# Patient Record
Sex: FEMALE | Race: WHITE | NOT HISPANIC OR LATINO | Employment: OTHER | URBAN - METROPOLITAN AREA
[De-identification: names, ages, dates, MRNs, and addresses within clinical notes are randomized per-mention and may not be internally consistent; named-entity substitution may affect disease eponyms.]

---

## 2019-10-22 ENCOUNTER — TELEPHONE (OUTPATIENT)
Dept: FAMILY MEDICINE | Facility: CLINIC | Age: 59
End: 2019-10-22

## 2019-10-22 DIAGNOSIS — Z79.899 ENCOUNTER FOR LONG-TERM (CURRENT) USE OF OTHER MEDICATIONS: ICD-10-CM

## 2019-10-22 DIAGNOSIS — E78.5 HYPERLIPEMIA: ICD-10-CM

## 2019-10-22 DIAGNOSIS — E03.9 HYPOTHYROID: Primary | ICD-10-CM

## 2019-10-22 NOTE — TELEPHONE ENCOUNTER
From eCW:  VERONIKA NEGRON 7/1/2019 12:53:34 PM > Slightly overtreating thyroid. Lets reduce levothyroxine to 88 mcg and rechedk TSH in 3 months. Cholesterol is slightly elevated as well but not worrisome. Encourage a healthy diet and exercise regimen if possible. Can recheck cholesterol in 3 months as well.**QUEST/EARLY OCT** Call with any questions. CHELE STAFFORD 7/1/2019 1:19:56 PM > lab call    10/22 no answer/ba

## 2019-10-22 NOTE — LETTER
[address] [facility] [address]  [place] [address]  Phone: [phone]   [phone]    MD Jamie Boogie MD Chequita Williams, MD Matthew Bassett, PA-C Linda Melerine, NP Jodi Powell, NP      Date: 10/30/2019        Patient: Antonieta Clay  YOB: 1960      Dear Ms Clay,         We have been unable to reach you by phone.  It is time to recheck your cholesterol and thyroid.  Your lab order has been sent to [facility].  Please fast for 8 hours before your blood draw but be sure to drink water during that time to prevent dehydration.       Call us if you have any questions.        Sincerely,     Frida Contreras

## 2019-11-04 ENCOUNTER — TELEPHONE (OUTPATIENT)
Dept: FAMILY MEDICINE | Facility: CLINIC | Age: 59
End: 2019-11-04

## 2019-11-04 NOTE — TELEPHONE ENCOUNTER
----- Message from Paul Renteria sent at 11/4/2019  8:48 AM CST -----  Contact: Tere Clay   Pt left a voice message, she would like to know can she come in today and get blood work done? Please give her a call back .   # [phone]

## 2019-11-05 LAB
CHOLEST SERPL-MCNC: 232 MG/DL
CHOLEST/HDLC SERPL: 4.5 (CALC)
HDLC SERPL-MCNC: 52 MG/DL
LDLC SERPL CALC-MCNC: 159 MG/DL (CALC)
NONHDLC SERPL-MCNC: 180 MG/DL (CALC)
TRIGL SERPL-MCNC: 100 MG/DL
TSH SERPL-ACNC: 1.04 MIU/L (ref 0.4–4.5)

## 2019-11-07 NOTE — TELEPHONE ENCOUNTER
Please call pt and let her know thyroid level within normal range- continue current medication. Her bad cholesterol (LDL) has risen since last labs- up to 159 from 135 though triglyceride and HDL within range- recommend low fat diet and regular exercise. Repeat TSH/reflex, CBC, CMP, lipids, UA/micro in 6 months

## 2019-11-08 ENCOUNTER — TELEPHONE (OUTPATIENT)
Dept: FAMILY MEDICINE | Facility: CLINIC | Age: 59
End: 2019-11-08

## 2019-11-08 NOTE — TELEPHONE ENCOUNTER
----- Message from Casandra Yu NP sent at 11/7/2019  3:39 PM CST -----  Please call pt and let her know thyroid level within normal range- continue current medication. Her bad cholesterol (LDL) has risen since last labs- up to 159 from 135 though triglyceride and HDL within range- recommend low fat diet and regular exercise. Repeat TSH/reflex, CBC, CMP, lipids, UA/micro in 6 months

## 2019-11-19 DIAGNOSIS — E03.9 HYPOTHYROIDISM, UNSPECIFIED TYPE: Primary | ICD-10-CM

## 2019-11-19 RX ORDER — LEVOTHYROXINE SODIUM 88 UG/1
1 TABLET ORAL DAILY
Refills: 0 | COMMUNITY
Start: 2019-10-25 | End: 2019-11-19 | Stop reason: SDUPTHER

## 2019-11-19 RX ORDER — LEVOTHYROXINE SODIUM 88 UG/1
88 TABLET ORAL DAILY
Qty: 90 TABLET | Refills: 1 | Status: SHIPPED | OUTPATIENT
Start: 2019-11-19 | End: 2020-01-20 | Stop reason: SDUPTHER

## 2019-11-19 NOTE — TELEPHONE ENCOUNTER
----- Message from Paul Renteria sent at 11/19/2019  9:10 AM CST -----  Contact: Antonieta Clay  Patient needs a refill on her on her Levothyroxine [facility] [place] [facility]  Pt# [phone]

## 2019-11-26 ENCOUNTER — OFFICE VISIT (OUTPATIENT)
Dept: FAMILY MEDICINE | Facility: CLINIC | Age: 59
End: 2019-11-26
Payer: COMMERCIAL

## 2019-11-26 ENCOUNTER — HOSPITAL ENCOUNTER (OUTPATIENT)
Dept: RADIOLOGY | Facility: HOSPITAL | Age: 59
Discharge: HOME | End: 2019-11-26
Attending: NURSE PRACTITIONER
Payer: COMMERCIAL

## 2019-11-26 ENCOUNTER — TELEPHONE (OUTPATIENT)
Dept: FAMILY MEDICINE | Facility: CLINIC | Age: 59
End: 2019-11-26

## 2019-11-26 VITALS
HEART RATE: 60 BPM | SYSTOLIC BLOOD PRESSURE: 130 MMHG | BODY MASS INDEX: 26.86 KG/M2 | WEIGHT: 136.81 LBS | HEIGHT: 60 IN | DIASTOLIC BLOOD PRESSURE: 82 MMHG

## 2019-11-26 DIAGNOSIS — M54.41 ACUTE BILATERAL LOW BACK PAIN WITH BILATERAL SCIATICA: ICD-10-CM

## 2019-11-26 DIAGNOSIS — M54.42 ACUTE BILATERAL LOW BACK PAIN WITH BILATERAL SCIATICA: Primary | ICD-10-CM

## 2019-11-26 DIAGNOSIS — M54.41 ACUTE BILATERAL LOW BACK PAIN WITH BILATERAL SCIATICA: Primary | ICD-10-CM

## 2019-11-26 DIAGNOSIS — M54.42 ACUTE BILATERAL LOW BACK PAIN WITH BILATERAL SCIATICA: ICD-10-CM

## 2019-11-26 PROCEDURE — 3008F BODY MASS INDEX DOCD: CPT | Mod: S$GLB,,, | Performed by: NURSE PRACTITIONER

## 2019-11-26 PROCEDURE — 3008F PR BODY MASS INDEX (BMI) DOCUMENTED: ICD-10-PCS | Mod: S$GLB,,, | Performed by: NURSE PRACTITIONER

## 2019-11-26 PROCEDURE — 99213 PR OFFICE/OUTPT VISIT, EST, LEVL III, 20-29 MIN: ICD-10-PCS | Mod: 25,S$GLB,, | Performed by: NURSE PRACTITIONER

## 2019-11-26 PROCEDURE — 99213 OFFICE O/P EST LOW 20 MIN: CPT | Mod: 25,S$GLB,, | Performed by: NURSE PRACTITIONER

## 2019-11-26 PROCEDURE — 96372 THER/PROPH/DIAG INJ SC/IM: CPT | Mod: S$GLB,,, | Performed by: NURSE PRACTITIONER

## 2019-11-26 PROCEDURE — 72110 X-RAY EXAM L-2 SPINE 4/>VWS: CPT | Mod: TC,PO

## 2019-11-26 PROCEDURE — 96372 PR INJECTION,THERAP/PROPH/DIAG2ST, IM OR SUBCUT: ICD-10-PCS | Mod: S$GLB,,, | Performed by: NURSE PRACTITIONER

## 2019-11-26 RX ORDER — IBUPROFEN 800 MG/1
800 TABLET ORAL
COMMUNITY
Start: 2019-11-26 | End: 2019-11-29

## 2019-11-26 RX ORDER — DEXAMETHASONE SODIUM PHOSPHATE 4 MG/ML
8 INJECTION, SOLUTION INTRA-ARTICULAR; INTRALESIONAL; INTRAMUSCULAR; INTRAVENOUS; SOFT TISSUE ONCE
Status: COMPLETED | OUTPATIENT
Start: 2019-11-26 | End: 2019-11-26

## 2019-11-26 RX ADMIN — DEXAMETHASONE SODIUM PHOSPHATE 8 MG: 4 INJECTION, SOLUTION INTRA-ARTICULAR; INTRALESIONAL; INTRAMUSCULAR; INTRAVENOUS; SOFT TISSUE at 09:11

## 2019-11-26 NOTE — PATIENT INSTRUCTIONS
Back Exercise to Keep Fit for Low Back Pain  To help in your recovery and to prevent further back problems, keep your back, abdominal muscles and legs strong. Walk daily as soon as you can. Gradually add other physical activities such as swimming and biking, which can help improve lower back strength. Begin as soon as you can do them comfortably. Do not do any exercises that make your pain a lot worse. The following are some back exercises that can help relieve low back pain.     Pelvic tilt   Repeat 5-10 times, twice per day.  Lie flat on your back (or stand with your back to a wall), knees bent, feet flat on the floor, body relaxed. Tighten your abdominal and buttock muscles, and tilt your pelvis. The curve of the small of your back should flatten towards the floor (or wall). Hold 10 seconds and then relax.       Knee raise     Repeat 5-10 times, twice per day.    Lie flat on your back, knees bent. Bring one knee slowly to your chest. Hug your knee gently. Then lower your leg toward the floor, keeping your knee bent. Do not straighten your legs. Repeat exercise with your other leg.              Partial press-up     Lie face down on a soft, firm surface. Do not turn your head to either side. Rest your arms bent at the elbows alongside your body. Relax for a few minutes. Then raise your upper body enough to lean on your elbows. Relax your lower back and legs as much as possible. Hold this position for 30 seconds at first. Gradually work up to five minutes. Or try slow press-ups. Hold each for five seconds, and repeat five to six times.              Copyright © 2012 by [employer]   Suman DUMONT, Duc ROONEY, Carmelina KHAN, Jennifer MARTI, Cristino R, Julia B, Hieu K, Kenneth C, Denver B, Gabino S, Gabby PARRA, Corby R. [employer]. Adult Acute and Subacute Low Back Pain. Updated November 2012.     Back Pain (Acute or Chronic)    Back pain is one of the most common  problems. The good news is that most people feel better in 1 to 2 weeks, and most of the rest in 1 to 2 months. Most people can remain active.  People experience and describe pain differently; not everyone is the same.  · The pain can be sharp, stabbing, shooting, aching, cramping or burning.  · Movement, standing, bending, lifting, sitting, or walking may worsen pain.  · It can be localized to one spot or area, or it can be more generalized.  · It can spread or radiate upwards, to the front, or go down your arms or legs (sciatica).  · It can cause muscle spasm.  Most of the time, mechanical problems with the muscles or spine cause the pain. Mechanical problems are usually caused by an injury to the muscles or ligaments. While illness can cause back pain, it is usually not caused by a serious illness. Mechanical problems include:   · Physical activity such as sports, exercise, work, or normal activity  · Overexertion, lifting, pushing, pulling incorrectly or too aggressively  · Sudden twisting, bending, or stretching from an accident, or accidental movement  · Poor posture  · Stretching or moving wrong, without noticing pain at the time  · Poor coordination, lack of regular exercise (check with your doctor about this)  · Spinal disc disease or arthritis  · Stress  Pain can also be related to pregnancy, or illness like appendicitis, bladder or kidney infections, pelvic infections, and many other things.  Acute back pain usually gets better in 1 to 2 weeks. Back pain related to disk disease, arthritis in the spinal joints or spinal stenosis (narrowing of the spinal canal) can become chronic and last for months or years.  Unless you had a physical injury (for example, a car accident or fall) X-rays are usually not needed for the initial evaluation of back pain. If pain continues and does not respond to medical treatment, X-rays and other tests may be needed.  Home care  Try these home care recommendations:  · When in  bed, try to find a position of comfort. A firm mattress is best. Try lying flat on your back with pillows under your knees. You can also try lying on your side with your knees bent up towards your chest and a pillow between your knees.  · At first, do not try to stretch out the sore spots. If there is a strain, it is not like the good soreness you get after exercising without an injury. In this case, stretching may make it worse.  · Avoid prolong sitting, long car rides, or travel. This puts more stress on the lower back than standing or walking.  · During the first 24 to 72 hours after an acute injury or flare up of chronic back pain, apply an ice pack to the painful area for 20 minutes and then remove it for 20 minutes. Do this over a period of 60 to 90 minutes or several times a day. This will reduce swelling and pain. Wrap the ice pack in a thin towel or plastic to protect your skin.  · You can start with ice, then switch to heat. Heat (hot shower, hot bath, or heating pad) reduces pain and works well for muscle spasms. Heat can be applied to the painful area for 20 minutes then remove it for 20 minutes. Do this over a period of 60 to 90 minutes or several times a day. Do not sleep on a heating pad. It can lead to skin burns or tissue damage.  · You can alternate ice and heat therapy. Talk with your doctor about the best treatment for your back pain.  · Therapeutic massage can help relax the back muscles without stretching them.  · Be aware of safe lifting methods and do not lift anything without stretching first.  Medicines  Talk to your doctor before using medicine, especially if you have other medical problems or are taking other medicines.  · You may use over-the-counter medicine as directed on the bottle to control pain, unless another pain medicine was prescribed. If you have chronic conditions like diabetes, liver or kidney disease, stomach ulcers, or gastrointestinal bleeding, or are taking blood  thinners, talk to your doctor before taking any medicine.  · Be careful if you are given a prescription medicines, narcotics, or medicine for muscle spasms. They can cause drowsiness, affect your coordination, reflexes, and judgement. Do not drive or operate heavy machinery.  Follow-up care  Follow up with your healthcare provider, or as advised.   A radiologist will review any X-rays that were taken. Your provide will notify you of any new findings that may affect your care.  Call 911  Call emergency services if any of the following occur:  · Trouble breathing  · Confusion  · Very drowsy or trouble awakening  · Fainting or loss of consciousness  · Rapid or very slow heart rate  · Loss of bowel or bladder control  When to seek medical advice  Call your healthcare provider right away if any of these occur:   · Pain becomes worse or spreads to your legs  · Weakness or numbness in one or both legs  · Numbness in the groin or genital area  Date Last Reviewed: 7/1/2016  © 2022-3196 [employer]. [address] [place] [address], [place] [address]. All rights reserved. This information is not intended as a substitute for professional medical care. Always follow your healthcare professional's instructions.

## 2019-11-26 NOTE — TELEPHONE ENCOUNTER
----- Message from Angie Holland sent at 11/26/2019  9:08 AM CST -----  vm- pt would like to be seen today for back pain  [phone]

## 2019-11-26 NOTE — PROGRESS NOTES
Patient ID: Antonieta Clay is a 59 y.o. female.    Chief Complaint: Back Pain (Lower back pain for past few days, using heating pad and taking ibuprofen which has not helped much//MM) and Medications (Pt did not bring medication bottles//MM)    Pt here for sick visit- reports started with pain across lower back approx 3 days ago. Pain across lower back, worse with movement- has been taking ibuprofen which only helps some. Used heating pad which helped some. Earlier today had shooting pain into bilat lateral thighs. Denies any strength change. Denies any recent trauma/falls.  Works at [employer] in the kitchen and, admits carries heavy trays for work          Past Medical History:   Diagnosis Date    Hypothyroidism      History reviewed. No pertinent surgical history.      Tobacco History:  reports that she has never smoked. She has never used smokeless tobacco.      Review of patient's allergies indicates:  No Known Allergies    Current Outpatient Medications:     levothyroxine (SYNTHROID) 88 MCG tablet, Take 1 tablet (88 mcg total) by mouth once daily., Disp: 90 tablet, Rfl: 1    multivit-iron-min-folic acid (ONE DAILY FOR WOMEN) 18-0.4 mg Tab, as directed, Disp: , Rfl:     ibuprofen (ADVIL,MOTRIN) 800 MG tablet, Take 1 tablet (800 mg total) by mouth every meal as needed for Pain., Disp: , Rfl:   No current facility-administered medications for this visit.     Review of Systems   Constitutional: Negative for activity change, chills and fever.   Respiratory: Negative for cough and shortness of breath.    Cardiovascular: Negative for chest pain.   Gastrointestinal: Negative for abdominal pain, diarrhea, nausea and vomiting.   Genitourinary: Negative for difficulty urinating and flank pain.   Musculoskeletal: Positive for back pain. Negative for gait problem.   Neurological: Negative for dizziness and weakness.          Objective:      Vitals:    11/26/19 0909   BP: 130/82   Pulse: 60    Weight: 62.1 kg (136 lb 12.8 oz)   Height: 5' (1.524 m)     Physical Exam   Constitutional: She is oriented to person, place, and time. She appears well-developed and well-nourished. No distress.   Cardiovascular: Normal rate and regular rhythm. Exam reveals no gallop and no friction rub.   No murmur heard.  Pulmonary/Chest: Breath sounds normal. She has no wheezes. She has no rales.   Musculoskeletal:        Lumbar back: She exhibits tenderness (mild tenderness bilat lumbar paraspinous muscles). She exhibits normal range of motion (normal forward flexion, able to touch toes), no bony tenderness, no pain and no spasm.   Neurological: She is alert and oriented to person, place, and time. She has normal strength and normal reflexes. No sensory deficit. Gait normal.   Skin: No rash noted.         Assessment:       1. Acute bilateral low back pain with bilateral sciatica           Plan:       Acute bilateral low back pain with bilateral sciatica  -     dexamethasone injection 8 mg  -     X-Ray Lumbar 4-5 View with Bending Views; Future; Expected date: 11/26/2019  -     ibuprofen (ADVIL,MOTRIN) 800 MG tablet; Take 1 tablet (800 mg total) by mouth every meal as needed for Pain.  -patient complaining of lower back pain and today had some pain radiating to legs.  No neuro deficits on exam and denies any trauma recently.  Will send for x-ray given the mild radicular symptom though discussed main treatment is aimed at stretching exercises and anti-inflammatories.  Cautioned if pain or leg symptoms persist or worsen then notify me.  Additional treatment options include physical therapy and or addition of muscle relaxer    Follow up if symptoms worsen or fail to improve.        11/26/2019 Casandra Yu NP

## 2019-11-26 NOTE — TELEPHONE ENCOUNTER
----- Message from Casandra Yu NP sent at 11/26/2019  1:14 PM CST -----  Please call pt and let her know lumbar xray shows mild disc space narrowing L2-L3 and L3-L4 and multilevel arthritis changes. Recommended treatment as we discussed however if pain continues/worsens then consider PT referral vs pain mgmt referral

## 2019-11-26 NOTE — LETTER
November 26, 2019      [facility]  [address]  [facility] [address]  Phone: [phone]  Fax: [phone]       Patient: Antonieta Clay   YOB: 1960  Date of Visit: 11/26/2019    To Whom It May Concern:    GOLDIE Clay  was at [facility] on 11/26/2019. She may return to work/school on 12/2/2019 with no restrictions. If you have any questions or concerns, or if I can be of further assistance, please do not hesitate to contact me.    Sincerely,    Casandra Yu, NP

## 2019-11-26 NOTE — PROGRESS NOTES
Please call pt and let her know lumbar xray shows mild disc space narrowing L2-L3 and L3-L4 and multilevel arthritis changes. Recommended treatment as we discussed however if pain continues/worsens then consider PT referral vs pain mgmt referral

## 2019-12-12 ENCOUNTER — HOSPITAL ENCOUNTER (OUTPATIENT)
Dept: RADIOLOGY | Facility: HOSPITAL | Age: 59
Discharge: HOME | End: 2019-12-12
Attending: OBSTETRICS & GYNECOLOGY
Payer: COMMERCIAL

## 2019-12-12 VITALS — BODY MASS INDEX: 26.87 KG/M2 | HEIGHT: 60 IN | WEIGHT: 136.88 LBS

## 2019-12-12 DIAGNOSIS — R92.8 ABNORMAL MAMMOGRAM: ICD-10-CM

## 2019-12-12 PROCEDURE — 77062 BREAST TOMOSYNTHESIS BI: CPT | Mod: TC,PO

## 2019-12-12 PROCEDURE — 76642 ULTRASOUND BREAST LIMITED: CPT | Mod: TC,PO,RT

## 2020-01-20 DIAGNOSIS — E03.9 HYPOTHYROIDISM, UNSPECIFIED TYPE: ICD-10-CM

## 2020-01-20 RX ORDER — LEVOTHYROXINE SODIUM 88 UG/1
88 TABLET ORAL DAILY
Qty: 90 TABLET | Refills: 1 | Status: SHIPPED | OUTPATIENT
Start: 2020-01-20 | End: 2020-07-14 | Stop reason: SDUPTHER

## 2020-01-20 NOTE — TELEPHONE ENCOUNTER
----- Message from Casandra Tracy sent at 1/20/2020  9:37 AM CST -----  Refill for thyroid pills. Pt want more than QTY 30. [facility] . Pt #[phone]

## 2020-05-11 ENCOUNTER — OFFICE VISIT (OUTPATIENT)
Dept: FAMILY MEDICINE | Facility: CLINIC | Age: 60
End: 2020-05-11
Payer: COMMERCIAL

## 2020-05-11 ENCOUNTER — TELEPHONE (OUTPATIENT)
Dept: FAMILY MEDICINE | Facility: CLINIC | Age: 60
End: 2020-05-11

## 2020-05-11 VITALS
HEIGHT: 60 IN | SYSTOLIC BLOOD PRESSURE: 132 MMHG | BODY MASS INDEX: 25.52 KG/M2 | HEART RATE: 62 BPM | WEIGHT: 130 LBS | DIASTOLIC BLOOD PRESSURE: 80 MMHG | TEMPERATURE: 98 F

## 2020-05-11 DIAGNOSIS — E03.9 ACQUIRED HYPOTHYROIDISM: ICD-10-CM

## 2020-05-11 DIAGNOSIS — H69.92 EUSTACHIAN TUBE DYSFUNCTION, LEFT: Primary | ICD-10-CM

## 2020-05-11 PROCEDURE — 3008F PR BODY MASS INDEX (BMI) DOCUMENTED: ICD-10-PCS | Mod: S$GLB,,, | Performed by: NURSE PRACTITIONER

## 2020-05-11 PROCEDURE — 99213 PR OFFICE/OUTPT VISIT, EST, LEVL III, 20-29 MIN: ICD-10-PCS | Mod: S$GLB,,, | Performed by: NURSE PRACTITIONER

## 2020-05-11 PROCEDURE — 3008F BODY MASS INDEX DOCD: CPT | Mod: S$GLB,,, | Performed by: NURSE PRACTITIONER

## 2020-05-11 PROCEDURE — 99213 OFFICE O/P EST LOW 20 MIN: CPT | Mod: S$GLB,,, | Performed by: NURSE PRACTITIONER

## 2020-05-11 RX ORDER — FLUTICASONE PROPIONATE 50 MCG
2 SPRAY, SUSPENSION (ML) NASAL DAILY
Qty: 18.2 ML | Refills: 1 | Status: SHIPPED | OUTPATIENT
Start: 2020-05-11 | End: 2020-06-09

## 2020-05-11 NOTE — PROGRESS NOTES
Patient ID: Antonieta Clay is a 59 y.o. female.    Chief Complaint: Otalgia (left ear//no bottles tb ) and Adenopathy (left side tb )    Pt here for sick visit- reports left ear and neck pain for approx 6 weeks. Describes pain as pressure to left ear and feels her glands may be slightly swollen left neck. Denies any fever/chills.  Reports went to urgent care a couple months ago and treated with abx but ear has continued to feel full. No drainage from ear or tinnitus          Past Medical History:   Diagnosis Date    Hypothyroidism      History reviewed. No pertinent surgical history.      Tobacco History:  reports that she has never smoked. She has never used smokeless tobacco.      Review of patient's allergies indicates:   Allergen Reactions    Pcn [penicillins] Swelling    Codeine Nausea And Vomiting       Current Outpatient Medications:     fluticasone propionate (FLONASE) 50 mcg/actuation nasal spray, 2 sprays (100 mcg total) by Each Nostril route once daily., Disp: 18.2 mL, Rfl: 1    levothyroxine (SYNTHROID) 88 MCG tablet, Take 1 tablet (88 mcg total) by mouth once daily., Disp: 90 tablet, Rfl: 1    multivit-iron-min-folic acid (ONE DAILY FOR WOMEN) 18-0.4 mg Tab, as directed, Disp: , Rfl:     Review of Systems   Constitutional: Negative for chills and fever.   HENT: Positive for ear pain. Negative for congestion, postnasal drip, rhinorrhea, sinus pain and sore throat.    Respiratory: Negative for cough, shortness of breath and wheezing.    Cardiovascular: Negative for chest pain.   Gastrointestinal: Negative for nausea and vomiting.   Skin: Negative for rash.   Neurological: Negative for dizziness and headaches.          Objective:      Vitals:    05/11/20 1512   BP: 132/80   Pulse: 62   Temp: 98 °F (36.7 °C)   Weight: 59 kg (130 lb)   Height: 5' (1.524 m)     Physical Exam   Constitutional: She is oriented to person, place, and time. She appears well-developed and well-nourished.   HENT:   Head:  Normocephalic and atraumatic.   Right Ear: Tympanic membrane and ear canal normal.   Left Ear: Ear canal normal. Tympanic membrane is not perforated and not erythematous. A middle ear effusion (TM dull/thickened) is present.   Nose: No mucosal edema or rhinorrhea. Right sinus exhibits no maxillary sinus tenderness and no frontal sinus tenderness. Left sinus exhibits no maxillary sinus tenderness and no frontal sinus tenderness.   Mouth/Throat: Mucous membranes are normal. No oropharyngeal exudate or posterior oropharyngeal erythema. No tonsillar exudate.   Eyes: Conjunctivae are normal.   Neck: Neck supple.   Shotty submandibular lymph nodes bilat, no erythema/pain   Cardiovascular: Normal rate and regular rhythm.   Pulmonary/Chest: Breath sounds normal. She has no wheezes. She has no rales.   Neurological: She is alert and oriented to person, place, and time.   Skin: No rash noted.   Nursing note and vitals reviewed.        Assessment:       1. Eustachian tube dysfunction, left    2. Acquired hypothyroidism           Plan:       Eustachian tube dysfunction, left  -     fluticasone propionate (FLONASE) 50 mcg/actuation nasal spray; 2 sprays (100 mcg total) by Each Nostril route once daily.  Dispense: 18.2 mL; Refill: 1  -pt advised no signs of infection, will treat for chronic effusion with nasal steroid spray and recommend OTC oral decongestant for 3-5 days    Acquired hypothyroidism  -     Comprehensive metabolic panel; Future; Expected date: 05/11/2020  -     Lipid Panel; Future; Expected date: 05/11/2020  -     TSH w/reflex to FT4; Future; Expected date: 05/11/2020  -labs before routine f/u in 2 months    Follow up in about 2 months (around 7/11/2020) for Labs before next visit.        5/11/2020 Casandra Yu NP

## 2020-05-11 NOTE — PATIENT INSTRUCTIONS

## 2020-06-09 ENCOUNTER — OFFICE VISIT (OUTPATIENT)
Dept: FAMILY MEDICINE | Facility: CLINIC | Age: 60
End: 2020-06-09
Payer: COMMERCIAL

## 2020-06-09 ENCOUNTER — TELEPHONE (OUTPATIENT)
Dept: FAMILY MEDICINE | Facility: CLINIC | Age: 60
End: 2020-06-09

## 2020-06-09 VITALS
SYSTOLIC BLOOD PRESSURE: 114 MMHG | BODY MASS INDEX: 25.32 KG/M2 | TEMPERATURE: 98 F | HEIGHT: 60 IN | HEART RATE: 64 BPM | DIASTOLIC BLOOD PRESSURE: 64 MMHG | WEIGHT: 129 LBS

## 2020-06-09 DIAGNOSIS — H69.92 EUSTACHIAN TUBE DYSFUNCTION, LEFT: Primary | ICD-10-CM

## 2020-06-09 PROCEDURE — 99213 OFFICE O/P EST LOW 20 MIN: CPT | Mod: S$GLB,,, | Performed by: NURSE PRACTITIONER

## 2020-06-09 PROCEDURE — 3008F PR BODY MASS INDEX (BMI) DOCUMENTED: ICD-10-PCS | Mod: S$GLB,,, | Performed by: NURSE PRACTITIONER

## 2020-06-09 PROCEDURE — 3008F BODY MASS INDEX DOCD: CPT | Mod: S$GLB,,, | Performed by: NURSE PRACTITIONER

## 2020-06-09 PROCEDURE — 99213 PR OFFICE/OUTPT VISIT, EST, LEVL III, 20-29 MIN: ICD-10-PCS | Mod: S$GLB,,, | Performed by: NURSE PRACTITIONER

## 2020-06-09 RX ORDER — AZITHROMYCIN 250 MG/1
TABLET, FILM COATED ORAL
Qty: 6 TABLET | Refills: 0 | Status: SHIPPED | OUTPATIENT
Start: 2020-06-09 | End: 2020-06-14

## 2020-06-09 RX ORDER — FLUTICASONE PROPIONATE 50 MCG
2 SPRAY, SUSPENSION (ML) NASAL DAILY
Qty: 18.2 ML | Refills: 1 | COMMUNITY
Start: 2020-06-09

## 2020-06-09 NOTE — PROGRESS NOTES
Patient ID: Antonieta Clay is a 59 y.o. female.    Chief Complaint: Otalgia (under left comes and goes, no bottles went over meds verbally, C-scope declined for now and declined FOBT// SW)    Pt here for sick visit.  Patient was seen last month with similar complaints of left ear pain.  Describes popping and pressure to left ear, denies any drainage.  No fever, chills, cough or sore throat.  Patient was prescribed Flonase at last visit however she reports she never started this.  She did have a nasal spray at home she used for about a week and then stopped though she is unclear of the medications name.  Over last couple weeks she has continued with similar symptoms to left ear and thought she may have a slightly enlarged lymph node to the left neck          Past Medical History:   Diagnosis Date    Hypothyroidism      History reviewed. No pertinent surgical history.      Tobacco History:  reports that she has never smoked. She has never used smokeless tobacco.      Review of patient's allergies indicates:   Allergen Reactions    Pcn [penicillins] Swelling    Codeine Nausea And Vomiting       Current Outpatient Medications:     fluticasone propionate (FLONASE) 50 mcg/actuation nasal spray, 2 sprays (100 mcg total) by Each Nostril route once daily., Disp: 18.2 mL, Rfl: 1    levothyroxine (SYNTHROID) 88 MCG tablet, Take 1 tablet (88 mcg total) by mouth once daily., Disp: 90 tablet, Rfl: 1    multivit-iron-min-folic acid (ONE DAILY FOR WOMEN) 18-0.4 mg Tab, as directed, Disp: , Rfl:     azithromycin (Z-AKIN) 250 MG tablet, Take 2 tablets by mouth on day 1; Take 1 tablet by mouth on days 2-5, Disp: 6 tablet, Rfl: 0    Review of Systems   Constitutional: Negative for chills and fever.   HENT: Positive for ear pain. Negative for congestion, postnasal drip, rhinorrhea, sinus pain and sore throat.    Respiratory: Negative for cough, shortness of breath and wheezing.    Cardiovascular: Negative for chest pain.    Gastrointestinal: Negative for nausea and vomiting.   Skin: Negative for rash.   Neurological: Negative for dizziness and headaches.          Objective:      Vitals:    06/09/20 1019   BP: 114/64   Pulse: 64   Temp: 98.3 °F (36.8 °C)   Weight: 58.5 kg (129 lb)   Height: 5' (1.524 m)     Physical Exam   Constitutional: She is oriented to person, place, and time. She appears well-developed and well-nourished.   HENT:   Head: Normocephalic and atraumatic.   Right Ear: Tympanic membrane and ear canal normal.   Left Ear: Ear canal normal. Tympanic membrane is not perforated and not erythematous. A middle ear effusion (mild TM dullness, no obvious effusion/AFL) is present.   Nose: No mucosal edema or rhinorrhea. Right sinus exhibits no maxillary sinus tenderness and no frontal sinus tenderness. Left sinus exhibits no maxillary sinus tenderness and no frontal sinus tenderness.   Mouth/Throat: Mucous membranes are normal. No oropharyngeal exudate or posterior oropharyngeal erythema. No tonsillar exudate.   Eyes: Conjunctivae are normal.   Neck: Neck supple.   Shotty submandibular lymph nodes bilat, no erythema/pain   Cardiovascular: Normal rate and regular rhythm.   Pulmonary/Chest: Breath sounds normal. She has no wheezes. She has no rales.   Musculoskeletal: She exhibits no edema.   Lymphadenopathy:        Head (right side): No tonsillar, no preauricular and no posterior auricular adenopathy present.        Head (left side): No tonsillar, no preauricular and no posterior auricular adenopathy present.     She has no cervical adenopathy.   Neurological: She is alert and oriented to person, place, and time.   Skin: No rash noted.   Nursing note and vitals reviewed.        Assessment:       1. Eustachian tube dysfunction, left           Plan:       Eustachian tube dysfunction, left  Comments:  pt advised still suspect most of her sxs are eustachian tube dysfxn- recommend daily nasal steroid spray though will treat with  zpak- if no improvement ENT ref  Orders:  -     azithromycin (Z-AKIN) 250 MG tablet; Take 2 tablets by mouth on day 1; Take 1 tablet by mouth on days 2-5  Dispense: 6 tablet; Refill: 0  -     fluticasone propionate (FLONASE) 50 mcg/actuation nasal spray; 2 sprays (100 mcg total) by Each Nostril route once daily.  Dispense: 18.2 mL; Refill: 1      Follow up if symptoms worsen or fail to improve, for as scheduled.        6/9/2020 Casandra Yu NP

## 2020-06-09 NOTE — TELEPHONE ENCOUNTER
----- Message from Lorraine Arango sent at 6/9/2020  8:38 AM CDT -----  Pt had fluid in the ear 3 weeks ago and otc meds are not working and pt is still having pain   Pt [phone]

## 2020-07-02 ENCOUNTER — TELEPHONE (OUTPATIENT)
Dept: FAMILY MEDICINE | Facility: CLINIC | Age: 60
End: 2020-07-02

## 2020-07-02 DIAGNOSIS — H69.92 EUSTACHIAN TUBE DYSFUNCTION, LEFT: Primary | ICD-10-CM

## 2020-07-02 NOTE — TELEPHONE ENCOUNTER
Spoke to pt to let her know she is due to have fasting labs before her next office visit. Pt verbalized understanding

## 2020-07-02 NOTE — TELEPHONE ENCOUNTER
After hours: pt wants the nurse to call her back.   Spoke with pt she has already seen Casandra, she just wants a referral to  for her ears. Please sign.

## 2020-07-08 ENCOUNTER — TELEPHONE (OUTPATIENT)
Dept: FAMILY MEDICINE | Facility: CLINIC | Age: 60
End: 2020-07-08

## 2020-07-08 NOTE — TELEPHONE ENCOUNTER
From [facility]ind [facility]-lab due. Spoke to patient that fasting lab is due prior to office visit. Said she will come this week. Has ov 7/15. Updated remind me.

## 2020-07-14 ENCOUNTER — TELEPHONE (OUTPATIENT)
Dept: FAMILY MEDICINE | Facility: CLINIC | Age: 60
End: 2020-07-14

## 2020-07-14 DIAGNOSIS — E03.9 HYPOTHYROIDISM, UNSPECIFIED TYPE: ICD-10-CM

## 2020-07-14 RX ORDER — LEVOTHYROXINE SODIUM 88 UG/1
88 TABLET ORAL DAILY
Qty: 30 TABLET | Refills: 0 | Status: SHIPPED | OUTPATIENT
Start: 2020-07-14 | End: 2020-12-14 | Stop reason: SDUPTHER

## 2020-07-14 NOTE — TELEPHONE ENCOUNTER
----- Message from Jeannette Romel, RT sent at 7/8/2020  8:49 AM CDT -----    ----- Message -----  From: Frida Pratt  Sent: 5/1/2020  To: Frida Pratt    Notes recorded by Casandra Yu NP on 11/7/2019 at 3:39 PM CST  Please call pt and let her know thyroid level within normal range- continue current medication. Her bad cholesterol (LDL) has risen since last labs- up to 159 from 135 though triglyceride and HDL within range- recommend low fat diet and regular exercise. Repeat TSH/reflex, CBC, CMP, lipids, UA/micro in 6 months

## 2020-07-14 NOTE — TELEPHONE ENCOUNTER
Spoke to patient that fasting lab is due. Said she is holding off on this for now because Dr Nguyen found a tumor on her salivary gland and he will want some labs. Has ov with him 7/23 and wants to wait to see if he orders lab.

## 2020-07-14 NOTE — TELEPHONE ENCOUNTER
----- Message from [facility], RT sent at 7/14/2020 12:57 PM CDT -----  Needs refill on Levothyroxine, doesn't know strength. She is aware she is due for lab, but is waiting to see if Dr Nguyen orders lab at 7/23 visit. Said he found tumor on her salivary gland and she told our office this already. Harpreet Alvarez. Pt # [phone]

## 2020-07-23 DIAGNOSIS — Z87.898 PERSONAL HISTORY OF SOLITARY PULMONARY NODULE: ICD-10-CM

## 2020-07-23 DIAGNOSIS — K11.8 MASS OF PAROTID GLAND: Primary | ICD-10-CM

## 2020-07-30 ENCOUNTER — HOSPITAL ENCOUNTER (OUTPATIENT)
Dept: RADIOLOGY | Facility: HOSPITAL | Age: 60
Discharge: HOME | End: 2020-07-30
Attending: OTOLARYNGOLOGY
Payer: COMMERCIAL

## 2020-07-30 DIAGNOSIS — Z87.898 PERSONAL HISTORY OF SOLITARY PULMONARY NODULE: ICD-10-CM

## 2020-07-30 PROCEDURE — 25500020 PHARM REV CODE 255: Mod: PO

## 2020-07-30 PROCEDURE — 71260 CT THORAX DX C+: CPT | Mod: TC,PO

## 2020-07-30 RX ADMIN — IOHEXOL 100 ML: 350 INJECTION, SOLUTION INTRAVENOUS at 03:07

## 2020-08-04 ENCOUNTER — TELEPHONE (OUTPATIENT)
Dept: FAMILY MEDICINE | Facility: CLINIC | Age: 60
End: 2020-08-04

## 2020-08-04 NOTE — TELEPHONE ENCOUNTER
LMOR on cell, no voicemail on home, that fasting lab is due and that last time we talked, she wanted to see if Dr Nguyen ordered labs and if so we could possibly combine orders. Told patient on voicemail if he didn't order lab to be sure she has these drawn soon. This is 3rd attempt to reach patient. Can I shar reminder complete?

## 2020-08-17 DIAGNOSIS — E03.9 HYPOTHYROIDISM, UNSPECIFIED TYPE: ICD-10-CM

## 2020-08-17 NOTE — TELEPHONE ENCOUNTER
Spoke to patient today. Said that she is getting pre-op labs done tomorrow. I informed her that they may not draw what we need so she is coming to  our lab orders to bring when she gets pre-op labs at [facility] tomorrow.

## 2020-08-17 NOTE — TELEPHONE ENCOUNTER
Patient called back today. States that she is getting lab tomorrow as pre-op. She is going to come  a copy of our lab orders so that she can get those drawn at the same time. Copy placed in accordion.

## 2020-08-17 NOTE — TELEPHONE ENCOUNTER
Lm to call - needs labs.  We have left several messages for her to get TSH done.    SILAS Machado.

## 2020-08-17 NOTE — TELEPHONE ENCOUNTER
----- Message from Lorraine Arango sent at 8/17/2020  8:45 AM CDT -----  Regarding: refills  Synthroid  Pharm [facility] [address]  Pt  [phone]

## 2020-08-31 RX ORDER — LEVOTHYROXINE SODIUM 88 UG/1
88 TABLET ORAL DAILY
Qty: 30 TABLET | Refills: 0 | OUTPATIENT
Start: 2020-08-31 | End: 2020-09-30

## 2020-08-31 NOTE — TELEPHONE ENCOUNTER
Called patient again. LMOR that fasting lab is way overdue and to call me if she has any questions. Casandra Hirsch gave me permission to sign off the reminder because we contacted her 3 times. SILAS.

## 2020-09-03 ENCOUNTER — TELEPHONE (OUTPATIENT)
Dept: FAMILY MEDICINE | Facility: CLINIC | Age: 60
End: 2020-09-03

## 2020-09-03 NOTE — TELEPHONE ENCOUNTER
----- Message from Lorraine Arango sent at 9/3/2020 11:25 AM CDT -----  Regarding: junior Gonzalez is under the care of her dr she just had surgery with and will come in for labs when she is released

## 2020-09-17 ENCOUNTER — OFFICE VISIT (OUTPATIENT)
Dept: RADIATION ONCOLOGY | Facility: CLINIC | Age: 60
End: 2020-09-17
Payer: COMMERCIAL

## 2020-09-17 ENCOUNTER — SOCIAL WORK (OUTPATIENT)
Dept: HEMATOLOGY/ONCOLOGY | Facility: CLINIC | Age: 60
End: 2020-09-17

## 2020-09-17 VITALS
DIASTOLIC BLOOD PRESSURE: 77 MMHG | RESPIRATION RATE: 16 BRPM | HEART RATE: 57 BPM | OXYGEN SATURATION: 97 % | TEMPERATURE: 98 F | SYSTOLIC BLOOD PRESSURE: 121 MMHG | BODY MASS INDEX: 24.8 KG/M2 | WEIGHT: 127 LBS

## 2020-09-17 DIAGNOSIS — C08.9 MALIGNANT NEOPLASM OF MAJOR SALIVARY GLAND: ICD-10-CM

## 2020-09-17 PROCEDURE — 3008F PR BODY MASS INDEX (BMI) DOCUMENTED: ICD-10-PCS | Mod: S$GLB,,, | Performed by: RADIOLOGY

## 2020-09-17 PROCEDURE — 99205 OFFICE O/P NEW HI 60 MIN: CPT | Mod: S$GLB,,, | Performed by: RADIOLOGY

## 2020-09-17 PROCEDURE — 3008F BODY MASS INDEX DOCD: CPT | Mod: S$GLB,,, | Performed by: RADIOLOGY

## 2020-09-17 PROCEDURE — 99205 PR OFFICE/OUTPT VISIT, NEW, LEVL V, 60-74 MIN: ICD-10-PCS | Mod: S$GLB,,, | Performed by: RADIOLOGY

## 2020-09-17 NOTE — PROGRESS NOTES
I met with patient and her  to complete new patient orientation and the NCCN Distress Screening: she indicated a rating of 0.  Patient's  is under the care of Dr. Sagastume for Melanoma.  Patient denied needing psychosocial support at this time.  I provided my contact information in the event she needs supportive services in the future.

## 2020-09-17 NOTE — PROGRESS NOTES
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 Clay is an unfortunate 59F who initially noted a fullness and discomfort of her left jaw and ear in summer 2020, and after eval and w/u by PCP and ENT was found on CT to have a large mass arising from her left parotid gland.  She was then referred to Dr. Darden at [facility] who performed a left parotidectomy w/ cervical neck dissection as well as additional cutaneous margin excisions due to dermal/cutaneous invasion and (+)SM of primary resection.  Ultimately clear SM's were achieved, however (+)PNI, (+)LVSI, and 6 of 32 dissected LN's were (+) for disease w/ some having (+)MARIJA.  Thus the patient has now been referred to [facility] for eval and discussion re: PORT.  She endorses the above hx and report an uncomplicated stable postop recovery thus far.  She endorses some left-sided facial droop and eyelid palsy since surgery as well as dysgeusia and xerostomia, however denies any vision/hearing/speech changes, epistaxis, otalgia, or dysphagia/odynophagia, nor any sz activity, focal deficits, parasthesias, or other acute changes/concerns.      CT chest (7/30/2020):        L parotidectomy w/ unilateral cervical LND (8/24/2020):        REVIEW OF SYSTEMS:  A complete ROS was performed and the patient denies any acute changes/concerns other than per HPI.    Past Medical History:   Diagnosis Date    Hypothyroidism      No past surgical history on file.  Social History     Socioeconomic History    Marital status:      Spouse name: Not on file    Number of children: Not on file    Years of education: Not on file    Highest education level: Not on file   Occupational History    Not on file   Social Needs    Financial resource strain: Not on  file    Food insecurity     Worry: Not on file     Inability: Not on file    Transportation needs     Medical: Not on file     Non-medical: Not on file   Tobacco Use    Smoking status: Never Smoker    Smokeless tobacco: Never Used   Substance and Sexual Activity    Alcohol use: Never     Frequency: Never    Drug use: Not on file    Sexual activity: Not on file   Lifestyle    Physical activity     Days per week: Not on file     Minutes per session: Not on file    Stress: Not on file   Relationships    Social connections     Talks on phone: Not on file     Gets together: Not on file     Attends Anabaptism service: Not on file     Active member of club or organization: Not on file     Attends meetings of clubs or organizations: Not on file     Relationship status: Not on file   Other Topics Concern    Not on file   Social History Narrative    Not on file     Family History   Problem Relation Age of Onset    Breast cancer Mother     Breast cancer Maternal Aunt        PRIOR HISTORY OF CHEMOTHERAPY OR RADIOTHERAPY: Please see HPI for patients prior oncologic history.    Medication List with Changes/Refills   Current Medications    FLUTICASONE PROPIONATE (FLONASE) 50 MCG/ACTUATION NASAL SPRAY    2 sprays (100 mcg total) by Each Nostril route once daily.    LEVOTHYROXINE (SYNTHROID) 88 MCG TABLET    Take 1 tablet (88 mcg total) by mouth once daily.    MULTIVIT-IRON-MIN-FOLIC ACID (ONE DAILY FOR WOMEN) 18-0.4 MG TAB    as directed     Review of patient's allergies indicates:   Allergen Reactions    Pcn [penicillins] Swelling    Codeine Nausea And Vomiting       QUALITY OF LIFE: 80%- Normal Activity with Effort: Some Symptoms of Disease    There were no vitals filed for this visit.  There is no height or weight on file to calculate BMI.    PHYSICAL EXAM:   GENERAL: alert; in no apparent distress.   HEAD: mild asymmetric left-sided facial droop is evident; otherwise normocephalic, atraumatic.  EYES: Left eyelid  palsy is noted w/ inability to voluntarily close eyelid; pupils are equal, round, reactive to light and accommodation. Sclera anicteric. Conjunctiva not injected.   NOSE/THROAT: no nasal erythema or rhinorrhea. Oropharynx pink, without erythema, ulcerations or thrush.   NECK: surgical incisions of left neck/parotid healing well w/o inflammation, seroma, or bleeding noted; no cervical motion rigidity; supple with no masses.  CHEST: clear to auscultation bilaterally; no wheezes, crackles or rubs. Patient is speaking comfortably on room air with normal work of breathing without using accessory muscles of respiration.  CARDIOVASCULAR: regular rate and rhythm; no murmurs, rubs or gallops.  ABDOMEN: soft, nontender, nondistended. Bowel sounds present.   MUSCULOSKELETAL: no tenderness to palpation along the spine or scapulae. Normal range of motion.  NEUROLOGIC: cranial nerves II-XII intact bilaterally. Strength 5/5 in bilateral upper and lower extremities. No sensory deficits appreciated. Reflexes globally intact. No cerebellar signs. Normal gait.  LYMPHATIC: no cervical, supraclavicular or axillary adenopathy appreciated bilaterally.   EXTREMITIES: no clubbing, cyanosis, edema.  SKIN: no erythema, rashes, ulcerations noted.     REVIEW OF IMAGING/PATHOLOGY/LABS: Please see HPI. All images reviewed personally by dictating physician.       ASSESSMENT: Antonieta Clay is a 59 y.o. 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    PLAN:  After complete review of the patient's chart/hx and eval/discussion w/ her and her  today, I explained that I agree w/ Dr. Darden's recommendation for PORT in this setting but also that I would like them to consult w/ a [facility] re: the option of adding concurrent radiosensitizing chemo to her RT due to an accumulation of high-risk features, such as dermal invasion, (+)PNI, (+)LVSI, and (+)MARIJA.  The addition  of systemic therapy to PORT is a category-2B NCCN recommendation w/ any of these features and typically reserved for gross disease and/or very advanced disease, however her constellation of high-risk features may warrant such aggressive therapy.      Her PORT will consist of 6600 cGy in 33 daily fxns of IMRT.  I have also ordered a maxillofacial MRI for eval of cranial/facial nerve enhancement for RT fusion/targeting.    I spent over one hour discussing this plan w/ the patient emphasizing the importance of nutrition for weight maintenance and hydration to prevent the need for treatment breaks and/or PEG tube placement.  We reviewed the rationales, risks, benefits, and toxicities of H&N RT, including but not limited to, fatigue, skin erythema/discomfort/desquamation, late skin thickening and fibrosis, worsening of dysphagia, esophagitis, oral mucositis, trismus, xerostomia, hearing changes or tinnitus, voice changes, vision changes/loss, increased aspiration risk and/or laryngeal edema requiring intubation or tracheostomy, spinal cord myelopathy, mandibular necrosis requiring surgery, and secondary malignancy.  I carefully explained the process of simulation and treatment delivery with weekly physician visits.  It was also explained to the patient that she must be evaluated by her dentist for possible extractions prior to head and neck radiation therapy due to expected healing complications of future dental procedures, which may be mitigated by prophylactic extractions.  She was provided written instructions to share with her dentist regarding this request.  I carefully explained the process of simulation and treatment delivery with weekly physician visits.     The patient verbalized understanding of the above plan, risks, benefits, and details, and informed consent was obtained.  We will proceed with RTP-CT imaging after dental clearance, maxillofacial MRI, and [facility] with plans to initiate RT w/in the  next 1-2 weeks.  Contact info was exchanged, and the patient was encouraged to contact our clinic with any questions, needs, or concerns.    DISPOSITION: RTC FOR CT SIM    I have personally seen and evaluated this patient. Greater than 50% of this time was spent discussing coordination of care and/or counseling.    PHYSICIAN: Ismael Monroy III, MD    Thank you for the opportunity to meet and consult with Antonieta Clay.   Please feel free to contact me to discuss the above recommendation further.

## 2020-09-18 NOTE — PATIENT INSTRUCTIONS
Patient given education and instructions on dental care. Radiation therapy to the Head and neck instruction sheet given along with skin care  Instructions.   SUAD booklet given.  Dental clearance form given.  Patient verbalized understanding.

## 2020-09-21 ENCOUNTER — OFFICE VISIT (OUTPATIENT)
Dept: HEMATOLOGY/ONCOLOGY | Facility: CLINIC | Age: 60
End: 2020-09-21
Payer: COMMERCIAL

## 2020-09-21 VITALS
HEART RATE: 57 BPM | BODY MASS INDEX: 24.78 KG/M2 | TEMPERATURE: 98 F | DIASTOLIC BLOOD PRESSURE: 75 MMHG | WEIGHT: 126.88 LBS | RESPIRATION RATE: 19 BRPM | SYSTOLIC BLOOD PRESSURE: 127 MMHG

## 2020-09-21 DIAGNOSIS — C07 MALIGNANT NEOPLASM OF PAROTID GLAND: Primary | ICD-10-CM

## 2020-09-21 DIAGNOSIS — C08.9 MALIGNANT NEOPLASM OF MAJOR SALIVARY GLAND: ICD-10-CM

## 2020-09-21 PROCEDURE — 3008F BODY MASS INDEX DOCD: CPT | Mod: S$GLB,,, | Performed by: INTERNAL MEDICINE

## 2020-09-21 PROCEDURE — 99204 PR OFFICE/OUTPT VISIT, NEW, LEVL IV, 45-59 MIN: ICD-10-PCS | Mod: S$GLB,,, | Performed by: INTERNAL MEDICINE

## 2020-09-21 PROCEDURE — 3008F PR BODY MASS INDEX (BMI) DOCUMENTED: ICD-10-PCS | Mod: S$GLB,,, | Performed by: INTERNAL MEDICINE

## 2020-09-21 PROCEDURE — 99204 OFFICE O/P NEW MOD 45 MIN: CPT | Mod: S$GLB,,, | Performed by: INTERNAL MEDICINE

## 2020-09-21 NOTE — PROGRESS NOTES
INITIAL [facility] HEM/ONC CONSULTATION      Subjective:       Patient ID: Antonieta Clay is a 59 y.o. female.    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 Margin positive  T4aN3b    Chief Complaint: No chief complaint on file.  left parotid cancer.     Patient is a 60yo female who noticed fullness in her left ear 4 to 5 months ago.  She was treated with abx with no improvement and referred to Dr. Nguyen who did a CT scan which found a mass in the left parotid.  She was then seen by Dr. Wolff who performed a left parotidectomy on 8/31/2020 with left neck dissection.  Path c/w T4aN3b adenoid cystic carcinoma.  Margins are positive.  She is to get radiation therapy and is here on recommendations from the medical oncology point of view.        Past Medical History:   Diagnosis Date    Hypothyroidism        History reviewed. No pertinent surgical history.    Social History     Socioeconomic History    Marital status:      Spouse name: Not on file    Number of children: Not on file    Years of education: Not on file    Highest education level: Not on file   Occupational History    Not on file   Social Needs    Financial resource strain: Not on file    Food insecurity     Worry: Not on file     Inability: Not on file    Transportation needs     Medical: Not on file     Non-medical: Not on file   Tobacco Use    Smoking status: Never Smoker    Smokeless tobacco: Never Used   Substance and Sexual Activity    Alcohol use: Never     Frequency: Never    Drug use: Not on file    Sexual activity: Not on file   Lifestyle    Physical activity     Days per week: Not on file     Minutes per session: Not on file    Stress: Not on file   Relationships    Social connections     Talks on phone: Not on file     Gets together: Not on file     Attends Episcopalian service: Not on file     Active member of club or organization: Not on file     Attends  meetings of clubs or organizations: Not on file     Relationship status: Not on file   Other Topics Concern    Not on file   Social History Narrative    Not on file       Family History   Problem Relation Age of Onset    Breast cancer Mother     Breast cancer Maternal Aunt        Review of patient's allergies indicates:   Allergen Reactions    Pcn [penicillins] Swelling    Codeine Nausea And Vomiting       Current Outpatient Medications:     fluticasone propionate (FLONASE) 50 mcg/actuation nasal spray, 2 sprays (100 mcg total) by Each Nostril route once daily., Disp: 18.2 mL, Rfl: 1    levothyroxine (SYNTHROID) 88 MCG tablet, Take 1 tablet (88 mcg total) by mouth once daily., Disp: 30 tablet, Rfl: 0    multivit-iron-min-folic acid (ONE DAILY FOR WOMEN) 18-0.4 mg Tab, as directed, Disp: , Rfl:     All medications and past history have been reviewed.    Review of Systems   Constitutional: Negative for activity change, appetite change, diaphoresis, fatigue, fever and unexpected weight change.   HENT: Negative for congestion and hearing loss.    Eyes: Negative for visual disturbance.   Respiratory: Negative for cough, chest tightness and shortness of breath.    Cardiovascular: Negative for chest pain and leg swelling.   Gastrointestinal: Negative for abdominal pain, blood in stool, diarrhea, nausea and vomiting.   Endocrine: Negative for cold intolerance and heat intolerance.   Genitourinary: Negative for difficulty urinating, dysuria and hematuria.   Neurological: Negative for dizziness and headaches.   Hematological: Negative for adenopathy. Does not bruise/bleed easily.   Psychiatric/Behavioral: Negative for behavioral problems.       Objective:        /75   Pulse (!) 57   Temp 97.9 °F (36.6 °C)   Resp 19   Wt 57.6 kg (126 lb 14.4 oz)   BMI 24.78 kg/m²     Physical Exam  Constitutional:       Appearance: She is well-developed.   HENT:      Head: Normocephalic and atraumatic.      Right Ear:  External ear normal.      Left Ear: External ear normal.   Eyes:      Conjunctiva/sclera: Conjunctivae normal.      Pupils: Pupils are equal, round, and reactive to light.   Neck:      Thyroid: No thyromegaly.      Trachea: No tracheal deviation.   Cardiovascular:      Rate and Rhythm: Normal rate and regular rhythm.      Heart sounds: Normal heart sounds.   Pulmonary:      Effort: Pulmonary effort is normal.      Breath sounds: Normal breath sounds.   Abdominal:      General: Bowel sounds are normal. There is no distension.      Palpations: Abdomen is soft. There is no mass.      Tenderness: There is no abdominal tenderness.   Skin:     Findings: No rash.   Neurological:      Comments: Neuro intact througout   Psychiatric:         Behavior: Behavior normal.         Thought Content: Thought content normal.         Judgment: Judgment normal.           Lab  No results found for this or any previous visit (from the past 336 hour(s)).  CMP  No results found for: NA, K, CL, CO2, GLU, BUN, CREATININE, CALCIUM, PROT, ALBUMIN, BILITOT, ALKPHOS, AST, ALT, ANIONGAP, ESTGFRAFRICA, EGFRNONAA      Specimen (12h ago, onward)    None                All lab results and imaging results have been reviewed and discussed with the patient.     Assessment:       1. Malignant neoplasm of major salivary gland      Problem List Items Addressed This Visit     Malignant neoplasm of major salivary gland     I had a long discussion with Mr. Clay about this relatively aggressive cancer and reviewed the literature here.  There is no clear benefit to the addition of chemotherapy to radiation and is some situations, appears possible to have a poorer outcome.  I would like to get a PET scan to evaluate for distant mets and discussed this today.  I will discuss this further with Dr. Stevenson as the chemo/rt is still under study via RTOG 1008.  Discussed this in detail today.           Relevant Orders    NM PET CT Routine Skull to Mid Thigh         Cancer Staging  Malignant neoplasm of major salivary gland  Staging form: Major Salivary Glands, AJCC 8th Edition  - Clinical stage from 9/17/2020: Stage IVB (cT4a, cN3b, cM0) - Signed by Ismael Monroy III, MD on 9/17/2020        Plan:         Follow up in about 4 weeks (around 10/19/2020).       The plan was discussed with the patient and all questions/concerns have been answered to the patient's satisfaction.

## 2020-09-21 NOTE — ASSESSMENT & PLAN NOTE
I had a long discussion with Mr. Clay about this relatively aggressive cancer and reviewed the literature here.  There is no clear benefit to the addition of chemotherapy to radiation and is some situations, appears possible to have a poorer outcome.  I would like to get a PET scan to evaluate for distant mets and discussed this today.  I will discuss this further with Dr. Stevenson as the chemo/rt is still under study via RTOG 1008.  Discussed this in detail today.

## 2020-09-23 ENCOUNTER — HOSPITAL ENCOUNTER (OUTPATIENT)
Dept: RADIOLOGY | Facility: HOSPITAL | Age: 60
Discharge: HOME | End: 2020-09-23
Attending: RADIOLOGY
Payer: COMMERCIAL

## 2020-09-23 DIAGNOSIS — C08.9 MALIGNANT NEOPLASM OF MAJOR SALIVARY GLAND: ICD-10-CM

## 2020-09-23 PROCEDURE — 70543 MRI ORBT/FAC/NCK W/O &W/DYE: CPT | Mod: TC,PO

## 2020-09-23 PROCEDURE — 25500020 PHARM REV CODE 255: Mod: PO | Performed by: RADIOLOGY

## 2020-09-23 PROCEDURE — A9585 GADOBUTROL INJECTION: HCPCS | Mod: PO | Performed by: RADIOLOGY

## 2020-09-23 RX ORDER — GADOBUTROL 604.72 MG/ML
5.5 INJECTION INTRAVENOUS
Status: COMPLETED | OUTPATIENT
Start: 2020-09-23 | End: 2020-09-23

## 2020-09-23 RX ADMIN — GADOBUTROL 5.5 ML: 604.72 INJECTION INTRAVENOUS at 01:09

## 2020-09-25 LAB
ALBUMIN SERPL-MCNC: 4.3 G/DL (ref 3.6–5.1)
ALBUMIN/GLOB SERPL: 1.6 (CALC) (ref 1–2.5)
ALP SERPL-CCNC: 62 U/L (ref 37–153)
ALT SERPL-CCNC: 14 U/L (ref 6–29)
AST SERPL-CCNC: 16 U/L (ref 10–35)
BILIRUB SERPL-MCNC: 0.4 MG/DL (ref 0.2–1.2)
BUN SERPL-MCNC: 12 MG/DL (ref 7–25)
BUN/CREAT SERPL: NORMAL (CALC) (ref 6–22)
CALCIUM SERPL-MCNC: 9.7 MG/DL (ref 8.6–10.4)
CHLORIDE SERPL-SCNC: 106 MMOL/L (ref 98–110)
CHOLEST SERPL-MCNC: 251 MG/DL
CHOLEST/HDLC SERPL: 4.6 (CALC)
CO2 SERPL-SCNC: 29 MMOL/L (ref 20–32)
CREAT SERPL-MCNC: 0.71 MG/DL (ref 0.5–1.05)
GFRSERPLBLD MDRD-ARVRAT: 93 ML/MIN/1.73M2
GLOBULIN SER CALC-MCNC: 2.7 G/DL (CALC) (ref 1.9–3.7)
GLUCOSE SERPL-MCNC: 87 MG/DL (ref 65–99)
HDLC SERPL-MCNC: 55 MG/DL
LDLC SERPL CALC-MCNC: 172 MG/DL (CALC)
NONHDLC SERPL-MCNC: 196 MG/DL (CALC)
POTASSIUM SERPL-SCNC: 4.2 MMOL/L (ref 3.5–5.3)
PROT SERPL-MCNC: 7 G/DL (ref 6.1–8.1)
SODIUM SERPL-SCNC: 142 MMOL/L (ref 135–146)
T4 FREE SERPL-MCNC: 1.3 NG/DL (ref 0.8–1.8)
TRIGL SERPL-MCNC: 116 MG/DL
TSH SERPL-ACNC: 0.16 MIU/L (ref 0.4–4.5)

## 2020-09-30 ENCOUNTER — TELEPHONE (OUTPATIENT)
Dept: FAMILY MEDICINE | Facility: CLINIC | Age: 60
End: 2020-09-30

## 2020-09-30 NOTE — PROGRESS NOTES
Spoke to patient with results verbatim per Casandra. Verbalized understanding on all including to follow low fat diet and daily exercise.

## 2020-09-30 NOTE — PROGRESS NOTES
Please call pt and let her know TSH is slightly out of range however free T4 is normal so recommend continue with current levothyroxine dose. LDL cholesterol remains elevated at 172, up from 159 though calculated 10 year CV risk is still very low at 3%. Would recommend low fat diet and regular exercise. Blood sugar, liver and kidney function all within normal range.

## 2020-09-30 NOTE — TELEPHONE ENCOUNTER
----- Message from Casandra Yu NP sent at 9/29/2020  8:52 PM CDT -----  Please call pt and let her know TSH is slightly out of range however free T4 is normal so recommend continue with current levothyroxine dose. LDL cholesterol remains elevated at 172, up from 159 though calculated 10 year CV risk is still very low at 3%. Would recommend low fat diet and regular exercise. Blood sugar, liver and kidney function all within normal range.

## 2020-10-16 ENCOUNTER — HOSPITAL ENCOUNTER (OUTPATIENT)
Dept: RADIOLOGY | Facility: HOSPITAL | Age: 60
Discharge: HOME | End: 2020-10-16
Attending: INTERNAL MEDICINE
Payer: COMMERCIAL

## 2020-10-16 VITALS — HEIGHT: 59 IN | BODY MASS INDEX: 25.8 KG/M2 | WEIGHT: 128 LBS

## 2020-10-16 DIAGNOSIS — C07 MALIGNANT NEOPLASM OF PAROTID GLAND: ICD-10-CM

## 2020-10-16 LAB — GLUCOSE SERPL-MCNC: 95 MG/DL (ref 70–110)

## 2020-10-16 PROCEDURE — 82962 GLUCOSE BLOOD TEST: CPT | Mod: PO

## 2020-10-16 PROCEDURE — 78815 PET IMAGE W/CT SKULL-THIGH: CPT | Mod: TC,PO

## 2020-10-19 ENCOUNTER — OFFICE VISIT (OUTPATIENT)
Dept: HEMATOLOGY/ONCOLOGY | Facility: CLINIC | Age: 60
End: 2020-10-19
Payer: COMMERCIAL

## 2020-10-19 DIAGNOSIS — C08.9 MALIGNANT NEOPLASM OF MAJOR SALIVARY GLAND: ICD-10-CM

## 2020-10-19 PROCEDURE — 99213 OFFICE O/P EST LOW 20 MIN: CPT | Mod: 95,,, | Performed by: INTERNAL MEDICINE

## 2020-10-19 PROCEDURE — 99213 PR OFFICE/OUTPT VISIT, EST, LEVL III, 20-29 MIN: ICD-10-PCS | Mod: 95,,, | Performed by: INTERNAL MEDICINE

## 2020-10-19 NOTE — PROGRESS NOTES
Subjective:       Patient ID: Antonieta Clay is a 59 y.o. female.    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 Postoperative changes in the left side of the neck with slightly  increased FDG activity from background.  2. No FDG avid lymphadenopathy or finding of additional distant sites  of FDG avid disease.    10/20/2020:  Radiation start    Chief Complaint: parotid cancer.      HPI:  Patient presents for follow up as above.    She will begin radiation therapy tomorrow.  She has no new complaints today.     All medications and past medical and surgical history have been reviewed.     The patient location is: Home  Visit type: Virtual visit with synchronous audio and video due to covid 19 pandemic crisis.    Review of patient's allergies indicates:   Allergen Reactions    Pcn [penicillins] Swelling    Codeine Nausea And Vomiting       ROS  GEN:   normal without any fever, night sweats or weight loss  HEENT:  normal with no HA's,  changes in vision  CV:   normal with no CP, SOB  PULM:  normal with no SOB, cough  GI:   normal with no abdominal pain, nausea, vomiting  :   normal with no hematuria, dysuria  SKIN:   normal with no rash      Previous FAMHX and SOCHX information reviewed and remains unchanged         Objective:        Physical Exam  There were no vitals taken for this visit.  GEN:   no apparent distress, comfortable; AAOx3  HEAD:  atraumatic and normocephalic  EYES:   no pallor, no icterus,  PSYCH:  normal mood, affect and behavior  PULM:   no apparent distress, breathing unlabored.  NEURO:  Grossly intact.   MUSC:   Visibly normal ROM throughout.           All lab results and imaging results have been reviewed and discussed with the patient  No results found for this or any previous visit (from the past 336 hour(s)).  CMP  Sodium   Date Value Ref Range Status   09/24/2020  142 135 - 146 mmol/L Final     Potassium   Date Value Ref Range Status   09/24/2020 4.2 3.5 - 5.3 mmol/L Final     Chloride   Date Value Ref Range Status   09/24/2020 106 98 - 110 mmol/L Final     CO2   Date Value Ref Range Status   09/24/2020 29 20 - 32 mmol/L Final     Glucose   Date Value Ref Range Status   09/24/2020 87 65 - 99 mg/dL Final     Comment:                   Fasting reference interval          BUN, Bld   Date Value Ref Range Status   09/24/2020 12 7 - 25 mg/dL Final     Creatinine   Date Value Ref Range Status   09/24/2020 0.71 0.50 - 1.05 mg/dL Final     Comment:     For patients >49 years of age, the reference limit  for Creatinine is approximately 13% higher for people  identified as -American.          Calcium   Date Value Ref Range Status   09/24/2020 9.7 8.6 - 10.4 mg/dL Final     Total Protein   Date Value Ref Range Status   09/24/2020 7.0 6.1 - 8.1 g/dL Final     Albumin   Date Value Ref Range Status   09/24/2020 4.3 3.6 - 5.1 g/dL Final     Total Bilirubin   Date Value Ref Range Status   09/24/2020 0.4 0.2 - 1.2 mg/dL Final     AST   Date Value Ref Range Status   09/24/2020 16 10 - 35 U/L Final     ALT   Date Value Ref Range Status   09/24/2020 14 6 - 29 U/L Final     eGFR if    Date Value Ref Range Status   09/24/2020 108 > OR = 60 mL/min/1.73m2 Final     eGFR if non    Date Value Ref Range Status   09/24/2020 93 > OR = 60 mL/min/1.73m2 Final       Total time spent with patient: 10 min  Assessment:      1. Malignant neoplasm of major salivary gland      Problem List Items Addressed This Visit     Malignant neoplasm of major salivary gland     I had a long discussion today about the path forward.  Her PET scan looks good with no distant mets and she will proceed with radiation therapy tomorrow.      I reviewed that we will need to continue to watch with scanning in the future but will let rad/onc order the first post-treatment scans on the schedule they  desire.      I will have her back with me in three months and discussed this today.                  Plan:   As Above in Assessment      The plan was discussed with the patient and all questions/concerns have been answered to the patient's satisfaction.          Thank you for allowing me to participate in this pleasant patient's care. Please call with any questions or concerns.

## 2020-10-19 NOTE — ASSESSMENT & PLAN NOTE
I had a long discussion today about the path forward.  Her PET scan looks good with no distant mets and she will proceed with radiation therapy tomorrow.      I reviewed that we will need to continue to watch with scanning in the future but will let rad/onc order the first post-treatment scans on the schedule they desire.      I will have her back with me in three months and discussed this today.

## 2020-10-20 ENCOUNTER — TREATMENT (OUTPATIENT)
Dept: RADIATION ONCOLOGY | Facility: CLINIC | Age: 60
End: 2020-10-20
Payer: COMMERCIAL

## 2020-10-20 PROCEDURE — 77427 PR CHG RADIATION,MANGEMENT,5 TX'S: ICD-10-PCS | Mod: S$GLB,,, | Performed by: RADIOLOGY

## 2020-10-20 PROCEDURE — 77014 PR  CT GUIDANCE PLACEMENT RAD THERAPY FIELDS: CPT | Mod: S$GLB,,, | Performed by: RADIOLOGY

## 2020-10-20 PROCEDURE — G6015 PR RADN TX DELIVERY,  INTENS MOD, 1+ FIELDS PER TX: ICD-10-PCS | Mod: S$GLB,,, | Performed by: RADIOLOGY

## 2020-10-20 PROCEDURE — 77014 PR  CT GUIDANCE PLACEMENT RAD THERAPY FIELDS: ICD-10-PCS | Mod: S$GLB,,, | Performed by: RADIOLOGY

## 2020-10-20 PROCEDURE — 77427 RADIATION TX MANAGEMENT X5: CPT | Mod: S$GLB,,, | Performed by: RADIOLOGY

## 2020-10-20 PROCEDURE — G6015 RADIATION TX DELIVERY IMRT: HCPCS | Mod: S$GLB,,, | Performed by: RADIOLOGY

## 2020-10-21 ENCOUNTER — CLINICAL SUPPORT (OUTPATIENT)
Dept: HEMATOLOGY/ONCOLOGY | Facility: CLINIC | Age: 60
End: 2020-10-21
Payer: COMMERCIAL

## 2020-10-21 ENCOUNTER — TELEPHONE (OUTPATIENT)
Dept: HEMATOLOGY/ONCOLOGY | Facility: CLINIC | Age: 60
End: 2020-10-21

## 2020-10-21 NOTE — PROGRESS NOTES
Medical Nutrition Therapy Oncology Progress Note      Patient's PCP:Casandra Yu NP  Referring Provider: No ref. provider found    Recommendations/Interventions     RD Notes  Mrs. Tere Clay is a 60 y.o. female with a dx of malignant neoplasm of major salivary gland. She has just started radiation therapy yesterday. Plan for total of 33 fractions. S/p left parotidectomy with cervical neck dissection. Today she endorses mild facial pain/soreness on the left side when chewing certain foods such as tough cuts of meat. She also endorses mild dysgeusia & xerostomia (using biotene rinse). However, she is generally able to tolerate a regular diet. She endorses wt loss of ~10# since her surgery, but is now maintaining at 131#. She denies n/v/c/d. She endorses regular physical activity- goes on daily walks.     Plan  1. Discussed importance of wt maintenance & aggressive fluid intake during treatment course.   2. Reviewed potential side effects r/t RT & tips to combat each.   3. Continue with biotene rinse for xerostomia. Recommend baking soda/salt rinses multiple times daily to prevent mouth sores.   4. Provided lists of high-kcal/protein foods/supplements to promote wt maintenance.   5. Provided RD contact info. Encouraged pt & spouse to call with any questions/concerns.     Subjective:        Patient ID: Antonieta Clay is a 60 y.o. female.    Chief Complaint: nutrition consult- cancer of salivary gland    Past Medical History:   Diagnosis Date    Hypothyroidism        Past Surgical History:   Procedure Laterality Date    left temporal bone resection  2020    MODIFIED RADICAL NECK DISSECTION Left 2020    parotidectomy Left 2020       Social History     Socioeconomic History    Marital status:      Spouse name: Not on file    Number of children: Not on file    Years of education: Not on file    Highest education level: Not on file   Occupational History    Not on  file   Social Needs    Financial resource strain: Not on file    Food insecurity     Worry: Not on file     Inability: Not on file    Transportation needs     Medical: Not on file     Non-medical: Not on file   Tobacco Use    Smoking status: Never Smoker    Smokeless tobacco: Never Used   Substance and Sexual Activity    Alcohol use: Never     Frequency: Never    Drug use: Not on file    Sexual activity: Not on file   Lifestyle    Physical activity     Days per week: Not on file     Minutes per session: Not on file    Stress: Not on file   Relationships    Social connections     Talks on phone: Not on file     Gets together: Not on file     Attends [employer] service: Not on file     Active member of club or organization: Not on file     Attends meetings of clubs or organizations: Not on file     Relationship status: Not on file   Other Topics Concern    Not on file   Social History Narrative    Not on file       Family History   Problem Relation Age of Onset    Breast cancer Mother     Breast cancer Maternal Aunt        Review of patient's allergies indicates:   Allergen Reactions    Pcn [penicillins] Swelling    Codeine Nausea And Vomiting       Current Outpatient Medications:     fluticasone propionate (FLONASE) 50 mcg/actuation nasal spray, 2 sprays (100 mcg total) by Each Nostril route once daily., Disp: 18.2 mL, Rfl: 1    levothyroxine (SYNTHROID) 88 MCG tablet, Take 1 tablet (88 mcg total) by mouth once daily., Disp: 30 tablet, Rfl: 0    multivit-iron-min-folic acid (ONE DAILY FOR WOMEN) 18-0.4 mg Tab, as directed, Disp: , Rfl:     All medications and past history have been reviewed.    [No treatment plan]    Objective:        Wt Readings from Last 1 Encounters:   10/16/20 1122 58.1 kg (128 lb)       Last Labs:  Glucose   Date Value Ref Range Status   09/24/2020 87 65 - 99 mg/dL Final     Comment:                   Fasting reference interval          BUN, Bld   Date Value Ref Range Status    09/24/2020 12 7 - 25 mg/dL Final     Creatinine   Date Value Ref Range Status   09/24/2020 0.71 0.50 - 1.05 mg/dL Final     Comment:     For patients >49 years of age, the reference limit  for Creatinine is approximately 13% higher for people  identified as -American.          Sodium   Date Value Ref Range Status   09/24/2020 142 135 - 146 mmol/L Final     Potassium   Date Value Ref Range Status   09/24/2020 4.2 3.5 - 5.3 mmol/L Final     No results found for: PHOS  Calcium   Date Value Ref Range Status   09/24/2020 9.7 8.6 - 10.4 mg/dL Final     No results found for: PREALBUMIN  Total Protein   Date Value Ref Range Status   09/24/2020 7.0 6.1 - 8.1 g/dL Final     Cholesterol   Date Value Ref Range Status   09/24/2020 251 (H) <200 mg/dL Final   11/04/2019 232 (H) <200 mg/dL Final     No results found for: HGBA1C  No results found for: HGB  No results found for: HCT  No results found for: IRON  No components found for: FROLATE  No results found for: [id number]  No results found for: WBC    Assessment:     Nutrition/Diet History     Patient Reported Diet/Restrictions/Preferences: regular diet   Food Allergies: NKFA  Factors Affecting Nutritional Intake: facial pain when chewing tough proteins    Estimated/Assessed Needs     Weight Used For Calorie Calculations: 59.5 kg (131 lb)  Energy Calorie Requirements (kcal): 9897-4117 kcal/day   Energy Need Method: 25-30 Kcal/kg  Protein Requirements: 71-95 g/day   Protein Need Method: 1.2-1.6 g/kg  Fluid Requirements: 1600 ml/day  Estimated Fluid Requirement Method: 1ml/kcal      Nutrition Support  N/A    Evaluation of Received Nutrient/Fluid Intake     Calorie Intake: meeting needs  Protein Intake: meeting needs  Fluid Intake: meeting needs  Tolerance: tolerating  % Intake of Estimated Energy Needs:  %      Nutrition Diagnosis Related to (Etiology) As Evidenced By (Signs/Symptoms)   Increased nutrient needs H&N cancer Initiation of XRT for H&N cancer       RD  Notes  Mrs. Tere Clay is a 60 y.o. female with a dx of malignant neoplasm of major salivary gland. She has just started radiation therapy yesterday. Plan for total of 33 fractions. S/p left parotidectomy with cervical neck dissection. Today she endorses mild facial pain/soreness on the left side when chewing certain foods such as tough cuts of meat. She also endorses mild dysgeusia & xerostomia (using biotene rinse). However, she is generally able to tolerate a regular diet. She endorses wt loss of ~10# since her surgery, but is now maintaining at 131#. She denies n/v/c/d. She endorses regular physical activity- goes on daily walks.     Nutrition Intervention:      Nutrition Intervention Energy-modified diet   Goals/Expected Outcomes High-kcal/protein diet to promote wt maintenance during treatment.    Progress Initial       Plan  1. Discussed importance of wt maintenance & aggressive fluid intake during treatment course.   2. Reviewed potential side effects r/t RT & tips to combat each.   3. Continue with biotene rinse for xerostomia. Recommend baking soda/salt rinses multiple times daily to prevent mouth sores.   4. Provided lists of high-kcal/protein foods/supplements to promote wt maintenance.   5. Provided RD contact info. Encouraged pt & spouse to call with any questions/concerns.     Monitoring/Evaluation:     Monitor: wt, p.o. intake    Next Visit: Will f/u in 1 week.       I have explained and the patient understands all of  the current recommendation(s). I have answered all of their questions to the best of my ability and to their complete satisfaction.   The patient is to continue with the current management plan.    Electronically signed by: Shruti Decker MS, RDN, LDN

## 2020-10-21 NOTE — TELEPHONE ENCOUNTER
----- Message from Milagros Dinh MA sent at 10/21/2020 10:51 AM CDT -----  Regarding: last appointment notes  Angie JOSHI case manager at [facility] called and asked for patients last notes from visit on 10-19-20. Please fax these over to her.     # [phone]   Ext. [phone]    Fax # [phone]    Thank you     Milagros    Sent to [facility] last progress note.

## 2020-11-03 DIAGNOSIS — C08.9 MALIGNANT NEOPLASM OF MAJOR SALIVARY GLAND: Primary | ICD-10-CM

## 2020-11-03 RX ORDER — HYDROCODONE BITARTRATE AND ACETAMINOPHEN 7.5; 325 MG/15ML; MG/15ML
15 SOLUTION ORAL
Qty: 750 ML | Refills: 0 | Status: SHIPPED | OUTPATIENT
Start: 2020-11-03 | End: 2022-01-01

## 2020-11-04 ENCOUNTER — DOCUMENTATION ONLY (OUTPATIENT)
Dept: HEMATOLOGY/ONCOLOGY | Facility: CLINIC | Age: 60
End: 2020-11-04

## 2020-11-04 NOTE — PROGRESS NOTES
Medical Nutrition Therapy Oncology Progress Note      Patient's PCP:Casandra Yu NP  Referring Provider: No ref. provider found    Recommendations/Interventions     RD Notes  Nutrition f/u with Mrs. Clay today following her radiation appt. She has now received 12/33 fractions of radiation therapy. She endorses onset of mild odynophagia & is interested in beginning to supplement her diet with an ONS. She is using baking soda/salt rinse multiple times daily. She has been prescribed magic mouthwash & liquid hycet per Dr. Monroy, which she plans to  from her pharmacy today. Despite sore throat, she is still maintaining her wt at 131#.     Plan  1. Continue gargling with baking soda/salt rinses prn. Start magic mouthwash before meals & liquid hycet as prescribed for pain.   2. Encouraged high-kcal/protein bland, soft, lukewarm foods as tolerated to promote wt maintenance.   3. Recommend beginning to supplement with either Premier protein or Ensure Max protein shakes 1-2x/day. Provided samples & coupons for ONS.   4. Pt has RD contact info. Encouraged her to call with any questions/concerns.     Subjective:        Patient ID: Antonieta Clay is a 60 y.o. female.    Chief Complaint: nutrition f/u    Past Medical History:   Diagnosis Date    Hypothyroidism        Past Surgical History:   Procedure Laterality Date    left temporal bone resection  2020    MODIFIED RADICAL NECK DISSECTION Left 2020    parotidectomy Left 2020       Social History     Socioeconomic History    Marital status:      Spouse name: Not on file    Number of children: Not on file    Years of education: Not on file    Highest education level: Not on file   Occupational History    Not on file   Social Needs    Financial resource strain: Not on file    Food insecurity     Worry: Not on file     Inability: Not on file    Transportation needs     Medical: Not on file     Non-medical: Not  on file   Tobacco Use    Smoking status: Never Smoker    Smokeless tobacco: Never Used   Substance and Sexual Activity    Alcohol use: Never     Frequency: Never    Drug use: Not on file    Sexual activity: Not on file   Lifestyle    Physical activity     Days per week: Not on file     Minutes per session: Not on file    Stress: Not on file   Relationships    Social connections     Talks on phone: Not on file     Gets together: Not on file     Attends Holiness service: Not on file     Active member of club or organization: Not on file     Attends meetings of clubs or organizations: Not on file     Relationship status: Not on file   Other Topics Concern    Not on file   Social History Narrative    Not on file       Family History   Problem Relation Age of Onset    Breast cancer Mother     Breast cancer Maternal Aunt        Review of patient's allergies indicates:   Allergen Reactions    Pcn [penicillins] Swelling    Codeine Nausea And Vomiting       Current Outpatient Medications:     duke's soln (benadryl 30 mL, mylanta 30 mL, lidocaine 30 mL, nystatin 30 mL) 120mL, Take 10 mLs by mouth 4 (four) times daily., Disp: 120 mL, Rfl: 0    fluticasone propionate (FLONASE) 50 mcg/actuation nasal spray, 2 sprays (100 mcg total) by Each Nostril route once daily., Disp: 18.2 mL, Rfl: 1    hydrocodone-acetaminophen (HYCET) solution 7.5-325 mg/15mL, Take 15 mLs by mouth every 4 to 6 hours as needed for Pain., Disp: 750 mL, Rfl: 0    levothyroxine (SYNTHROID) 88 MCG tablet, Take 1 tablet (88 mcg total) by mouth once daily., Disp: 30 tablet, Rfl: 0    multivit-iron-min-folic acid (ONE DAILY FOR WOMEN) 18-0.4 mg Tab, as directed, Disp: , Rfl:     All medications and past history have been reviewed.    [No treatment plan]    Objective:        Wt Readings from Last 1 Encounters:   10/16/20 1122 58.1 kg (128 lb)       Last Labs:  Glucose   Date Value Ref Range Status   09/24/2020 87 65 - 99 mg/dL Final     Comment:                    Fasting reference interval          BUN   Date Value Ref Range Status   09/24/2020 12 7 - 25 mg/dL Final     Creatinine   Date Value Ref Range Status   09/24/2020 0.71 0.50 - 1.05 mg/dL Final     Comment:     For patients >49 years of age, the reference limit  for Creatinine is approximately 13% higher for people  identified as -American.          Sodium   Date Value Ref Range Status   09/24/2020 142 135 - 146 mmol/L Final     Potassium   Date Value Ref Range Status   09/24/2020 4.2 3.5 - 5.3 mmol/L Final     No results found for: PHOS  Calcium   Date Value Ref Range Status   09/24/2020 9.7 8.6 - 10.4 mg/dL Final     No results found for: PREALBUMIN  Total Protein   Date Value Ref Range Status   09/24/2020 7.0 6.1 - 8.1 g/dL Final     Cholesterol   Date Value Ref Range Status   09/24/2020 251 (H) <200 mg/dL Final   11/04/2019 232 (H) <200 mg/dL Final     No results found for: HGBA1C  No results found for: HGB  No results found for: HCT  No results found for: IRON  No components found for: FROLATE  No results found for: [id number]  No results found for: WBC    Assessment:     Nutrition/Diet History     Patient Reported Diet/Restrictions/Preferences: regular diet   Food Allergies: NKFA  Factors Affecting Nutritional Intake: odynophagia    Estimated/Assessed Needs     Weight Used For Calorie Calculations: 59.5 kg (131 lb)  Energy Calorie Requirements (kcal): 1548-6500 kcal/day   Energy Need Method: 25-30 Kcal/kg  Protein Requirements: 71-95 g/day   Protein Need Method: 1.2-1.6 g/kg  Fluid Requirements: 1600 ml/day  Estimated Fluid Requirement Method: 1ml/kcal     Nutrition Support  N/A    Evaluation of Received Nutrient/Fluid Intake     Calorie Intake: meeting needs  Protein Intake: meeting needs  Fluid Intake: meeting needs  Tolerance: tolerating  % Intake of Estimated Energy Needs:  %      Nutrition Diagnosis Related to (Etiology) As Evidenced By (Signs/Symptoms)   Swallowing  difficulty RT to H&N region Need for transition to mechanical soft diet        RD Notes  Nutrition f/u with Mrs. Clay today following her radiation appt. She has now received 12/33 fractions of radiation therapy. She endorses onset of mild odynophagia & is interested in beginning to supplement her diet with an ONS. She is using baking soda/salt rinse multiple times daily. She has been prescribed magic mouthwash & liquid hycet per Dr. Monroy, which she plans to  from her pharmacy today. Despite sore throat, she is still maintaining her wt at 131#.     Nutrition Intervention:      Nutrition Intervention Texture-modified diet   Goals/Expected Outcomes Consume a mechanical soft diet to meet estimated energy needs.    Progress Initial       Plan  1. Continue gargling with baking soda/salt rinses prn. Start magic mouthwash before meals & liquid hycet as prescribed for pain.   2. Encouraged high-kcal/protein bland, soft, lukewarm foods as tolerated to promote wt maintenance.   3. Recommend beginning to supplement with either Premier protein or Ensure Max protein shakes 1-2x/day. Provided samples & coupons for ONS.   4. Pt has RD contact info. Encouraged her to call with any questions/concerns.     Monitoring/Evaluation:     Monitor: wt, p.o. intake    Next Visit: Will f/u in 1 week.       I have explained and the patient understands all of  the current recommendation(s). I have answered all of their questions to the best of my ability and to their complete satisfaction.   The patient is to continue with the current management plan.    Electronically signed by: Shruti Decker MS, RDN, LDN

## 2020-11-12 ENCOUNTER — DOCUMENTATION ONLY (OUTPATIENT)
Dept: HEMATOLOGY/ONCOLOGY | Facility: CLINIC | Age: 60
End: 2020-11-12

## 2020-11-12 NOTE — PROGRESS NOTES
Medical Nutrition Therapy Oncology Progress Note      Patient's PCP:Casandra Yu NP  Referring Provider: No ref. provider found    Recommendations/Interventions     RD Notes  Nutrition f/u with Mrs. Clay today prior to her radiation appt. She has now received 17/33 fractions of radiation therapy. She endorses mild odynophagia, but is still tolerating a mechanical soft diet well. She reports one incident in which she tried to eat pizza, but endorses it burned her mouth due to the acidity of the tomato sauce. She has since eliminated spicy/highly seasoned foods along with acidic fruits/veggies from her diet. Supplementing with Boost shakes 1-2x/day. She is using baking soda/salt rinse multiple times daily. She has been prescribed magic mouthwash, but has not yet begun to use it. She has maintained her wt @ 131#.     Plan  1. Continue high-kcal/protein mechanical soft diet as tolerated.   2. Supplement with ONS of choice BID.   3. Avoid highly seasoned/spicy foods along with acidic foods/beverages.   4. Baking soda/salt rinses 2-3x/day along with magic mouthwash before meals.   5. Pleased with pt's wt maintenance thus far.   6. Pt has RD contact info; encouraged her to call with any questions/concerns.     Subjective:        Patient ID: Antonieta Clay is a 60 y.o. female.    Chief Complaint: nutrition f/u    Past Medical History:   Diagnosis Date    Hypothyroidism        Past Surgical History:   Procedure Laterality Date    left temporal bone resection  2020    MODIFIED RADICAL NECK DISSECTION Left 2020    parotidectomy Left 2020       Social History     Socioeconomic History    Marital status:      Spouse name: Not on file    Number of children: Not on file    Years of education: Not on file    Highest education level: Not on file   Occupational History    Not on file   Social Needs    Financial resource strain: Not on file    Food insecurity     Worry: Not  on file     Inability: Not on file    Transportation needs     Medical: Not on file     Non-medical: Not on file   Tobacco Use    Smoking status: Never Smoker    Smokeless tobacco: Never Used   Substance and Sexual Activity    Alcohol use: Never     Frequency: Never    Drug use: Not on file    Sexual activity: Not on file   Lifestyle    Physical activity     Days per week: Not on file     Minutes per session: Not on file    Stress: Not on file   Relationships    Social connections     Talks on phone: Not on file     Gets together: Not on file     Attends [employer] service: Not on file     Active member of club or organization: Not on file     Attends meetings of clubs or organizations: Not on file     Relationship status: Not on file   Other Topics Concern    Not on file   Social History Narrative    Not on file       Family History   Problem Relation Age of Onset    Breast cancer Mother     Breast cancer Maternal Aunt        Review of patient's allergies indicates:   Allergen Reactions    Pcn [penicillins] Swelling    Codeine Nausea And Vomiting       Current Outpatient Medications:     duke's soln (benadryl 30 mL, mylanta 30 mL, lidocaine 30 mL, nystatin 30 mL) 120mL, Take 10 mLs by mouth 4 (four) times daily., Disp: 120 mL, Rfl: 0    fluticasone propionate (FLONASE) 50 mcg/actuation nasal spray, 2 sprays (100 mcg total) by Each Nostril route once daily., Disp: 18.2 mL, Rfl: 1    hydrocodone-acetaminophen (HYCET) solution 7.5-325 mg/15mL, Take 15 mLs by mouth every 4 to 6 hours as needed for Pain., Disp: 750 mL, Rfl: 0    levothyroxine (SYNTHROID) 88 MCG tablet, Take 1 tablet (88 mcg total) by mouth once daily., Disp: 30 tablet, Rfl: 0    multivit-iron-min-folic acid (ONE DAILY FOR WOMEN) 18-0.4 mg Tab, as directed, Disp: , Rfl:     All medications and past history have been reviewed.    [No treatment plan]    Objective:        Wt Readings from Last 1 Encounters:   10/16/20 1122 58.1 kg (128  lb)       Last Labs:  Glucose   Date Value Ref Range Status   09/24/2020 87 65 - 99 mg/dL Final     Comment:                   Fasting reference interval          BUN   Date Value Ref Range Status   09/24/2020 12 7 - 25 mg/dL Final     Creatinine   Date Value Ref Range Status   09/24/2020 0.71 0.50 - 1.05 mg/dL Final     Comment:     For patients >49 years of age, the reference limit  for Creatinine is approximately 13% higher for people  identified as -American.          Sodium   Date Value Ref Range Status   09/24/2020 142 135 - 146 mmol/L Final     Potassium   Date Value Ref Range Status   09/24/2020 4.2 3.5 - 5.3 mmol/L Final     No results found for: PHOS  Calcium   Date Value Ref Range Status   09/24/2020 9.7 8.6 - 10.4 mg/dL Final     No results found for: PREALBUMIN  Total Protein   Date Value Ref Range Status   09/24/2020 7.0 6.1 - 8.1 g/dL Final     Cholesterol   Date Value Ref Range Status   09/24/2020 251 (H) <200 mg/dL Final   11/04/2019 232 (H) <200 mg/dL Final     No results found for: HGBA1C  No results found for: HGB  No results found for: HCT  No results found for: IRON  No components found for: FROLATE  No results found for: [id number]  No results found for: WBC    Assessment:     Nutrition/Diet History     Patient Reported Diet/Restrictions/Preferences: mechanical soft diet   Food Allergies: NKFA  Factors Affecting Nutritional Intake: odynophagia     Estimated/Assessed Needs     Weight Used For Calorie Calculations: 59.5 kg (131 lb)  Energy Calorie Requirements (kcal): 3885-7831 kcal/day   Energy Need Method: 25-30 Kcal/kg  Protein Requirements: 71-95 g/day   Protein Need Method: 1.2-1.6 g/kg  Fluid Requirements: 1600 ml/day  Estimated Fluid Requirement Method: 1ml/kcal      Nutrition Support  N/A    Evaluation of Received Nutrient/Fluid Intake     Calorie Intake: meeting needs  Protein Intake: meeting needs  Fluid Intake: meeting needs  Tolerance: tolerating  % Intake of Estimated  Energy Needs:  %      Nutrition Diagnosis Related to (Etiology) As Evidenced By (Signs/Symptoms)   Swallowing difficulty RT to H&N region Need for transition to mechanical soft diet        RD Notes  Nutrition f/u with Mrs. Clay today prior to her radiation appt. She has now received 17/33 fractions of radiation therapy. She endorses mild odynophagia, but is still tolerating a mechanical soft diet well. She reports one incident in which she tried to eat pizza, but endorses it burned her mouth due to the acidity of the tomato sauce. She has since eliminated spicy/highly seasoned foods along with acidic fruits/veggies from her diet. Supplementing with Boost shakes 1-2x/day. She is using baking soda/salt rinse multiple times daily. She has been prescribed magic mouthwash, but has not yet begun to use it. She has maintained her wt @ 131#.     Nutrition Intervention:      Nutrition Intervention Texture-modified diet   Goals/Expected Outcomes Mechanical soft diet as tolerated to meet estimated energy needs.    Progress Progressing towards goal       Plan  1. Continue high-kcal/protein mechanical soft diet as tolerated.   2. Supplement with ONS of choice BID.   3. Avoid highly seasoned/spicy foods along with acidic foods/beverages.   4. Baking soda/salt rinses 2-3x/day along with magic mouthwash before meals.   5. Pleased with pt's wt maintenance thus far.   6. Pt has RD contact info; encouraged her to call with any questions/concerns.     Monitoring/Evaluation:     Monitor: wt, p.o. intake    Next Visit: Will f/u in 1 week.       I have explained and the patient understands all of  the current recommendation(s). I have answered all of their questions to the best of my ability and to their complete satisfaction.   The patient is to continue with the current management plan.    Electronically signed by: Shruti Decker MS, RDN, LDN

## 2020-11-19 ENCOUNTER — DOCUMENTATION ONLY (OUTPATIENT)
Dept: HEMATOLOGY/ONCOLOGY | Facility: CLINIC | Age: 60
End: 2020-11-19

## 2020-11-19 NOTE — PROGRESS NOTES
Medical Nutrition Therapy Oncology Progress Note      Patient's PCP:Casandra Yu NP  Referring Provider: No ref. provider found    Recommendations/Interventions     RD Notes  Mrs. Clay has now received 23/33 fractions of RT. She endorses mild odynophagia & dysgeusia, but is still fully p.o. tolerant to a mechanical soft diet. She is avoiding spicy/highly seasoned & acidic foods. Supplementing with Boost shakes prn. She endorses difficulty swallowing her vit C capsule for the past few days, but is able to swallow all prescribed meds ok. Using baking soda/salt rinses multiple times daily. She has still not begun to use magic mouthwash but is taking liquid hycet every night. Wt stable @ 130#. Normal BMs noted.     Plan  1. Continue high-kcal/protein mechanical soft diet as tolerated.   2. Boost shakes prn for supplemental nutrition.   3. Avoid highly seasoned/spicy foods & acidic foods/beverages.   4. Baking soda/ salt rinses at least 3x daily.   5. Encouraged use of magic mouthwash before meals. Continue liquid hycet before bed.   6. D/c vit C capsules for remainder of treatment if needed.   7. Pt has RD contact info; encouraged her to call with any questions/concerns.     Subjective:        Patient ID: Antonieta Clay is a 60 y.o. female.    Chief Complaint: nutrition f/u    Past Medical History:   Diagnosis Date    Hypothyroidism        Past Surgical History:   Procedure Laterality Date    left temporal bone resection  2020    MODIFIED RADICAL NECK DISSECTION Left 2020    parotidectomy Left 2020       Social History     Socioeconomic History    Marital status:      Spouse name: Not on file    Number of children: Not on file    Years of education: Not on file    Highest education level: Not on file   Occupational History    Not on file   Social Needs    Financial resource strain: Not on file    Food insecurity     Worry: Not on file     Inability: Not on  file    Transportation needs     Medical: Not on file     Non-medical: Not on file   Tobacco Use    Smoking status: Never Smoker    Smokeless tobacco: Never Used   Substance and Sexual Activity    Alcohol use: Never     Frequency: Never    Drug use: Not on file    Sexual activity: Not on file   Lifestyle    Physical activity     Days per week: Not on file     Minutes per session: Not on file    Stress: Not on file   Relationships    Social connections     Talks on phone: Not on file     Gets together: Not on file     Attends [employer] service: Not on file     Active member of club or organization: Not on file     Attends meetings of clubs or organizations: Not on file     Relationship status: Not on file   Other Topics Concern    Not on file   Social History Narrative    Not on file       Family History   Problem Relation Age of Onset    Breast cancer Mother     Breast cancer Maternal Aunt        Review of patient's allergies indicates:   Allergen Reactions    Pcn [penicillins] Swelling    Codeine Nausea And Vomiting       Current Outpatient Medications:     duke's soln (benadryl 30 mL, mylanta 30 mL, lidocaine 30 mL, nystatin 30 mL) 120mL, Take 10 mLs by mouth 4 (four) times daily., Disp: 120 mL, Rfl: 0    fluticasone propionate (FLONASE) 50 mcg/actuation nasal spray, 2 sprays (100 mcg total) by Each Nostril route once daily., Disp: 18.2 mL, Rfl: 1    hydrocodone-acetaminophen (HYCET) solution 7.5-325 mg/15mL, Take 15 mLs by mouth every 4 to 6 hours as needed for Pain., Disp: 750 mL, Rfl: 0    levothyroxine (SYNTHROID) 88 MCG tablet, Take 1 tablet (88 mcg total) by mouth once daily., Disp: 30 tablet, Rfl: 0    multivit-iron-min-folic acid (ONE DAILY FOR WOMEN) 18-0.4 mg Tab, as directed, Disp: , Rfl:     All medications and past history have been reviewed.    [No treatment plan]    Objective:        Wt Readings from Last 1 Encounters:   10/16/20 1122 58.1 kg (128 lb)       Last Labs:  Glucose    Date Value Ref Range Status   09/24/2020 87 65 - 99 mg/dL Final     Comment:                   Fasting reference interval          BUN   Date Value Ref Range Status   09/24/2020 12 7 - 25 mg/dL Final     Creatinine   Date Value Ref Range Status   09/24/2020 0.71 0.50 - 1.05 mg/dL Final     Comment:     For patients >49 years of age, the reference limit  for Creatinine is approximately 13% higher for people  identified as -American.          Sodium   Date Value Ref Range Status   09/24/2020 142 135 - 146 mmol/L Final     Potassium   Date Value Ref Range Status   09/24/2020 4.2 3.5 - 5.3 mmol/L Final     No results found for: PHOS  Calcium   Date Value Ref Range Status   09/24/2020 9.7 8.6 - 10.4 mg/dL Final     No results found for: PREALBUMIN  Total Protein   Date Value Ref Range Status   09/24/2020 7.0 6.1 - 8.1 g/dL Final     Cholesterol   Date Value Ref Range Status   09/24/2020 251 (H) <200 mg/dL Final   11/04/2019 232 (H) <200 mg/dL Final     No results found for: HGBA1C  No results found for: HGB  No results found for: HCT  No results found for: IRON  No components found for: FROLATE  No results found for: [id number]  No results found for: WBC    Assessment:     Nutrition/Diet History     Patient Reported Diet/Restrictions/Preferences: mechanical soft diet   Food Allergies: NKFA  Factors Affecting Nutritional Intake: odynophagia     Estimated/Assessed Needs     Weight Used For Calorie Calculations: 59.5 kg (131 lb)  Energy Calorie Requirements (kcal): 7407-4002 kcal/day   Energy Need Method: 25-30 Kcal/kg  Protein Requirements: 71-95 g/day   Protein Need Method: 1.2-1.6 g/kg  Fluid Requirements: 1600 ml/day  Estimated Fluid Requirement Method: 1ml/kcal      Nutrition Support  N/A    Evaluation of Received Nutrient/Fluid Intake     Calorie Intake: meeting needs  Protein Intake: meeting needs  Fluid Intake: meeting needs  Tolerance: tolerating  % Intake of Estimated Energy Needs:   %      Nutrition Diagnosis Related to (Etiology) As Evidenced By (Signs/Symptoms)   Swallowing difficulty RT to H&N region  Need for mechanical soft diet        RD Notes  Mrs. Clay has now received 23/33 fractions of RT. She endorses mild odynophagia & dysgeusia, but is still fully p.o. tolerant to a mechanical soft diet. She is avoiding spicy/highly seasoned & acidic foods. Supplementing with Boost shakes prn. She endorses difficulty swallowing her vit C capsule for the past few days, but is able to swallow all prescribed meds ok. Using baking soda/salt rinses multiple times daily. She has still not begun to use magic mouthwash but is taking liquid hycet every night. Wt stable @ 130#. Normal BMs noted.     Nutrition Intervention:      Nutrition Intervention Texture-modified diet   Goals/Expected Outcomes Mechanical soft diet as tolerated to meet estimated energy needs.    Progress Progressing towards goal     Plan  1. Continue high-kcal/protein mechanical soft diet as tolerated.   2. Boost shakes prn for supplemental nutrition.   3. Avoid highly seasoned/spicy foods & acidic foods/beverages.   4. Baking soda/ salt rinses at least 3x daily.   5. Encouraged use of magic mouthwash before meals. Continue liquid hycet before bed.   6. D/c vit C capsules for remainder of treatment if needed.   7. Pt has RD contact info; encouraged her to call with any questions/concerns.     Monitoring/Evaluation:     Monitor: wt, p.o. intake    Next Visit: Will f/u in 1 week.       I have explained and the patient understands all of  the current recommendation(s). I have answered all of their questions to the best of my ability and to their complete satisfaction.   The patient is to continue with the current management plan.    Electronically signed by: Shruti Decker MS, RDN, LDN

## 2020-11-24 DIAGNOSIS — C08.9 MALIGNANT NEOPLASM OF MAJOR SALIVARY GLAND: Primary | ICD-10-CM

## 2020-11-24 RX ORDER — SILVER SULFADIAZINE 10 G/1000G
CREAM TOPICAL 2 TIMES DAILY
Qty: 400 G | Refills: 2 | Status: SHIPPED | OUTPATIENT
Start: 2020-11-24 | End: 2022-01-01

## 2020-12-04 ENCOUNTER — TREATMENT (OUTPATIENT)
Dept: RADIATION ONCOLOGY | Facility: CLINIC | Age: 60
End: 2020-12-04
Payer: COMMERCIAL

## 2020-12-04 PROCEDURE — G6015 PR RADN TX DELIVERY,  INTENS MOD, 1+ FIELDS PER TX: ICD-10-PCS | Mod: S$GLB,,, | Performed by: RADIOLOGY

## 2020-12-04 PROCEDURE — 77014 PR  CT GUIDANCE PLACEMENT RAD THERAPY FIELDS: ICD-10-PCS | Mod: S$GLB,,, | Performed by: RADIOLOGY

## 2020-12-04 PROCEDURE — 77014 PR  CT GUIDANCE PLACEMENT RAD THERAPY FIELDS: CPT | Mod: S$GLB,,, | Performed by: RADIOLOGY

## 2020-12-04 PROCEDURE — G6015 RADIATION TX DELIVERY IMRT: HCPCS | Mod: S$GLB,,, | Performed by: RADIOLOGY

## 2020-12-04 PROCEDURE — 77336 PR  RADN PHYSICS CONSULT CONTINUING: ICD-10-PCS | Mod: S$GLB,,, | Performed by: RADIOLOGY

## 2020-12-04 PROCEDURE — 77336 RADIATION PHYSICS CONSULT: CPT | Mod: S$GLB,,, | Performed by: RADIOLOGY

## 2020-12-08 DIAGNOSIS — Z12.31 ENCOUNTER FOR SCREENING MAMMOGRAM FOR MALIGNANT NEOPLASM OF BREAST: Primary | ICD-10-CM

## 2020-12-14 DIAGNOSIS — E03.9 HYPOTHYROIDISM, UNSPECIFIED TYPE: ICD-10-CM

## 2020-12-14 RX ORDER — LEVOTHYROXINE SODIUM 88 UG/1
88 TABLET ORAL DAILY
Qty: 90 TABLET | Refills: 0 | Status: SHIPPED | OUTPATIENT
Start: 2020-12-14 | End: 2021-01-20 | Stop reason: SDUPTHER

## 2020-12-14 NOTE — TELEPHONE ENCOUNTER
----- Message from Lorraine Arango sent at 12/14/2020  9:08 AM CST -----  Regarding: refills  [facility]   Pharm [facility] [address]  Pt [phone]

## 2020-12-23 ENCOUNTER — OFFICE VISIT (OUTPATIENT)
Dept: RADIATION ONCOLOGY | Facility: CLINIC | Age: 60
End: 2020-12-23
Payer: COMMERCIAL

## 2020-12-23 DIAGNOSIS — C76.0 MALIGNANT NEOPLASM OF HEAD, FACE AND NECK: ICD-10-CM

## 2020-12-23 NOTE — PROGRESS NOTES
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    TREATMENT      Contacted patient today for 3 week checkup.  She reports favorable improvements in neck comfort, swallowing, xerostomia, energy, and overall status each day/week since completion of RT.  Her skin has healed well w/ minimal residual erythema/sensitivity.  She denies hearing or facial nerve changes, nor slurred speech or drooling/droop.    A/P  1.  CT neck/face w/ IV contrast in Feb 2021  2.  Follow-up with ENT and MedOnc scheduled in Jan 2021  3.  Return to clinic in 3 months, after Feb CT   4.  COVID-19 precautions discussed.

## 2020-12-29 ENCOUNTER — HOSPITAL ENCOUNTER (OUTPATIENT)
Dept: RADIOLOGY | Facility: HOSPITAL | Age: 60
Discharge: HOME | End: 2020-12-29
Attending: OBSTETRICS & GYNECOLOGY
Payer: COMMERCIAL

## 2020-12-29 DIAGNOSIS — Z12.31 ENCOUNTER FOR SCREENING MAMMOGRAM FOR MALIGNANT NEOPLASM OF BREAST: ICD-10-CM

## 2020-12-29 PROCEDURE — 77067 SCR MAMMO BI INCL CAD: CPT | Mod: TC,PO

## 2021-01-01 ENCOUNTER — HOSPITAL ENCOUNTER (OUTPATIENT)
Dept: RADIOLOGY | Facility: HOSPITAL | Age: 61
Discharge: HOME | End: 2021-12-03
Attending: RADIOLOGY
Payer: COMMERCIAL

## 2021-01-01 ENCOUNTER — OFFICE VISIT (OUTPATIENT)
Dept: RADIATION ONCOLOGY | Facility: CLINIC | Age: 61
End: 2021-01-01
Payer: COMMERCIAL

## 2021-01-01 DIAGNOSIS — R91.1 SOLITARY PULMONARY NODULE: Primary | ICD-10-CM

## 2021-01-01 DIAGNOSIS — C07 MALIGNANT NEOPLASM OF PAROTID GLAND: ICD-10-CM

## 2021-01-01 DIAGNOSIS — R91.1 SOLITARY PULMONARY NODULE: ICD-10-CM

## 2021-01-01 PROCEDURE — 99214 PR OFFICE/OUTPT VISIT, EST, LEVL IV, 30-39 MIN: ICD-10-PCS | Mod: S$GLB,,, | Performed by: RADIOLOGY

## 2021-01-01 PROCEDURE — 99214 OFFICE O/P EST MOD 30 MIN: CPT | Mod: S$GLB,,, | Performed by: RADIOLOGY

## 2021-01-01 PROCEDURE — 71250 CT THORAX DX C-: CPT | Mod: TC,PO

## 2021-01-15 ENCOUNTER — OFFICE VISIT (OUTPATIENT)
Dept: RADIATION ONCOLOGY | Facility: CLINIC | Age: 61
End: 2021-01-15
Payer: COMMERCIAL

## 2021-01-15 VITALS
OXYGEN SATURATION: 97 % | WEIGHT: 128 LBS | BODY MASS INDEX: 25.85 KG/M2 | HEART RATE: 78 BPM | SYSTOLIC BLOOD PRESSURE: 119 MMHG | DIASTOLIC BLOOD PRESSURE: 71 MMHG | TEMPERATURE: 98 F

## 2021-01-15 DIAGNOSIS — R29.898 DECREASED RANGE OF MOTION OF NECK: Primary | ICD-10-CM

## 2021-01-15 DIAGNOSIS — C08.9 MALIGNANT NEOPLASM OF MAJOR SALIVARY GLAND: ICD-10-CM

## 2021-01-19 ENCOUNTER — OFFICE VISIT (OUTPATIENT)
Dept: HEMATOLOGY/ONCOLOGY | Facility: CLINIC | Age: 61
End: 2021-01-19
Payer: COMMERCIAL

## 2021-01-19 VITALS
BODY MASS INDEX: 24.92 KG/M2 | HEART RATE: 75 BPM | TEMPERATURE: 98 F | SYSTOLIC BLOOD PRESSURE: 151 MMHG | WEIGHT: 123.38 LBS | DIASTOLIC BLOOD PRESSURE: 77 MMHG | RESPIRATION RATE: 18 BRPM

## 2021-01-19 DIAGNOSIS — C08.9 MALIGNANT NEOPLASM OF MAJOR SALIVARY GLAND: ICD-10-CM

## 2021-01-19 PROCEDURE — 99213 OFFICE O/P EST LOW 20 MIN: CPT | Mod: S$GLB,,, | Performed by: INTERNAL MEDICINE

## 2021-01-19 PROCEDURE — 99213 PR OFFICE/OUTPT VISIT, EST, LEVL III, 20-29 MIN: ICD-10-PCS | Mod: S$GLB,,, | Performed by: INTERNAL MEDICINE

## 2021-01-20 DIAGNOSIS — E03.9 HYPOTHYROIDISM, UNSPECIFIED TYPE: ICD-10-CM

## 2021-01-20 RX ORDER — LEVOTHYROXINE SODIUM 88 UG/1
88 TABLET ORAL DAILY
Qty: 90 TABLET | Refills: 1 | Status: SHIPPED | OUTPATIENT
Start: 2021-01-20 | End: 2021-01-28 | Stop reason: SDUPTHER

## 2021-01-25 ENCOUNTER — CLINICAL SUPPORT (OUTPATIENT)
Dept: REHABILITATION | Facility: HOSPITAL | Age: 61
End: 2021-01-25
Attending: RADIOLOGY
Payer: COMMERCIAL

## 2021-01-25 DIAGNOSIS — M53.82 NECK MUSCLE WEAKNESS: ICD-10-CM

## 2021-01-25 DIAGNOSIS — M53.82 IMPAIRED RANGE OF MOTION OF CERVICAL SPINE: ICD-10-CM

## 2021-01-25 DIAGNOSIS — R29.3 POSTURE IMBALANCE: ICD-10-CM

## 2021-01-25 PROCEDURE — 97110 THERAPEUTIC EXERCISES: CPT | Mod: PN

## 2021-01-25 PROCEDURE — 97161 PT EVAL LOW COMPLEX 20 MIN: CPT | Mod: PN

## 2021-01-28 ENCOUNTER — OFFICE VISIT (OUTPATIENT)
Dept: FAMILY MEDICINE | Facility: CLINIC | Age: 61
End: 2021-01-28
Payer: COMMERCIAL

## 2021-01-28 VITALS
SYSTOLIC BLOOD PRESSURE: 124 MMHG | HEART RATE: 68 BPM | HEIGHT: 60 IN | BODY MASS INDEX: 24.54 KG/M2 | WEIGHT: 125 LBS | DIASTOLIC BLOOD PRESSURE: 72 MMHG

## 2021-01-28 DIAGNOSIS — C08.9 MALIGNANT NEOPLASM OF MAJOR SALIVARY GLAND: ICD-10-CM

## 2021-01-28 DIAGNOSIS — R53.83 FATIGUE, UNSPECIFIED TYPE: ICD-10-CM

## 2021-01-28 DIAGNOSIS — E03.9 ACQUIRED HYPOTHYROIDISM: Primary | ICD-10-CM

## 2021-01-28 DIAGNOSIS — E78.00 ELEVATED LDL CHOLESTEROL LEVEL: ICD-10-CM

## 2021-01-28 PROCEDURE — 99214 OFFICE O/P EST MOD 30 MIN: CPT | Mod: S$GLB,,, | Performed by: NURSE PRACTITIONER

## 2021-01-28 PROCEDURE — 99214 PR OFFICE/OUTPT VISIT, EST, LEVL IV, 30-39 MIN: ICD-10-PCS | Mod: S$GLB,,, | Performed by: NURSE PRACTITIONER

## 2021-01-28 RX ORDER — LEVOTHYROXINE SODIUM 88 UG/1
88 TABLET ORAL DAILY
Qty: 90 TABLET | Refills: 1 | Status: SHIPPED | OUTPATIENT
Start: 2021-01-28 | End: 2021-05-28 | Stop reason: SDUPTHER

## 2021-01-28 RX ORDER — IBUPROFEN 100 MG/5ML
1000 SUSPENSION, ORAL (FINAL DOSE FORM) ORAL DAILY
Status: ON HOLD | COMMUNITY
End: 2022-01-01 | Stop reason: HOSPADM

## 2021-01-28 RX ORDER — AMOXICILLIN 500 MG
1 CAPSULE ORAL DAILY
Status: ON HOLD | COMMUNITY
End: 2022-01-01 | Stop reason: HOSPADM

## 2021-01-29 ENCOUNTER — OFFICE VISIT (OUTPATIENT)
Dept: HEMATOLOGY/ONCOLOGY | Facility: CLINIC | Age: 61
End: 2021-01-29
Payer: COMMERCIAL

## 2021-01-29 ENCOUNTER — INFUSION (OUTPATIENT)
Dept: INFUSION THERAPY | Facility: HOSPITAL | Age: 61
End: 2021-01-29
Attending: INTERNAL MEDICINE
Payer: COMMERCIAL

## 2021-01-29 VITALS
TEMPERATURE: 98 F | RESPIRATION RATE: 18 BRPM | DIASTOLIC BLOOD PRESSURE: 80 MMHG | SYSTOLIC BLOOD PRESSURE: 130 MMHG | HEART RATE: 84 BPM | WEIGHT: 123.38 LBS | BODY MASS INDEX: 24.1 KG/M2

## 2021-01-29 VITALS
RESPIRATION RATE: 17 BRPM | DIASTOLIC BLOOD PRESSURE: 77 MMHG | SYSTOLIC BLOOD PRESSURE: 134 MMHG | TEMPERATURE: 98 F | HEART RATE: 71 BPM

## 2021-01-29 DIAGNOSIS — E86.0 DEHYDRATION: ICD-10-CM

## 2021-01-29 DIAGNOSIS — C08.9 MALIGNANT NEOPLASM OF MAJOR SALIVARY GLAND: Primary | ICD-10-CM

## 2021-01-29 DIAGNOSIS — R51.9 NONINTRACTABLE HEADACHE, UNSPECIFIED CHRONICITY PATTERN, UNSPECIFIED HEADACHE TYPE: ICD-10-CM

## 2021-01-29 DIAGNOSIS — E86.0 DEHYDRATION: Primary | ICD-10-CM

## 2021-01-29 DIAGNOSIS — R11.0 NAUSEA: ICD-10-CM

## 2021-01-29 PROCEDURE — 96360 HYDRATION IV INFUSION INIT: CPT

## 2021-01-29 PROCEDURE — 99214 OFFICE O/P EST MOD 30 MIN: CPT | Mod: S$GLB,,, | Performed by: NURSE PRACTITIONER

## 2021-01-29 PROCEDURE — 96365 THER/PROPH/DIAG IV INF INIT: CPT

## 2021-01-29 PROCEDURE — 99214 PR OFFICE/OUTPT VISIT, EST, LEVL IV, 30-39 MIN: ICD-10-PCS | Mod: S$GLB,,, | Performed by: NURSE PRACTITIONER

## 2021-01-29 PROCEDURE — 25000003 PHARM REV CODE 250: Performed by: NURSE PRACTITIONER

## 2021-01-29 PROCEDURE — 96367 TX/PROPH/DG ADDL SEQ IV INF: CPT

## 2021-01-29 PROCEDURE — 96361 HYDRATE IV INFUSION ADD-ON: CPT

## 2021-01-29 PROCEDURE — 63600175 PHARM REV CODE 636 W HCPCS: Performed by: NURSE PRACTITIONER

## 2021-01-29 RX ORDER — HEPARIN 100 UNIT/ML
500 SYRINGE INTRAVENOUS
Status: CANCELLED | OUTPATIENT
Start: 2021-01-29

## 2021-01-29 RX ORDER — DEXAMETHASONE SODIUM PHOSPHATE 4 MG/ML
20 INJECTION, SOLUTION INTRA-ARTICULAR; INTRALESIONAL; INTRAMUSCULAR; INTRAVENOUS; SOFT TISSUE
Status: CANCELLED
Start: 2021-01-29

## 2021-01-29 RX ORDER — SODIUM CHLORIDE 0.9 % (FLUSH) 0.9 %
10 SYRINGE (ML) INJECTION
Status: DISCONTINUED | OUTPATIENT
Start: 2021-01-29 | End: 2021-01-29 | Stop reason: HOSPADM

## 2021-01-29 RX ORDER — SODIUM CHLORIDE 0.9 % (FLUSH) 0.9 %
10 SYRINGE (ML) INJECTION
Status: CANCELLED | OUTPATIENT
Start: 2021-01-29

## 2021-01-29 RX ORDER — BUTALBITAL, ACETAMINOPHEN AND CAFFEINE 50; 325; 40 MG/1; MG/1; MG/1
1 TABLET ORAL EVERY 4 HOURS PRN
Qty: 20 TABLET | Refills: 0 | Status: SHIPPED | OUTPATIENT
Start: 2021-01-29 | End: 2021-02-28

## 2021-01-29 RX ORDER — ONDANSETRON 2 MG/ML
8 INJECTION INTRAMUSCULAR; INTRAVENOUS ONCE
Status: CANCELLED
Start: 2021-01-29 | End: 2021-01-29

## 2021-01-29 RX ORDER — ONDANSETRON HCL IN 0.9 % NACL 8 MG/50 ML
8 INTRAVENOUS SOLUTION, PIGGYBACK (ML) INTRAVENOUS ONCE
Status: COMPLETED | OUTPATIENT
Start: 2021-01-29 | End: 2021-01-29

## 2021-01-29 RX ADMIN — SODIUM CHLORIDE 500 ML: 0.9 INJECTION, SOLUTION INTRAVENOUS at 02:01

## 2021-01-29 RX ADMIN — DEXAMETHASONE SODIUM PHOSPHATE: 4 INJECTION, SOLUTION INTRA-ARTICULAR; INTRALESIONAL; INTRAMUSCULAR; INTRAVENOUS; SOFT TISSUE at 02:01

## 2021-01-29 RX ADMIN — ONDANSETRON 8 MG: 2 INJECTION INTRAMUSCULAR; INTRAVENOUS at 02:01

## 2021-01-30 LAB
ALBUMIN SERPL-MCNC: 4.6 G/DL (ref 3.6–5.1)
ALBUMIN/CREAT UR: 6 MCG/MG CREAT
ALBUMIN/GLOB SERPL: 1.6 (CALC) (ref 1–2.5)
ALP SERPL-CCNC: 77 U/L (ref 37–153)
ALT SERPL-CCNC: 16 U/L (ref 6–29)
APPEARANCE UR: CLEAR
AST SERPL-CCNC: 19 U/L (ref 10–35)
BACTERIA #/AREA URNS HPF: ABNORMAL /HPF
BACTERIA UR CULT: NORMAL
BASOPHILS # BLD AUTO: 30 CELLS/UL (ref 0–200)
BASOPHILS NFR BLD AUTO: 1 %
BILIRUB SERPL-MCNC: 0.4 MG/DL (ref 0.2–1.2)
BILIRUB UR QL STRIP: NEGATIVE
BUN SERPL-MCNC: 17 MG/DL (ref 7–25)
BUN/CREAT SERPL: NORMAL (CALC) (ref 6–22)
CALCIUM SERPL-MCNC: 10.2 MG/DL (ref 8.6–10.4)
CHLORIDE SERPL-SCNC: 104 MMOL/L (ref 98–110)
CHOLEST SERPL-MCNC: 244 MG/DL
CHOLEST/HDLC SERPL: 5 (CALC)
CO2 SERPL-SCNC: 27 MMOL/L (ref 20–32)
COLOR UR: YELLOW
CREAT SERPL-MCNC: 0.69 MG/DL (ref 0.5–0.99)
CREAT UR-MCNC: 98 MG/DL (ref 20–275)
EOSINOPHIL # BLD AUTO: 129 CELLS/UL (ref 15–500)
EOSINOPHIL NFR BLD AUTO: 4.3 %
ERYTHROCYTE [DISTWIDTH] IN BLOOD BY AUTOMATED COUNT: 13 % (ref 11–15)
GFRSERPLBLD MDRD-ARVRAT: 95 ML/MIN/1.73M2
GLOBULIN SER CALC-MCNC: 2.8 G/DL (CALC) (ref 1.9–3.7)
GLUCOSE SERPL-MCNC: 90 MG/DL (ref 65–99)
GLUCOSE UR QL STRIP: NEGATIVE
HCT VFR BLD AUTO: 37.4 % (ref 35–45)
HDLC SERPL-MCNC: 49 MG/DL
HGB BLD-MCNC: 12 G/DL (ref 11.7–15.5)
HGB UR QL STRIP: ABNORMAL
HYALINE CASTS #/AREA URNS LPF: ABNORMAL /LPF
KETONES UR QL STRIP: NEGATIVE
LDLC SERPL CALC-MCNC: 174 MG/DL (CALC)
LEUKOCYTE ESTERASE UR QL STRIP: ABNORMAL
LYMPHOCYTES # BLD AUTO: 756 CELLS/UL (ref 850–3900)
LYMPHOCYTES NFR BLD AUTO: 25.2 %
MCH RBC QN AUTO: 28.8 PG (ref 27–33)
MCHC RBC AUTO-ENTMCNC: 32.1 G/DL (ref 32–36)
MCV RBC AUTO: 89.9 FL (ref 80–100)
MICROALBUMIN UR-MCNC: 0.6 MG/DL
MONOCYTES # BLD AUTO: 426 CELLS/UL (ref 200–950)
MONOCYTES NFR BLD AUTO: 14.2 %
NEUTROPHILS # BLD AUTO: 1659 CELLS/UL (ref 1500–7800)
NEUTROPHILS NFR BLD AUTO: 55.3 %
NITRITE UR QL STRIP: NEGATIVE
NONHDLC SERPL-MCNC: 195 MG/DL (CALC)
PH UR STRIP: ABNORMAL [PH] (ref 5–8)
PLATELET # BLD AUTO: 258 THOUSAND/UL (ref 140–400)
PMV BLD REES-ECKER: 11.2 FL (ref 7.5–12.5)
POTASSIUM SERPL-SCNC: 4.1 MMOL/L (ref 3.5–5.3)
PROT SERPL-MCNC: 7.4 G/DL (ref 6.1–8.1)
PROT UR QL STRIP: NEGATIVE
RBC # BLD AUTO: 4.16 MILLION/UL (ref 3.8–5.1)
RBC #/AREA URNS HPF: ABNORMAL /HPF
SODIUM SERPL-SCNC: 141 MMOL/L (ref 135–146)
SP GR UR STRIP: 1.02 (ref 1–1.03)
SQUAMOUS #/AREA URNS HPF: ABNORMAL /HPF
T4 FREE SERPL-MCNC: 1.2 NG/DL (ref 0.8–1.8)
TRIGL SERPL-MCNC: 95 MG/DL
TSH SERPL-ACNC: 0.11 MIU/L (ref 0.4–4.5)
VIT B12 SERPL-MCNC: 1027 PG/ML (ref 200–1100)
WBC # BLD AUTO: 3 THOUSAND/UL (ref 3.8–10.8)
WBC #/AREA URNS HPF: ABNORMAL /HPF

## 2021-02-01 ENCOUNTER — HOSPITAL ENCOUNTER (OUTPATIENT)
Dept: RADIOLOGY | Facility: HOSPITAL | Age: 61
Discharge: HOME | End: 2021-02-01
Attending: RADIOLOGY
Payer: COMMERCIAL

## 2021-02-01 DIAGNOSIS — C76.0 MALIGNANT NEOPLASM OF HEAD, FACE AND NECK: ICD-10-CM

## 2021-02-01 PROCEDURE — 25500020 PHARM REV CODE 255: Mod: PO | Performed by: RADIOLOGY

## 2021-02-01 PROCEDURE — 70487 CT MAXILLOFACIAL W/DYE: CPT | Mod: TC,PO

## 2021-02-01 RX ADMIN — IOHEXOL 100 ML: 350 INJECTION, SOLUTION INTRAVENOUS at 08:02

## 2021-02-03 ENCOUNTER — TELEPHONE (OUTPATIENT)
Dept: HEMATOLOGY/ONCOLOGY | Facility: CLINIC | Age: 61
End: 2021-02-03

## 2021-02-04 ENCOUNTER — CLINICAL SUPPORT (OUTPATIENT)
Dept: REHABILITATION | Facility: HOSPITAL | Age: 61
End: 2021-02-04
Attending: RADIOLOGY
Payer: COMMERCIAL

## 2021-02-04 ENCOUNTER — DOCUMENTATION ONLY (OUTPATIENT)
Dept: REHABILITATION | Facility: HOSPITAL | Age: 61
End: 2021-02-04

## 2021-02-04 DIAGNOSIS — M53.82 NECK MUSCLE WEAKNESS: ICD-10-CM

## 2021-02-04 DIAGNOSIS — R29.3 POSTURE IMBALANCE: ICD-10-CM

## 2021-02-04 DIAGNOSIS — M53.82 IMPAIRED RANGE OF MOTION OF CERVICAL SPINE: ICD-10-CM

## 2021-02-04 PROCEDURE — 97110 THERAPEUTIC EXERCISES: CPT | Mod: PN,CQ

## 2021-02-05 ENCOUNTER — TELEPHONE (OUTPATIENT)
Dept: RADIATION ONCOLOGY | Facility: CLINIC | Age: 61
End: 2021-02-05

## 2021-02-05 DIAGNOSIS — C07 MALIGNANT NEOPLASM OF PAROTID GLAND: ICD-10-CM

## 2021-02-05 DIAGNOSIS — C08.9 MALIGNANT NEOPLASM OF MAJOR SALIVARY GLAND: Primary | ICD-10-CM

## 2021-02-09 ENCOUNTER — HOSPITAL ENCOUNTER (OUTPATIENT)
Dept: RADIOLOGY | Facility: HOSPITAL | Age: 61
Discharge: HOME | End: 2021-02-09
Attending: RADIOLOGY
Payer: COMMERCIAL

## 2021-02-09 DIAGNOSIS — C76.0 MALIGNANT NEOPLASM OF HEAD, FACE AND NECK: ICD-10-CM

## 2021-02-09 PROCEDURE — 70491 CT SOFT TISSUE NECK W/DYE: CPT | Mod: TC,PO

## 2021-02-09 PROCEDURE — 25500020 PHARM REV CODE 255: Mod: PO | Performed by: RADIOLOGY

## 2021-02-09 RX ADMIN — IOHEXOL 100 ML: 350 INJECTION, SOLUTION INTRAVENOUS at 08:02

## 2021-02-12 ENCOUNTER — CLINICAL SUPPORT (OUTPATIENT)
Dept: REHABILITATION | Facility: HOSPITAL | Age: 61
End: 2021-02-12
Attending: RADIOLOGY
Payer: COMMERCIAL

## 2021-02-12 DIAGNOSIS — R29.3 POSTURE IMBALANCE: ICD-10-CM

## 2021-02-12 DIAGNOSIS — M53.82 NECK MUSCLE WEAKNESS: ICD-10-CM

## 2021-02-12 DIAGNOSIS — M53.82 IMPAIRED RANGE OF MOTION OF CERVICAL SPINE: ICD-10-CM

## 2021-02-12 PROCEDURE — 97112 NEUROMUSCULAR REEDUCATION: CPT | Mod: PN,CQ

## 2021-02-12 PROCEDURE — 97110 THERAPEUTIC EXERCISES: CPT | Mod: PN,CQ

## 2021-02-24 ENCOUNTER — CLINICAL SUPPORT (OUTPATIENT)
Dept: REHABILITATION | Facility: HOSPITAL | Age: 61
End: 2021-02-24
Attending: RADIOLOGY
Payer: COMMERCIAL

## 2021-02-24 DIAGNOSIS — M53.82 NECK MUSCLE WEAKNESS: ICD-10-CM

## 2021-02-24 DIAGNOSIS — R29.3 POSTURE IMBALANCE: ICD-10-CM

## 2021-02-24 DIAGNOSIS — M53.82 IMPAIRED RANGE OF MOTION OF CERVICAL SPINE: ICD-10-CM

## 2021-02-24 PROCEDURE — 97110 THERAPEUTIC EXERCISES: CPT | Mod: PN

## 2021-02-24 PROCEDURE — 97112 NEUROMUSCULAR REEDUCATION: CPT | Mod: PN

## 2021-02-24 PROCEDURE — 97124 MASSAGE THERAPY: CPT | Mod: PN

## 2021-02-26 DIAGNOSIS — R51.9 HEADACHE: ICD-10-CM

## 2021-02-26 DIAGNOSIS — R51.9 GENERALIZED HEADACHES: Primary | ICD-10-CM

## 2021-02-26 DIAGNOSIS — C80.1 ADENOCYSTIC CARCINOMA: ICD-10-CM

## 2021-03-09 ENCOUNTER — CLINICAL SUPPORT (OUTPATIENT)
Dept: REHABILITATION | Facility: HOSPITAL | Age: 61
End: 2021-03-09
Attending: RADIOLOGY
Payer: COMMERCIAL

## 2021-03-09 DIAGNOSIS — R29.3 POSTURE IMBALANCE: ICD-10-CM

## 2021-03-09 DIAGNOSIS — M53.82 IMPAIRED RANGE OF MOTION OF CERVICAL SPINE: ICD-10-CM

## 2021-03-09 DIAGNOSIS — M53.82 NECK MUSCLE WEAKNESS: ICD-10-CM

## 2021-03-09 PROCEDURE — 97112 NEUROMUSCULAR REEDUCATION: CPT | Mod: PN

## 2021-03-09 PROCEDURE — 97110 THERAPEUTIC EXERCISES: CPT | Mod: PN

## 2021-03-11 ENCOUNTER — HOSPITAL ENCOUNTER (OUTPATIENT)
Dept: RADIOLOGY | Facility: HOSPITAL | Age: 61
Discharge: HOME | End: 2021-03-11
Attending: OTOLARYNGOLOGY
Payer: COMMERCIAL

## 2021-03-11 ENCOUNTER — CLINICAL SUPPORT (OUTPATIENT)
Dept: REHABILITATION | Facility: HOSPITAL | Age: 61
End: 2021-03-11
Attending: RADIOLOGY
Payer: COMMERCIAL

## 2021-03-11 DIAGNOSIS — C80.1 ADENOCYSTIC CARCINOMA: ICD-10-CM

## 2021-03-11 DIAGNOSIS — M53.82 IMPAIRED RANGE OF MOTION OF CERVICAL SPINE: ICD-10-CM

## 2021-03-11 DIAGNOSIS — R51.9 HEADACHE: ICD-10-CM

## 2021-03-11 DIAGNOSIS — R51.9 GENERALIZED HEADACHES: ICD-10-CM

## 2021-03-11 DIAGNOSIS — M53.82 NECK MUSCLE WEAKNESS: ICD-10-CM

## 2021-03-11 DIAGNOSIS — R29.3 POSTURE IMBALANCE: ICD-10-CM

## 2021-03-11 LAB
CREAT SERPL-MCNC: 0.9 MG/DL (ref 0.5–1.4)
SAMPLE: NORMAL

## 2021-03-11 PROCEDURE — 97112 NEUROMUSCULAR REEDUCATION: CPT | Mod: PN

## 2021-03-11 PROCEDURE — A9585 GADOBUTROL INJECTION: HCPCS | Mod: PO

## 2021-03-11 PROCEDURE — 97110 THERAPEUTIC EXERCISES: CPT | Mod: PN

## 2021-03-11 PROCEDURE — 70543 MRI ORBT/FAC/NCK W/O &W/DYE: CPT | Mod: TC,PO

## 2021-03-11 PROCEDURE — 70553 MRI BRAIN STEM W/O & W/DYE: CPT | Mod: TC,PO

## 2021-03-11 PROCEDURE — 25500020 PHARM REV CODE 255: Mod: PO

## 2021-03-11 RX ORDER — GADOBUTROL 604.72 MG/ML
5.5 INJECTION INTRAVENOUS
Status: COMPLETED | OUTPATIENT
Start: 2021-03-11 | End: 2021-03-11

## 2021-03-11 RX ADMIN — GADOBUTROL 5.5 ML: 604.72 INJECTION INTRAVENOUS at 01:03

## 2021-03-18 ENCOUNTER — CLINICAL SUPPORT (OUTPATIENT)
Dept: REHABILITATION | Facility: HOSPITAL | Age: 61
End: 2021-03-18
Attending: RADIOLOGY
Payer: COMMERCIAL

## 2021-03-18 DIAGNOSIS — M53.82 IMPAIRED RANGE OF MOTION OF CERVICAL SPINE: ICD-10-CM

## 2021-03-18 DIAGNOSIS — M53.82 NECK MUSCLE WEAKNESS: ICD-10-CM

## 2021-03-18 DIAGNOSIS — R29.3 POSTURE IMBALANCE: ICD-10-CM

## 2021-03-18 PROCEDURE — 97110 THERAPEUTIC EXERCISES: CPT | Mod: PN,CQ

## 2021-03-18 PROCEDURE — 97112 NEUROMUSCULAR REEDUCATION: CPT | Mod: PN,CQ

## 2021-03-19 ENCOUNTER — DOCUMENTATION ONLY (OUTPATIENT)
Dept: REHABILITATION | Facility: HOSPITAL | Age: 61
End: 2021-03-19

## 2021-03-23 ENCOUNTER — CLINICAL SUPPORT (OUTPATIENT)
Dept: REHABILITATION | Facility: HOSPITAL | Age: 61
End: 2021-03-23
Attending: RADIOLOGY
Payer: COMMERCIAL

## 2021-03-23 DIAGNOSIS — M53.82 NECK MUSCLE WEAKNESS: ICD-10-CM

## 2021-03-23 DIAGNOSIS — M53.82 IMPAIRED RANGE OF MOTION OF CERVICAL SPINE: ICD-10-CM

## 2021-03-23 DIAGNOSIS — R29.3 POSTURE IMBALANCE: ICD-10-CM

## 2021-03-23 PROCEDURE — 97110 THERAPEUTIC EXERCISES: CPT | Mod: PN,CQ

## 2021-03-23 PROCEDURE — 97112 NEUROMUSCULAR REEDUCATION: CPT | Mod: PN,CQ

## 2021-03-25 ENCOUNTER — CLINICAL SUPPORT (OUTPATIENT)
Dept: REHABILITATION | Facility: HOSPITAL | Age: 61
End: 2021-03-25
Attending: RADIOLOGY
Payer: COMMERCIAL

## 2021-03-25 DIAGNOSIS — R29.3 POSTURE IMBALANCE: ICD-10-CM

## 2021-03-25 DIAGNOSIS — M53.82 NECK MUSCLE WEAKNESS: ICD-10-CM

## 2021-03-25 DIAGNOSIS — M53.82 IMPAIRED RANGE OF MOTION OF CERVICAL SPINE: ICD-10-CM

## 2021-03-25 PROCEDURE — 97110 THERAPEUTIC EXERCISES: CPT | Mod: PN,CQ

## 2021-03-25 PROCEDURE — 97112 NEUROMUSCULAR REEDUCATION: CPT | Mod: PN,CQ

## 2021-03-30 ENCOUNTER — CLINICAL SUPPORT (OUTPATIENT)
Dept: REHABILITATION | Facility: HOSPITAL | Age: 61
End: 2021-03-30
Attending: RADIOLOGY
Payer: COMMERCIAL

## 2021-03-30 DIAGNOSIS — R29.3 POSTURE IMBALANCE: ICD-10-CM

## 2021-03-30 DIAGNOSIS — M53.82 IMPAIRED RANGE OF MOTION OF CERVICAL SPINE: ICD-10-CM

## 2021-03-30 DIAGNOSIS — M53.82 NECK MUSCLE WEAKNESS: ICD-10-CM

## 2021-03-30 PROCEDURE — 97110 THERAPEUTIC EXERCISES: CPT | Mod: PN

## 2021-03-30 PROCEDURE — 97112 NEUROMUSCULAR REEDUCATION: CPT | Mod: PN

## 2021-04-01 ENCOUNTER — CLINICAL SUPPORT (OUTPATIENT)
Dept: REHABILITATION | Facility: HOSPITAL | Age: 61
End: 2021-04-01
Attending: RADIOLOGY
Payer: COMMERCIAL

## 2021-04-01 ENCOUNTER — DOCUMENTATION ONLY (OUTPATIENT)
Dept: REHABILITATION | Facility: HOSPITAL | Age: 61
End: 2021-04-01

## 2021-04-01 DIAGNOSIS — M53.82 IMPAIRED RANGE OF MOTION OF CERVICAL SPINE: ICD-10-CM

## 2021-04-01 DIAGNOSIS — R29.3 POSTURE IMBALANCE: ICD-10-CM

## 2021-04-01 DIAGNOSIS — M53.82 NECK MUSCLE WEAKNESS: ICD-10-CM

## 2021-04-01 PROCEDURE — 97112 NEUROMUSCULAR REEDUCATION: CPT | Mod: PN,CQ

## 2021-04-01 PROCEDURE — 97110 THERAPEUTIC EXERCISES: CPT | Mod: PN,CQ

## 2021-04-06 ENCOUNTER — CLINICAL SUPPORT (OUTPATIENT)
Dept: REHABILITATION | Facility: HOSPITAL | Age: 61
End: 2021-04-06
Attending: RADIOLOGY
Payer: COMMERCIAL

## 2021-04-06 DIAGNOSIS — M53.82 IMPAIRED RANGE OF MOTION OF CERVICAL SPINE: ICD-10-CM

## 2021-04-06 DIAGNOSIS — R29.3 POSTURE IMBALANCE: ICD-10-CM

## 2021-04-06 DIAGNOSIS — M53.82 NECK MUSCLE WEAKNESS: ICD-10-CM

## 2021-04-06 PROCEDURE — 97110 THERAPEUTIC EXERCISES: CPT | Mod: PN

## 2021-05-10 ENCOUNTER — PATIENT MESSAGE (OUTPATIENT)
Dept: RESEARCH | Facility: HOSPITAL | Age: 61
End: 2021-05-10

## 2021-05-28 ENCOUNTER — HOSPITAL ENCOUNTER (OUTPATIENT)
Dept: RADIOLOGY | Facility: HOSPITAL | Age: 61
Discharge: HOME | End: 2021-05-28
Attending: NURSE PRACTITIONER
Payer: COMMERCIAL

## 2021-05-28 ENCOUNTER — OFFICE VISIT (OUTPATIENT)
Dept: FAMILY MEDICINE | Facility: CLINIC | Age: 61
End: 2021-05-28
Payer: COMMERCIAL

## 2021-05-28 VITALS
DIASTOLIC BLOOD PRESSURE: 84 MMHG | WEIGHT: 124 LBS | HEART RATE: 60 BPM | SYSTOLIC BLOOD PRESSURE: 136 MMHG | BODY MASS INDEX: 24.35 KG/M2 | HEIGHT: 60 IN

## 2021-05-28 DIAGNOSIS — E03.9 ACQUIRED HYPOTHYROIDISM: ICD-10-CM

## 2021-05-28 DIAGNOSIS — M54.2 NECK PAIN: ICD-10-CM
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
## 2021-05-28 DIAGNOSIS — C07 PRIMARY CANCER OF PAROTID GLAND: ICD-10-CM

## 2021-05-28 PROCEDURE — 99214 OFFICE O/P EST MOD 30 MIN: CPT | Mod: S$GLB,,, | Performed by: NURSE PRACTITIONER

## 2021-05-28 PROCEDURE — 72070 X-RAY EXAM THORAC SPINE 2VWS: CPT | Mod: TC,PO

## 2021-05-28 PROCEDURE — 72050 X-RAY EXAM NECK SPINE 4/5VWS: CPT | Mod: TC,PO

## 2021-05-28 PROCEDURE — 99214 PR OFFICE/OUTPT VISIT, EST, LEVL IV, 30-39 MIN: ICD-10-PCS | Mod: S$GLB,,, | Performed by: NURSE PRACTITIONER

## 2021-05-28 RX ORDER — LEVOTHYROXINE SODIUM 88 UG/1
88 TABLET ORAL DAILY
Qty: 90 TABLET | Refills: 1 | Status: SHIPPED | OUTPATIENT
Start: 2021-05-28 | End: 2021-07-22 | Stop reason: SDUPTHER

## 2021-05-28 RX ORDER — METHOCARBAMOL 750 MG/1
750 TABLET, FILM COATED ORAL 3 TIMES DAILY PRN
Qty: 30 TABLET | Refills: 0 | Status: SHIPPED | OUTPATIENT
Start: 2021-05-28 | End: 2021-06-07

## 2021-05-28 RX ORDER — LEVOTHYROXINE SODIUM 88 UG/1
88 TABLET ORAL DAILY
Qty: 90 TABLET | Refills: 1 | Status: CANCELLED | OUTPATIENT
Start: 2021-05-28

## 2021-06-01 ENCOUNTER — TELEPHONE (OUTPATIENT)
Dept: FAMILY MEDICINE | Facility: CLINIC | Age: 61
End: 2021-06-01

## 2021-07-22 ENCOUNTER — OFFICE VISIT (OUTPATIENT)
Dept: FAMILY MEDICINE | Facility: CLINIC | Age: 61
End: 2021-07-22
Payer: COMMERCIAL

## 2021-07-22 VITALS
BODY MASS INDEX: 24.94 KG/M2 | OXYGEN SATURATION: 99 % | WEIGHT: 127 LBS | SYSTOLIC BLOOD PRESSURE: 124 MMHG | DIASTOLIC BLOOD PRESSURE: 70 MMHG | HEART RATE: 60 BPM | HEIGHT: 60 IN

## 2021-07-22 DIAGNOSIS — C07 PRIMARY CANCER OF PAROTID GLAND: ICD-10-CM

## 2021-07-22 DIAGNOSIS — Z12.11 COLON CANCER SCREENING: ICD-10-CM

## 2021-07-22 DIAGNOSIS — E78.00 ELEVATED LDL CHOLESTEROL LEVEL: ICD-10-CM

## 2021-07-22 DIAGNOSIS — E03.9 ACQUIRED HYPOTHYROIDISM: Primary | ICD-10-CM

## 2021-07-22 PROCEDURE — 99214 PR OFFICE/OUTPT VISIT, EST, LEVL IV, 30-39 MIN: ICD-10-PCS | Mod: S$GLB,,, | Performed by: NURSE PRACTITIONER

## 2021-07-22 PROCEDURE — 99214 OFFICE O/P EST MOD 30 MIN: CPT | Mod: S$GLB,,, | Performed by: NURSE PRACTITIONER

## 2021-07-22 RX ORDER — LEVOTHYROXINE SODIUM 88 UG/1
88 TABLET ORAL DAILY
Qty: 90 TABLET | Refills: 1 | Status: SHIPPED | OUTPATIENT
Start: 2021-07-22 | End: 2022-01-01 | Stop reason: SDUPTHER

## 2021-07-28 ENCOUNTER — OFFICE VISIT (OUTPATIENT)
Dept: HEMATOLOGY/ONCOLOGY | Facility: CLINIC | Age: 61
End: 2021-07-28
Payer: COMMERCIAL

## 2021-07-28 VITALS
BODY MASS INDEX: 25.6 KG/M2 | WEIGHT: 127 LBS | RESPIRATION RATE: 18 BRPM | HEIGHT: 59 IN | SYSTOLIC BLOOD PRESSURE: 159 MMHG | HEART RATE: 59 BPM | DIASTOLIC BLOOD PRESSURE: 73 MMHG | TEMPERATURE: 98 F

## 2021-07-28 DIAGNOSIS — C08.9 MALIGNANT NEOPLASM OF MAJOR SALIVARY GLAND: ICD-10-CM

## 2021-07-28 PROCEDURE — 99213 PR OFFICE/OUTPT VISIT, EST, LEVL III, 20-29 MIN: ICD-10-PCS | Mod: S$GLB,,, | Performed by: INTERNAL MEDICINE

## 2021-07-28 PROCEDURE — 99213 OFFICE O/P EST LOW 20 MIN: CPT | Mod: S$GLB,,, | Performed by: INTERNAL MEDICINE

## 2021-08-16 ENCOUNTER — CLINICAL SUPPORT (OUTPATIENT)
Dept: RADIATION ONCOLOGY | Facility: CLINIC | Age: 61
End: 2021-08-16
Payer: COMMERCIAL

## 2021-08-16 DIAGNOSIS — C76.0 MALIGNANT NEOPLASM OF HEAD, FACE AND NECK: ICD-10-CM

## 2021-08-16 PROCEDURE — 99442 PR PHYSICIAN TELEPHONE EVALUATION 11-20 MIN: ICD-10-PCS | Mod: 95,,, | Performed by: RADIOLOGY

## 2021-08-16 PROCEDURE — 99442 PR PHYSICIAN TELEPHONE EVALUATION 11-20 MIN: CPT | Mod: 95,,, | Performed by: RADIOLOGY

## 2021-08-26 ENCOUNTER — TELEPHONE (OUTPATIENT)
Dept: FAMILY MEDICINE | Facility: CLINIC | Age: 61
End: 2021-08-26

## 2021-09-03 ENCOUNTER — HOSPITAL ENCOUNTER (OUTPATIENT)
Dept: RADIOLOGY | Facility: HOSPITAL | Age: 61
Discharge: HOME | End: 2021-09-03
Attending: RADIOLOGY
Payer: COMMERCIAL

## 2021-09-03 DIAGNOSIS — R91.1 SOLITARY PULMONARY NODULE: ICD-10-CM

## 2021-09-03 DIAGNOSIS — C76.0 MALIGNANT NEOPLASM OF HEAD, FACE AND NECK: ICD-10-CM

## 2021-09-03 DIAGNOSIS — C07 MALIGNANT NEOPLASM OF PAROTID GLAND: Primary | ICD-10-CM

## 2021-09-03 LAB
CREAT SERPL-MCNC: 0.9 MG/DL (ref 0.5–1.4)
SAMPLE: NORMAL

## 2021-09-03 PROCEDURE — 70491 CT SOFT TISSUE NECK W/DYE: CPT | Mod: TC,PO

## 2021-09-03 PROCEDURE — 25500020 PHARM REV CODE 255: Mod: PO | Performed by: RADIOLOGY

## 2021-09-03 PROCEDURE — 82565 ASSAY OF CREATININE: CPT | Mod: PO

## 2021-09-03 RX ADMIN — IOHEXOL 100 ML: 350 INJECTION, SOLUTION INTRAVENOUS at 08:09

## 2021-09-21 ENCOUNTER — HOSPITAL ENCOUNTER (EMERGENCY)
Facility: HOSPITAL | Age: 61
Discharge: HOME | End: 2021-09-21
Attending: EMERGENCY MEDICINE
Payer: COMMERCIAL

## 2021-09-21 VITALS
RESPIRATION RATE: 18 BRPM | BODY MASS INDEX: 25.6 KG/M2 | TEMPERATURE: 99 F | DIASTOLIC BLOOD PRESSURE: 68 MMHG | HEIGHT: 59 IN | WEIGHT: 127 LBS | SYSTOLIC BLOOD PRESSURE: 146 MMHG | OXYGEN SATURATION: 99 % | HEART RATE: 56 BPM

## 2021-09-21 DIAGNOSIS — J98.4 PULMONARY LESION, RIGHT: Primary | ICD-10-CM

## 2021-09-21 DIAGNOSIS — U07.1 COVID-19: ICD-10-CM

## 2021-09-21 DIAGNOSIS — R07.9 CHEST PAIN: ICD-10-CM

## 2021-09-21 LAB
ALBUMIN SERPL BCP-MCNC: 4.4 G/DL (ref 3.5–5.2)
ALP SERPL-CCNC: 71 U/L (ref 55–135)
ALT SERPL W/O P-5'-P-CCNC: 21 U/L (ref 10–44)
ANION GAP SERPL CALC-SCNC: 13 MMOL/L (ref 8–16)
AST SERPL-CCNC: 22 U/L (ref 10–40)
BASOPHILS # BLD AUTO: 0.04 K/UL (ref 0–0.2)
BASOPHILS NFR BLD: 0.7 % (ref 0–1.9)
BILIRUB SERPL-MCNC: 0.9 MG/DL (ref 0.1–1)
BNP SERPL-MCNC: 64 PG/ML (ref 0–99)
BUN SERPL-MCNC: 19 MG/DL (ref 6–20)
CALCIUM SERPL-MCNC: 9.4 MG/DL (ref 8.7–10.5)
CHLORIDE SERPL-SCNC: 108 MMOL/L (ref 95–110)
CO2 SERPL-SCNC: 22 MMOL/L (ref 23–29)
CREAT SERPL-MCNC: 0.7 MG/DL (ref 0.5–1.4)
CRP SERPL-MCNC: 0.45 MG/DL
DIFFERENTIAL METHOD: ABNORMAL
EOSINOPHIL # BLD AUTO: 0.1 K/UL (ref 0–0.5)
EOSINOPHIL NFR BLD: 1.7 % (ref 0–8)
ERYTHROCYTE [DISTWIDTH] IN BLOOD BY AUTOMATED COUNT: 13.1 % (ref 11.5–14.5)
EST. GFR  (AFRICAN AMERICAN): >60 ML/MIN/1.73 M^2
EST. GFR  (NON AFRICAN AMERICAN): >60 ML/MIN/1.73 M^2
FERRITIN SERPL-MCNC: 250 NG/ML (ref 20–300)
GLUCOSE SERPL-MCNC: 89 MG/DL (ref 70–110)
HCT VFR BLD AUTO: 36.5 % (ref 37–48.5)
HGB BLD-MCNC: 11.9 G/DL (ref 12–16)
IMM GRANULOCYTES # BLD AUTO: 0.01 K/UL (ref 0–0.04)
IMM GRANULOCYTES NFR BLD AUTO: 0.2 % (ref 0–0.5)
LYMPHOCYTES # BLD AUTO: 1.6 K/UL (ref 1–4.8)
LYMPHOCYTES NFR BLD: 30.2 % (ref 18–48)
MCH RBC QN AUTO: 29.6 PG (ref 27–31)
MCHC RBC AUTO-ENTMCNC: 32.6 G/DL (ref 32–36)
MCV RBC AUTO: 91 FL (ref 82–98)
MONOCYTES # BLD AUTO: 0.7 K/UL (ref 0.3–1)
MONOCYTES NFR BLD: 12.1 % (ref 4–15)
NEUTROPHILS # BLD AUTO: 3 K/UL (ref 1.8–7.7)
NEUTROPHILS NFR BLD: 55.1 % (ref 38–73)
NRBC BLD-RTO: 0 /100 WBC
PLATELET # BLD AUTO: 243 K/UL (ref 150–450)
PMV BLD AUTO: 11.1 FL (ref 9.2–12.9)
POTASSIUM SERPL-SCNC: 3.5 MMOL/L (ref 3.5–5.1)
PROT SERPL-MCNC: 7.8 G/DL (ref 6–8.4)
RBC # BLD AUTO: 4.02 M/UL (ref 4–5.4)
SARS-COV-2 RDRP RESP QL NAA+PROBE: POSITIVE
SODIUM SERPL-SCNC: 143 MMOL/L (ref 136–145)
TROPONIN I SERPL DL<=0.01 NG/ML-MCNC: <0.03 NG/ML
TROPONIN I SERPL DL<=0.01 NG/ML-MCNC: <0.03 NG/ML
WBC # BLD AUTO: 5.39 K/UL (ref 3.9–12.7)

## 2021-09-21 PROCEDURE — 83880 ASSAY OF NATRIURETIC PEPTIDE: CPT | Performed by: EMERGENCY MEDICINE

## 2021-09-21 PROCEDURE — 25500020 PHARM REV CODE 255: Performed by: EMERGENCY MEDICINE

## 2021-09-21 PROCEDURE — 93010 EKG 12-LEAD: ICD-10-PCS | Mod: ,,, | Performed by: INTERNAL MEDICINE

## 2021-09-21 PROCEDURE — 85025 COMPLETE CBC W/AUTO DIFF WBC: CPT | Performed by: EMERGENCY MEDICINE

## 2021-09-21 PROCEDURE — 99285 EMERGENCY DEPT VISIT HI MDM: CPT

## 2021-09-21 PROCEDURE — 82728 ASSAY OF FERRITIN: CPT | Performed by: EMERGENCY MEDICINE

## 2021-09-21 PROCEDURE — 84484 ASSAY OF TROPONIN QUANT: CPT | Performed by: EMERGENCY MEDICINE

## 2021-09-21 PROCEDURE — 93010 ELECTROCARDIOGRAM REPORT: CPT | Mod: ,,, | Performed by: INTERNAL MEDICINE

## 2021-09-21 PROCEDURE — 36415 COLL VENOUS BLD VENIPUNCTURE: CPT | Performed by: EMERGENCY MEDICINE

## 2021-09-21 PROCEDURE — 80053 COMPREHEN METABOLIC PANEL: CPT | Performed by: EMERGENCY MEDICINE

## 2021-09-21 PROCEDURE — 86140 C-REACTIVE PROTEIN: CPT | Performed by: EMERGENCY MEDICINE

## 2021-09-21 PROCEDURE — 25000003 PHARM REV CODE 250: Performed by: EMERGENCY MEDICINE

## 2021-09-21 PROCEDURE — U0002 COVID-19 LAB TEST NON-CDC: HCPCS | Performed by: EMERGENCY MEDICINE

## 2021-09-21 PROCEDURE — 93005 ELECTROCARDIOGRAM TRACING: CPT | Performed by: INTERNAL MEDICINE

## 2021-09-21 RX ORDER — ASPIRIN 325 MG
325 TABLET ORAL
Status: COMPLETED | OUTPATIENT
Start: 2021-09-21 | End: 2021-09-21

## 2021-09-21 RX ORDER — ACETAMINOPHEN 500 MG
5000 TABLET ORAL DAILY
Status: ON HOLD | COMMUNITY
End: 2022-01-01 | Stop reason: HOSPADM

## 2021-09-21 RX ADMIN — ASPIRIN 325 MG ORAL TABLET 325 MG: 325 PILL ORAL at 05:09

## 2021-09-21 RX ADMIN — IOHEXOL 100 ML: 350 INJECTION, SOLUTION INTRAVENOUS at 07:09

## 2021-09-22 ENCOUNTER — INFUSION (OUTPATIENT)
Dept: INFECTIOUS DISEASES | Facility: HOSPITAL | Age: 61
End: 2021-09-22
Attending: EMERGENCY MEDICINE
Payer: COMMERCIAL

## 2021-09-22 VITALS
WEIGHT: 127 LBS | BODY MASS INDEX: 25.6 KG/M2 | RESPIRATION RATE: 17 BRPM | SYSTOLIC BLOOD PRESSURE: 150 MMHG | OXYGEN SATURATION: 98 % | HEART RATE: 59 BPM | HEIGHT: 59 IN | TEMPERATURE: 98 F | DIASTOLIC BLOOD PRESSURE: 69 MMHG

## 2021-09-22 DIAGNOSIS — U07.1 COVID-19: Primary | ICD-10-CM

## 2021-09-22 DIAGNOSIS — J98.4 PULMONARY LESION, RIGHT: ICD-10-CM

## 2021-09-22 PROCEDURE — 25000003 PHARM REV CODE 250: Performed by: EMERGENCY MEDICINE

## 2021-09-22 PROCEDURE — 63600175 PHARM REV CODE 636 W HCPCS: Performed by: EMERGENCY MEDICINE

## 2021-09-22 PROCEDURE — M0243 CASIRIVI AND IMDEVI INFUSION: HCPCS | Performed by: EMERGENCY MEDICINE

## 2021-09-22 RX ORDER — EPINEPHRINE 0.3 MG/.3ML
0.3 INJECTION SUBCUTANEOUS
Status: DISCONTINUED | OUTPATIENT
Start: 2021-09-22 | End: 2022-01-01

## 2021-09-22 RX ORDER — ACETAMINOPHEN 325 MG/1
650 TABLET ORAL ONCE AS NEEDED
Status: DISCONTINUED | OUTPATIENT
Start: 2021-09-22 | End: 2022-01-01

## 2021-09-22 RX ORDER — ONDANSETRON 4 MG/1
4 TABLET, ORALLY DISINTEGRATING ORAL ONCE AS NEEDED
Status: DISCONTINUED | OUTPATIENT
Start: 2021-09-22 | End: 2022-01-01

## 2021-09-22 RX ORDER — DIPHENHYDRAMINE HYDROCHLORIDE 50 MG/ML
25 INJECTION INTRAMUSCULAR; INTRAVENOUS ONCE AS NEEDED
Status: DISCONTINUED | OUTPATIENT
Start: 2021-09-22 | End: 2022-01-01

## 2021-09-22 RX ORDER — ALBUTEROL SULFATE 90 UG/1
2 AEROSOL, METERED RESPIRATORY (INHALATION)
Status: DISCONTINUED | OUTPATIENT
Start: 2021-09-22 | End: 2022-01-01

## 2021-09-22 RX ORDER — SODIUM CHLORIDE 0.9 % (FLUSH) 0.9 %
10 SYRINGE (ML) INJECTION
Status: DISCONTINUED | OUTPATIENT
Start: 2021-09-22 | End: 2022-01-01

## 2021-09-22 RX ADMIN — CASIRIVIMAB AND IMDEVIMAB 600 MG: 600; 600 INJECTION, SOLUTION, CONCENTRATE INTRAVENOUS at 08:09

## 2021-10-01 DIAGNOSIS — Z12.31 ENCOUNTER FOR SCREENING MAMMOGRAM FOR MALIGNANT NEOPLASM OF BREAST: Primary | ICD-10-CM

## 2021-10-04 ENCOUNTER — HOSPITAL ENCOUNTER (OUTPATIENT)
Dept: RADIOLOGY | Facility: HOSPITAL | Age: 61
Discharge: HOME | End: 2021-10-04
Attending: RADIOLOGY
Payer: COMMERCIAL

## 2021-10-04 DIAGNOSIS — R91.1 SOLITARY PULMONARY NODULE: ICD-10-CM

## 2021-10-04 DIAGNOSIS — C07 MALIGNANT NEOPLASM OF PAROTID GLAND: ICD-10-CM

## 2021-10-04 PROCEDURE — 71250 CT THORAX DX C-: CPT | Mod: TC,PO

## 2021-10-06 LAB
ALBUMIN SERPL-MCNC: 4.6 G/DL (ref 3.6–5.1)
ALBUMIN/GLOB SERPL: 1.7 (CALC) (ref 1–2.5)
ALP SERPL-CCNC: 82 U/L (ref 37–153)
ALT SERPL-CCNC: 22 U/L (ref 6–29)
AST SERPL-CCNC: 21 U/L (ref 10–35)
BASOPHILS # BLD AUTO: 29 CELLS/UL (ref 0–200)
BASOPHILS NFR BLD AUTO: 0.7 %
BILIRUB SERPL-MCNC: 0.4 MG/DL (ref 0.2–1.2)
BUN SERPL-MCNC: 17 MG/DL (ref 7–25)
BUN/CREAT SERPL: NORMAL (CALC) (ref 6–22)
CALCIUM SERPL-MCNC: 9.9 MG/DL (ref 8.6–10.4)
CHLORIDE SERPL-SCNC: 104 MMOL/L (ref 98–110)
CHOLEST SERPL-MCNC: 247 MG/DL
CHOLEST/HDLC SERPL: 4.5 (CALC)
CO2 SERPL-SCNC: 26 MMOL/L (ref 20–32)
CREAT SERPL-MCNC: 0.92 MG/DL (ref 0.5–0.99)
EOSINOPHIL # BLD AUTO: 90 CELLS/UL (ref 15–500)
EOSINOPHIL NFR BLD AUTO: 2.2 %
ERYTHROCYTE [DISTWIDTH] IN BLOOD BY AUTOMATED COUNT: 13 % (ref 11–15)
GLOBULIN SER CALC-MCNC: 2.7 G/DL (CALC) (ref 1.9–3.7)
GLUCOSE SERPL-MCNC: 83 MG/DL (ref 65–99)
HCT VFR BLD AUTO: 36.4 % (ref 35–45)
HDLC SERPL-MCNC: 55 MG/DL
HGB BLD-MCNC: 12 G/DL (ref 11.7–15.5)
LDLC SERPL CALC-MCNC: 170 MG/DL (CALC)
LYMPHOCYTES # BLD AUTO: 1230 CELLS/UL (ref 850–3900)
LYMPHOCYTES NFR BLD AUTO: 30 %
MCH RBC QN AUTO: 29.6 PG (ref 27–33)
MCHC RBC AUTO-ENTMCNC: 33 G/DL (ref 32–36)
MCV RBC AUTO: 89.9 FL (ref 80–100)
MONOCYTES # BLD AUTO: 492 CELLS/UL (ref 200–950)
MONOCYTES NFR BLD AUTO: 12 %
NEUTROPHILS # BLD AUTO: 2259 CELLS/UL (ref 1500–7800)
NEUTROPHILS NFR BLD AUTO: 55.1 %
NONHDLC SERPL-MCNC: 192 MG/DL (CALC)
PLATELET # BLD AUTO: 285 THOUSAND/UL (ref 140–400)
PMV BLD REES-ECKER: 10.6 FL (ref 7.5–12.5)
POTASSIUM SERPL-SCNC: 4.3 MMOL/L (ref 3.5–5.3)
PROT SERPL-MCNC: 7.3 G/DL (ref 6.1–8.1)
RBC # BLD AUTO: 4.05 MILLION/UL (ref 3.8–5.1)
SODIUM SERPL-SCNC: 141 MMOL/L (ref 135–146)
T4 FREE SERPL-MCNC: 1.2 NG/DL (ref 0.8–1.8)
TRIGL SERPL-MCNC: 101 MG/DL
TSH SERPL-ACNC: 0.48 MIU/L (ref 0.4–4.5)
WBC # BLD AUTO: 4.1 THOUSAND/UL (ref 3.8–10.8)

## 2021-10-12 DIAGNOSIS — C07 MALIGNANT NEOPLASM OF PAROTID GLAND: ICD-10-CM

## 2021-10-12 DIAGNOSIS — R91.1 SOLITARY PULMONARY NODULE: Primary | ICD-10-CM

## 2021-10-14 ENCOUNTER — TELEPHONE (OUTPATIENT)
Dept: FAMILY MEDICINE | Facility: CLINIC | Age: 61
End: 2021-10-14

## 2022-01-01 ENCOUNTER — ANESTHESIA EVENT (OUTPATIENT)
Dept: SURGERY | Facility: HOSPITAL | Age: 62
End: 2022-01-01
Payer: COMMERCIAL

## 2022-01-01 ENCOUNTER — OFFICE VISIT (OUTPATIENT)
Dept: SURGERY | Facility: CLINIC | Age: 62
End: 2022-01-01
Payer: COMMERCIAL

## 2022-01-01 ENCOUNTER — OFFICE VISIT (OUTPATIENT)
Dept: HEMATOLOGY/ONCOLOGY | Facility: CLINIC | Age: 62
End: 2022-01-01
Payer: COMMERCIAL

## 2022-01-01 ENCOUNTER — PATIENT MESSAGE (OUTPATIENT)
Dept: HEMATOLOGY/ONCOLOGY | Facility: CLINIC | Age: 62
End: 2022-01-01

## 2022-01-01 ENCOUNTER — OFFICE VISIT (OUTPATIENT)
Dept: FAMILY MEDICINE | Facility: CLINIC | Age: 62
End: 2022-01-01
Payer: COMMERCIAL

## 2022-01-01 ENCOUNTER — SOCIAL WORK (OUTPATIENT)
Dept: HEMATOLOGY/ONCOLOGY | Facility: CLINIC | Age: 62
End: 2022-01-01

## 2022-01-01 ENCOUNTER — HOSPITAL ENCOUNTER (INPATIENT)
Facility: HOSPITAL | Age: 62
LOS: 1 days | Discharge: HOME | DRG: 897 | End: 2022-09-23
Attending: EMERGENCY MEDICINE | Admitting: INTERNAL MEDICINE
Payer: COMMERCIAL

## 2022-01-01 ENCOUNTER — TELEPHONE (OUTPATIENT)
Dept: HEMATOLOGY/ONCOLOGY | Facility: CLINIC | Age: 62
End: 2022-01-01

## 2022-01-01 ENCOUNTER — PATIENT OUTREACH (OUTPATIENT)
Dept: FAMILY MEDICINE | Facility: CLINIC | Age: 62
End: 2022-01-01

## 2022-01-01 ENCOUNTER — HOSPITAL ENCOUNTER (OUTPATIENT)
Dept: RADIOLOGY | Facility: HOSPITAL | Age: 62
Discharge: HOME | End: 2022-02-25
Attending: OTOLARYNGOLOGY
Payer: COMMERCIAL

## 2022-01-01 ENCOUNTER — LAB VISIT (OUTPATIENT)
Dept: LAB | Facility: HOSPITAL | Age: 62
End: 2022-01-01
Attending: INTERNAL MEDICINE
Payer: COMMERCIAL

## 2022-01-01 ENCOUNTER — HOSPITAL ENCOUNTER (OUTPATIENT)
Dept: RADIOLOGY | Facility: HOSPITAL | Age: 62
Discharge: HOME | End: 2022-05-31
Attending: NURSE PRACTITIONER
Payer: COMMERCIAL

## 2022-01-01 ENCOUNTER — TELEPHONE (OUTPATIENT)
Dept: HEMATOLOGY/ONCOLOGY | Facility: CLINIC | Age: 62
End: 2022-01-01
Payer: COMMERCIAL

## 2022-01-01 ENCOUNTER — ANESTHESIA EVENT (OUTPATIENT)
Dept: SURGERY | Facility: HOSPITAL | Age: 62
DRG: 866 | End: 2022-01-01
Payer: COMMERCIAL

## 2022-01-01 ENCOUNTER — ANESTHESIA (OUTPATIENT)
Dept: SURGERY | Facility: HOSPITAL | Age: 62
End: 2022-01-01
Payer: COMMERCIAL

## 2022-01-01 ENCOUNTER — DOCUMENTATION ONLY (OUTPATIENT)
Dept: HEMATOLOGY/ONCOLOGY | Facility: CLINIC | Age: 62
End: 2022-01-01

## 2022-01-01 ENCOUNTER — DOCUMENT SCAN (OUTPATIENT)
Dept: HOME HEALTH SERVICES | Facility: HOSPITAL | Age: 62
End: 2022-01-01
Payer: COMMERCIAL

## 2022-01-01 ENCOUNTER — INFUSION (OUTPATIENT)
Dept: INFUSION THERAPY | Facility: HOSPITAL | Age: 62
End: 2022-01-01
Attending: INTERNAL MEDICINE
Payer: COMMERCIAL

## 2022-01-01 ENCOUNTER — HOSPITAL ENCOUNTER (OUTPATIENT)
Dept: RADIOLOGY | Facility: HOSPITAL | Age: 62
Discharge: HOME | End: 2022-01-04
Attending: OBSTETRICS & GYNECOLOGY
Payer: COMMERCIAL

## 2022-01-01 ENCOUNTER — HOSPITAL ENCOUNTER (OUTPATIENT)
Facility: HOSPITAL | Age: 62
Discharge: HOME | End: 2022-04-04
Attending: SURGERY | Admitting: SURGERY
Payer: COMMERCIAL

## 2022-01-01 ENCOUNTER — EXTERNAL HOME HEALTH (OUTPATIENT)
Dept: HOME HEALTH SERVICES | Facility: HOSPITAL | Age: 62
End: 2022-01-01
Payer: COMMERCIAL

## 2022-01-01 ENCOUNTER — OFFICE VISIT (OUTPATIENT)
Dept: RADIATION ONCOLOGY | Facility: CLINIC | Age: 62
End: 2022-01-01
Payer: COMMERCIAL

## 2022-01-01 ENCOUNTER — CLINICAL SUPPORT (OUTPATIENT)
Dept: CARDIOLOGY | Facility: HOSPITAL | Age: 62
End: 2022-01-01
Attending: NURSE PRACTITIONER
Payer: COMMERCIAL

## 2022-01-01 ENCOUNTER — SPECIALTY PHARMACY (OUTPATIENT)
Dept: PHARMACY | Facility: CLINIC | Age: 62
End: 2022-01-01
Payer: COMMERCIAL

## 2022-01-01 ENCOUNTER — TELEPHONE (OUTPATIENT)
Dept: FAMILY MEDICINE | Facility: CLINIC | Age: 62
End: 2022-01-01

## 2022-01-01 ENCOUNTER — CLINICAL SUPPORT (OUTPATIENT)
Dept: HEMATOLOGY/ONCOLOGY | Facility: CLINIC | Age: 62
End: 2022-01-01
Payer: COMMERCIAL

## 2022-01-01 ENCOUNTER — HOSPITAL ENCOUNTER (EMERGENCY)
Facility: HOSPITAL | Age: 62
Discharge: HOME | End: 2022-09-17
Attending: EMERGENCY MEDICINE
Payer: COMMERCIAL

## 2022-01-01 ENCOUNTER — HOSPITAL ENCOUNTER (OUTPATIENT)
Dept: RADIOLOGY | Facility: HOSPITAL | Age: 62
Discharge: HOME | End: 2022-03-18
Attending: RADIOLOGY
Payer: COMMERCIAL

## 2022-01-01 ENCOUNTER — HOSPITAL ENCOUNTER (OUTPATIENT)
Dept: RADIOLOGY | Facility: HOSPITAL | Age: 62
Discharge: HOME | End: 2022-04-19
Attending: INTERNAL MEDICINE
Payer: COMMERCIAL

## 2022-01-01 ENCOUNTER — ANESTHESIA (OUTPATIENT)
Dept: SURGERY | Facility: HOSPITAL | Age: 62
DRG: 866 | End: 2022-01-01
Payer: COMMERCIAL

## 2022-01-01 ENCOUNTER — HOSPITAL ENCOUNTER (INPATIENT)
Facility: HOSPITAL | Age: 62
LOS: 9 days | Discharge: ANOTHER HOSPITAL | DRG: 866 | End: 2022-11-13
Attending: EMERGENCY MEDICINE | Admitting: INTERNAL MEDICINE
Payer: COMMERCIAL

## 2022-01-01 ENCOUNTER — HOSPITAL ENCOUNTER (OUTPATIENT)
Dept: PREADMISSION TESTING | Facility: HOSPITAL | Age: 62
Discharge: HOME | End: 2022-04-01
Attending: SURGERY
Payer: COMMERCIAL

## 2022-01-01 VITALS
HEART RATE: 66 BPM | TEMPERATURE: 97 F | BODY MASS INDEX: 24.3 KG/M2 | HEIGHT: 59 IN | HEART RATE: 60 BPM | SYSTOLIC BLOOD PRESSURE: 127 MMHG | SYSTOLIC BLOOD PRESSURE: 146 MMHG | RESPIRATION RATE: 18 BRPM | TEMPERATURE: 97 F | DIASTOLIC BLOOD PRESSURE: 76 MMHG | DIASTOLIC BLOOD PRESSURE: 79 MMHG

## 2022-01-01 VITALS
DIASTOLIC BLOOD PRESSURE: 64 MMHG | DIASTOLIC BLOOD PRESSURE: 72 MMHG | OXYGEN SATURATION: 99 % | WEIGHT: 119.38 LBS | WEIGHT: 122.63 LBS | BODY MASS INDEX: 24.72 KG/M2 | TEMPERATURE: 97 F | RESPIRATION RATE: 18 BRPM | HEART RATE: 69 BPM | SYSTOLIC BLOOD PRESSURE: 128 MMHG | RESPIRATION RATE: 18 BRPM | HEIGHT: 59 IN | TEMPERATURE: 98 F | BODY MASS INDEX: 24.07 KG/M2 | SYSTOLIC BLOOD PRESSURE: 112 MMHG | HEART RATE: 58 BPM | HEIGHT: 59 IN

## 2022-01-01 VITALS
TEMPERATURE: 97 F | SYSTOLIC BLOOD PRESSURE: 147 MMHG | HEART RATE: 77 BPM | SYSTOLIC BLOOD PRESSURE: 103 MMHG | HEART RATE: 83 BPM | DIASTOLIC BLOOD PRESSURE: 55 MMHG | WEIGHT: 114.38 LBS | WEIGHT: 120.19 LBS | DIASTOLIC BLOOD PRESSURE: 75 MMHG | TEMPERATURE: 98 F | BODY MASS INDEX: 24.28 KG/M2 | BODY MASS INDEX: 23.11 KG/M2

## 2022-01-01 VITALS
TEMPERATURE: 98 F | HEIGHT: 59 IN | DIASTOLIC BLOOD PRESSURE: 71 MMHG | OXYGEN SATURATION: 96 % | WEIGHT: 116.88 LBS | BODY MASS INDEX: 23.56 KG/M2 | SYSTOLIC BLOOD PRESSURE: 127 MMHG | RESPIRATION RATE: 18 BRPM | HEART RATE: 64 BPM

## 2022-01-01 VITALS
HEART RATE: 56 BPM | DIASTOLIC BLOOD PRESSURE: 56 MMHG | RESPIRATION RATE: 12 BRPM | OXYGEN SATURATION: 100 % | SYSTOLIC BLOOD PRESSURE: 85 MMHG

## 2022-01-01 VITALS
DIASTOLIC BLOOD PRESSURE: 76 MMHG | HEART RATE: 58 BPM | TEMPERATURE: 98 F | HEIGHT: 59 IN | RESPIRATION RATE: 18 BRPM | SYSTOLIC BLOOD PRESSURE: 143 MMHG | BODY MASS INDEX: 24.3 KG/M2

## 2022-01-01 VITALS
HEIGHT: 59 IN | RESPIRATION RATE: 18 BRPM | HEART RATE: 57 BPM | DIASTOLIC BLOOD PRESSURE: 75 MMHG | TEMPERATURE: 98 F | OXYGEN SATURATION: 98 % | BODY MASS INDEX: 24.25 KG/M2 | SYSTOLIC BLOOD PRESSURE: 134 MMHG | WEIGHT: 120.31 LBS

## 2022-01-01 VITALS
RESPIRATION RATE: 18 BRPM | WEIGHT: 125.31 LBS | SYSTOLIC BLOOD PRESSURE: 122 MMHG | HEIGHT: 59 IN | BODY MASS INDEX: 25.26 KG/M2 | DIASTOLIC BLOOD PRESSURE: 58 MMHG | TEMPERATURE: 98 F | HEART RATE: 60 BPM

## 2022-01-01 VITALS
WEIGHT: 123.81 LBS | BODY MASS INDEX: 24.96 KG/M2 | HEART RATE: 82 BPM | SYSTOLIC BLOOD PRESSURE: 112 MMHG | HEIGHT: 59 IN | DIASTOLIC BLOOD PRESSURE: 70 MMHG

## 2022-01-01 VITALS
WEIGHT: 120 LBS | HEART RATE: 66 BPM | BODY MASS INDEX: 24.19 KG/M2 | HEIGHT: 59 IN | RESPIRATION RATE: 14 BRPM | DIASTOLIC BLOOD PRESSURE: 83 MMHG | SYSTOLIC BLOOD PRESSURE: 152 MMHG | OXYGEN SATURATION: 96 % | TEMPERATURE: 98 F

## 2022-01-01 VITALS
DIASTOLIC BLOOD PRESSURE: 70 MMHG | WEIGHT: 125 LBS | SYSTOLIC BLOOD PRESSURE: 128 MMHG | HEIGHT: 59 IN | HEART RATE: 60 BPM | BODY MASS INDEX: 25.2 KG/M2

## 2022-01-01 VITALS
DIASTOLIC BLOOD PRESSURE: 98 MMHG | TEMPERATURE: 97 F | BODY MASS INDEX: 21.63 KG/M2 | WEIGHT: 107.13 LBS | HEART RATE: 85 BPM | SYSTOLIC BLOOD PRESSURE: 152 MMHG

## 2022-01-01 VITALS
RESPIRATION RATE: 18 BRPM | DIASTOLIC BLOOD PRESSURE: 64 MMHG | HEIGHT: 59 IN | SYSTOLIC BLOOD PRESSURE: 101 MMHG | TEMPERATURE: 97 F | BODY MASS INDEX: 24.12 KG/M2 | SYSTOLIC BLOOD PRESSURE: 96 MMHG | WEIGHT: 119.63 LBS | HEART RATE: 98 BPM | RESPIRATION RATE: 18 BRPM | WEIGHT: 116.31 LBS | TEMPERATURE: 96 F | DIASTOLIC BLOOD PRESSURE: 66 MMHG | HEIGHT: 59 IN | HEART RATE: 85 BPM | BODY MASS INDEX: 23.45 KG/M2

## 2022-01-01 VITALS — HEIGHT: 59 IN | BODY MASS INDEX: 24.19 KG/M2 | WEIGHT: 120 LBS

## 2022-01-01 VITALS
HEART RATE: 61 BPM | RESPIRATION RATE: 18 BRPM | WEIGHT: 120.13 LBS | TEMPERATURE: 98 F | OXYGEN SATURATION: 94 % | HEIGHT: 59 IN | SYSTOLIC BLOOD PRESSURE: 185 MMHG | DIASTOLIC BLOOD PRESSURE: 84 MMHG | BODY MASS INDEX: 24.22 KG/M2

## 2022-01-01 VITALS
WEIGHT: 121.31 LBS | HEART RATE: 70 BPM | BODY MASS INDEX: 24.46 KG/M2 | HEIGHT: 59 IN | OXYGEN SATURATION: 95 % | DIASTOLIC BLOOD PRESSURE: 72 MMHG | TEMPERATURE: 97 F | SYSTOLIC BLOOD PRESSURE: 117 MMHG | RESPIRATION RATE: 17 BRPM

## 2022-01-01 VITALS
WEIGHT: 123 LBS | SYSTOLIC BLOOD PRESSURE: 159 MMHG | HEART RATE: 61 BPM | TEMPERATURE: 97 F | BODY MASS INDEX: 24.84 KG/M2 | DIASTOLIC BLOOD PRESSURE: 81 MMHG

## 2022-01-01 VITALS
HEIGHT: 59 IN | OXYGEN SATURATION: 98 % | WEIGHT: 124.88 LBS | DIASTOLIC BLOOD PRESSURE: 77 MMHG | HEART RATE: 72 BPM | WEIGHT: 125 LBS | HEIGHT: 59 IN | DIASTOLIC BLOOD PRESSURE: 63 MMHG | HEART RATE: 69 BPM | RESPIRATION RATE: 18 BRPM | BODY MASS INDEX: 25.17 KG/M2 | BODY MASS INDEX: 25.2 KG/M2 | TEMPERATURE: 98 F | TEMPERATURE: 97 F | SYSTOLIC BLOOD PRESSURE: 132 MMHG | SYSTOLIC BLOOD PRESSURE: 114 MMHG | RESPIRATION RATE: 18 BRPM

## 2022-01-01 VITALS
HEART RATE: 64 BPM | WEIGHT: 124.31 LBS | SYSTOLIC BLOOD PRESSURE: 124 MMHG | BODY MASS INDEX: 25.11 KG/M2 | TEMPERATURE: 98 F | DIASTOLIC BLOOD PRESSURE: 74 MMHG | RESPIRATION RATE: 18 BRPM

## 2022-01-01 VITALS
TEMPERATURE: 98 F | HEIGHT: 59 IN | SYSTOLIC BLOOD PRESSURE: 123 MMHG | HEART RATE: 78 BPM | DIASTOLIC BLOOD PRESSURE: 72 MMHG | RESPIRATION RATE: 18 BRPM | OXYGEN SATURATION: 98 % | BODY MASS INDEX: 24.82 KG/M2 | WEIGHT: 123.13 LBS

## 2022-01-01 VITALS
RESPIRATION RATE: 18 BRPM | BODY MASS INDEX: 24.8 KG/M2 | SYSTOLIC BLOOD PRESSURE: 112 MMHG | HEART RATE: 68 BPM | TEMPERATURE: 97 F | DIASTOLIC BLOOD PRESSURE: 69 MMHG | WEIGHT: 123 LBS | HEIGHT: 59 IN

## 2022-01-01 VITALS
TEMPERATURE: 98 F | WEIGHT: 123.63 LBS | HEART RATE: 65 BPM | BODY MASS INDEX: 24.96 KG/M2 | SYSTOLIC BLOOD PRESSURE: 142 MMHG | DIASTOLIC BLOOD PRESSURE: 85 MMHG

## 2022-01-01 VITALS
HEART RATE: 92 BPM | SYSTOLIC BLOOD PRESSURE: 105 MMHG | BODY MASS INDEX: 24.86 KG/M2 | HEIGHT: 59 IN | TEMPERATURE: 98 F | DIASTOLIC BLOOD PRESSURE: 73 MMHG | RESPIRATION RATE: 20 BRPM

## 2022-01-01 VITALS
HEART RATE: 61 BPM | SYSTOLIC BLOOD PRESSURE: 134 MMHG | BODY MASS INDEX: 24.39 KG/M2 | HEIGHT: 59 IN | RESPIRATION RATE: 18 BRPM | DIASTOLIC BLOOD PRESSURE: 73 MMHG | WEIGHT: 121 LBS

## 2022-01-01 VITALS
RESPIRATION RATE: 18 BRPM | DIASTOLIC BLOOD PRESSURE: 82 MMHG | BODY MASS INDEX: 22.58 KG/M2 | HEIGHT: 59 IN | TEMPERATURE: 98 F | HEART RATE: 61 BPM | SYSTOLIC BLOOD PRESSURE: 168 MMHG | WEIGHT: 112 LBS

## 2022-01-01 VITALS
BODY MASS INDEX: 23.18 KG/M2 | HEART RATE: 77 BPM | WEIGHT: 115 LBS | SYSTOLIC BLOOD PRESSURE: 134 MMHG | HEIGHT: 59 IN | DIASTOLIC BLOOD PRESSURE: 72 MMHG | OXYGEN SATURATION: 98 %

## 2022-01-01 VITALS
WEIGHT: 122 LBS | HEIGHT: 59 IN | HEART RATE: 58 BPM | RESPIRATION RATE: 12 BRPM | DIASTOLIC BLOOD PRESSURE: 61 MMHG | TEMPERATURE: 98 F | SYSTOLIC BLOOD PRESSURE: 124 MMHG | BODY MASS INDEX: 24.6 KG/M2 | OXYGEN SATURATION: 97 %

## 2022-01-01 VITALS
WEIGHT: 107 LBS | HEIGHT: 64 IN | TEMPERATURE: 98 F | OXYGEN SATURATION: 96 % | SYSTOLIC BLOOD PRESSURE: 112 MMHG | DIASTOLIC BLOOD PRESSURE: 76 MMHG | HEART RATE: 105 BPM | RESPIRATION RATE: 16 BRPM | BODY MASS INDEX: 18.27 KG/M2

## 2022-01-01 VITALS
DIASTOLIC BLOOD PRESSURE: 67 MMHG | HEART RATE: 75 BPM | OXYGEN SATURATION: 99 % | SYSTOLIC BLOOD PRESSURE: 116 MMHG | RESPIRATION RATE: 18 BRPM | TEMPERATURE: 98 F

## 2022-01-01 VITALS
HEIGHT: 59 IN | BODY MASS INDEX: 24.6 KG/M2 | WEIGHT: 122 LBS | HEART RATE: 64 BPM | OXYGEN SATURATION: 97 % | DIASTOLIC BLOOD PRESSURE: 68 MMHG | TEMPERATURE: 98 F | RESPIRATION RATE: 16 BRPM | SYSTOLIC BLOOD PRESSURE: 104 MMHG

## 2022-01-01 VITALS
OXYGEN SATURATION: 96 % | SYSTOLIC BLOOD PRESSURE: 127 MMHG | BODY MASS INDEX: 24.52 KG/M2 | WEIGHT: 121.63 LBS | HEIGHT: 59 IN | HEART RATE: 76 BPM | TEMPERATURE: 97 F | RESPIRATION RATE: 18 BRPM | DIASTOLIC BLOOD PRESSURE: 74 MMHG

## 2022-01-01 VITALS — BODY MASS INDEX: 24.19 KG/M2 | WEIGHT: 120 LBS | HEIGHT: 59 IN

## 2022-01-01 DIAGNOSIS — C08.9 MALIGNANT NEOPLASM OF MAJOR SALIVARY GLAND: ICD-10-CM

## 2022-01-01 DIAGNOSIS — I10 HYPERTENSION, UNSPECIFIED TYPE: ICD-10-CM

## 2022-01-01 DIAGNOSIS — E87.1 DEHYDRATION WITH HYPONATREMIA: ICD-10-CM

## 2022-01-01 DIAGNOSIS — F41.9 ANXIETY: ICD-10-CM

## 2022-01-01 DIAGNOSIS — E86.0 DEHYDRATION: ICD-10-CM

## 2022-01-01 DIAGNOSIS — E03.9 ACQUIRED HYPOTHYROIDISM: ICD-10-CM

## 2022-01-01 DIAGNOSIS — Z85.818 HISTORY OF SALIVARY GLAND CANCER: ICD-10-CM

## 2022-01-01 DIAGNOSIS — D63.0 ANEMIA IN NEOPLASTIC DISEASE: ICD-10-CM

## 2022-01-01 DIAGNOSIS — C07 MALIGNANT NEOPLASM OF PAROTID GLAND: ICD-10-CM

## 2022-01-01 DIAGNOSIS — R29.898 WEAKNESS OF LOWER EXTREMITY, UNSPECIFIED LATERALITY: ICD-10-CM

## 2022-01-01 DIAGNOSIS — R44.3 HALLUCINATIONS: ICD-10-CM

## 2022-01-01 DIAGNOSIS — T45.1X5A CHEMOTHERAPY INDUCED NEUTROPENIA: ICD-10-CM

## 2022-01-01 DIAGNOSIS — R11.0 NAUSEA: ICD-10-CM

## 2022-01-01 DIAGNOSIS — C08.9 MALIGNANT NEOPLASM OF MAJOR SALIVARY GLAND: Primary | ICD-10-CM

## 2022-01-01 DIAGNOSIS — C80.1 ADENOID CYSTIC CARCINOMA: ICD-10-CM

## 2022-01-01 DIAGNOSIS — R63.0 ANOREXIA: Primary | ICD-10-CM

## 2022-01-01 DIAGNOSIS — R13.10 DYSPHAGIA, UNSPECIFIED TYPE: ICD-10-CM

## 2022-01-01 DIAGNOSIS — R07.9 CHEST PAIN: ICD-10-CM

## 2022-01-01 DIAGNOSIS — R51.9 NONINTRACTABLE HEADACHE, UNSPECIFIED CHRONICITY PATTERN, UNSPECIFIED HEADACHE TYPE: ICD-10-CM

## 2022-01-01 DIAGNOSIS — Z12.31 ENCOUNTER FOR SCREENING MAMMOGRAM FOR MALIGNANT NEOPLASM OF BREAST: ICD-10-CM

## 2022-01-01 DIAGNOSIS — I10 ESSENTIAL HYPERTENSION, MALIGNANT: ICD-10-CM

## 2022-01-01 DIAGNOSIS — B37.0 THRUSH, ORAL: ICD-10-CM

## 2022-01-01 DIAGNOSIS — E86.0 DEHYDRATION: Primary | ICD-10-CM

## 2022-01-01 DIAGNOSIS — C07 MALIGNANT NEOPLASM OF PAROTID GLAND: Primary | ICD-10-CM

## 2022-01-01 DIAGNOSIS — R41.82 ALTERED MENTAL STATUS, UNSPECIFIED ALTERED MENTAL STATUS TYPE: Primary | ICD-10-CM

## 2022-01-01 DIAGNOSIS — H71.90 CHOLESTEATOMA, UNSPECIFIED LATERALITY: ICD-10-CM

## 2022-01-01 DIAGNOSIS — L58.9 POST-RADIATION DERMATITIS: ICD-10-CM

## 2022-01-01 DIAGNOSIS — C80.1 ADENOID CYSTIC CARCINOMA: Primary | ICD-10-CM

## 2022-01-01 DIAGNOSIS — R42 DIZZINESS: ICD-10-CM

## 2022-01-01 DIAGNOSIS — Z01.818 PRE-OP TESTING: ICD-10-CM

## 2022-01-01 DIAGNOSIS — R44.3 HALLUCINATION: ICD-10-CM

## 2022-01-01 DIAGNOSIS — G89.3 CANCER ASSOCIATED PAIN: ICD-10-CM

## 2022-01-01 DIAGNOSIS — E80.6 HYPERBILIRUBINEMIA: Primary | ICD-10-CM

## 2022-01-01 DIAGNOSIS — R41.82 ALTERED MENTAL STATE: ICD-10-CM

## 2022-01-01 DIAGNOSIS — R05.9 COUGH: Primary | ICD-10-CM

## 2022-01-01 DIAGNOSIS — R26.81 UNSTEADY GAIT: Primary | ICD-10-CM

## 2022-01-01 DIAGNOSIS — E03.9 ACQUIRED HYPOTHYROIDISM: Primary | ICD-10-CM

## 2022-01-01 DIAGNOSIS — R41.82 AMS (ALTERED MENTAL STATUS): Primary | ICD-10-CM

## 2022-01-01 DIAGNOSIS — R91.1 PULMONARY NODULE: ICD-10-CM

## 2022-01-01 DIAGNOSIS — B00.89 HERPES SIMPLEX ESOPHAGITIS: ICD-10-CM

## 2022-01-01 DIAGNOSIS — R51.9 NONINTRACTABLE HEADACHE, UNSPECIFIED CHRONICITY PATTERN, UNSPECIFIED HEADACHE TYPE: Primary | ICD-10-CM

## 2022-01-01 DIAGNOSIS — K20.80 HERPES SIMPLEX ESOPHAGITIS: ICD-10-CM

## 2022-01-01 DIAGNOSIS — Z51.5 END OF LIFE CARE: ICD-10-CM

## 2022-01-01 DIAGNOSIS — C79.31 SECONDARY MALIGNANT NEOPLASM OF BRAIN: ICD-10-CM

## 2022-01-01 DIAGNOSIS — R30.0 DYSURIA: ICD-10-CM

## 2022-01-01 DIAGNOSIS — K13.79 MOUTH SORES: Primary | ICD-10-CM

## 2022-01-01 DIAGNOSIS — R44.3 HALLUCINATIONS: Primary | ICD-10-CM

## 2022-01-01 DIAGNOSIS — R62.7 ADULT FAILURE TO THRIVE: ICD-10-CM

## 2022-01-01 DIAGNOSIS — Z12.11 COLON CANCER SCREENING: ICD-10-CM

## 2022-01-01 DIAGNOSIS — R53.83 FATIGUE, UNSPECIFIED TYPE: ICD-10-CM

## 2022-01-01 DIAGNOSIS — R53.81 PHYSICAL DEBILITY: ICD-10-CM

## 2022-01-01 DIAGNOSIS — C50.919 ADENOID CYSTIC CARCINOMA OF BREAST: Primary | ICD-10-CM

## 2022-01-01 DIAGNOSIS — D70.1 CHEMOTHERAPY INDUCED NEUTROPENIA: ICD-10-CM

## 2022-01-01 DIAGNOSIS — C79.9 METASTATIC MALIGNANT NEOPLASM, UNSPECIFIED SITE: ICD-10-CM

## 2022-01-01 DIAGNOSIS — Z51.89 AFTERCARE: Primary | ICD-10-CM

## 2022-01-01 DIAGNOSIS — K12.1 STOMATITIS: ICD-10-CM

## 2022-01-01 DIAGNOSIS — D50.9 IRON DEFICIENCY ANEMIA, UNSPECIFIED: Primary | ICD-10-CM

## 2022-01-01 DIAGNOSIS — Z01.818 PREOP TESTING: Primary | ICD-10-CM

## 2022-01-01 DIAGNOSIS — E86.0 DEHYDRATION WITH HYPONATREMIA: ICD-10-CM

## 2022-01-01 DIAGNOSIS — R41.82 ALTERED MENTAL STATUS, UNSPECIFIED ALTERED MENTAL STATUS TYPE: ICD-10-CM

## 2022-01-01 DIAGNOSIS — K21.9 GASTROESOPHAGEAL REFLUX DISEASE, UNSPECIFIED WHETHER ESOPHAGITIS PRESENT: Primary | ICD-10-CM

## 2022-01-01 LAB
ALBUMIN SERPL BCP-MCNC: 1.8 G/DL (ref 3.5–5.2)
ALBUMIN SERPL BCP-MCNC: 1.8 G/DL (ref 3.5–5.2)
ALBUMIN SERPL BCP-MCNC: 2 G/DL (ref 3.5–5.2)
ALBUMIN SERPL BCP-MCNC: 2.1 G/DL (ref 3.5–5.2)
ALBUMIN SERPL BCP-MCNC: 2.1 G/DL (ref 3.5–5.2)
ALBUMIN SERPL BCP-MCNC: 2.2 G/DL (ref 3.5–5.2)
ALBUMIN SERPL BCP-MCNC: 2.3 G/DL (ref 3.5–5.2)
ALBUMIN SERPL BCP-MCNC: 2.6 G/DL (ref 3.5–5.2)
ALBUMIN SERPL BCP-MCNC: 2.6 G/DL (ref 3.5–5.2)
ALBUMIN SERPL BCP-MCNC: 3.1 G/DL (ref 3.5–5.2)
ALBUMIN SERPL BCP-MCNC: 3.3 G/DL (ref 3.5–5.2)
ALBUMIN SERPL BCP-MCNC: 3.4 G/DL (ref 3.5–5.2)
ALBUMIN SERPL BCP-MCNC: 3.5 G/DL (ref 3.5–5.2)
ALBUMIN SERPL BCP-MCNC: 3.6 G/DL (ref 3.5–5.2)
ALBUMIN SERPL BCP-MCNC: 3.7 G/DL (ref 3.5–5.2)
ALBUMIN SERPL BCP-MCNC: 3.9 G/DL (ref 3.5–5.2)
ALBUMIN SERPL BCP-MCNC: 4 G/DL (ref 3.5–5.2)
ALBUMIN SERPL BCP-MCNC: 4.1 G/DL (ref 3.5–5.2)
ALBUMIN SERPL BCP-MCNC: 4.2 G/DL (ref 3.5–5.2)
ALBUMIN SERPL BCP-MCNC: 4.2 G/DL (ref 3.5–5.2)
ALBUMIN SERPL BCP-MCNC: 4.3 G/DL (ref 3.5–5.2)
ALBUMIN SERPL BCP-MCNC: 4.3 G/DL (ref 3.5–5.2)
ALP SERPL-CCNC: 102 U/L (ref 55–135)
ALP SERPL-CCNC: 104 U/L (ref 55–135)
ALP SERPL-CCNC: 129 U/L (ref 55–135)
ALP SERPL-CCNC: 49 U/L (ref 55–135)
ALP SERPL-CCNC: 51 U/L (ref 55–135)
ALP SERPL-CCNC: 52 U/L (ref 55–135)
ALP SERPL-CCNC: 54 U/L (ref 55–135)
ALP SERPL-CCNC: 57 U/L (ref 55–135)
ALP SERPL-CCNC: 57 U/L (ref 55–135)
ALP SERPL-CCNC: 59 U/L (ref 55–135)
ALP SERPL-CCNC: 61 U/L (ref 55–135)
ALP SERPL-CCNC: 62 U/L (ref 55–135)
ALP SERPL-CCNC: 63 U/L (ref 55–135)
ALP SERPL-CCNC: 63 U/L (ref 55–135)
ALP SERPL-CCNC: 65 U/L (ref 55–135)
ALP SERPL-CCNC: 66 U/L (ref 55–135)
ALP SERPL-CCNC: 67 U/L (ref 55–135)
ALP SERPL-CCNC: 68 U/L (ref 55–135)
ALP SERPL-CCNC: 70 U/L (ref 55–135)
ALP SERPL-CCNC: 71 U/L (ref 55–135)
ALP SERPL-CCNC: 73 U/L (ref 55–135)
ALP SERPL-CCNC: 74 U/L (ref 55–135)
ALP SERPL-CCNC: 76 U/L (ref 55–135)
ALP SERPL-CCNC: 76 U/L (ref 55–135)
ALP SERPL-CCNC: 79 U/L (ref 55–135)
ALP SERPL-CCNC: 83 U/L (ref 55–135)
ALP SERPL-CCNC: 84 U/L (ref 55–135)
ALP SERPL-CCNC: 85 U/L (ref 55–135)
ALP SERPL-CCNC: 95 U/L (ref 55–135)
ALT SERPL W/O P-5'-P-CCNC: 112 U/L (ref 10–44)
ALT SERPL W/O P-5'-P-CCNC: 113 U/L (ref 10–44)
ALT SERPL W/O P-5'-P-CCNC: 121 U/L (ref 10–44)
ALT SERPL W/O P-5'-P-CCNC: 132 U/L (ref 10–44)
ALT SERPL W/O P-5'-P-CCNC: 145 U/L (ref 10–44)
ALT SERPL W/O P-5'-P-CCNC: 15 U/L (ref 10–44)
ALT SERPL W/O P-5'-P-CCNC: 157 U/L (ref 10–44)
ALT SERPL W/O P-5'-P-CCNC: 159 U/L (ref 10–44)
ALT SERPL W/O P-5'-P-CCNC: 16 U/L (ref 10–44)
ALT SERPL W/O P-5'-P-CCNC: 17 U/L (ref 10–44)
ALT SERPL W/O P-5'-P-CCNC: 19 U/L (ref 10–44)
ALT SERPL W/O P-5'-P-CCNC: 191 U/L (ref 10–44)
ALT SERPL W/O P-5'-P-CCNC: 20 U/L (ref 10–44)
ALT SERPL W/O P-5'-P-CCNC: 20 U/L (ref 10–44)
ALT SERPL W/O P-5'-P-CCNC: 223 U/L (ref 10–44)
ALT SERPL W/O P-5'-P-CCNC: 23 U/L (ref 10–44)
ALT SERPL W/O P-5'-P-CCNC: 23 U/L (ref 10–44)
ALT SERPL W/O P-5'-P-CCNC: 28 U/L (ref 10–44)
ALT SERPL W/O P-5'-P-CCNC: 30 U/L (ref 10–44)
ALT SERPL W/O P-5'-P-CCNC: 34 U/L (ref 10–44)
ALT SERPL W/O P-5'-P-CCNC: 37 U/L (ref 10–44)
ALT SERPL W/O P-5'-P-CCNC: 42 U/L (ref 10–44)
ALT SERPL W/O P-5'-P-CCNC: 42 U/L (ref 10–44)
ALT SERPL W/O P-5'-P-CCNC: 43 U/L (ref 10–44)
ALT SERPL W/O P-5'-P-CCNC: 45 U/L (ref 10–44)
ALT SERPL W/O P-5'-P-CCNC: 47 U/L (ref 10–44)
ALT SERPL W/O P-5'-P-CCNC: 48 U/L (ref 10–44)
ALT SERPL W/O P-5'-P-CCNC: 58 U/L (ref 10–44)
ALT SERPL W/O P-5'-P-CCNC: 72 U/L (ref 10–44)
ALT SERPL W/O P-5'-P-CCNC: 75 U/L (ref 10–44)
ALT SERPL W/O P-5'-P-CCNC: 80 U/L (ref 10–44)
ALT SERPL W/O P-5'-P-CCNC: 89 U/L (ref 10–44)
ALT SERPL W/O P-5'-P-CCNC: 94 U/L (ref 10–44)
ALT SERPL W/O P-5'-P-CCNC: 96 U/L (ref 10–44)
AMMONIA PLAS-SCNC: 10 UMOL/L (ref 10–50)
AMPHET+METHAMPHET UR QL: NEGATIVE
ANION GAP SERPL CALC-SCNC: 10 MMOL/L (ref 8–16)
ANION GAP SERPL CALC-SCNC: 11 MMOL/L (ref 8–16)
ANION GAP SERPL CALC-SCNC: 12 MMOL/L (ref 8–16)
ANION GAP SERPL CALC-SCNC: 12 MMOL/L (ref 8–16)
ANION GAP SERPL CALC-SCNC: 13 MMOL/L (ref 8–16)
ANION GAP SERPL CALC-SCNC: 14 MMOL/L (ref 8–16)
ANION GAP SERPL CALC-SCNC: 5 MMOL/L (ref 8–16)
ANION GAP SERPL CALC-SCNC: 6 MMOL/L (ref 8–16)
ANION GAP SERPL CALC-SCNC: 7 MMOL/L (ref 8–16)
ANION GAP SERPL CALC-SCNC: 8 MMOL/L (ref 8–16)
ANION GAP SERPL CALC-SCNC: 9 MMOL/L (ref 8–16)
ANISOCYTOSIS BLD QL SMEAR: ABNORMAL
ANISOCYTOSIS BLD QL SMEAR: SLIGHT
AORTIC VALVE CUSP SEPERATION: 1.77 CM
APPEARANCE UR: CLEAR
AST SERPL-CCNC: 16 U/L (ref 10–40)
AST SERPL-CCNC: 18 U/L (ref 10–40)
AST SERPL-CCNC: 18 U/L (ref 10–40)
AST SERPL-CCNC: 19 U/L (ref 10–40)
AST SERPL-CCNC: 19 U/L (ref 10–40)
AST SERPL-CCNC: 20 U/L (ref 10–40)
AST SERPL-CCNC: 20 U/L (ref 10–40)
AST SERPL-CCNC: 21 U/L (ref 10–40)
AST SERPL-CCNC: 21 U/L (ref 10–40)
AST SERPL-CCNC: 22 U/L (ref 10–40)
AST SERPL-CCNC: 22 U/L (ref 10–40)
AST SERPL-CCNC: 23 U/L (ref 10–40)
AST SERPL-CCNC: 25 U/L (ref 10–40)
AST SERPL-CCNC: 30 U/L (ref 10–40)
AST SERPL-CCNC: 31 U/L (ref 10–40)
AST SERPL-CCNC: 32 U/L (ref 10–40)
AST SERPL-CCNC: 34 U/L (ref 10–40)
AST SERPL-CCNC: 34 U/L (ref 10–40)
AST SERPL-CCNC: 35 U/L (ref 10–40)
AST SERPL-CCNC: 35 U/L (ref 10–40)
AST SERPL-CCNC: 36 U/L (ref 10–40)
AST SERPL-CCNC: 36 U/L (ref 10–40)
AST SERPL-CCNC: 38 U/L (ref 10–40)
AST SERPL-CCNC: 38 U/L (ref 10–40)
AST SERPL-CCNC: 47 U/L (ref 10–40)
AST SERPL-CCNC: 47 U/L (ref 10–40)
AST SERPL-CCNC: 50 U/L (ref 10–40)
AST SERPL-CCNC: 51 U/L (ref 10–40)
AST SERPL-CCNC: 52 U/L (ref 10–40)
AST SERPL-CCNC: 56 U/L (ref 10–40)
AST SERPL-CCNC: 77 U/L (ref 10–40)
AST SERPL-CCNC: 78 U/L (ref 10–40)
AST SERPL-CCNC: 81 U/L (ref 10–40)
AST SERPL-CCNC: 86 U/L (ref 10–40)
AV INDEX (PROSTH): 0.89
AV MEAN GRADIENT: 3 MMHG
AV PEAK GRADIENT: 5 MMHG
AV VALVE AREA: 2.59 CM2
AV VELOCITY RATIO: 89.77
BACTERIA #/AREA URNS HPF: ABNORMAL /HPF
BACTERIA #/AREA URNS HPF: NEGATIVE /HPF
BACTERIA BLD CULT: ABNORMAL
BACTERIA BLD CULT: NORMAL
BACTERIA CSF CULT: NO GROWTH
BACTERIA UR CULT: ABNORMAL
BACTERIA UR CULT: ABNORMAL
BARBITURATES UR QL SCN>200 NG/ML: NEGATIVE
BASOPHILS # BLD AUTO: 0 K/UL (ref 0–0.2)
BASOPHILS # BLD AUTO: 0 K/UL (ref 0–0.2)
BASOPHILS # BLD AUTO: 0.01 K/UL (ref 0–0.2)
BASOPHILS # BLD AUTO: 0.02 K/UL (ref 0–0.2)
BASOPHILS # BLD AUTO: 0.03 K/UL (ref 0–0.2)
BASOPHILS # BLD AUTO: 0.06 K/UL (ref 0–0.2)
BASOPHILS # BLD AUTO: ABNORMAL K/UL (ref 0–0.2)
BASOPHILS # BLD AUTO: ABNORMAL K/UL (ref 0–0.2)
BASOPHILS NFR BLD: 0 % (ref 0–1.9)
BASOPHILS NFR BLD: 0.1 % (ref 0–1.9)
BASOPHILS NFR BLD: 0.2 % (ref 0–1.9)
BASOPHILS NFR BLD: 0.3 % (ref 0–1.9)
BASOPHILS NFR BLD: 0.3 % (ref 0–1.9)
BASOPHILS NFR BLD: 0.4 % (ref 0–1.9)
BASOPHILS NFR BLD: 0.5 % (ref 0–1.9)
BASOPHILS NFR BLD: 0.7 % (ref 0–1.9)
BASOPHILS NFR BLD: 1 % (ref 0–1.9)
BASOPHILS NFR BLD: 1.2 % (ref 0–1.9)
BENZODIAZ UR QL SCN>200 NG/ML: NEGATIVE
BILIRUB SERPL-MCNC: 0.2 MG/DL (ref 0.1–1)
BILIRUB SERPL-MCNC: 0.2 MG/DL (ref 0.1–1)
BILIRUB SERPL-MCNC: 0.3 MG/DL (ref 0.1–1)
BILIRUB SERPL-MCNC: 0.4 MG/DL (ref 0.1–1)
BILIRUB SERPL-MCNC: 0.5 MG/DL (ref 0.1–1)
BILIRUB SERPL-MCNC: 0.6 MG/DL (ref 0.1–1)
BILIRUB SERPL-MCNC: 0.7 MG/DL (ref 0.1–1)
BILIRUB SERPL-MCNC: 0.8 MG/DL (ref 0.1–1)
BILIRUB SERPL-MCNC: 0.9 MG/DL (ref 0.1–1)
BILIRUB SERPL-MCNC: 1 MG/DL (ref 0.1–1)
BILIRUB SERPL-MCNC: 1.1 MG/DL (ref 0.1–1)
BILIRUB SERPL-MCNC: 1.2 MG/DL (ref 0.1–1)
BILIRUB UR QL STRIP: NEGATIVE
BNP SERPL-MCNC: 33 PG/ML (ref 0–99)
BNP SERPL-MCNC: 36 PG/ML (ref 0–99)
BSA FOR ECHO PROCEDURE: 1.51 M2
BUN SERPL-MCNC: 10 MG/DL (ref 8–23)
BUN SERPL-MCNC: 10 MG/DL (ref 8–23)
BUN SERPL-MCNC: 12 MG/DL (ref 8–23)
BUN SERPL-MCNC: 12 MG/DL (ref 8–23)
BUN SERPL-MCNC: 13 MG/DL (ref 8–23)
BUN SERPL-MCNC: 15 MG/DL (ref 8–23)
BUN SERPL-MCNC: 15 MG/DL (ref 8–23)
BUN SERPL-MCNC: 16 MG/DL (ref 8–23)
BUN SERPL-MCNC: 17 MG/DL (ref 8–23)
BUN SERPL-MCNC: 18 MG/DL (ref 8–23)
BUN SERPL-MCNC: 19 MG/DL (ref 8–23)
BUN SERPL-MCNC: 20 MG/DL (ref 8–23)
BUN SERPL-MCNC: 21 MG/DL (ref 8–23)
BUN SERPL-MCNC: 24 MG/DL (ref 8–23)
BUN SERPL-MCNC: 24 MG/DL (ref 8–23)
BUN SERPL-MCNC: 25 MG/DL (ref 8–23)
BUN SERPL-MCNC: 25 MG/DL (ref 8–23)
BUN SERPL-MCNC: 26 MG/DL (ref 8–23)
BUN SERPL-MCNC: 26 MG/DL (ref 8–23)
BUN SERPL-MCNC: 27 MG/DL (ref 8–23)
BUN SERPL-MCNC: 27 MG/DL (ref 8–23)
BUN SERPL-MCNC: 28 MG/DL (ref 8–23)
BUN SERPL-MCNC: 29 MG/DL (ref 8–23)
BUN SERPL-MCNC: 31 MG/DL (ref 8–23)
BUN SERPL-MCNC: 9 MG/DL (ref 8–23)
BZE UR QL SCN: NEGATIVE
CALCIUM SERPL-MCNC: 10 MG/DL (ref 8.7–10.5)
CALCIUM SERPL-MCNC: 7.8 MG/DL (ref 8.7–10.5)
CALCIUM SERPL-MCNC: 7.8 MG/DL (ref 8.7–10.5)
CALCIUM SERPL-MCNC: 7.9 MG/DL (ref 8.7–10.5)
CALCIUM SERPL-MCNC: 8 MG/DL (ref 8.7–10.5)
CALCIUM SERPL-MCNC: 8.1 MG/DL (ref 8.7–10.5)
CALCIUM SERPL-MCNC: 8.2 MG/DL (ref 8.7–10.5)
CALCIUM SERPL-MCNC: 8.2 MG/DL (ref 8.7–10.5)
CALCIUM SERPL-MCNC: 8.3 MG/DL (ref 8.7–10.5)
CALCIUM SERPL-MCNC: 8.4 MG/DL (ref 8.7–10.5)
CALCIUM SERPL-MCNC: 8.4 MG/DL (ref 8.7–10.5)
CALCIUM SERPL-MCNC: 8.5 MG/DL (ref 8.7–10.5)
CALCIUM SERPL-MCNC: 8.7 MG/DL (ref 8.7–10.5)
CALCIUM SERPL-MCNC: 8.9 MG/DL (ref 8.7–10.5)
CALCIUM SERPL-MCNC: 9 MG/DL (ref 8.7–10.5)
CALCIUM SERPL-MCNC: 9.1 MG/DL (ref 8.7–10.5)
CALCIUM SERPL-MCNC: 9.1 MG/DL (ref 8.7–10.5)
CALCIUM SERPL-MCNC: 9.3 MG/DL (ref 8.7–10.5)
CALCIUM SERPL-MCNC: 9.4 MG/DL (ref 8.7–10.5)
CALCIUM SERPL-MCNC: 9.5 MG/DL (ref 8.7–10.5)
CALCIUM SERPL-MCNC: 9.6 MG/DL (ref 8.7–10.5)
CALCIUM SERPL-MCNC: 9.7 MG/DL (ref 8.7–10.5)
CALCIUM SERPL-MCNC: 9.7 MG/DL (ref 8.7–10.5)
CALCIUM SERPL-MCNC: 9.8 MG/DL (ref 8.7–10.5)
CANNABINOIDS UR QL SCN: ABNORMAL
CHLORIDE SERPL-SCNC: 100 MMOL/L (ref 95–110)
CHLORIDE SERPL-SCNC: 101 MMOL/L (ref 95–110)
CHLORIDE SERPL-SCNC: 102 MMOL/L (ref 95–110)
CHLORIDE SERPL-SCNC: 102 MMOL/L (ref 95–110)
CHLORIDE SERPL-SCNC: 103 MMOL/L (ref 95–110)
CHLORIDE SERPL-SCNC: 104 MMOL/L (ref 95–110)
CHLORIDE SERPL-SCNC: 105 MMOL/L (ref 95–110)
CHLORIDE SERPL-SCNC: 105 MMOL/L (ref 95–110)
CHLORIDE SERPL-SCNC: 106 MMOL/L (ref 95–110)
CHLORIDE SERPL-SCNC: 93 MMOL/L (ref 95–110)
CHLORIDE SERPL-SCNC: 93 MMOL/L (ref 95–110)
CHLORIDE SERPL-SCNC: 98 MMOL/L (ref 95–110)
CHLORIDE SERPL-SCNC: 99 MMOL/L (ref 95–110)
CHOLEST SERPL-MCNC: 265 MG/DL
CHOLEST/HDLC SERPL: 4.5 (CALC)
CLARITY CSF: CLEAR
CLARITY CSF: CLEAR
CLARITY UR: CLEAR
CO2 SERPL-SCNC: 20 MMOL/L (ref 23–29)
CO2 SERPL-SCNC: 20 MMOL/L (ref 23–29)
CO2 SERPL-SCNC: 22 MMOL/L (ref 23–29)
CO2 SERPL-SCNC: 22 MMOL/L (ref 23–29)
CO2 SERPL-SCNC: 24 MMOL/L (ref 23–29)
CO2 SERPL-SCNC: 25 MMOL/L (ref 23–29)
CO2 SERPL-SCNC: 26 MMOL/L (ref 23–29)
CO2 SERPL-SCNC: 27 MMOL/L (ref 23–29)
CO2 SERPL-SCNC: 28 MMOL/L (ref 23–29)
CO2 SERPL-SCNC: 29 MMOL/L (ref 23–29)
CO2 SERPL-SCNC: 30 MMOL/L (ref 23–29)
CO2 SERPL-SCNC: 31 MMOL/L (ref 23–29)
COLOR CSF: COLORLESS
COLOR CSF: COLORLESS
COLOR UR: YELLOW
CREAT SERPL-MCNC: 0.3 MG/DL (ref 0.5–1.4)
CREAT SERPL-MCNC: 0.4 MG/DL (ref 0.5–1.4)
CREAT SERPL-MCNC: 0.5 MG/DL (ref 0.5–1.4)
CREAT SERPL-MCNC: 0.6 MG/DL (ref 0.5–1.4)
CREAT SERPL-MCNC: 0.6 MG/DL (ref 0.5–1.4)
CREAT SERPL-MCNC: 0.7 MG/DL (ref 0.5–1.4)
CREAT SERPL-MCNC: 0.8 MG/DL (ref 0.5–1.4)
CREAT SERPL-MCNC: 0.9 MG/DL (ref 0.5–1.4)
CREAT UR-MCNC: 84 MG/DL (ref 15–325)
CRP SERPL-MCNC: 9.84 MG/DL
CV ECHO LV RWT: 0.45 CM
DACRYOCYTES BLD QL SMEAR: ABNORMAL
DIFFERENTIAL METHOD: ABNORMAL
DOP CALC AO PEAK VEL: 1.16 M/S
DOP CALC AO VTI: 25.35 CM
DOP CALC LVOT AREA: 2.9 CM2
DOP CALC LVOT DIAMETER: 1.92 CM
DOP CALC LVOT PEAK VEL: 104.13 M/S
DOP CALC LVOT STROKE VOLUME: 65.55 CM3
DOP CALCLVOT PEAK VEL VTI: 22.65 CM
E WAVE DECELERATION TIME: 230.22 MSEC
E/A RATIO: 1.02
E/E' RATIO: 7.88 M/S
ECHO LV POSTERIOR WALL: 0.82 CM (ref 0.6–1.1)
EJECTION FRACTION: 75 %
EOSINOPHIL # BLD AUTO: 0 K/UL (ref 0–0.5)
EOSINOPHIL # BLD AUTO: 0.1 K/UL (ref 0–0.5)
EOSINOPHIL # BLD AUTO: 0.2 K/UL (ref 0–0.5)
EOSINOPHIL # BLD AUTO: 0.5 K/UL (ref 0–0.5)
EOSINOPHIL # BLD AUTO: ABNORMAL K/UL (ref 0–0.5)
EOSINOPHIL # BLD AUTO: ABNORMAL K/UL (ref 0–0.5)
EOSINOPHIL NFR BLD: 0 % (ref 0–8)
EOSINOPHIL NFR BLD: 0.2 % (ref 0–8)
EOSINOPHIL NFR BLD: 1.4 % (ref 0–8)
EOSINOPHIL NFR BLD: 2.5 % (ref 0–8)
EOSINOPHIL NFR BLD: 4 % (ref 0–8)
EOSINOPHIL NFR BLD: 4.6 % (ref 0–8)
EOSINOPHIL NFR BLD: 5.1 % (ref 0–8)
EOSINOPHIL NFR BLD: 9.3 % (ref 0–8)
ERYTHROCYTE [DISTWIDTH] IN BLOOD BY AUTOMATED COUNT: 11.6 % (ref 11.5–14.5)
ERYTHROCYTE [DISTWIDTH] IN BLOOD BY AUTOMATED COUNT: 11.7 % (ref 11.5–14.5)
ERYTHROCYTE [DISTWIDTH] IN BLOOD BY AUTOMATED COUNT: 12.3 % (ref 11.5–14.5)
ERYTHROCYTE [DISTWIDTH] IN BLOOD BY AUTOMATED COUNT: 12.8 % (ref 11.5–14.5)
ERYTHROCYTE [DISTWIDTH] IN BLOOD BY AUTOMATED COUNT: 13 % (ref 11.5–14.5)
ERYTHROCYTE [DISTWIDTH] IN BLOOD BY AUTOMATED COUNT: 13.2 % (ref 11.5–14.5)
ERYTHROCYTE [DISTWIDTH] IN BLOOD BY AUTOMATED COUNT: 13.4 % (ref 11.5–14.5)
ERYTHROCYTE [DISTWIDTH] IN BLOOD BY AUTOMATED COUNT: 13.4 % (ref 11.5–14.5)
ERYTHROCYTE [DISTWIDTH] IN BLOOD BY AUTOMATED COUNT: 13.5 % (ref 11.5–14.5)
ERYTHROCYTE [DISTWIDTH] IN BLOOD BY AUTOMATED COUNT: 13.6 % (ref 11.5–14.5)
ERYTHROCYTE [DISTWIDTH] IN BLOOD BY AUTOMATED COUNT: 13.9 % (ref 11.5–14.5)
ERYTHROCYTE [DISTWIDTH] IN BLOOD BY AUTOMATED COUNT: 14.2 % (ref 11.5–14.5)
ERYTHROCYTE [DISTWIDTH] IN BLOOD BY AUTOMATED COUNT: 14.2 % (ref 11.5–14.5)
ERYTHROCYTE [DISTWIDTH] IN BLOOD BY AUTOMATED COUNT: 14.3 % (ref 11.5–14.5)
ERYTHROCYTE [DISTWIDTH] IN BLOOD BY AUTOMATED COUNT: 14.4 % (ref 11.5–14.5)
ERYTHROCYTE [DISTWIDTH] IN BLOOD BY AUTOMATED COUNT: 14.6 % (ref 11.5–14.5)
ERYTHROCYTE [DISTWIDTH] IN BLOOD BY AUTOMATED COUNT: 14.9 % (ref 11.5–14.5)
ERYTHROCYTE [DISTWIDTH] IN BLOOD BY AUTOMATED COUNT: 15 % (ref 11.5–14.5)
ERYTHROCYTE [DISTWIDTH] IN BLOOD BY AUTOMATED COUNT: 15.4 % (ref 11.5–14.5)
ERYTHROCYTE [DISTWIDTH] IN BLOOD BY AUTOMATED COUNT: 15.5 % (ref 11.5–14.5)
ERYTHROCYTE [DISTWIDTH] IN BLOOD BY AUTOMATED COUNT: 15.6 % (ref 11.5–14.5)
ERYTHROCYTE [DISTWIDTH] IN BLOOD BY AUTOMATED COUNT: 15.7 % (ref 11.5–14.5)
ERYTHROCYTE [DISTWIDTH] IN BLOOD BY AUTOMATED COUNT: 15.7 % (ref 11.5–14.5)
ERYTHROCYTE [DISTWIDTH] IN BLOOD BY AUTOMATED COUNT: 15.8 % (ref 11.5–14.5)
ERYTHROCYTE [DISTWIDTH] IN BLOOD BY AUTOMATED COUNT: 16.5 % (ref 11.5–14.5)
ERYTHROCYTE [DISTWIDTH] IN BLOOD BY AUTOMATED COUNT: 16.7 % (ref 11.5–14.5)
ERYTHROCYTE [DISTWIDTH] IN BLOOD BY AUTOMATED COUNT: 16.7 % (ref 11.5–14.5)
ERYTHROCYTE [DISTWIDTH] IN BLOOD BY AUTOMATED COUNT: 16.8 % (ref 11.5–14.5)
ERYTHROCYTE [DISTWIDTH] IN BLOOD BY AUTOMATED COUNT: 17 % (ref 11.5–14.5)
ERYTHROCYTE [DISTWIDTH] IN BLOOD BY AUTOMATED COUNT: 17.1 % (ref 11.5–14.5)
ERYTHROCYTE [DISTWIDTH] IN BLOOD BY AUTOMATED COUNT: 17.2 % (ref 11.5–14.5)
ERYTHROCYTE [DISTWIDTH] IN BLOOD BY AUTOMATED COUNT: 18.3 % (ref 11.5–14.5)
EST. GFR  (AFRICAN AMERICAN): >60 ML/MIN/1.73 M^2
EST. GFR  (NO RACE VARIABLE): >60 ML/MIN/1.73 M^2
EST. GFR  (NON AFRICAN AMERICAN): >60 ML/MIN/1.73 M^2
FRACTIONAL SHORTENING: 45 % (ref 28–44)
GLUCOSE CSF-MCNC: 86 MG/DL (ref 40–70)
GLUCOSE SERPL-MCNC: 100 MG/DL (ref 70–110)
GLUCOSE SERPL-MCNC: 101 MG/DL (ref 70–110)
GLUCOSE SERPL-MCNC: 102 MG/DL (ref 70–110)
GLUCOSE SERPL-MCNC: 102 MG/DL (ref 70–110)
GLUCOSE SERPL-MCNC: 103 MG/DL (ref 70–110)
GLUCOSE SERPL-MCNC: 108 MG/DL (ref 70–110)
GLUCOSE SERPL-MCNC: 108 MG/DL (ref 70–110)
GLUCOSE SERPL-MCNC: 109 MG/DL (ref 70–110)
GLUCOSE SERPL-MCNC: 112 MG/DL (ref 70–110)
GLUCOSE SERPL-MCNC: 122 MG/DL (ref 70–110)
GLUCOSE SERPL-MCNC: 122 MG/DL (ref 70–110)
GLUCOSE SERPL-MCNC: 124 MG/DL (ref 70–110)
GLUCOSE SERPL-MCNC: 125 MG/DL (ref 70–110)
GLUCOSE SERPL-MCNC: 125 MG/DL (ref 70–110)
GLUCOSE SERPL-MCNC: 127 MG/DL (ref 70–110)
GLUCOSE SERPL-MCNC: 131 MG/DL (ref 70–110)
GLUCOSE SERPL-MCNC: 132 MG/DL (ref 70–110)
GLUCOSE SERPL-MCNC: 133 MG/DL (ref 70–110)
GLUCOSE SERPL-MCNC: 137 MG/DL (ref 70–110)
GLUCOSE SERPL-MCNC: 139 MG/DL (ref 70–110)
GLUCOSE SERPL-MCNC: 140 MG/DL (ref 70–110)
GLUCOSE SERPL-MCNC: 141 MG/DL (ref 70–110)
GLUCOSE SERPL-MCNC: 144 MG/DL (ref 70–110)
GLUCOSE SERPL-MCNC: 147 MG/DL (ref 70–110)
GLUCOSE SERPL-MCNC: 148 MG/DL (ref 70–110)
GLUCOSE SERPL-MCNC: 149 MG/DL (ref 70–110)
GLUCOSE SERPL-MCNC: 149 MG/DL (ref 70–110)
GLUCOSE SERPL-MCNC: 158 MG/DL (ref 70–110)
GLUCOSE SERPL-MCNC: 162 MG/DL (ref 70–110)
GLUCOSE SERPL-MCNC: 165 MG/DL (ref 70–110)
GLUCOSE SERPL-MCNC: 167 MG/DL (ref 70–110)
GLUCOSE SERPL-MCNC: 169 MG/DL (ref 70–110)
GLUCOSE SERPL-MCNC: 175 MG/DL (ref 70–110)
GLUCOSE SERPL-MCNC: 177 MG/DL (ref 70–110)
GLUCOSE SERPL-MCNC: 179 MG/DL (ref 70–110)
GLUCOSE SERPL-MCNC: 180 MG/DL (ref 70–110)
GLUCOSE SERPL-MCNC: 184 MG/DL (ref 70–110)
GLUCOSE SERPL-MCNC: 185 MG/DL (ref 70–110)
GLUCOSE SERPL-MCNC: 187 MG/DL (ref 70–110)
GLUCOSE SERPL-MCNC: 188 MG/DL (ref 70–110)
GLUCOSE SERPL-MCNC: 189 MG/DL (ref 70–110)
GLUCOSE SERPL-MCNC: 196 MG/DL (ref 70–110)
GLUCOSE SERPL-MCNC: 197 MG/DL (ref 70–110)
GLUCOSE SERPL-MCNC: 199 MG/DL (ref 70–110)
GLUCOSE SERPL-MCNC: 202 MG/DL (ref 70–110)
GLUCOSE SERPL-MCNC: 203 MG/DL (ref 70–110)
GLUCOSE SERPL-MCNC: 207 MG/DL (ref 70–110)
GLUCOSE SERPL-MCNC: 209 MG/DL (ref 70–110)
GLUCOSE SERPL-MCNC: 210 MG/DL (ref 70–110)
GLUCOSE SERPL-MCNC: 214 MG/DL (ref 70–110)
GLUCOSE SERPL-MCNC: 215 MG/DL (ref 70–110)
GLUCOSE SERPL-MCNC: 232 MG/DL (ref 70–110)
GLUCOSE SERPL-MCNC: 239 MG/DL (ref 70–110)
GLUCOSE SERPL-MCNC: 256 MG/DL (ref 70–110)
GLUCOSE SERPL-MCNC: 267 MG/DL (ref 70–110)
GLUCOSE SERPL-MCNC: 286 MG/DL (ref 70–110)
GLUCOSE SERPL-MCNC: 294 MG/DL (ref 70–110)
GLUCOSE SERPL-MCNC: 83 MG/DL (ref 70–110)
GLUCOSE SERPL-MCNC: 87 MG/DL (ref 70–110)
GLUCOSE SERPL-MCNC: 89 MG/DL (ref 70–110)
GLUCOSE SERPL-MCNC: 91 MG/DL (ref 70–110)
GLUCOSE SERPL-MCNC: 92 MG/DL (ref 70–110)
GLUCOSE SERPL-MCNC: 98 MG/DL (ref 70–110)
GLUCOSE SERPL-MCNC: 98 MG/DL (ref 70–110)
GLUCOSE SERPL-MCNC: 99 MG/DL (ref 70–110)
GLUCOSE UR QL STRIP: ABNORMAL
GLUCOSE UR QL STRIP: NEGATIVE
GRAM STN SPEC: NORMAL
GRAM STN SPEC: NORMAL
HCT VFR BLD AUTO: 25.1 % (ref 37–48.5)
HCT VFR BLD AUTO: 26 % (ref 37–48.5)
HCT VFR BLD AUTO: 26.4 % (ref 37–48.5)
HCT VFR BLD AUTO: 27.1 % (ref 37–48.5)
HCT VFR BLD AUTO: 27.6 % (ref 37–48.5)
HCT VFR BLD AUTO: 27.9 % (ref 37–48.5)
HCT VFR BLD AUTO: 28.6 % (ref 37–48.5)
HCT VFR BLD AUTO: 28.7 % (ref 37–48.5)
HCT VFR BLD AUTO: 28.8 % (ref 37–48.5)
HCT VFR BLD AUTO: 29 % (ref 37–48.5)
HCT VFR BLD AUTO: 29.9 % (ref 37–48.5)
HCT VFR BLD AUTO: 30 % (ref 37–48.5)
HCT VFR BLD AUTO: 30.2 % (ref 37–48.5)
HCT VFR BLD AUTO: 31.8 % (ref 37–48.5)
HCT VFR BLD AUTO: 31.9 % (ref 37–48.5)
HCT VFR BLD AUTO: 32.2 % (ref 37–48.5)
HCT VFR BLD AUTO: 32.3 % (ref 37–48.5)
HCT VFR BLD AUTO: 32.9 % (ref 37–48.5)
HCT VFR BLD AUTO: 33 % (ref 37–48.5)
HCT VFR BLD AUTO: 33.2 % (ref 37–48.5)
HCT VFR BLD AUTO: 33.9 % (ref 37–48.5)
HCT VFR BLD AUTO: 34 % (ref 37–48.5)
HCT VFR BLD AUTO: 34.2 % (ref 37–48.5)
HCT VFR BLD AUTO: 34.9 % (ref 37–48.5)
HCT VFR BLD AUTO: 35.4 % (ref 37–48.5)
HCT VFR BLD AUTO: 35.9 % (ref 37–48.5)
HCT VFR BLD AUTO: 37.1 % (ref 37–48.5)
HCT VFR BLD AUTO: 37.3 % (ref 37–48.5)
HCT VFR BLD AUTO: 37.5 % (ref 37–48.5)
HCT VFR BLD AUTO: 37.6 % (ref 37–48.5)
HCT VFR BLD AUTO: 37.9 % (ref 37–48.5)
HCT VFR BLD AUTO: 40.9 % (ref 37–48.5)
HCT VFR BLD AUTO: 41.4 % (ref 37–48.5)
HCT VFR BLD AUTO: 42 % (ref 37–48.5)
HDLC SERPL-MCNC: 59 MG/DL
HGB BLD-MCNC: 10 G/DL (ref 12–16)
HGB BLD-MCNC: 10.2 G/DL (ref 12–16)
HGB BLD-MCNC: 10.6 G/DL (ref 12–16)
HGB BLD-MCNC: 10.9 G/DL (ref 12–16)
HGB BLD-MCNC: 10.9 G/DL (ref 12–16)
HGB BLD-MCNC: 11 G/DL (ref 12–16)
HGB BLD-MCNC: 11.1 G/DL (ref 12–16)
HGB BLD-MCNC: 11.2 G/DL (ref 12–16)
HGB BLD-MCNC: 11.2 G/DL (ref 12–16)
HGB BLD-MCNC: 11.3 G/DL (ref 12–16)
HGB BLD-MCNC: 11.8 G/DL (ref 12–16)
HGB BLD-MCNC: 11.9 G/DL (ref 12–16)
HGB BLD-MCNC: 12.2 G/DL (ref 12–16)
HGB BLD-MCNC: 12.3 G/DL (ref 12–16)
HGB BLD-MCNC: 12.4 G/DL (ref 12–16)
HGB BLD-MCNC: 12.7 G/DL (ref 12–16)
HGB BLD-MCNC: 13 G/DL (ref 12–16)
HGB BLD-MCNC: 13.3 G/DL (ref 12–16)
HGB BLD-MCNC: 14 G/DL (ref 12–16)
HGB BLD-MCNC: 14.6 G/DL (ref 12–16)
HGB BLD-MCNC: 14.6 G/DL (ref 12–16)
HGB BLD-MCNC: 8.4 G/DL (ref 12–16)
HGB BLD-MCNC: 8.5 G/DL (ref 12–16)
HGB BLD-MCNC: 9 G/DL (ref 12–16)
HGB BLD-MCNC: 9 G/DL (ref 12–16)
HGB BLD-MCNC: 9.1 G/DL (ref 12–16)
HGB BLD-MCNC: 9.5 G/DL (ref 12–16)
HGB BLD-MCNC: 9.6 G/DL (ref 12–16)
HGB BLD-MCNC: 9.7 G/DL (ref 12–16)
HGB BLD-MCNC: 9.8 G/DL (ref 12–16)
HGB BLD-MCNC: 9.9 G/DL (ref 12–16)
HGB BLD-MCNC: 9.9 G/DL (ref 12–16)
HGB UR QL STRIP: ABNORMAL
HGB UR QL STRIP: NEGATIVE
HYALINE CASTS #/AREA URNS LPF: 1 /LPF
HYALINE CASTS #/AREA URNS LPF: 16 /LPF
HYALINE CASTS #/AREA URNS LPF: 23 /LPF
HYALINE CASTS #/AREA URNS LPF: 6 /LPF
HYALINE CASTS #/AREA URNS LPF: 6 /LPF
HYALINE CASTS #/AREA URNS LPF: ABNORMAL /LPF
IMM GRANULOCYTES # BLD AUTO: 0.01 K/UL (ref 0–0.04)
IMM GRANULOCYTES # BLD AUTO: 0.02 K/UL (ref 0–0.04)
IMM GRANULOCYTES # BLD AUTO: 0.03 K/UL (ref 0–0.04)
IMM GRANULOCYTES # BLD AUTO: 0.03 K/UL (ref 0–0.04)
IMM GRANULOCYTES # BLD AUTO: 0.04 K/UL (ref 0–0.04)
IMM GRANULOCYTES # BLD AUTO: 0.05 K/UL (ref 0–0.04)
IMM GRANULOCYTES # BLD AUTO: 0.06 K/UL (ref 0–0.04)
IMM GRANULOCYTES # BLD AUTO: 0.07 K/UL (ref 0–0.04)
IMM GRANULOCYTES # BLD AUTO: 0.09 K/UL (ref 0–0.04)
IMM GRANULOCYTES # BLD AUTO: 0.1 K/UL (ref 0–0.04)
IMM GRANULOCYTES # BLD AUTO: 0.11 K/UL (ref 0–0.04)
IMM GRANULOCYTES # BLD AUTO: 0.11 K/UL (ref 0–0.04)
IMM GRANULOCYTES # BLD AUTO: 0.13 K/UL (ref 0–0.04)
IMM GRANULOCYTES # BLD AUTO: 0.17 K/UL (ref 0–0.04)
IMM GRANULOCYTES # BLD AUTO: 0.21 K/UL (ref 0–0.04)
IMM GRANULOCYTES # BLD AUTO: 0.21 K/UL (ref 0–0.04)
IMM GRANULOCYTES # BLD AUTO: ABNORMAL K/UL (ref 0–0.04)
IMM GRANULOCYTES NFR BLD AUTO: 0.2 % (ref 0–0.5)
IMM GRANULOCYTES NFR BLD AUTO: 0.3 % (ref 0–0.5)
IMM GRANULOCYTES NFR BLD AUTO: 0.5 % (ref 0–0.5)
IMM GRANULOCYTES NFR BLD AUTO: 0.6 % (ref 0–0.5)
IMM GRANULOCYTES NFR BLD AUTO: 0.9 % (ref 0–0.5)
IMM GRANULOCYTES NFR BLD AUTO: 1 % (ref 0–0.5)
IMM GRANULOCYTES NFR BLD AUTO: 1.1 % (ref 0–0.5)
IMM GRANULOCYTES NFR BLD AUTO: 1.1 % (ref 0–0.5)
IMM GRANULOCYTES NFR BLD AUTO: 1.2 % (ref 0–0.5)
IMM GRANULOCYTES NFR BLD AUTO: 1.2 % (ref 0–0.5)
IMM GRANULOCYTES NFR BLD AUTO: 1.3 % (ref 0–0.5)
IMM GRANULOCYTES NFR BLD AUTO: 1.4 % (ref 0–0.5)
IMM GRANULOCYTES NFR BLD AUTO: 1.6 % (ref 0–0.5)
IMM GRANULOCYTES NFR BLD AUTO: 1.7 % (ref 0–0.5)
IMM GRANULOCYTES NFR BLD AUTO: 1.8 % (ref 0–0.5)
IMM GRANULOCYTES NFR BLD AUTO: 1.8 % (ref 0–0.5)
IMM GRANULOCYTES NFR BLD AUTO: 2.6 % (ref 0–0.5)
IMM GRANULOCYTES NFR BLD AUTO: 2.8 % (ref 0–0.5)
IMM GRANULOCYTES NFR BLD AUTO: 3.5 % (ref 0–0.5)
IMM GRANULOCYTES NFR BLD AUTO: 4 % (ref 0–0.5)
IMM GRANULOCYTES NFR BLD AUTO: 4.4 % (ref 0–0.5)
IMM GRANULOCYTES NFR BLD AUTO: ABNORMAL % (ref 0–0.5)
INFLUENZA A, MOLECULAR: NEGATIVE
INFLUENZA B, MOLECULAR: NEGATIVE
INTERVENTRICULAR SEPTUM: 0.83 CM (ref 0.6–1.1)
KETONES UR QL STRIP: ABNORMAL
KETONES UR QL STRIP: NEGATIVE
LABCORP MISC TEST CODE: NORMAL
LABCORP MISC TEST NAME: NORMAL
LABCORP MISCELLANEOUS TEST: NORMAL
LACTATE SERPL-SCNC: 1.3 MMOL/L (ref 0.5–1.9)
LDLC SERPL CALC-MCNC: 179 MG/DL (CALC)
LEFT ATRIUM SIZE: 3.03 CM
LEFT INTERNAL DIMENSION IN SYSTOLE: 2.03 CM (ref 2.1–4)
LEFT VENTRICLE MASS INDEX: 58 G/M2
LEFT VENTRICULAR INTERNAL DIMENSION IN DIASTOLE: 3.68 CM (ref 3.5–6)
LEFT VENTRICULAR MASS: 85.12 G
LEUKOCYTE ESTERASE UR QL STRIP: ABNORMAL
LEUKOCYTE ESTERASE UR QL STRIP: NEGATIVE
LV LATERAL E/E' RATIO: 7 M/S
LV SEPTAL E/E' RATIO: 9 M/S
LYMPHOCYTES # BLD AUTO: 0.5 K/UL (ref 1–4.8)
LYMPHOCYTES # BLD AUTO: 0.6 K/UL (ref 1–4.8)
LYMPHOCYTES # BLD AUTO: 0.7 K/UL (ref 1–4.8)
LYMPHOCYTES # BLD AUTO: 0.8 K/UL (ref 1–4.8)
LYMPHOCYTES # BLD AUTO: 0.9 K/UL (ref 1–4.8)
LYMPHOCYTES # BLD AUTO: 1.1 K/UL (ref 1–4.8)
LYMPHOCYTES # BLD AUTO: 1.2 K/UL (ref 1–4.8)
LYMPHOCYTES # BLD AUTO: 1.4 K/UL (ref 1–4.8)
LYMPHOCYTES # BLD AUTO: ABNORMAL K/UL (ref 1–4.8)
LYMPHOCYTES # BLD AUTO: ABNORMAL K/UL (ref 1–4.8)
LYMPHOCYTES NFR BLD: 10.4 % (ref 18–48)
LYMPHOCYTES NFR BLD: 10.8 % (ref 18–48)
LYMPHOCYTES NFR BLD: 11.2 % (ref 18–48)
LYMPHOCYTES NFR BLD: 11.2 % (ref 18–48)
LYMPHOCYTES NFR BLD: 11.6 % (ref 18–48)
LYMPHOCYTES NFR BLD: 11.6 % (ref 18–48)
LYMPHOCYTES NFR BLD: 11.8 % (ref 18–48)
LYMPHOCYTES NFR BLD: 12.7 % (ref 18–48)
LYMPHOCYTES NFR BLD: 13.1 % (ref 18–48)
LYMPHOCYTES NFR BLD: 13.2 % (ref 18–48)
LYMPHOCYTES NFR BLD: 14.5 % (ref 18–48)
LYMPHOCYTES NFR BLD: 15.9 % (ref 18–48)
LYMPHOCYTES NFR BLD: 19.1 % (ref 18–48)
LYMPHOCYTES NFR BLD: 19.2 % (ref 18–48)
LYMPHOCYTES NFR BLD: 19.8 % (ref 18–48)
LYMPHOCYTES NFR BLD: 20.9 % (ref 18–48)
LYMPHOCYTES NFR BLD: 22 % (ref 18–48)
LYMPHOCYTES NFR BLD: 25.3 % (ref 18–48)
LYMPHOCYTES NFR BLD: 25.5 % (ref 18–48)
LYMPHOCYTES NFR BLD: 25.8 % (ref 18–48)
LYMPHOCYTES NFR BLD: 26.8 % (ref 18–48)
LYMPHOCYTES NFR BLD: 27.9 % (ref 18–48)
LYMPHOCYTES NFR BLD: 28.2 % (ref 18–48)
LYMPHOCYTES NFR BLD: 28.3 % (ref 18–48)
LYMPHOCYTES NFR BLD: 48 % (ref 18–48)
LYMPHOCYTES NFR BLD: 5 % (ref 18–48)
LYMPHOCYTES NFR BLD: 5.7 % (ref 18–48)
LYMPHOCYTES NFR BLD: 52 % (ref 18–48)
LYMPHOCYTES NFR BLD: 56 % (ref 18–48)
LYMPHOCYTES NFR BLD: 6.9 % (ref 18–48)
LYMPHOCYTES NFR BLD: 7 % (ref 18–48)
LYMPHOCYTES NFR BLD: 7 % (ref 18–48)
LYMPHOCYTES NFR BLD: 8 % (ref 18–48)
LYMPHOCYTES NFR BLD: 8.9 % (ref 18–48)
LYMPHOCYTES NFR CSF MANUAL: 50 % (ref 40–80)
LYMPHOCYTES NFR CSF MANUAL: 52 % (ref 40–80)
MAGNESIUM SERPL-MCNC: 2 MG/DL (ref 1.6–2.6)
MAGNESIUM SERPL-MCNC: 2 MG/DL (ref 1.6–2.6)
MAGNESIUM SERPL-MCNC: 2.1 MG/DL (ref 1.6–2.6)
MAGNESIUM SERPL-MCNC: 2.1 MG/DL (ref 1.6–2.6)
MAGNESIUM SERPL-MCNC: 2.2 MG/DL (ref 1.6–2.6)
MAGNESIUM SERPL-MCNC: 2.3 MG/DL (ref 1.6–2.6)
MAGNESIUM SERPL-MCNC: 2.3 MG/DL (ref 1.6–2.6)
MAGNESIUM SERPL-MCNC: 2.4 MG/DL (ref 1.6–2.6)
MAGNESIUM SERPL-MCNC: 2.6 MG/DL (ref 1.6–2.6)
MAGNESIUM SERPL-MCNC: 2.6 MG/DL (ref 1.6–2.6)
MCH RBC QN AUTO: 28.4 PG (ref 27–31)
MCH RBC QN AUTO: 29.2 PG (ref 27–31)
MCH RBC QN AUTO: 29.2 PG (ref 27–31)
MCH RBC QN AUTO: 29.3 PG (ref 27–31)
MCH RBC QN AUTO: 29.3 PG (ref 27–31)
MCH RBC QN AUTO: 29.4 PG (ref 27–31)
MCH RBC QN AUTO: 29.6 PG (ref 27–31)
MCH RBC QN AUTO: 30 PG (ref 27–31)
MCH RBC QN AUTO: 30 PG (ref 27–31)
MCH RBC QN AUTO: 30.1 PG (ref 27–31)
MCH RBC QN AUTO: 30.1 PG (ref 27–31)
MCH RBC QN AUTO: 30.2 PG (ref 27–31)
MCH RBC QN AUTO: 30.2 PG (ref 27–31)
MCH RBC QN AUTO: 30.3 PG (ref 27–31)
MCH RBC QN AUTO: 30.4 PG (ref 27–31)
MCH RBC QN AUTO: 30.5 PG (ref 27–31)
MCH RBC QN AUTO: 30.6 PG (ref 27–31)
MCH RBC QN AUTO: 30.7 PG (ref 27–31)
MCH RBC QN AUTO: 31 PG (ref 27–31)
MCH RBC QN AUTO: 31 PG (ref 27–31)
MCH RBC QN AUTO: 31.1 PG (ref 27–31)
MCH RBC QN AUTO: 31.3 PG (ref 27–31)
MCHC RBC AUTO-ENTMCNC: 32.1 G/DL (ref 32–36)
MCHC RBC AUTO-ENTMCNC: 32.3 G/DL (ref 32–36)
MCHC RBC AUTO-ENTMCNC: 32.7 G/DL (ref 32–36)
MCHC RBC AUTO-ENTMCNC: 32.8 G/DL (ref 32–36)
MCHC RBC AUTO-ENTMCNC: 33.1 G/DL (ref 32–36)
MCHC RBC AUTO-ENTMCNC: 33.2 G/DL (ref 32–36)
MCHC RBC AUTO-ENTMCNC: 33.3 G/DL (ref 32–36)
MCHC RBC AUTO-ENTMCNC: 33.5 G/DL (ref 32–36)
MCHC RBC AUTO-ENTMCNC: 33.5 G/DL (ref 32–36)
MCHC RBC AUTO-ENTMCNC: 33.7 G/DL (ref 32–36)
MCHC RBC AUTO-ENTMCNC: 33.8 G/DL (ref 32–36)
MCHC RBC AUTO-ENTMCNC: 33.9 G/DL (ref 32–36)
MCHC RBC AUTO-ENTMCNC: 34 G/DL (ref 32–36)
MCHC RBC AUTO-ENTMCNC: 34.1 G/DL (ref 32–36)
MCHC RBC AUTO-ENTMCNC: 34.2 G/DL (ref 32–36)
MCHC RBC AUTO-ENTMCNC: 34.2 G/DL (ref 32–36)
MCHC RBC AUTO-ENTMCNC: 34.3 G/DL (ref 32–36)
MCHC RBC AUTO-ENTMCNC: 34.4 G/DL (ref 32–36)
MCHC RBC AUTO-ENTMCNC: 34.5 G/DL (ref 32–36)
MCHC RBC AUTO-ENTMCNC: 34.5 G/DL (ref 32–36)
MCHC RBC AUTO-ENTMCNC: 34.6 G/DL (ref 32–36)
MCHC RBC AUTO-ENTMCNC: 34.8 G/DL (ref 32–36)
MCHC RBC AUTO-ENTMCNC: 34.9 G/DL (ref 32–36)
MCHC RBC AUTO-ENTMCNC: 35 G/DL (ref 32–36)
MCHC RBC AUTO-ENTMCNC: 35 G/DL (ref 32–36)
MCHC RBC AUTO-ENTMCNC: 35.3 G/DL (ref 32–36)
MCHC RBC AUTO-ENTMCNC: 35.5 G/DL (ref 32–36)
MCHC RBC AUTO-ENTMCNC: 35.5 G/DL (ref 32–36)
MCHC RBC AUTO-ENTMCNC: 36 G/DL (ref 32–36)
MCV RBC AUTO: 86 FL (ref 82–98)
MCV RBC AUTO: 87 FL (ref 82–98)
MCV RBC AUTO: 88 FL (ref 82–98)
MCV RBC AUTO: 89 FL (ref 82–98)
MCV RBC AUTO: 90 FL (ref 82–98)
MCV RBC AUTO: 91 FL (ref 82–98)
MCV RBC AUTO: 91 FL (ref 82–98)
MCV RBC AUTO: 92 FL (ref 82–98)
MCV RBC AUTO: 93 FL (ref 82–98)
MCV RBC AUTO: 94 FL (ref 82–98)
MICROSCOPIC COMMENT: ABNORMAL
MONOCYTES # BLD AUTO: 0 K/UL (ref 0.3–1)
MONOCYTES # BLD AUTO: 0 K/UL (ref 0.3–1)
MONOCYTES # BLD AUTO: 0.2 K/UL (ref 0.3–1)
MONOCYTES # BLD AUTO: 0.3 K/UL (ref 0.3–1)
MONOCYTES # BLD AUTO: 0.4 K/UL (ref 0.3–1)
MONOCYTES # BLD AUTO: 0.5 K/UL (ref 0.3–1)
MONOCYTES # BLD AUTO: 0.6 K/UL (ref 0.3–1)
MONOCYTES # BLD AUTO: 0.7 K/UL (ref 0.3–1)
MONOCYTES # BLD AUTO: 0.8 K/UL (ref 0.3–1)
MONOCYTES # BLD AUTO: 0.9 K/UL (ref 0.3–1)
MONOCYTES # BLD AUTO: 1 K/UL (ref 0.3–1)
MONOCYTES # BLD AUTO: 1 K/UL (ref 0.3–1)
MONOCYTES # BLD AUTO: ABNORMAL K/UL (ref 0.3–1)
MONOCYTES # BLD AUTO: ABNORMAL K/UL (ref 0.3–1)
MONOCYTES NFR BLD: 0 % (ref 4–15)
MONOCYTES NFR BLD: 0.7 % (ref 4–15)
MONOCYTES NFR BLD: 1 % (ref 4–15)
MONOCYTES NFR BLD: 1.4 % (ref 4–15)
MONOCYTES NFR BLD: 10 % (ref 4–15)
MONOCYTES NFR BLD: 10.6 % (ref 4–15)
MONOCYTES NFR BLD: 14.2 % (ref 4–15)
MONOCYTES NFR BLD: 14.3 % (ref 4–15)
MONOCYTES NFR BLD: 14.6 % (ref 4–15)
MONOCYTES NFR BLD: 15.7 % (ref 4–15)
MONOCYTES NFR BLD: 17.1 % (ref 4–15)
MONOCYTES NFR BLD: 17.3 % (ref 4–15)
MONOCYTES NFR BLD: 2 % (ref 4–15)
MONOCYTES NFR BLD: 23 % (ref 4–15)
MONOCYTES NFR BLD: 26.1 % (ref 4–15)
MONOCYTES NFR BLD: 3 % (ref 4–15)
MONOCYTES NFR BLD: 3.8 % (ref 4–15)
MONOCYTES NFR BLD: 3.9 % (ref 4–15)
MONOCYTES NFR BLD: 32 % (ref 4–15)
MONOCYTES NFR BLD: 4.6 % (ref 4–15)
MONOCYTES NFR BLD: 4.6 % (ref 4–15)
MONOCYTES NFR BLD: 4.7 % (ref 4–15)
MONOCYTES NFR BLD: 4.8 % (ref 4–15)
MONOCYTES NFR BLD: 40 % (ref 4–15)
MONOCYTES NFR BLD: 44 % (ref 4–15)
MONOCYTES NFR BLD: 5.5 % (ref 4–15)
MONOCYTES NFR BLD: 5.5 % (ref 4–15)
MONOCYTES NFR BLD: 5.6 % (ref 4–15)
MONOCYTES NFR BLD: 5.8 % (ref 4–15)
MONOCYTES NFR BLD: 6.3 % (ref 4–15)
MONOCYTES NFR BLD: 6.6 % (ref 4–15)
MONOCYTES NFR BLD: 6.8 % (ref 4–15)
MONOCYTES NFR BLD: 6.9 % (ref 4–15)
MONOCYTES NFR BLD: 7.6 % (ref 4–15)
MONOS+MACROS NFR CSF MANUAL: 48 % (ref 15–45)
MONOS+MACROS NFR CSF MANUAL: 50 % (ref 15–45)
MV PEAK A VEL: 0.62 M/S
MV PEAK E VEL: 0.63 M/S
NEUTROPHILS # BLD AUTO: 1.5 K/UL (ref 1.8–7.7)
NEUTROPHILS # BLD AUTO: 1.5 K/UL (ref 1.8–7.7)
NEUTROPHILS # BLD AUTO: 1.6 K/UL (ref 1.8–7.7)
NEUTROPHILS # BLD AUTO: 1.9 K/UL (ref 1.8–7.7)
NEUTROPHILS # BLD AUTO: 11.6 K/UL (ref 1.8–7.7)
NEUTROPHILS # BLD AUTO: 2.1 K/UL (ref 1.8–7.7)
NEUTROPHILS # BLD AUTO: 2.2 K/UL (ref 1.8–7.7)
NEUTROPHILS # BLD AUTO: 2.5 K/UL (ref 1.8–7.7)
NEUTROPHILS # BLD AUTO: 2.5 K/UL (ref 1.8–7.7)
NEUTROPHILS # BLD AUTO: 2.6 K/UL (ref 1.8–7.7)
NEUTROPHILS # BLD AUTO: 2.6 K/UL (ref 1.8–7.7)
NEUTROPHILS # BLD AUTO: 2.7 K/UL (ref 1.8–7.7)
NEUTROPHILS # BLD AUTO: 3.2 K/UL (ref 1.8–7.7)
NEUTROPHILS # BLD AUTO: 3.8 K/UL (ref 1.8–7.7)
NEUTROPHILS # BLD AUTO: 3.9 K/UL (ref 1.8–7.7)
NEUTROPHILS # BLD AUTO: 3.9 K/UL (ref 1.8–7.7)
NEUTROPHILS # BLD AUTO: 4 K/UL (ref 1.8–7.7)
NEUTROPHILS # BLD AUTO: 4.1 K/UL (ref 1.8–7.7)
NEUTROPHILS # BLD AUTO: 4.1 K/UL (ref 1.8–7.7)
NEUTROPHILS # BLD AUTO: 4.3 K/UL (ref 1.8–7.7)
NEUTROPHILS # BLD AUTO: 4.6 K/UL (ref 1.8–7.7)
NEUTROPHILS # BLD AUTO: 4.6 K/UL (ref 1.8–7.7)
NEUTROPHILS # BLD AUTO: 4.7 K/UL (ref 1.8–7.7)
NEUTROPHILS # BLD AUTO: 5.5 K/UL (ref 1.8–7.7)
NEUTROPHILS # BLD AUTO: 7 K/UL (ref 1.8–7.7)
NEUTROPHILS # BLD AUTO: 7.4 K/UL (ref 1.8–7.7)
NEUTROPHILS # BLD AUTO: 7.5 K/UL (ref 1.8–7.7)
NEUTROPHILS # BLD AUTO: ABNORMAL K/UL (ref 1.8–7.7)
NEUTROPHILS # BLD AUTO: ABNORMAL K/UL (ref 1.8–7.7)
NEUTROPHILS NFR BLD: 12 % (ref 38–73)
NEUTROPHILS NFR BLD: 44 % (ref 38–73)
NEUTROPHILS NFR BLD: 45.1 % (ref 38–73)
NEUTROPHILS NFR BLD: 52 % (ref 38–73)
NEUTROPHILS NFR BLD: 55.6 % (ref 38–73)
NEUTROPHILS NFR BLD: 56.5 % (ref 38–73)
NEUTROPHILS NFR BLD: 57.1 % (ref 38–73)
NEUTROPHILS NFR BLD: 57.2 % (ref 38–73)
NEUTROPHILS NFR BLD: 59.2 % (ref 38–73)
NEUTROPHILS NFR BLD: 62.1 % (ref 38–73)
NEUTROPHILS NFR BLD: 70.4 % (ref 38–73)
NEUTROPHILS NFR BLD: 72.2 % (ref 38–73)
NEUTROPHILS NFR BLD: 74.8 % (ref 38–73)
NEUTROPHILS NFR BLD: 76.2 % (ref 38–73)
NEUTROPHILS NFR BLD: 77.9 % (ref 38–73)
NEUTROPHILS NFR BLD: 77.9 % (ref 38–73)
NEUTROPHILS NFR BLD: 78.9 % (ref 38–73)
NEUTROPHILS NFR BLD: 79.1 % (ref 38–73)
NEUTROPHILS NFR BLD: 79.4 % (ref 38–73)
NEUTROPHILS NFR BLD: 79.6 % (ref 38–73)
NEUTROPHILS NFR BLD: 79.9 % (ref 38–73)
NEUTROPHILS NFR BLD: 8 % (ref 38–73)
NEUTROPHILS NFR BLD: 8 % (ref 38–73)
NEUTROPHILS NFR BLD: 80.6 % (ref 38–73)
NEUTROPHILS NFR BLD: 80.6 % (ref 38–73)
NEUTROPHILS NFR BLD: 82 % (ref 38–73)
NEUTROPHILS NFR BLD: 82.9 % (ref 38–73)
NEUTROPHILS NFR BLD: 83 % (ref 38–73)
NEUTROPHILS NFR BLD: 85.2 % (ref 38–73)
NEUTROPHILS NFR BLD: 85.6 % (ref 38–73)
NEUTROPHILS NFR BLD: 86.4 % (ref 38–73)
NEUTROPHILS NFR BLD: 88 % (ref 38–73)
NEUTROPHILS NFR BLD: 89 % (ref 38–73)
NEUTROPHILS NFR BLD: 91 % (ref 38–73)
NEUTS BAND NFR BLD MANUAL: 1 %
NEUTS BAND NFR BLD MANUAL: 3 %
NEUTS BAND NFR BLD MANUAL: 5 %
NEUTS BAND NFR BLD MANUAL: 7 %
NITRITE UR QL STRIP: NEGATIVE
NONHDLC SERPL-MCNC: 206 MG/DL (CALC)
NRBC BLD-RTO: 0 /100 WBC
NRBC BLD-RTO: 1 /100 WBC
NRBC BLD-RTO: 2 /100 WBC
OPIATES UR QL SCN: NEGATIVE
OVALOCYTES BLD QL SMEAR: ABNORMAL
OVALOCYTES BLD QL SMEAR: ABNORMAL
PCP UR QL SCN>25 NG/ML: NEGATIVE
PH UR STRIP: 5.5 [PH] (ref 5–8)
PH UR STRIP: 6 [PH] (ref 5–8)
PH UR STRIP: 7 [PH] (ref 5–8)
PHOSPHATE SERPL-MCNC: 1.9 MG/DL (ref 2.7–4.5)
PHOSPHATE SERPL-MCNC: 2 MG/DL (ref 2.7–4.5)
PHOSPHATE SERPL-MCNC: 2.7 MG/DL (ref 2.7–4.5)
PHOSPHATE SERPL-MCNC: 2.9 MG/DL (ref 2.7–4.5)
PHOSPHATE SERPL-MCNC: 3.2 MG/DL (ref 2.7–4.5)
PHOSPHATE SERPL-MCNC: 3.4 MG/DL (ref 2.7–4.5)
PHOSPHATE SERPL-MCNC: 3.8 MG/DL (ref 2.7–4.5)
PHOSPHATE SERPL-MCNC: 4.1 MG/DL (ref 2.7–4.5)
PHOSPHATE SERPL-MCNC: 4.1 MG/DL (ref 2.7–4.5)
PISA TR MAX VEL: 2.26 M/S
PLATELET # BLD AUTO: 107 K/UL (ref 150–450)
PLATELET # BLD AUTO: 133 K/UL (ref 150–450)
PLATELET # BLD AUTO: 134 K/UL (ref 150–450)
PLATELET # BLD AUTO: 142 K/UL (ref 150–450)
PLATELET # BLD AUTO: 171 K/UL (ref 150–450)
PLATELET # BLD AUTO: 192 K/UL (ref 150–450)
PLATELET # BLD AUTO: 229 K/UL (ref 150–450)
PLATELET # BLD AUTO: 230 K/UL (ref 150–450)
PLATELET # BLD AUTO: 239 K/UL (ref 150–450)
PLATELET # BLD AUTO: 247 K/UL (ref 150–450)
PLATELET # BLD AUTO: 255 K/UL (ref 150–450)
PLATELET # BLD AUTO: 263 K/UL (ref 150–450)
PLATELET # BLD AUTO: 263 K/UL (ref 150–450)
PLATELET # BLD AUTO: 278 K/UL (ref 150–450)
PLATELET # BLD AUTO: 279 K/UL (ref 150–450)
PLATELET # BLD AUTO: 281 K/UL (ref 150–450)
PLATELET # BLD AUTO: 296 K/UL (ref 150–450)
PLATELET # BLD AUTO: 319 K/UL (ref 150–450)
PLATELET # BLD AUTO: 379 K/UL (ref 150–450)
PLATELET # BLD AUTO: 38 K/UL (ref 150–450)
PLATELET # BLD AUTO: 38 K/UL (ref 150–450)
PLATELET # BLD AUTO: 385 K/UL (ref 150–450)
PLATELET # BLD AUTO: 44 K/UL (ref 150–450)
PLATELET # BLD AUTO: 45 K/UL (ref 150–450)
PLATELET # BLD AUTO: 46 K/UL (ref 150–450)
PLATELET # BLD AUTO: 50 K/UL (ref 150–450)
PLATELET # BLD AUTO: 50 K/UL (ref 150–450)
PLATELET # BLD AUTO: 51 K/UL (ref 150–450)
PLATELET # BLD AUTO: 52 K/UL (ref 150–450)
PLATELET # BLD AUTO: 537 K/UL (ref 150–450)
PLATELET # BLD AUTO: 56 K/UL (ref 150–450)
PLATELET # BLD AUTO: 60 K/UL (ref 150–450)
PLATELET # BLD AUTO: 71 K/UL (ref 150–450)
PLATELET # BLD AUTO: 94 K/UL (ref 150–450)
PLATELET BLD QL SMEAR: ABNORMAL
PMV BLD AUTO: 10 FL (ref 9.2–12.9)
PMV BLD AUTO: 10.2 FL (ref 9.2–12.9)
PMV BLD AUTO: 10.6 FL (ref 9.2–12.9)
PMV BLD AUTO: 11 FL (ref 9.2–12.9)
PMV BLD AUTO: 12.1 FL (ref 9.2–12.9)
PMV BLD AUTO: 12.2 FL (ref 9.2–12.9)
PMV BLD AUTO: 12.4 FL (ref 9.2–12.9)
PMV BLD AUTO: 12.6 FL (ref 9.2–12.9)
PMV BLD AUTO: 8.4 FL (ref 9.2–12.9)
PMV BLD AUTO: 8.7 FL (ref 9.2–12.9)
PMV BLD AUTO: 8.7 FL (ref 9.2–12.9)
PMV BLD AUTO: 8.9 FL (ref 9.2–12.9)
PMV BLD AUTO: 9.2 FL (ref 9.2–12.9)
PMV BLD AUTO: 9.2 FL (ref 9.2–12.9)
PMV BLD AUTO: 9.3 FL (ref 9.2–12.9)
PMV BLD AUTO: 9.5 FL (ref 9.2–12.9)
PMV BLD AUTO: 9.5 FL (ref 9.2–12.9)
PMV BLD AUTO: 9.6 FL (ref 9.2–12.9)
PMV BLD AUTO: 9.6 FL (ref 9.2–12.9)
PMV BLD AUTO: 9.7 FL (ref 9.2–12.9)
PMV BLD AUTO: 9.7 FL (ref 9.2–12.9)
PMV BLD AUTO: 9.8 FL (ref 9.2–12.9)
PMV BLD AUTO: 9.8 FL (ref 9.2–12.9)
PMV BLD AUTO: 9.9 FL (ref 9.2–12.9)
PMV BLD AUTO: 9.9 FL (ref 9.2–12.9)
PMV BLD AUTO: ABNORMAL FL (ref 9.2–12.9)
POIKILOCYTOSIS BLD QL SMEAR: SLIGHT
POLYCHROMASIA BLD QL SMEAR: ABNORMAL
POTASSIUM SERPL-SCNC: 3.2 MMOL/L (ref 3.5–5.1)
POTASSIUM SERPL-SCNC: 3.4 MMOL/L (ref 3.5–5.1)
POTASSIUM SERPL-SCNC: 3.4 MMOL/L (ref 3.5–5.1)
POTASSIUM SERPL-SCNC: 3.6 MMOL/L (ref 3.5–5.1)
POTASSIUM SERPL-SCNC: 3.7 MMOL/L (ref 3.5–5.1)
POTASSIUM SERPL-SCNC: 3.8 MMOL/L (ref 3.5–5.1)
POTASSIUM SERPL-SCNC: 3.9 MMOL/L (ref 3.5–5.1)
POTASSIUM SERPL-SCNC: 4 MMOL/L (ref 3.5–5.1)
POTASSIUM SERPL-SCNC: 4.1 MMOL/L (ref 3.5–5.1)
POTASSIUM SERPL-SCNC: 4.2 MMOL/L (ref 3.5–5.1)
POTASSIUM SERPL-SCNC: 5 MMOL/L (ref 3.5–5.1)
PREALB SERPL-MCNC: 14 MG/DL (ref 20–43)
PREALB SERPL-MCNC: 16 MG/DL (ref 20–43)
PROCALCITONIN SERPL IA-MCNC: 0.38 NG/ML (ref 0–0.5)
PROT CSF-MCNC: 42 MG/DL (ref 15–40)
PROT SERPL-MCNC: 4.7 G/DL (ref 6–8.4)
PROT SERPL-MCNC: 4.9 G/DL (ref 6–8.4)
PROT SERPL-MCNC: 5.1 G/DL (ref 6–8.4)
PROT SERPL-MCNC: 5.3 G/DL (ref 6–8.4)
PROT SERPL-MCNC: 5.4 G/DL (ref 6–8.4)
PROT SERPL-MCNC: 5.4 G/DL (ref 6–8.4)
PROT SERPL-MCNC: 5.5 G/DL (ref 6–8.4)
PROT SERPL-MCNC: 5.6 G/DL (ref 6–8.4)
PROT SERPL-MCNC: 5.7 G/DL (ref 6–8.4)
PROT SERPL-MCNC: 6.4 G/DL (ref 6–8.4)
PROT SERPL-MCNC: 6.7 G/DL (ref 6–8.4)
PROT SERPL-MCNC: 6.7 G/DL (ref 6–8.4)
PROT SERPL-MCNC: 7 G/DL (ref 6–8.4)
PROT SERPL-MCNC: 7.1 G/DL (ref 6–8.4)
PROT SERPL-MCNC: 7.3 G/DL (ref 6–8.4)
PROT SERPL-MCNC: 7.4 G/DL (ref 6–8.4)
PROT SERPL-MCNC: 7.5 G/DL (ref 6–8.4)
PROT SERPL-MCNC: 7.6 G/DL (ref 6–8.4)
PROT SERPL-MCNC: 7.7 G/DL (ref 6–8.4)
PROT SERPL-MCNC: 7.8 G/DL (ref 6–8.4)
PROT SERPL-MCNC: 7.9 G/DL (ref 6–8.4)
PROT SERPL-MCNC: 7.9 G/DL (ref 6–8.4)
PROT SERPL-MCNC: 8 G/DL (ref 6–8.4)
PROT SERPL-MCNC: 8 G/DL (ref 6–8.4)
PROT SERPL-MCNC: 8.1 G/DL (ref 6–8.4)
PROT SERPL-MCNC: 8.2 G/DL (ref 6–8.4)
PROT SERPL-MCNC: 8.5 G/DL (ref 6–8.4)
PROT UR QL STRIP: ABNORMAL
PROT UR QL STRIP: NEGATIVE
PV PEAK VELOCITY: 92.89 CM/S
RA PRESSURE: 3 MMHG
RBC # BLD AUTO: 2.8 M/UL (ref 4–5.4)
RBC # BLD AUTO: 2.83 M/UL (ref 4–5.4)
RBC # BLD AUTO: 2.88 M/UL (ref 4–5.4)
RBC # BLD AUTO: 2.93 M/UL (ref 4–5.4)
RBC # BLD AUTO: 3.07 M/UL (ref 4–5.4)
RBC # BLD AUTO: 3.09 M/UL (ref 4–5.4)
RBC # BLD AUTO: 3.22 M/UL (ref 4–5.4)
RBC # BLD AUTO: 3.22 M/UL (ref 4–5.4)
RBC # BLD AUTO: 3.23 M/UL (ref 4–5.4)
RBC # BLD AUTO: 3.24 M/UL (ref 4–5.4)
RBC # BLD AUTO: 3.38 M/UL (ref 4–5.4)
RBC # BLD AUTO: 3.39 M/UL (ref 4–5.4)
RBC # BLD AUTO: 3.41 M/UL (ref 4–5.4)
RBC # BLD AUTO: 3.42 M/UL (ref 4–5.4)
RBC # BLD AUTO: 3.57 M/UL (ref 4–5.4)
RBC # BLD AUTO: 3.65 M/UL (ref 4–5.4)
RBC # BLD AUTO: 3.66 M/UL (ref 4–5.4)
RBC # BLD AUTO: 3.68 M/UL (ref 4–5.4)
RBC # BLD AUTO: 3.69 M/UL (ref 4–5.4)
RBC # BLD AUTO: 3.72 M/UL (ref 4–5.4)
RBC # BLD AUTO: 3.84 M/UL (ref 4–5.4)
RBC # BLD AUTO: 3.84 M/UL (ref 4–5.4)
RBC # BLD AUTO: 3.93 M/UL (ref 4–5.4)
RBC # BLD AUTO: 4.03 M/UL (ref 4–5.4)
RBC # BLD AUTO: 4.04 M/UL (ref 4–5.4)
RBC # BLD AUTO: 4.04 M/UL (ref 4–5.4)
RBC # BLD AUTO: 4.22 M/UL (ref 4–5.4)
RBC # BLD AUTO: 4.23 M/UL (ref 4–5.4)
RBC # BLD AUTO: 4.24 M/UL (ref 4–5.4)
RBC # BLD AUTO: 4.24 M/UL (ref 4–5.4)
RBC # BLD AUTO: 4.37 M/UL (ref 4–5.4)
RBC # BLD AUTO: 4.6 M/UL (ref 4–5.4)
RBC # BLD AUTO: 4.78 M/UL (ref 4–5.4)
RBC # BLD AUTO: 4.78 M/UL (ref 4–5.4)
RBC # CSF: 0 /CU MM
RBC # CSF: 1 /CU MM
RBC #/AREA URNS HPF: 13 /HPF (ref 0–4)
RBC #/AREA URNS HPF: 2 /HPF (ref 0–4)
RBC #/AREA URNS HPF: 6 /HPF (ref 0–4)
RBC #/AREA URNS HPF: 6 /HPF (ref 0–4)
RBC #/AREA URNS HPF: 7 /HPF (ref 0–4)
RBC #/AREA URNS HPF: ABNORMAL /HPF
SAMPLE: NORMAL
SARS-COV-2 RDRP RESP QL NAA+PROBE: NEGATIVE
SARS-COV-2 RDRP RESP QL NAA+PROBE: NEGATIVE
SERVICE CMNT-IMP: ABNORMAL
SODIUM SERPL-SCNC: 129 MMOL/L (ref 136–145)
SODIUM SERPL-SCNC: 131 MMOL/L (ref 136–145)
SODIUM SERPL-SCNC: 131 MMOL/L (ref 136–145)
SODIUM SERPL-SCNC: 132 MMOL/L (ref 136–145)
SODIUM SERPL-SCNC: 133 MMOL/L (ref 136–145)
SODIUM SERPL-SCNC: 133 MMOL/L (ref 136–145)
SODIUM SERPL-SCNC: 134 MMOL/L (ref 136–145)
SODIUM SERPL-SCNC: 135 MMOL/L (ref 136–145)
SODIUM SERPL-SCNC: 136 MMOL/L (ref 136–145)
SODIUM SERPL-SCNC: 137 MMOL/L (ref 136–145)
SODIUM SERPL-SCNC: 138 MMOL/L (ref 136–145)
SODIUM SERPL-SCNC: 139 MMOL/L (ref 136–145)
SODIUM SERPL-SCNC: 140 MMOL/L (ref 136–145)
SODIUM SERPL-SCNC: 141 MMOL/L (ref 136–145)
SODIUM SERPL-SCNC: 141 MMOL/L (ref 136–145)
SODIUM SERPL-SCNC: 142 MMOL/L (ref 136–145)
SP GR UR STRIP: 1.01 (ref 1–1.03)
SP GR UR STRIP: 1.02 (ref 1–1.03)
SP GR UR STRIP: >1.03 (ref 1–1.03)
SPECIMEN SOURCE: NORMAL
SPECIMEN VOL CSF: 1.5 ML
SPECIMEN VOL CSF: 2 ML
SQUAMOUS #/AREA URNS HPF: 1 /HPF
SQUAMOUS #/AREA URNS HPF: 13 /HPF
SQUAMOUS #/AREA URNS HPF: 3 /HPF
SQUAMOUS #/AREA URNS HPF: 4 /HPF
SQUAMOUS #/AREA URNS HPF: 6 /HPF
SQUAMOUS #/AREA URNS HPF: ABNORMAL /HPF
T4 FREE SERPL-MCNC: 1.2 NG/DL (ref 0.71–1.51)
T4 FREE SERPL-MCNC: 1.3 NG/DL (ref 0.8–1.8)
TB INDURATION 48 - 72 HR READ: 0 MM
TDI LATERAL: 0.09 M/S
TDI SEPTAL: 0.07 M/S
TDI: 0.08 M/S
TOXICOLOGY INFORMATION: ABNORMAL
TR MAX PG: 20 MMHG
TRIGL SERPL-MCNC: 133 MG/DL
TRIGL SERPL-MCNC: 213 MG/DL (ref 30–150)
TRIGL SERPL-MCNC: 266 MG/DL (ref 30–150)
TROPONIN I SERPL DL<=0.01 NG/ML-MCNC: 0.04 NG/ML
TROPONIN I SERPL DL<=0.01 NG/ML-MCNC: <0.03 NG/ML
TSH SERPL DL<=0.005 MIU/L-ACNC: 0.28 UIU/ML (ref 0.34–5.6)
TSH SERPL-ACNC: 0.15 MIU/L (ref 0.4–4.5)
TV REST PULMONARY ARTERY PRESSURE: 23 MMHG
URN SPEC COLLECT METH UR: ABNORMAL
UROBILINOGEN UR STRIP-ACNC: >=8 EU/DL
UROBILINOGEN UR STRIP-ACNC: NEGATIVE EU/DL
WBC # BLD AUTO: 1.1 K/UL (ref 3.9–12.7)
WBC # BLD AUTO: 1.44 K/UL (ref 3.9–12.7)
WBC # BLD AUTO: 1.67 K/UL (ref 3.9–12.7)
WBC # BLD AUTO: 13.46 K/UL (ref 3.9–12.7)
WBC # BLD AUTO: 2.09 K/UL (ref 3.9–12.7)
WBC # BLD AUTO: 2.8 K/UL (ref 3.9–12.7)
WBC # BLD AUTO: 2.82 K/UL (ref 3.9–12.7)
WBC # BLD AUTO: 2.82 K/UL (ref 3.9–12.7)
WBC # BLD AUTO: 3.26 K/UL (ref 3.9–12.7)
WBC # BLD AUTO: 3.71 K/UL (ref 3.9–12.7)
WBC # BLD AUTO: 3.98 K/UL (ref 3.9–12.7)
WBC # BLD AUTO: 4.22 K/UL (ref 3.9–12.7)
WBC # BLD AUTO: 4.24 K/UL (ref 3.9–12.7)
WBC # BLD AUTO: 4.46 K/UL (ref 3.9–12.7)
WBC # BLD AUTO: 4.53 K/UL (ref 3.9–12.7)
WBC # BLD AUTO: 4.69 K/UL (ref 3.9–12.7)
WBC # BLD AUTO: 4.79 K/UL (ref 3.9–12.7)
WBC # BLD AUTO: 4.9 K/UL (ref 3.9–12.7)
WBC # BLD AUTO: 4.94 K/UL (ref 3.9–12.7)
WBC # BLD AUTO: 4.96 K/UL (ref 3.9–12.7)
WBC # BLD AUTO: 4.97 K/UL (ref 3.9–12.7)
WBC # BLD AUTO: 4.98 K/UL (ref 3.9–12.7)
WBC # BLD AUTO: 5.25 K/UL (ref 3.9–12.7)
WBC # BLD AUTO: 5.59 K/UL (ref 3.9–12.7)
WBC # BLD AUTO: 5.67 K/UL (ref 3.9–12.7)
WBC # BLD AUTO: 5.77 K/UL (ref 3.9–12.7)
WBC # BLD AUTO: 5.78 K/UL (ref 3.9–12.7)
WBC # BLD AUTO: 6.08 K/UL (ref 3.9–12.7)
WBC # BLD AUTO: 6.38 K/UL (ref 3.9–12.7)
WBC # BLD AUTO: 6.4 K/UL (ref 3.9–12.7)
WBC # BLD AUTO: 6.68 K/UL (ref 3.9–12.7)
WBC # BLD AUTO: 8.15 K/UL (ref 3.9–12.7)
WBC # BLD AUTO: 8.77 K/UL (ref 3.9–12.7)
WBC # BLD AUTO: 9.46 K/UL (ref 3.9–12.7)
WBC # CSF: 6 /CU MM (ref 0–5)
WBC # CSF: 6 /CU MM (ref 0–5)
WBC #/AREA URNS HPF: 17 /HPF (ref 0–5)
WBC #/AREA URNS HPF: 2 /HPF (ref 0–5)
WBC #/AREA URNS HPF: 4 /HPF (ref 0–5)
WBC #/AREA URNS HPF: 6 /HPF (ref 0–5)
WBC #/AREA URNS HPF: 7 /HPF (ref 0–5)
WBC #/AREA URNS HPF: ABNORMAL /HPF

## 2022-01-01 PROCEDURE — 80053 COMPREHEN METABOLIC PANEL: CPT | Performed by: NURSE PRACTITIONER

## 2022-01-01 PROCEDURE — 80053 COMPREHEN METABOLIC PANEL: CPT | Performed by: INTERNAL MEDICINE

## 2022-01-01 PROCEDURE — 25500020 PHARM REV CODE 255: Mod: PO

## 2022-01-01 PROCEDURE — 3078F PR MOST RECENT DIASTOLIC BLOOD PRESSURE < 80 MM HG: ICD-10-PCS | Mod: CPTII,S$GLB,, | Performed by: NURSE PRACTITIONER

## 2022-01-01 PROCEDURE — 63600175 PHARM REV CODE 636 W HCPCS: Performed by: INTERNAL MEDICINE

## 2022-01-01 PROCEDURE — 96360 HYDRATION IV INFUSION INIT: CPT

## 2022-01-01 PROCEDURE — 1159F PR MEDICATION LIST DOCUMENTED IN MEDICAL RECORD: ICD-10-PCS | Mod: CPTII,S$GLB,, | Performed by: INTERNAL MEDICINE

## 2022-01-01 PROCEDURE — 1159F PR MEDICATION LIST DOCUMENTED IN MEDICAL RECORD: ICD-10-PCS | Mod: CPTII,S$GLB,, | Performed by: NURSE PRACTITIONER

## 2022-01-01 PROCEDURE — 3077F PR MOST RECENT SYSTOLIC BLOOD PRESSURE >= 140 MM HG: ICD-10-PCS | Mod: CPTII,S$GLB,, | Performed by: NURSE PRACTITIONER

## 2022-01-01 PROCEDURE — 99214 PR OFFICE/OUTPT VISIT, EST, LEVL IV, 30-39 MIN: ICD-10-PCS | Mod: S$GLB,,, | Performed by: RADIOLOGY

## 2022-01-01 PROCEDURE — 85007 BL SMEAR W/DIFF WBC COUNT: CPT | Performed by: INTERNAL MEDICINE

## 2022-01-01 PROCEDURE — 3078F DIAST BP <80 MM HG: CPT | Mod: CPTII,S$GLB,, | Performed by: NURSE PRACTITIONER

## 2022-01-01 PROCEDURE — 99214 OFFICE O/P EST MOD 30 MIN: CPT | Mod: S$GLB,,, | Performed by: NURSE PRACTITIONER

## 2022-01-01 PROCEDURE — 94761 N-INVAS EAR/PLS OXIMETRY MLT: CPT

## 2022-01-01 PROCEDURE — 83880 ASSAY OF NATRIURETIC PEPTIDE: CPT | Performed by: EMERGENCY MEDICINE

## 2022-01-01 PROCEDURE — 3008F PR BODY MASS INDEX (BMI) DOCUMENTED: ICD-10-PCS | Mod: S$GLB,,, | Performed by: INTERNAL MEDICINE

## 2022-01-01 PROCEDURE — 1159F MED LIST DOCD IN RCRD: CPT | Mod: CPTII,S$GLB,, | Performed by: NURSE PRACTITIONER

## 2022-01-01 PROCEDURE — 96413 CHEMO IV INFUSION 1 HR: CPT

## 2022-01-01 PROCEDURE — 3080F DIAST BP >= 90 MM HG: CPT | Mod: CPTII,S$GLB,, | Performed by: NURSE PRACTITIONER

## 2022-01-01 PROCEDURE — 85025 COMPLETE CBC W/AUTO DIFF WBC: CPT | Performed by: INTERNAL MEDICINE

## 2022-01-01 PROCEDURE — 96366 THER/PROPH/DIAG IV INF ADDON: CPT

## 2022-01-01 PROCEDURE — 25000242 PHARM REV CODE 250 ALT 637 W/ HCPCS: Performed by: INTERNAL MEDICINE

## 2022-01-01 PROCEDURE — 84478 ASSAY OF TRIGLYCERIDES: CPT | Performed by: INTERNAL MEDICINE

## 2022-01-01 PROCEDURE — 1111F PR DISCHARGE MEDS RECONCILED W/ CURRENT OUTPATIENT MED LIST: ICD-10-PCS | Mod: CPTII,S$GLB,, | Performed by: NURSE PRACTITIONER

## 2022-01-01 PROCEDURE — 27000221 HC OXYGEN, UP TO 24 HOURS

## 2022-01-01 PROCEDURE — 25000003 PHARM REV CODE 250: Performed by: STUDENT IN AN ORGANIZED HEALTH CARE EDUCATION/TRAINING PROGRAM

## 2022-01-01 PROCEDURE — 87798 DETECT AGENT NOS DNA AMP: CPT | Performed by: INTERNAL MEDICINE

## 2022-01-01 PROCEDURE — G0378 HOSPITAL OBSERVATION PER HR: HCPCS

## 2022-01-01 PROCEDURE — 1160F RVW MEDS BY RX/DR IN RCRD: CPT | Mod: CPTII,S$GLB,, | Performed by: INTERNAL MEDICINE

## 2022-01-01 PROCEDURE — 3079F DIAST BP 80-89 MM HG: CPT | Mod: CPTII,S$GLB,, | Performed by: INTERNAL MEDICINE

## 2022-01-01 PROCEDURE — 25000003 PHARM REV CODE 250: Performed by: INTERNAL MEDICINE

## 2022-01-01 PROCEDURE — U0002 COVID-19 LAB TEST NON-CDC: HCPCS | Performed by: EMERGENCY MEDICINE

## 2022-01-01 PROCEDURE — 84100 ASSAY OF PHOSPHORUS: CPT | Performed by: INTERNAL MEDICINE

## 2022-01-01 PROCEDURE — 99214 OFFICE O/P EST MOD 30 MIN: CPT | Mod: S$GLB,,, | Performed by: INTERNAL MEDICINE

## 2022-01-01 PROCEDURE — 99214 PR OFFICE/OUTPT VISIT, EST, LEVL IV, 30-39 MIN: ICD-10-PCS | Mod: S$GLB,,, | Performed by: NURSE PRACTITIONER

## 2022-01-01 PROCEDURE — 3074F PR MOST RECENT SYSTOLIC BLOOD PRESSURE < 130 MM HG: ICD-10-PCS | Mod: CPTII,S$GLB,, | Performed by: INTERNAL MEDICINE

## 2022-01-01 PROCEDURE — 1160F PR REVIEW ALL MEDS BY PRESCRIBER/CLIN PHARMACIST DOCUMENTED: ICD-10-PCS | Mod: S$GLB,,, | Performed by: NURSE PRACTITIONER

## 2022-01-01 PROCEDURE — 87070 CULTURE OTHR SPECIMN AEROBIC: CPT | Performed by: INTERNAL MEDICINE

## 2022-01-01 PROCEDURE — 96367 TX/PROPH/DG ADDL SEQ IV INF: CPT

## 2022-01-01 PROCEDURE — 12000002 HC ACUTE/MED SURGE SEMI-PRIVATE ROOM

## 2022-01-01 PROCEDURE — 99214 PR OFFICE/OUTPT VISIT, EST, LEVL IV, 30-39 MIN: ICD-10-PCS | Mod: S$GLB,,, | Performed by: INTERNAL MEDICINE

## 2022-01-01 PROCEDURE — G0425 INPT/ED TELECONSULT30: HCPCS | Mod: 95,,, | Performed by: PSYCHIATRY & NEUROLOGY

## 2022-01-01 PROCEDURE — 3008F BODY MASS INDEX DOCD: CPT | Mod: CPTII,S$GLB,, | Performed by: NURSE PRACTITIONER

## 2022-01-01 PROCEDURE — 89051 BODY FLUID CELL COUNT: CPT | Performed by: INTERNAL MEDICINE

## 2022-01-01 PROCEDURE — 80307 DRUG TEST PRSMV CHEM ANLYZR: CPT | Performed by: EMERGENCY MEDICINE

## 2022-01-01 PROCEDURE — 27201423 OPTIME MED/SURG SUP & DEVICES STERILE SUPPLY: Performed by: SURGERY

## 2022-01-01 PROCEDURE — 36415 COLL VENOUS BLD VENIPUNCTURE: CPT | Performed by: INTERNAL MEDICINE

## 2022-01-01 PROCEDURE — 96375 TX/PRO/DX INJ NEW DRUG ADDON: CPT

## 2022-01-01 PROCEDURE — 99232 SBSQ HOSP IP/OBS MODERATE 35: CPT | Mod: ,,, | Performed by: INTERNAL MEDICINE

## 2022-01-01 PROCEDURE — 3075F SYST BP GE 130 - 139MM HG: CPT | Mod: CPTII,S$GLB,, | Performed by: NURSE PRACTITIONER

## 2022-01-01 PROCEDURE — 3078F PR MOST RECENT DIASTOLIC BLOOD PRESSURE < 80 MM HG: ICD-10-PCS | Mod: CPTII,S$GLB,, | Performed by: INTERNAL MEDICINE

## 2022-01-01 PROCEDURE — 25500020 PHARM REV CODE 255: Performed by: EMERGENCY MEDICINE

## 2022-01-01 PROCEDURE — 25000003 PHARM REV CODE 250: Performed by: NURSE PRACTITIONER

## 2022-01-01 PROCEDURE — 82945 GLUCOSE OTHER FLUID: CPT | Performed by: INTERNAL MEDICINE

## 2022-01-01 PROCEDURE — 1159F MED LIST DOCD IN RCRD: CPT | Mod: CPTII,S$GLB,, | Performed by: INTERNAL MEDICINE

## 2022-01-01 PROCEDURE — 85007 BL SMEAR W/DIFF WBC COUNT: CPT | Performed by: NURSE PRACTITIONER

## 2022-01-01 PROCEDURE — 85027 COMPLETE CBC AUTOMATED: CPT | Performed by: NURSE PRACTITIONER

## 2022-01-01 PROCEDURE — 96361 HYDRATE IV INFUSION ADD-ON: CPT

## 2022-01-01 PROCEDURE — 81001 URINALYSIS AUTO W/SCOPE: CPT | Performed by: INTERNAL MEDICINE

## 2022-01-01 PROCEDURE — 80053 COMPREHEN METABOLIC PANEL: CPT | Performed by: EMERGENCY MEDICINE

## 2022-01-01 PROCEDURE — 85027 COMPLETE CBC AUTOMATED: CPT | Performed by: INTERNAL MEDICINE

## 2022-01-01 PROCEDURE — 77001 CHG FLUOROGUIDE CNTRL VEN ACCESS,PLACE,REPLACE,REMOVE: ICD-10-PCS | Mod: 26,,, | Performed by: SURGERY

## 2022-01-01 PROCEDURE — 30000890 LABCORP MISCELLANEOUS TEST: Performed by: INTERNAL MEDICINE

## 2022-01-01 PROCEDURE — 83735 ASSAY OF MAGNESIUM: CPT | Performed by: NURSE PRACTITIONER

## 2022-01-01 PROCEDURE — 96374 THER/PROPH/DIAG INJ IV PUSH: CPT

## 2022-01-01 PROCEDURE — 37000009 HC ANESTHESIA EA ADD 15 MINS: Performed by: INTERNAL MEDICINE

## 2022-01-01 PROCEDURE — 3008F BODY MASS INDEX DOCD: CPT | Mod: S$GLB,,, | Performed by: RADIOLOGY

## 2022-01-01 PROCEDURE — 85025 COMPLETE CBC W/AUTO DIFF WBC: CPT | Performed by: NURSE PRACTITIONER

## 2022-01-01 PROCEDURE — 3078F DIAST BP <80 MM HG: CPT | Mod: S$GLB,,, | Performed by: INTERNAL MEDICINE

## 2022-01-01 PROCEDURE — 99215 PR OFFICE/OUTPT VISIT, EST, LEVL V, 40-54 MIN: ICD-10-PCS | Mod: S$GLB,,, | Performed by: INTERNAL MEDICINE

## 2022-01-01 PROCEDURE — 3008F PR BODY MASS INDEX (BMI) DOCUMENTED: ICD-10-PCS | Mod: CPTII,S$GLB,, | Performed by: INTERNAL MEDICINE

## 2022-01-01 PROCEDURE — 96411 CHEMO IV PUSH ADDL DRUG: CPT

## 2022-01-01 PROCEDURE — 1160F PR REVIEW ALL MEDS BY PRESCRIBER/CLIN PHARMACIST DOCUMENTED: ICD-10-PCS | Mod: CPTII,S$GLB,, | Performed by: NURSE PRACTITIONER

## 2022-01-01 PROCEDURE — 3077F PR MOST RECENT SYSTOLIC BLOOD PRESSURE >= 140 MM HG: ICD-10-PCS | Mod: S$GLB,,, | Performed by: INTERNAL MEDICINE

## 2022-01-01 PROCEDURE — A4216 STERILE WATER/SALINE, 10 ML: HCPCS | Performed by: NURSE PRACTITIONER

## 2022-01-01 PROCEDURE — 1160F PR REVIEW ALL MEDS BY PRESCRIBER/CLIN PHARMACIST DOCUMENTED: ICD-10-PCS | Mod: CPTII,S$GLB,, | Performed by: INTERNAL MEDICINE

## 2022-01-01 PROCEDURE — 1111F DSCHRG MED/CURRENT MED MERGE: CPT | Mod: CPTII,S$GLB,, | Performed by: NURSE PRACTITIONER

## 2022-01-01 PROCEDURE — 3008F BODY MASS INDEX DOCD: CPT | Mod: S$GLB,,, | Performed by: NURSE PRACTITIONER

## 2022-01-01 PROCEDURE — 97116 GAIT TRAINING THERAPY: CPT

## 2022-01-01 PROCEDURE — 27000284 HC CANNULA NASAL: Performed by: ANESTHESIOLOGY

## 2022-01-01 PROCEDURE — 63600175 PHARM REV CODE 636 W HCPCS: Performed by: NURSE PRACTITIONER

## 2022-01-01 PROCEDURE — 96376 TX/PRO/DX INJ SAME DRUG ADON: CPT

## 2022-01-01 PROCEDURE — B4185 PARENTERAL SOL 10 GM LIPIDS: HCPCS | Performed by: INTERNAL MEDICINE

## 2022-01-01 PROCEDURE — 85025 COMPLETE CBC W/AUTO DIFF WBC: CPT | Performed by: EMERGENCY MEDICINE

## 2022-01-01 PROCEDURE — 3077F SYST BP >= 140 MM HG: CPT | Mod: CPTII,S$GLB,, | Performed by: INTERNAL MEDICINE

## 2022-01-01 PROCEDURE — 99215 PR OFFICE/OUTPT VISIT, EST, LEVL V, 40-54 MIN: ICD-10-PCS | Mod: S$GLB,,, | Performed by: NURSE PRACTITIONER

## 2022-01-01 PROCEDURE — 3008F BODY MASS INDEX DOCD: CPT | Mod: CPTII,S$GLB,, | Performed by: INTERNAL MEDICINE

## 2022-01-01 PROCEDURE — 3075F PR MOST RECENT SYSTOLIC BLOOD PRESS GE 130-139MM HG: ICD-10-PCS | Mod: CPTII,S$GLB,, | Performed by: NURSE PRACTITIONER

## 2022-01-01 PROCEDURE — 3075F PR MOST RECENT SYSTOLIC BLOOD PRESS GE 130-139MM HG: ICD-10-PCS | Mod: S$GLB,,, | Performed by: INTERNAL MEDICINE

## 2022-01-01 PROCEDURE — C9113 INJ PANTOPRAZOLE SODIUM, VIA: HCPCS | Performed by: NURSE PRACTITIONER

## 2022-01-01 PROCEDURE — 3078F DIAST BP <80 MM HG: CPT | Mod: S$GLB,,, | Performed by: NURSE PRACTITIONER

## 2022-01-01 PROCEDURE — 3077F PR MOST RECENT SYSTOLIC BLOOD PRESSURE >= 140 MM HG: ICD-10-PCS | Mod: CPTII,S$GLB,, | Performed by: INTERNAL MEDICINE

## 2022-01-01 PROCEDURE — 27000673 HC TUBING BLOOD Y: Performed by: ANESTHESIOLOGY

## 2022-01-01 PROCEDURE — 3079F PR MOST RECENT DIASTOLIC BLOOD PRESSURE 80-89 MM HG: ICD-10-PCS | Mod: CPTII,S$GLB,, | Performed by: INTERNAL MEDICINE

## 2022-01-01 PROCEDURE — 93306 TTE W/DOPPLER COMPLETE: CPT | Mod: 26,,, | Performed by: INTERNAL MEDICINE

## 2022-01-01 PROCEDURE — 3077F SYST BP >= 140 MM HG: CPT | Mod: S$GLB,,, | Performed by: NURSE PRACTITIONER

## 2022-01-01 PROCEDURE — 84439 ASSAY OF FREE THYROXINE: CPT | Performed by: EMERGENCY MEDICINE

## 2022-01-01 PROCEDURE — 3008F PR BODY MASS INDEX (BMI) DOCUMENTED: ICD-10-PCS | Mod: S$GLB,,, | Performed by: NURSE PRACTITIONER

## 2022-01-01 PROCEDURE — 96415 CHEMO IV INFUSION ADDL HR: CPT

## 2022-01-01 PROCEDURE — 3008F PR BODY MASS INDEX (BMI) DOCUMENTED: ICD-10-PCS | Mod: CPTII,S$GLB,, | Performed by: NURSE PRACTITIONER

## 2022-01-01 PROCEDURE — 99213 OFFICE O/P EST LOW 20 MIN: CPT | Mod: S$GLB,,, | Performed by: INTERNAL MEDICINE

## 2022-01-01 PROCEDURE — 84484 ASSAY OF TROPONIN QUANT: CPT | Performed by: EMERGENCY MEDICINE

## 2022-01-01 PROCEDURE — 1160F RVW MEDS BY RX/DR IN RCRD: CPT | Mod: CPTII,S$GLB,, | Performed by: NURSE PRACTITIONER

## 2022-01-01 PROCEDURE — 99213 PR OFFICE/OUTPT VISIT, EST, LEVL III, 20-29 MIN: ICD-10-PCS | Mod: S$GLB,,, | Performed by: NURSE PRACTITIONER

## 2022-01-01 PROCEDURE — 3008F BODY MASS INDEX DOCD: CPT | Mod: S$GLB,,, | Performed by: INTERNAL MEDICINE

## 2022-01-01 PROCEDURE — 99285 EMERGENCY DEPT VISIT HI MDM: CPT | Mod: 25

## 2022-01-01 PROCEDURE — 85007 BL SMEAR W/DIFF WBC COUNT: CPT | Mod: 91 | Performed by: INTERNAL MEDICINE

## 2022-01-01 PROCEDURE — 99900035 HC TECH TIME PER 15 MIN (STAT)

## 2022-01-01 PROCEDURE — 3075F SYST BP GE 130 - 139MM HG: CPT | Mod: S$GLB,,, | Performed by: INTERNAL MEDICINE

## 2022-01-01 PROCEDURE — 3074F SYST BP LT 130 MM HG: CPT | Mod: CPTII,S$GLB,, | Performed by: INTERNAL MEDICINE

## 2022-01-01 PROCEDURE — 25500020 PHARM REV CODE 255: Performed by: INTERNAL MEDICINE

## 2022-01-01 PROCEDURE — 99232 PR SUBSEQUENT HOSPITAL CARE,LEVL II: ICD-10-PCS | Mod: ,,, | Performed by: INTERNAL MEDICINE

## 2022-01-01 PROCEDURE — 3078F DIAST BP <80 MM HG: CPT | Mod: S$GLB,,, | Performed by: RADIOLOGY

## 2022-01-01 PROCEDURE — 3075F PR MOST RECENT SYSTOLIC BLOOD PRESS GE 130-139MM HG: ICD-10-PCS | Mod: S$GLB,,, | Performed by: RADIOLOGY

## 2022-01-01 PROCEDURE — 99900031 HC PATIENT EDUCATION (STAT)

## 2022-01-01 PROCEDURE — 99233 SBSQ HOSP IP/OBS HIGH 50: CPT | Mod: ,,, | Performed by: INTERNAL MEDICINE

## 2022-01-01 PROCEDURE — 99215 OFFICE O/P EST HI 40 MIN: CPT | Mod: S$GLB,,, | Performed by: NURSE PRACTITIONER

## 2022-01-01 PROCEDURE — 63600175 PHARM REV CODE 636 W HCPCS: Performed by: HOSPITALIST

## 2022-01-01 PROCEDURE — 25000003 PHARM REV CODE 250: Performed by: NURSE ANESTHETIST, CERTIFIED REGISTERED

## 2022-01-01 PROCEDURE — 3078F DIAST BP <80 MM HG: CPT | Mod: CPTII,S$GLB,, | Performed by: PHYSICIAN ASSISTANT

## 2022-01-01 PROCEDURE — 94760 N-INVAS EAR/PLS OXIMETRY 1: CPT

## 2022-01-01 PROCEDURE — 84478 ASSAY OF TRIGLYCERIDES: CPT | Performed by: STUDENT IN AN ORGANIZED HEALTH CARE EDUCATION/TRAINING PROGRAM

## 2022-01-01 PROCEDURE — 87040 BLOOD CULTURE FOR BACTERIA: CPT | Performed by: INTERNAL MEDICINE

## 2022-01-01 PROCEDURE — 80053 COMPREHEN METABOLIC PANEL: CPT | Mod: 91 | Performed by: INTERNAL MEDICINE

## 2022-01-01 PROCEDURE — 70492 CT SFT TSUE NCK W/O & W/DYE: CPT | Mod: TC,PO

## 2022-01-01 PROCEDURE — 3074F PR MOST RECENT SYSTOLIC BLOOD PRESSURE < 130 MM HG: ICD-10-PCS | Mod: S$GLB,,, | Performed by: NURSE PRACTITIONER

## 2022-01-01 PROCEDURE — 37000008 HC ANESTHESIA 1ST 15 MINUTES: Performed by: SURGERY

## 2022-01-01 PROCEDURE — 3078F PR MOST RECENT DIASTOLIC BLOOD PRESSURE < 80 MM HG: ICD-10-PCS | Mod: S$GLB,,, | Performed by: INTERNAL MEDICINE

## 2022-01-01 PROCEDURE — 3077F SYST BP >= 140 MM HG: CPT | Mod: S$GLB,,, | Performed by: INTERNAL MEDICINE

## 2022-01-01 PROCEDURE — 63600175 PHARM REV CODE 636 W HCPCS: Mod: JG | Performed by: NURSE PRACTITIONER

## 2022-01-01 PROCEDURE — 99496 TRANSJ CARE MGMT HIGH F2F 7D: CPT | Mod: S$GLB,,, | Performed by: NURSE PRACTITIONER

## 2022-01-01 PROCEDURE — 96417 CHEMO IV INFUS EACH ADDL SEQ: CPT

## 2022-01-01 PROCEDURE — 99233 PR SUBSEQUENT HOSPITAL CARE,LEVL III: ICD-10-PCS | Mod: ,,, | Performed by: INTERNAL MEDICINE

## 2022-01-01 PROCEDURE — G0425 PR INPT TELEHEALTH CONSULT 30M: ICD-10-PCS | Mod: 95,,, | Performed by: PSYCHIATRY & NEUROLOGY

## 2022-01-01 PROCEDURE — 25000003 PHARM REV CODE 250: Performed by: HOSPITALIST

## 2022-01-01 PROCEDURE — 3074F PR MOST RECENT SYSTOLIC BLOOD PRESSURE < 130 MM HG: ICD-10-PCS | Mod: CPTII,S$GLB,, | Performed by: NURSE PRACTITIONER

## 2022-01-01 PROCEDURE — 1111F DSCHRG MED/CURRENT MED MERGE: CPT | Mod: CPTII,S$GLB,, | Performed by: INTERNAL MEDICINE

## 2022-01-01 PROCEDURE — 1160F RVW MEDS BY RX/DR IN RCRD: CPT | Mod: S$GLB,,, | Performed by: INTERNAL MEDICINE

## 2022-01-01 PROCEDURE — 63600175 PHARM REV CODE 636 W HCPCS: Performed by: STUDENT IN AN ORGANIZED HEALTH CARE EDUCATION/TRAINING PROGRAM

## 2022-01-01 PROCEDURE — 97116 GAIT TRAINING THERAPY: CPT | Mod: CQ

## 2022-01-01 PROCEDURE — 3079F DIAST BP 80-89 MM HG: CPT | Mod: CPTII,S$GLB,, | Performed by: NURSE PRACTITIONER

## 2022-01-01 PROCEDURE — C1788 PORT, INDWELLING, IMP: HCPCS | Performed by: SURGERY

## 2022-01-01 PROCEDURE — 63600175 PHARM REV CODE 636 W HCPCS: Performed by: EMERGENCY MEDICINE

## 2022-01-01 PROCEDURE — 99213 PR OFFICE/OUTPT VISIT, EST, LEVL III, 20-29 MIN: ICD-10-PCS | Mod: S$GLB,,, | Performed by: INTERNAL MEDICINE

## 2022-01-01 PROCEDURE — 97535 SELF CARE MNGMENT TRAINING: CPT

## 2022-01-01 PROCEDURE — 36415 COLL VENOUS BLD VENIPUNCTURE: CPT | Performed by: NURSE PRACTITIONER

## 2022-01-01 PROCEDURE — 99222 PR INITIAL HOSPITAL CARE,LEVL II: ICD-10-PCS | Mod: ,,, | Performed by: INTERNAL MEDICINE

## 2022-01-01 PROCEDURE — 25000003 PHARM REV CODE 250: Performed by: PHYSICIAN ASSISTANT

## 2022-01-01 PROCEDURE — 84100 ASSAY OF PHOSPHORUS: CPT | Performed by: NURSE PRACTITIONER

## 2022-01-01 PROCEDURE — 1160F RVW MEDS BY RX/DR IN RCRD: CPT | Mod: S$GLB,,, | Performed by: NURSE PRACTITIONER

## 2022-01-01 PROCEDURE — 86580 TB INTRADERMAL TEST: CPT | Performed by: INTERNAL MEDICINE

## 2022-01-01 PROCEDURE — 87147 CULTURE TYPE IMMUNOLOGIC: CPT | Performed by: INTERNAL MEDICINE

## 2022-01-01 PROCEDURE — 97165 OT EVAL LOW COMPLEX 30 MIN: CPT

## 2022-01-01 PROCEDURE — 3078F DIAST BP <80 MM HG: CPT | Mod: CPTII,S$GLB,, | Performed by: INTERNAL MEDICINE

## 2022-01-01 PROCEDURE — 25500020 PHARM REV CODE 255: Mod: PO | Performed by: RADIOLOGY

## 2022-01-01 PROCEDURE — 37000008 HC ANESTHESIA 1ST 15 MINUTES: Performed by: INTERNAL MEDICINE

## 2022-01-01 PROCEDURE — 99214 OFFICE O/P EST MOD 30 MIN: CPT | Mod: S$GLB,,, | Performed by: RADIOLOGY

## 2022-01-01 PROCEDURE — 36415 COLL VENOUS BLD VENIPUNCTURE: CPT | Performed by: EMERGENCY MEDICINE

## 2022-01-01 PROCEDURE — 93306 ECHO (CUPID ONLY): ICD-10-PCS | Mod: 26,,, | Performed by: INTERNAL MEDICINE

## 2022-01-01 PROCEDURE — 37000009 HC ANESTHESIA EA ADD 15 MINS: Performed by: SURGERY

## 2022-01-01 PROCEDURE — 25500020 PHARM REV CODE 255: Mod: PO | Performed by: NURSE PRACTITIONER

## 2022-01-01 PROCEDURE — 3079F PR MOST RECENT DIASTOLIC BLOOD PRESSURE 80-89 MM HG: ICD-10-PCS | Mod: CPTII,S$GLB,, | Performed by: NURSE PRACTITIONER

## 2022-01-01 PROCEDURE — 1160F PR REVIEW ALL MEDS BY PRESCRIBER/CLIN PHARMACIST DOCUMENTED: ICD-10-PCS | Mod: S$GLB,,, | Performed by: INTERNAL MEDICINE

## 2022-01-01 PROCEDURE — 99231 SBSQ HOSP IP/OBS SF/LOW 25: CPT | Mod: ,,, | Performed by: INTERNAL MEDICINE

## 2022-01-01 PROCEDURE — 84134 ASSAY OF PREALBUMIN: CPT | Performed by: INTERNAL MEDICINE

## 2022-01-01 PROCEDURE — 63600175 PHARM REV CODE 636 W HCPCS: Performed by: NURSE ANESTHETIST, CERTIFIED REGISTERED

## 2022-01-01 PROCEDURE — 99223 1ST HOSP IP/OBS HIGH 75: CPT | Mod: ,,, | Performed by: INTERNAL MEDICINE

## 2022-01-01 PROCEDURE — A9585 GADOBUTROL INJECTION: HCPCS | Performed by: INTERNAL MEDICINE

## 2022-01-01 PROCEDURE — 3074F SYST BP LT 130 MM HG: CPT | Mod: CPTII,S$GLB,, | Performed by: NURSE PRACTITIONER

## 2022-01-01 PROCEDURE — A4217 STERILE WATER/SALINE, 500 ML: HCPCS | Performed by: INTERNAL MEDICINE

## 2022-01-01 PROCEDURE — 30000890 HC MISC. SEND OUT TEST

## 2022-01-01 PROCEDURE — 3078F PR MOST RECENT DIASTOLIC BLOOD PRESSURE < 80 MM HG: ICD-10-PCS | Mod: S$GLB,,, | Performed by: NURSE PRACTITIONER

## 2022-01-01 PROCEDURE — 96523 IRRIG DRUG DELIVERY DEVICE: CPT

## 2022-01-01 PROCEDURE — 77001 FLUOROGUIDE FOR VEIN DEVICE: CPT | Mod: 26,,, | Performed by: SURGERY

## 2022-01-01 PROCEDURE — 93010 ELECTROCARDIOGRAM REPORT: CPT | Mod: ,,, | Performed by: SPECIALIST

## 2022-01-01 PROCEDURE — 97530 THERAPEUTIC ACTIVITIES: CPT

## 2022-01-01 PROCEDURE — 84134 ASSAY OF PREALBUMIN: CPT | Performed by: STUDENT IN AN ORGANIZED HEALTH CARE EDUCATION/TRAINING PROGRAM

## 2022-01-01 PROCEDURE — 87205 SMEAR GRAM STAIN: CPT | Performed by: INTERNAL MEDICINE

## 2022-01-01 PROCEDURE — 93005 ELECTROCARDIOGRAM TRACING: CPT | Performed by: GENERAL PRACTICE

## 2022-01-01 PROCEDURE — 81001 URINALYSIS AUTO W/SCOPE: CPT | Performed by: EMERGENCY MEDICINE

## 2022-01-01 PROCEDURE — 3074F SYST BP LT 130 MM HG: CPT | Mod: CPTII,S$GLB,, | Performed by: PHYSICIAN ASSISTANT

## 2022-01-01 PROCEDURE — 70480 CT ORBIT/EAR/FOSSA W/O DYE: CPT | Mod: TC,PO

## 2022-01-01 PROCEDURE — 99496 TRANSITIONAL CARE MANAGE SERVICE 7 DAY DISCHARGE: ICD-10-PCS | Mod: S$GLB,,, | Performed by: NURSE PRACTITIONER

## 2022-01-01 PROCEDURE — 99203 OFFICE O/P NEW LOW 30 MIN: CPT | Mod: S$GLB,,, | Performed by: PHYSICIAN ASSISTANT

## 2022-01-01 PROCEDURE — 99231 PR SUBSEQUENT HOSPITAL CARE,LEVL I: ICD-10-PCS | Mod: ,,, | Performed by: INTERNAL MEDICINE

## 2022-01-01 PROCEDURE — 27000675 HC TUBING MICRODRIP: Performed by: ANESTHESIOLOGY

## 2022-01-01 PROCEDURE — A4216 STERILE WATER/SALINE, 10 ML: HCPCS | Performed by: INTERNAL MEDICINE

## 2022-01-01 PROCEDURE — 36000707: Performed by: SURGERY

## 2022-01-01 PROCEDURE — 82565 ASSAY OF CREATININE: CPT | Mod: PO

## 2022-01-01 PROCEDURE — 3074F PR MOST RECENT SYSTOLIC BLOOD PRESSURE < 130 MM HG: ICD-10-PCS | Mod: CPTII,S$GLB,, | Performed by: PHYSICIAN ASSISTANT

## 2022-01-01 PROCEDURE — 94799 UNLISTED PULMONARY SVC/PX: CPT

## 2022-01-01 PROCEDURE — 77063 BREAST TOMOSYNTHESIS BI: CPT | Mod: TC,PO

## 2022-01-01 PROCEDURE — 83605 ASSAY OF LACTIC ACID: CPT | Performed by: EMERGENCY MEDICINE

## 2022-01-01 PROCEDURE — 86140 C-REACTIVE PROTEIN: CPT | Performed by: INTERNAL MEDICINE

## 2022-01-01 PROCEDURE — 99213 OFFICE O/P EST LOW 20 MIN: CPT | Mod: S$GLB,,, | Performed by: NURSE PRACTITIONER

## 2022-01-01 PROCEDURE — 70491 CT SOFT TISSUE NECK W/DYE: CPT | Mod: TC,PO

## 2022-01-01 PROCEDURE — 3074F SYST BP LT 130 MM HG: CPT | Mod: S$GLB,,, | Performed by: NURSE PRACTITIONER

## 2022-01-01 PROCEDURE — 51798 US URINE CAPACITY MEASURE: CPT

## 2022-01-01 PROCEDURE — 3008F PR BODY MASS INDEX (BMI) DOCUMENTED: ICD-10-PCS | Mod: S$GLB,,, | Performed by: RADIOLOGY

## 2022-01-01 PROCEDURE — 93005 ELECTROCARDIOGRAM TRACING: CPT | Performed by: SPECIALIST

## 2022-01-01 PROCEDURE — 99222 1ST HOSP IP/OBS MODERATE 55: CPT | Mod: ,,, | Performed by: INTERNAL MEDICINE

## 2022-01-01 PROCEDURE — 3077F SYST BP >= 140 MM HG: CPT | Mod: CPTII,S$GLB,, | Performed by: NURSE PRACTITIONER

## 2022-01-01 PROCEDURE — 3077F PR MOST RECENT SYSTOLIC BLOOD PRESSURE >= 140 MM HG: ICD-10-PCS | Mod: S$GLB,,, | Performed by: NURSE PRACTITIONER

## 2022-01-01 PROCEDURE — 27200043 HC FORCEPS, BIOPSY: Performed by: INTERNAL MEDICINE

## 2022-01-01 PROCEDURE — 99223 PR INITIAL HOSPITAL CARE,LEVL III: ICD-10-PCS | Mod: ,,, | Performed by: INTERNAL MEDICINE

## 2022-01-01 PROCEDURE — 83735 ASSAY OF MAGNESIUM: CPT | Performed by: EMERGENCY MEDICINE

## 2022-01-01 PROCEDURE — 36591 DRAW BLOOD OFF VENOUS DEVICE: CPT

## 2022-01-01 PROCEDURE — 82140 ASSAY OF AMMONIA: CPT | Performed by: EMERGENCY MEDICINE

## 2022-01-01 PROCEDURE — 43239 EGD BIOPSY SINGLE/MULTIPLE: CPT | Performed by: INTERNAL MEDICINE

## 2022-01-01 PROCEDURE — 93306 TTE W/DOPPLER COMPLETE: CPT

## 2022-01-01 PROCEDURE — 71260 CT THORAX DX C+: CPT | Mod: TC,PO

## 2022-01-01 PROCEDURE — 36000706: Performed by: SURGERY

## 2022-01-01 PROCEDURE — 3080F PR MOST RECENT DIASTOLIC BLOOD PRESSURE >= 90 MM HG: ICD-10-PCS | Mod: CPTII,S$GLB,, | Performed by: NURSE PRACTITIONER

## 2022-01-01 PROCEDURE — 1111F PR DISCHARGE MEDS RECONCILED W/ CURRENT OUTPATIENT MED LIST: ICD-10-PCS | Mod: CPTII,S$GLB,, | Performed by: INTERNAL MEDICINE

## 2022-01-01 PROCEDURE — 84145 PROCALCITONIN (PCT): CPT | Performed by: INTERNAL MEDICINE

## 2022-01-01 PROCEDURE — A9585 GADOBUTROL INJECTION: HCPCS | Mod: PO | Performed by: RADIOLOGY

## 2022-01-01 PROCEDURE — 25000003 PHARM REV CODE 250: Performed by: EMERGENCY MEDICINE

## 2022-01-01 PROCEDURE — 63600175 PHARM REV CODE 636 W HCPCS: Performed by: SURGERY

## 2022-01-01 PROCEDURE — 92526 ORAL FUNCTION THERAPY: CPT

## 2022-01-01 PROCEDURE — 3075F SYST BP GE 130 - 139MM HG: CPT | Mod: S$GLB,,, | Performed by: RADIOLOGY

## 2022-01-01 PROCEDURE — 78815 PET IMAGE W/CT SKULL-THIGH: CPT | Mod: TC,PO

## 2022-01-01 PROCEDURE — 82962 GLUCOSE BLOOD TEST: CPT

## 2022-01-01 PROCEDURE — 27000671 HC TUBING MICROBORE EXT: Performed by: ANESTHESIOLOGY

## 2022-01-01 PROCEDURE — 87255 GENET VIRUS ISOLATE HSV: CPT | Performed by: INTERNAL MEDICINE

## 2022-01-01 PROCEDURE — 71000015 HC POSTOP RECOV 1ST HR: Performed by: SURGERY

## 2022-01-01 PROCEDURE — 99215 OFFICE O/P EST HI 40 MIN: CPT | Mod: S$GLB,,, | Performed by: INTERNAL MEDICINE

## 2022-01-01 PROCEDURE — 87502 INFLUENZA DNA AMP PROBE: CPT | Performed by: EMERGENCY MEDICINE

## 2022-01-01 PROCEDURE — 93010 EKG 12-LEAD: ICD-10-PCS | Mod: ,,, | Performed by: SPECIALIST

## 2022-01-01 PROCEDURE — 36561 PR INSERT TUNNELED CV CATH WITH PORT: ICD-10-PCS | Mod: RT,,, | Performed by: SURGERY

## 2022-01-01 PROCEDURE — 3078F PR MOST RECENT DIASTOLIC BLOOD PRESSURE < 80 MM HG: ICD-10-PCS | Mod: CPTII,S$GLB,, | Performed by: PHYSICIAN ASSISTANT

## 2022-01-01 PROCEDURE — 85027 COMPLETE CBC AUTOMATED: CPT | Mod: 91 | Performed by: INTERNAL MEDICINE

## 2022-01-01 PROCEDURE — 25000003 PHARM REV CODE 250: Performed by: SURGERY

## 2022-01-01 PROCEDURE — 36561 INSERT TUNNELED CV CATH: CPT | Mod: RT,,, | Performed by: SURGERY

## 2022-01-01 PROCEDURE — 84157 ASSAY OF PROTEIN OTHER: CPT | Performed by: INTERNAL MEDICINE

## 2022-01-01 PROCEDURE — 93010 EKG 12-LEAD: ICD-10-PCS | Mod: ,,, | Performed by: GENERAL PRACTICE

## 2022-01-01 PROCEDURE — 3078F PR MOST RECENT DIASTOLIC BLOOD PRESSURE < 80 MM HG: ICD-10-PCS | Mod: S$GLB,,, | Performed by: RADIOLOGY

## 2022-01-01 PROCEDURE — 70553 MRI BRAIN STEM W/O & W/DYE: CPT | Mod: TC,PO

## 2022-01-01 PROCEDURE — 92610 EVALUATE SWALLOWING FUNCTION: CPT

## 2022-01-01 PROCEDURE — 81001 URINALYSIS AUTO W/SCOPE: CPT | Performed by: NURSE PRACTITIONER

## 2022-01-01 PROCEDURE — 97162 PT EVAL MOD COMPLEX 30 MIN: CPT

## 2022-01-01 PROCEDURE — 87040 BLOOD CULTURE FOR BACTERIA: CPT | Performed by: EMERGENCY MEDICINE

## 2022-01-01 PROCEDURE — 81003 URINALYSIS AUTO W/O SCOPE: CPT | Performed by: NURSE PRACTITIONER

## 2022-01-01 PROCEDURE — 71000033 HC RECOVERY, INTIAL HOUR: Performed by: SURGERY

## 2022-01-01 PROCEDURE — 84443 ASSAY THYROID STIM HORMONE: CPT | Performed by: EMERGENCY MEDICINE

## 2022-01-01 PROCEDURE — 99203 PR OFFICE/OUTPT VISIT, NEW, LEVL III, 30-44 MIN: ICD-10-PCS | Mod: S$GLB,,, | Performed by: PHYSICIAN ASSISTANT

## 2022-01-01 PROCEDURE — 93010 ELECTROCARDIOGRAM REPORT: CPT | Mod: ,,, | Performed by: GENERAL PRACTICE

## 2022-01-01 PROCEDURE — 30200315 PPD INTRADERMAL TEST REV CODE 302: Performed by: INTERNAL MEDICINE

## 2022-01-01 RX ORDER — SODIUM CHLORIDE 0.9 % (FLUSH) 0.9 %
10 SYRINGE (ML) INJECTION
Status: CANCELLED | OUTPATIENT
Start: 2022-01-01

## 2022-01-01 RX ORDER — SODIUM CHLORIDE 0.9 % (FLUSH) 0.9 %
10 SYRINGE (ML) INJECTION EVERY 12 HOURS PRN
Status: DISCONTINUED | OUTPATIENT
Start: 2022-01-01 | End: 2022-01-01 | Stop reason: HOSPADM

## 2022-01-01 RX ORDER — HEPARIN 100 UNIT/ML
500 SYRINGE INTRAVENOUS
Status: CANCELLED | OUTPATIENT
Start: 2022-01-01

## 2022-01-01 RX ORDER — SODIUM CHLORIDE 0.9 % (FLUSH) 0.9 %
10 SYRINGE (ML) INJECTION
Status: DISCONTINUED | OUTPATIENT
Start: 2022-01-01 | End: 2022-01-01 | Stop reason: HOSPADM

## 2022-01-01 RX ORDER — NALOXONE HCL 0.4 MG/ML
0.02 VIAL (ML) INJECTION
Status: DISCONTINUED | OUTPATIENT
Start: 2022-01-01 | End: 2022-01-01 | Stop reason: HOSPADM

## 2022-01-01 RX ORDER — HYDROCODONE BITARTRATE AND ACETAMINOPHEN 10; 325 MG/1; MG/1
1 TABLET ORAL EVERY 6 HOURS PRN
COMMUNITY
End: 2022-01-01

## 2022-01-01 RX ORDER — DOXORUBICIN HYDROCHLORIDE 2 MG/ML
50 INJECTION, SOLUTION INTRAVENOUS
Status: CANCELLED | OUTPATIENT
Start: 2022-01-01

## 2022-01-01 RX ORDER — SODIUM,POTASSIUM PHOSPHATES 280-250MG
2 POWDER IN PACKET (EA) ORAL
Status: DISCONTINUED | OUTPATIENT
Start: 2022-01-01 | End: 2022-01-01 | Stop reason: HOSPADM

## 2022-01-01 RX ORDER — LEVOTHYROXINE SODIUM 88 UG/1
88 TABLET ORAL DAILY
Qty: 90 TABLET | Refills: 1 | Status: SHIPPED | OUTPATIENT
Start: 2022-01-01

## 2022-01-01 RX ORDER — SODIUM CHLORIDE 9 MG/ML
INJECTION, SOLUTION INTRAVENOUS ONCE
Status: DISCONTINUED | OUTPATIENT
Start: 2022-01-01 | End: 2022-01-01 | Stop reason: HOSPADM

## 2022-01-01 RX ORDER — ACETAMINOPHEN 10 MG/ML
INJECTION, SOLUTION INTRAVENOUS
Status: DISCONTINUED | OUTPATIENT
Start: 2022-01-01 | End: 2022-01-01

## 2022-01-01 RX ORDER — ALPRAZOLAM 0.25 MG/1
0.25 TABLET ORAL NIGHTLY PRN
Status: DISCONTINUED | OUTPATIENT
Start: 2022-01-01 | End: 2022-01-01 | Stop reason: HOSPADM

## 2022-01-01 RX ORDER — HEPARIN 100 UNIT/ML
500 SYRINGE INTRAVENOUS
Status: DISCONTINUED | OUTPATIENT
Start: 2022-01-01 | End: 2022-01-01 | Stop reason: HOSPADM

## 2022-01-01 RX ORDER — SODIUM CHLORIDE 9 MG/ML
1000 INJECTION, SOLUTION INTRAVENOUS
Status: COMPLETED | OUTPATIENT
Start: 2022-01-01 | End: 2022-01-01

## 2022-01-01 RX ORDER — ALPRAZOLAM 0.5 MG/1
0.5 TABLET ORAL NIGHTLY PRN
Status: DISCONTINUED | OUTPATIENT
Start: 2022-01-01 | End: 2022-01-01 | Stop reason: HOSPADM

## 2022-01-01 RX ORDER — BISACODYL 10 MG
10 SUPPOSITORY, RECTAL RECTAL DAILY PRN
Status: DISCONTINUED | OUTPATIENT
Start: 2022-01-01 | End: 2022-01-01 | Stop reason: HOSPADM

## 2022-01-01 RX ORDER — ONDANSETRON 2 MG/ML
8 INJECTION INTRAMUSCULAR; INTRAVENOUS
Status: COMPLETED | OUTPATIENT
Start: 2022-01-01 | End: 2022-01-01

## 2022-01-01 RX ORDER — LEVOTHYROXINE SODIUM 25 UG/1
75 TABLET ORAL DAILY
Status: DISCONTINUED | OUTPATIENT
Start: 2022-01-01 | End: 2022-01-01 | Stop reason: HOSPADM

## 2022-01-01 RX ORDER — GADOBUTROL 604.72 MG/ML
5 INJECTION INTRAVENOUS
Status: COMPLETED | OUTPATIENT
Start: 2022-01-01 | End: 2022-01-01

## 2022-01-01 RX ORDER — LIDOCAINE HYDROCHLORIDE 20 MG/ML
5 SOLUTION OROPHARYNGEAL
Status: DISCONTINUED | OUTPATIENT
Start: 2022-01-01 | End: 2022-01-01 | Stop reason: HOSPADM

## 2022-01-01 RX ORDER — IBUPROFEN 200 MG
24 TABLET ORAL
Status: DISCONTINUED | OUTPATIENT
Start: 2022-01-01 | End: 2022-01-01 | Stop reason: HOSPADM

## 2022-01-01 RX ORDER — ONDANSETRON 2 MG/ML
4 INJECTION INTRAMUSCULAR; INTRAVENOUS EVERY 8 HOURS PRN
Status: DISCONTINUED | OUTPATIENT
Start: 2022-01-01 | End: 2022-01-01 | Stop reason: HOSPADM

## 2022-01-01 RX ORDER — ENOXAPARIN SODIUM 100 MG/ML
40 INJECTION SUBCUTANEOUS EVERY 24 HOURS
Status: DISCONTINUED | OUTPATIENT
Start: 2022-01-01 | End: 2022-01-01

## 2022-01-01 RX ORDER — LIDOCAINE HYDROCHLORIDE 20 MG/ML
INJECTION, SOLUTION EPIDURAL; INFILTRATION; INTRACAUDAL; PERINEURAL
Status: DISCONTINUED | OUTPATIENT
Start: 2022-01-01 | End: 2022-01-01

## 2022-01-01 RX ORDER — ALPRAZOLAM 0.25 MG/1
0.25 TABLET ORAL 3 TIMES DAILY PRN
Status: DISCONTINUED | OUTPATIENT
Start: 2022-01-01 | End: 2022-01-01 | Stop reason: HOSPADM

## 2022-01-01 RX ORDER — NYSTATIN 100000 [USP'U]/ML
4 SUSPENSION ORAL 4 TIMES DAILY
Status: DISCONTINUED | OUTPATIENT
Start: 2022-01-01 | End: 2022-01-01 | Stop reason: HOSPADM

## 2022-01-01 RX ORDER — MIRTAZAPINE 7.5 MG/1
7.5 TABLET, FILM COATED ORAL NIGHTLY
Status: DISCONTINUED | OUTPATIENT
Start: 2022-01-01 | End: 2022-01-01

## 2022-01-01 RX ORDER — BUTALBITAL, ACETAMINOPHEN AND CAFFEINE 50; 325; 40 MG/1; MG/1; MG/1
1 TABLET ORAL EVERY 4 HOURS PRN
Qty: 30 TABLET | Refills: 2 | Status: SHIPPED | OUTPATIENT
Start: 2022-01-01 | End: 2022-01-01

## 2022-01-01 RX ORDER — POTASSIUM CHLORIDE 7.45 MG/ML
60 INJECTION INTRAVENOUS
Status: DISCONTINUED | OUTPATIENT
Start: 2022-01-01 | End: 2022-01-01 | Stop reason: HOSPADM

## 2022-01-01 RX ORDER — OXYCODONE AND ACETAMINOPHEN 10; 325 MG/1; MG/1
1 TABLET ORAL EVERY 6 HOURS PRN
Qty: 28 TABLET | Refills: 0 | Status: SHIPPED | OUTPATIENT
Start: 2022-01-01 | End: 2023-08-09

## 2022-01-01 RX ORDER — LANOLIN ALCOHOL/MO/W.PET/CERES
800 CREAM (GRAM) TOPICAL
Status: DISCONTINUED | OUTPATIENT
Start: 2022-01-01 | End: 2022-01-01 | Stop reason: HOSPADM

## 2022-01-01 RX ORDER — AMLODIPINE BESYLATE 5 MG/1
5 TABLET ORAL DAILY
Status: DISCONTINUED | OUTPATIENT
Start: 2022-01-01 | End: 2022-01-01 | Stop reason: HOSPADM

## 2022-01-01 RX ORDER — PROMETHAZINE HYDROCHLORIDE 25 MG/1
25 TABLET ORAL
Qty: 30 TABLET | Refills: 5 | Status: SHIPPED | OUTPATIENT
Start: 2022-01-01 | End: 2022-01-01

## 2022-01-01 RX ORDER — SODIUM CHLORIDE 9 MG/ML
INJECTION, SOLUTION INTRAVENOUS CONTINUOUS
Status: CANCELLED | OUTPATIENT
Start: 2022-01-01

## 2022-01-01 RX ORDER — POTASSIUM CHLORIDE 7.45 MG/ML
40 INJECTION INTRAVENOUS
Status: DISCONTINUED | OUTPATIENT
Start: 2022-01-01 | End: 2022-01-01 | Stop reason: HOSPADM

## 2022-01-01 RX ORDER — ASCORBIC ACID 500 MG
1000 TABLET ORAL DAILY
Status: DISCONTINUED | OUTPATIENT
Start: 2022-01-01 | End: 2022-01-01

## 2022-01-01 RX ORDER — BUPIVACAINE HYDROCHLORIDE AND EPINEPHRINE 2.5; 5 MG/ML; UG/ML
INJECTION, SOLUTION EPIDURAL; INFILTRATION; INTRACAUDAL; PERINEURAL
Status: DISCONTINUED | OUTPATIENT
Start: 2022-01-01 | End: 2022-01-01 | Stop reason: HOSPADM

## 2022-01-01 RX ORDER — DEXAMETHASONE 4 MG/1
4 TABLET ORAL EVERY 12 HOURS
Qty: 60 TABLET | Refills: 2 | Status: ON HOLD | OUTPATIENT
Start: 2022-01-01 | End: 2022-01-01 | Stop reason: SDUPTHER

## 2022-01-01 RX ORDER — FAMOTIDINE 40 MG/1
40 TABLET, FILM COATED ORAL NIGHTLY
Qty: 30 TABLET | Refills: 1 | Status: SHIPPED | OUTPATIENT
Start: 2022-01-01 | End: 2022-01-01

## 2022-01-01 RX ORDER — ALPRAZOLAM 0.5 MG/1
TABLET ORAL
Qty: 3 TABLET | Refills: 0 | Status: SHIPPED | OUTPATIENT
Start: 2022-01-01 | End: 2022-01-01

## 2022-01-01 RX ORDER — MAGNESIUM SULFATE HEPTAHYDRATE 40 MG/ML
2 INJECTION, SOLUTION INTRAVENOUS
Status: DISCONTINUED | OUTPATIENT
Start: 2022-01-01 | End: 2022-01-01 | Stop reason: HOSPADM

## 2022-01-01 RX ORDER — BUTALBITAL, ACETAMINOPHEN AND CAFFEINE 50; 325; 40 MG/1; MG/1; MG/1
1 TABLET ORAL EVERY 4 HOURS PRN
Status: DISCONTINUED | OUTPATIENT
Start: 2022-01-01 | End: 2022-01-01 | Stop reason: HOSPADM

## 2022-01-01 RX ORDER — GADOBUTROL 604.72 MG/ML
4 INJECTION INTRAVENOUS
Status: COMPLETED | OUTPATIENT
Start: 2022-01-01 | End: 2022-01-01

## 2022-01-01 RX ORDER — MEGESTROL ACETATE 40 MG/ML
400 SUSPENSION ORAL 2 TIMES DAILY
Qty: 600 ML | Refills: 11 | Status: ON HOLD | OUTPATIENT
Start: 2022-01-01 | End: 2022-01-01 | Stop reason: HOSPADM

## 2022-01-01 RX ORDER — LEVOTHYROXINE SODIUM 88 UG/1
88 TABLET ORAL DAILY
Qty: 90 TABLET | Refills: 1 | Status: SHIPPED | OUTPATIENT
Start: 2022-01-01 | End: 2022-01-01 | Stop reason: SDUPTHER

## 2022-01-01 RX ORDER — HYDRALAZINE HYDROCHLORIDE 20 MG/ML
5 INJECTION INTRAMUSCULAR; INTRAVENOUS ONCE
Status: COMPLETED | OUTPATIENT
Start: 2022-01-01 | End: 2022-01-01

## 2022-01-01 RX ORDER — SODIUM CHLORIDE 0.9 % (FLUSH) 0.9 %
10 SYRINGE (ML) INJECTION
Status: CANCELLED
Start: 2022-01-01

## 2022-01-01 RX ORDER — PHENYLEPHRINE HYDROCHLORIDE 10 MG/ML
INJECTION INTRAVENOUS
Status: DISCONTINUED | OUTPATIENT
Start: 2022-01-01 | End: 2022-01-01

## 2022-01-01 RX ORDER — POLYETHYLENE GLYCOL 3350 17 G/17G
17 POWDER, FOR SOLUTION ORAL 2 TIMES DAILY PRN
Status: DISCONTINUED | OUTPATIENT
Start: 2022-01-01 | End: 2022-01-01 | Stop reason: HOSPADM

## 2022-01-01 RX ORDER — LORAZEPAM 2 MG/ML
1 INJECTION INTRAMUSCULAR ONCE AS NEEDED
Status: COMPLETED | OUTPATIENT
Start: 2022-01-01 | End: 2022-01-01

## 2022-01-01 RX ORDER — CHLORHEXIDINE GLUCONATE ORAL RINSE 1.2 MG/ML
15 SOLUTION DENTAL 2 TIMES DAILY
Status: DISCONTINUED | OUTPATIENT
Start: 2022-01-01 | End: 2022-01-01

## 2022-01-01 RX ORDER — NYSTATIN 100000 [USP'U]/ML
4 SUSPENSION ORAL 4 TIMES DAILY
Qty: 160 ML | Refills: 0 | Status: SHIPPED | OUTPATIENT
Start: 2022-01-01 | End: 2022-11-22

## 2022-01-01 RX ORDER — INSULIN ASPART 100 [IU]/ML
0-5 INJECTION, SOLUTION INTRAVENOUS; SUBCUTANEOUS
Status: DISCONTINUED | OUTPATIENT
Start: 2022-01-01 | End: 2022-01-01 | Stop reason: HOSPADM

## 2022-01-01 RX ORDER — LORAZEPAM 2 MG/ML
0.5 INJECTION INTRAMUSCULAR
Status: DISCONTINUED | OUTPATIENT
Start: 2022-01-01 | End: 2022-01-01 | Stop reason: HOSPADM

## 2022-01-01 RX ORDER — PROPOFOL 10 MG/ML
VIAL (ML) INTRAVENOUS
Status: DISCONTINUED | OUTPATIENT
Start: 2022-01-01 | End: 2022-01-01

## 2022-01-01 RX ORDER — BENZONATATE 100 MG/1
100 CAPSULE ORAL 3 TIMES DAILY PRN
Qty: 30 CAPSULE | Refills: 1 | Status: SHIPPED | OUTPATIENT
Start: 2022-01-01 | End: 2022-01-01

## 2022-01-01 RX ORDER — DOXORUBICIN HYDROCHLORIDE 2 MG/ML
50 INJECTION, SOLUTION INTRAVENOUS
Status: COMPLETED | OUTPATIENT
Start: 2022-01-01 | End: 2022-01-01

## 2022-01-01 RX ORDER — LORAZEPAM 2 MG/ML
1 INJECTION INTRAMUSCULAR
Status: DISCONTINUED | OUTPATIENT
Start: 2022-01-01 | End: 2022-01-01 | Stop reason: HOSPADM

## 2022-01-01 RX ORDER — CALCIUM GLUCONATE 20 MG/ML
1 INJECTION, SOLUTION INTRAVENOUS
Status: DISCONTINUED | OUTPATIENT
Start: 2022-01-01 | End: 2022-01-01 | Stop reason: HOSPADM

## 2022-01-01 RX ORDER — ONDANSETRON 2 MG/ML
4 INJECTION INTRAMUSCULAR; INTRAVENOUS DAILY PRN
Status: DISCONTINUED | OUTPATIENT
Start: 2022-01-01 | End: 2022-01-01 | Stop reason: HOSPADM

## 2022-01-01 RX ORDER — DRONABINOL 2.5 MG/1
2.5 CAPSULE ORAL 2 TIMES DAILY
Status: DISCONTINUED | OUTPATIENT
Start: 2022-01-01 | End: 2022-01-01

## 2022-01-01 RX ORDER — POTASSIUM CHLORIDE 7.45 MG/ML
80 INJECTION INTRAVENOUS
Status: DISCONTINUED | OUTPATIENT
Start: 2022-01-01 | End: 2022-01-01 | Stop reason: HOSPADM

## 2022-01-01 RX ORDER — AMLODIPINE BESYLATE 5 MG/1
5 TABLET ORAL DAILY
Qty: 30 TABLET | Refills: 3 | Status: ON HOLD | OUTPATIENT
Start: 2022-01-01 | End: 2022-01-01 | Stop reason: HOSPADM

## 2022-01-01 RX ORDER — LEVOTHYROXINE SODIUM 88 UG/1
88 TABLET ORAL DAILY
Status: DISCONTINUED | OUTPATIENT
Start: 2022-01-01 | End: 2022-01-01 | Stop reason: HOSPADM

## 2022-01-01 RX ORDER — DEXAMETHASONE 2 MG/1
2 TABLET ORAL EVERY 8 HOURS
Status: DISCONTINUED | OUTPATIENT
Start: 2022-01-01 | End: 2022-01-01 | Stop reason: HOSPADM

## 2022-01-01 RX ORDER — DEXAMETHASONE SODIUM PHOSPHATE 4 MG/ML
4 INJECTION, SOLUTION INTRA-ARTICULAR; INTRALESIONAL; INTRAMUSCULAR; INTRAVENOUS; SOFT TISSUE EVERY 6 HOURS
Status: DISCONTINUED | OUTPATIENT
Start: 2022-01-01 | End: 2022-01-01

## 2022-01-01 RX ORDER — AMLODIPINE BESYLATE 5 MG/1
5 TABLET ORAL DAILY
Status: DISCONTINUED | OUTPATIENT
Start: 2022-01-01 | End: 2022-01-01

## 2022-01-01 RX ORDER — TALC
6 POWDER (GRAM) TOPICAL NIGHTLY PRN
Status: DISCONTINUED | OUTPATIENT
Start: 2022-01-01 | End: 2022-01-01

## 2022-01-01 RX ORDER — ONDANSETRON 2 MG/ML
4 INJECTION INTRAMUSCULAR; INTRAVENOUS
Status: COMPLETED | OUTPATIENT
Start: 2022-01-01 | End: 2022-01-01

## 2022-01-01 RX ORDER — FENTANYL CITRATE 50 UG/ML
INJECTION, SOLUTION INTRAMUSCULAR; INTRAVENOUS
Status: DISCONTINUED | OUTPATIENT
Start: 2022-01-01 | End: 2022-01-01

## 2022-01-01 RX ORDER — LEVOFLOXACIN 250 MG/1
250 TABLET ORAL DAILY
COMMUNITY
Start: 2022-01-01 | End: 2022-01-01

## 2022-01-01 RX ORDER — CLINDAMYCIN PHOSPHATE 900 MG/50ML
900 INJECTION, SOLUTION INTRAVENOUS
Status: COMPLETED | OUTPATIENT
Start: 2022-01-01 | End: 2022-01-01

## 2022-01-01 RX ORDER — ACETAMINOPHEN 325 MG/1
650 TABLET ORAL EVERY 4 HOURS PRN
Status: DISCONTINUED | OUTPATIENT
Start: 2022-01-01 | End: 2022-01-01 | Stop reason: HOSPADM

## 2022-01-01 RX ORDER — IBUPROFEN 200 MG
16 TABLET ORAL
Status: DISCONTINUED | OUTPATIENT
Start: 2022-01-01 | End: 2022-01-01 | Stop reason: HOSPADM

## 2022-01-01 RX ORDER — SODIUM CHLORIDE, SODIUM LACTATE, POTASSIUM CHLORIDE, CALCIUM CHLORIDE 600; 310; 30; 20 MG/100ML; MG/100ML; MG/100ML; MG/100ML
INJECTION, SOLUTION INTRAVENOUS CONTINUOUS
Status: ACTIVE | OUTPATIENT
Start: 2022-01-01 | End: 2022-01-01

## 2022-01-01 RX ORDER — DEXAMETHASONE SODIUM PHOSPHATE 4 MG/ML
4 INJECTION, SOLUTION INTRA-ARTICULAR; INTRALESIONAL; INTRAMUSCULAR; INTRAVENOUS; SOFT TISSUE EVERY 6 HOURS
Status: DISCONTINUED | OUTPATIENT
Start: 2022-01-01 | End: 2022-01-01 | Stop reason: HOSPADM

## 2022-01-01 RX ORDER — METHYLPREDNISOLONE SOD SUCC 125 MG
125 VIAL (EA) INJECTION
Status: COMPLETED | OUTPATIENT
Start: 2022-01-01 | End: 2022-01-01

## 2022-01-01 RX ORDER — DIPHENHYDRAMINE HYDROCHLORIDE 50 MG/ML
12.5 INJECTION INTRAMUSCULAR; INTRAVENOUS
Status: DISCONTINUED | OUTPATIENT
Start: 2022-01-01 | End: 2022-01-01 | Stop reason: HOSPADM

## 2022-01-01 RX ORDER — HEPARIN 100 UNIT/ML
500 SYRINGE INTRAVENOUS
Status: CANCELLED
Start: 2022-01-01

## 2022-01-01 RX ORDER — OXYCODONE HYDROCHLORIDE 5 MG/1
5 TABLET ORAL
Status: DISCONTINUED | OUTPATIENT
Start: 2022-01-01 | End: 2022-01-01 | Stop reason: HOSPADM

## 2022-01-01 RX ORDER — CALCIUM GLUCONATE 20 MG/ML
2 INJECTION, SOLUTION INTRAVENOUS
Status: DISCONTINUED | OUTPATIENT
Start: 2022-01-01 | End: 2022-01-01 | Stop reason: HOSPADM

## 2022-01-01 RX ORDER — HEPARIN 100 UNIT/ML
500 SYRINGE INTRAVENOUS
Status: COMPLETED | OUTPATIENT
Start: 2022-01-01 | End: 2022-01-01

## 2022-01-01 RX ORDER — DEXAMETHASONE 4 MG/1
4 TABLET ORAL EVERY 8 HOURS
Qty: 90 TABLET | Refills: 0 | Status: SHIPPED | OUTPATIENT
Start: 2022-01-01 | End: 2022-01-01

## 2022-01-01 RX ORDER — GLUCAGON 1 MG
1 KIT INJECTION
Status: DISCONTINUED | OUTPATIENT
Start: 2022-01-01 | End: 2022-01-01 | Stop reason: HOSPADM

## 2022-01-01 RX ORDER — ALPRAZOLAM 0.25 MG/1
0.25 TABLET ORAL ONCE
Status: COMPLETED | OUTPATIENT
Start: 2022-01-01 | End: 2022-01-01

## 2022-01-01 RX ORDER — OXYCODONE AND ACETAMINOPHEN 10; 325 MG/1; MG/1
1 TABLET ORAL EVERY 12 HOURS PRN
Status: DISCONTINUED | OUTPATIENT
Start: 2022-01-01 | End: 2022-01-01 | Stop reason: HOSPADM

## 2022-01-01 RX ORDER — GADOBUTROL 604.72 MG/ML
5.5 INJECTION INTRAVENOUS
Status: DISCONTINUED | OUTPATIENT
Start: 2022-01-01 | End: 2022-01-01

## 2022-01-01 RX ORDER — ONDANSETRON HYDROCHLORIDE 8 MG/1
8 TABLET, FILM COATED ORAL EVERY 8 HOURS PRN
Qty: 30 TABLET | Refills: 5 | Status: SHIPPED | OUTPATIENT
Start: 2022-01-01 | End: 2022-01-01 | Stop reason: SDUPTHER

## 2022-01-01 RX ORDER — MAGNESIUM SULFATE HEPTAHYDRATE 40 MG/ML
4 INJECTION, SOLUTION INTRAVENOUS
Status: DISCONTINUED | OUTPATIENT
Start: 2022-01-01 | End: 2022-01-01 | Stop reason: HOSPADM

## 2022-01-01 RX ORDER — MIDAZOLAM HYDROCHLORIDE 1 MG/ML
INJECTION INTRAMUSCULAR; INTRAVENOUS
Status: DISCONTINUED | OUTPATIENT
Start: 2022-01-01 | End: 2022-01-01

## 2022-01-01 RX ORDER — ACETAMINOPHEN 500 MG
5000 TABLET ORAL DAILY
Status: DISCONTINUED | OUTPATIENT
Start: 2022-01-01 | End: 2022-01-01 | Stop reason: HOSPADM

## 2022-01-01 RX ORDER — DEXAMETHASONE 2 MG/1
4 TABLET ORAL EVERY 8 HOURS
Status: DISCONTINUED | OUTPATIENT
Start: 2022-01-01 | End: 2022-01-01

## 2022-01-01 RX ORDER — ONDANSETRON HYDROCHLORIDE 8 MG/1
8 TABLET, FILM COATED ORAL EVERY 8 HOURS PRN
Qty: 30 TABLET | Refills: 5 | Status: SHIPPED | OUTPATIENT
Start: 2022-01-01 | End: 2022-01-01

## 2022-01-01 RX ORDER — SODIUM CHLORIDE 9 MG/ML
INJECTION, SOLUTION INTRAVENOUS CONTINUOUS
Status: DISCONTINUED | OUTPATIENT
Start: 2022-01-01 | End: 2022-01-01 | Stop reason: HOSPADM

## 2022-01-01 RX ORDER — NYSTATIN 100000 [USP'U]/ML
4 SUSPENSION ORAL 4 TIMES DAILY
Qty: 160 ML | Refills: 0 | Status: ON HOLD | OUTPATIENT
Start: 2022-01-01 | End: 2022-01-01

## 2022-01-01 RX ORDER — GADOBUTROL 604.72 MG/ML
5.5 INJECTION INTRAVENOUS
Status: COMPLETED | OUTPATIENT
Start: 2022-01-01 | End: 2022-01-01

## 2022-01-01 RX ORDER — FLUCONAZOLE 2 MG/ML
200 INJECTION, SOLUTION INTRAVENOUS
Status: DISCONTINUED | OUTPATIENT
Start: 2022-01-01 | End: 2022-01-01

## 2022-01-01 RX ORDER — HYDROMORPHONE HYDROCHLORIDE 1 MG/ML
0.2 INJECTION, SOLUTION INTRAMUSCULAR; INTRAVENOUS; SUBCUTANEOUS EVERY 5 MIN PRN
Status: DISCONTINUED | OUTPATIENT
Start: 2022-01-01 | End: 2022-01-01 | Stop reason: HOSPADM

## 2022-01-01 RX ORDER — PROCHLORPERAZINE EDISYLATE 5 MG/ML
10 INJECTION INTRAMUSCULAR; INTRAVENOUS
Status: COMPLETED | OUTPATIENT
Start: 2022-01-01 | End: 2022-01-01

## 2022-01-01 RX ORDER — PANTOPRAZOLE SODIUM 40 MG/10ML
40 INJECTION, POWDER, LYOPHILIZED, FOR SOLUTION INTRAVENOUS
Status: DISCONTINUED | OUTPATIENT
Start: 2022-01-01 | End: 2022-01-01 | Stop reason: HOSPADM

## 2022-01-01 RX ORDER — MEPERIDINE HYDROCHLORIDE 50 MG/ML
12.5 INJECTION INTRAMUSCULAR; INTRAVENOUS; SUBCUTANEOUS EVERY 10 MIN PRN
Status: DISCONTINUED | OUTPATIENT
Start: 2022-01-01 | End: 2022-01-01 | Stop reason: HOSPADM

## 2022-01-01 RX ORDER — HEPARIN SODIUM 1000 [USP'U]/ML
INJECTION, SOLUTION INTRAVENOUS; SUBCUTANEOUS
Status: DISCONTINUED | OUTPATIENT
Start: 2022-01-01 | End: 2022-01-01 | Stop reason: HOSPADM

## 2022-01-01 RX ORDER — CALCIUM GLUCONATE 20 MG/ML
3 INJECTION, SOLUTION INTRAVENOUS
Status: DISCONTINUED | OUTPATIENT
Start: 2022-01-01 | End: 2022-01-01 | Stop reason: HOSPADM

## 2022-01-01 RX ORDER — SODIUM CHLORIDE, SODIUM LACTATE, POTASSIUM CHLORIDE, CALCIUM CHLORIDE 600; 310; 30; 20 MG/100ML; MG/100ML; MG/100ML; MG/100ML
INJECTION, SOLUTION INTRAVENOUS CONTINUOUS
Status: DISCONTINUED | OUTPATIENT
Start: 2022-01-01 | End: 2022-01-01

## 2022-01-01 RX ADMIN — NYSTATIN 400000 UNITS: 500000 SUSPENSION ORAL at 01:11

## 2022-01-01 RX ADMIN — SODIUM CHLORIDE, PRESERVATIVE FREE 10 ML: 5 INJECTION INTRAVENOUS at 09:09

## 2022-01-01 RX ADMIN — SODIUM CHLORIDE 1000 ML: 0.9 INJECTION, SOLUTION INTRAVENOUS at 10:05

## 2022-01-01 RX ADMIN — DEXAMETHASONE 2 MG: 2 TABLET ORAL at 01:11

## 2022-01-01 RX ADMIN — ONDANSETRON 4 MG: 2 INJECTION INTRAMUSCULAR; INTRAVENOUS at 04:11

## 2022-01-01 RX ADMIN — Medication 500 UNITS: at 02:04

## 2022-01-01 RX ADMIN — FENTANYL CITRATE 50 MCG: 50 INJECTION INTRAMUSCULAR; INTRAVENOUS at 09:04

## 2022-01-01 RX ADMIN — CHLORHEXIDINE GLUCONATE 15 ML: 1.2 RINSE ORAL at 09:11

## 2022-01-01 RX ADMIN — ACYCLOVIR SODIUM 240 MG: 50 INJECTION, SOLUTION INTRAVENOUS at 01:11

## 2022-01-01 RX ADMIN — Medication 500 UNITS: at 12:05

## 2022-01-01 RX ADMIN — PHENYLEPHRINE HYDROCHLORIDE 100 MCG: 10 INJECTION INTRAVENOUS at 12:11

## 2022-01-01 RX ADMIN — ACYCLOVIR SODIUM 240 MG: 50 INJECTION, SOLUTION INTRAVENOUS at 04:11

## 2022-01-01 RX ADMIN — NYSTATIN 400000 UNITS: 500000 SUSPENSION ORAL at 09:11

## 2022-01-01 RX ADMIN — SODIUM CHLORIDE 1000 ML: 900 INJECTION, SOLUTION INTRAVENOUS at 10:05

## 2022-01-01 RX ADMIN — ACYCLOVIR SODIUM 240 MG: 50 INJECTION, SOLUTION INTRAVENOUS at 11:11

## 2022-01-01 RX ADMIN — POTASSIUM CHLORIDE 500 ML/HR: 2 INJECTION, SOLUTION, CONCENTRATE INTRAVENOUS at 07:04

## 2022-01-01 RX ADMIN — SODIUM CHLORIDE 1000 ML: 900 INJECTION, SOLUTION INTRAVENOUS at 12:04

## 2022-01-01 RX ADMIN — AMLODIPINE BESYLATE 5 MG: 5 TABLET ORAL at 08:09

## 2022-01-01 RX ADMIN — CYCLOPHOSPHAMIDE 760 MG: 200 INJECTION, SOLUTION INTRAVENOUS at 11:04

## 2022-01-01 RX ADMIN — LORAZEPAM 0.5 MG: 2 INJECTION INTRAMUSCULAR; INTRAVENOUS at 05:11

## 2022-01-01 RX ADMIN — NYSTATIN 400000 UNITS: 500000 SUSPENSION ORAL at 10:11

## 2022-01-01 RX ADMIN — AMLODIPINE BESYLATE 5 MG: 5 TABLET ORAL at 10:11

## 2022-01-01 RX ADMIN — PROPOFOL 30 MG: 10 INJECTION, EMULSION INTRAVENOUS at 12:11

## 2022-01-01 RX ADMIN — Medication 5000 UNITS: at 09:11

## 2022-01-01 RX ADMIN — IOHEXOL 100 ML: 350 INJECTION, SOLUTION INTRAVENOUS at 01:02

## 2022-01-01 RX ADMIN — LEVOTHYROXINE SODIUM 75 MCG: 0.03 TABLET ORAL at 05:09

## 2022-01-01 RX ADMIN — MULTIPLE VITAMINS W/ MINERALS TAB 1 TABLET: TAB at 09:11

## 2022-01-01 RX ADMIN — OXYCODONE HYDROCHLORIDE AND ACETAMINOPHEN 1000 MG: 500 TABLET ORAL at 09:11

## 2022-01-01 RX ADMIN — DEXAMETHASONE 4 MG: 2 TABLET ORAL at 03:11

## 2022-01-01 RX ADMIN — DEXAMETHASONE 2 MG: 2 TABLET ORAL at 10:11

## 2022-01-01 RX ADMIN — SODIUM CHLORIDE, SODIUM LACTATE, POTASSIUM CHLORIDE, AND CALCIUM CHLORIDE: .6; .31; .03; .02 INJECTION, SOLUTION INTRAVENOUS at 09:11

## 2022-01-01 RX ADMIN — HYDRALAZINE HYDROCHLORIDE 5 MG: 20 INJECTION INTRAMUSCULAR; INTRAVENOUS at 08:09

## 2022-01-01 RX ADMIN — ONDANSETRON 8 MG: 2 INJECTION INTRAMUSCULAR; INTRAVENOUS at 10:09

## 2022-01-01 RX ADMIN — PANTOPRAZOLE SODIUM 40 MG: 40 INJECTION, POWDER, FOR SOLUTION INTRAVENOUS at 08:11

## 2022-01-01 RX ADMIN — CISPLATIN 76 MG: 1 INJECTION INTRAVENOUS at 10:04

## 2022-01-01 RX ADMIN — PROPOFOL 30 MG: 10 INJECTION, EMULSION INTRAVENOUS at 09:04

## 2022-01-01 RX ADMIN — HEPARIN 500 UNITS: 100 SYRINGE at 09:08

## 2022-01-01 RX ADMIN — SODIUM CHLORIDE, PRESERVATIVE FREE 10 ML: 5 INJECTION INTRAVENOUS at 10:07

## 2022-01-01 RX ADMIN — POTASSIUM CHLORIDE 506 ML/HR: 2 INJECTION, SOLUTION, CONCENTRATE INTRAVENOUS at 07:04

## 2022-01-01 RX ADMIN — OXYCODONE HYDROCHLORIDE AND ACETAMINOPHEN 1 TABLET: 10; 325 TABLET ORAL at 09:09

## 2022-01-01 RX ADMIN — SMOFLIPID: 6; 6; 5; 3 INJECTION, EMULSION INTRAVENOUS at 05:11

## 2022-01-01 RX ADMIN — IOHEXOL 50 ML: 350 INJECTION, SOLUTION INTRAVENOUS at 11:09

## 2022-01-01 RX ADMIN — CISPLATIN 76 MG: 1 INJECTION INTRAVENOUS at 10:05

## 2022-01-01 RX ADMIN — PANTOPRAZOLE SODIUM 40 MG: 40 INJECTION, POWDER, FOR SOLUTION INTRAVENOUS at 09:11

## 2022-01-01 RX ADMIN — ALPRAZOLAM 0.25 MG: 0.25 TABLET ORAL at 02:11

## 2022-01-01 RX ADMIN — AMLODIPINE BESYLATE 5 MG: 5 TABLET ORAL at 11:11

## 2022-01-01 RX ADMIN — DEXAMETHASONE SODIUM PHOSPHATE 0.25 MG: 4 INJECTION, SOLUTION INTRA-ARTICULAR; INTRALESIONAL; INTRAMUSCULAR; INTRAVENOUS; SOFT TISSUE at 10:04

## 2022-01-01 RX ADMIN — DEXAMETHASONE 4 MG: 2 TABLET ORAL at 05:11

## 2022-01-01 RX ADMIN — NYSTATIN 400000 UNITS: 500000 SUSPENSION ORAL at 08:11

## 2022-01-01 RX ADMIN — LEUCINE, PHENYLALANINE, LYSINE, METHIONINE, ISOLEUCINE, VALINE, HISTIDINE, THREONINE, TRYPTOPHAN, ALANINE, GLYCINE, ARGININE, PROLINE, SERINE, TYROSINE, SODIUM ACETATE, DIBASIC POTASSIUM PHOSPHATE, MAGNESIUM CHLORIDE, SODIUM CHLORIDE, CALCIUM CHLORIDE, DEXTROSE
311; 238; 247; 170; 255; 247; 204; 179; 77; 880; 438; 489; 289; 213; 17; 297; 261; 51; 77; 33; 5 INJECTION INTRAVENOUS at 01:11

## 2022-01-01 RX ADMIN — IOHEXOL 100 ML: 350 INJECTION, SOLUTION INTRAVENOUS at 09:05

## 2022-01-01 RX ADMIN — PROPOFOL 20 MG: 10 INJECTION, EMULSION INTRAVENOUS at 09:04

## 2022-01-01 RX ADMIN — NYSTATIN 400000 UNITS: 500000 SUSPENSION ORAL at 05:11

## 2022-01-01 RX ADMIN — BISACODYL 10 MG: 10 SUPPOSITORY RECTAL at 05:11

## 2022-01-01 RX ADMIN — DEXAMETHASONE 2 MG: 2 TABLET ORAL at 08:11

## 2022-01-01 RX ADMIN — DRONABINOL 2.5 MG: 2.5 CAPSULE ORAL at 08:11

## 2022-01-01 RX ADMIN — SODIUM CHLORIDE, SODIUM LACTATE, POTASSIUM CHLORIDE, AND CALCIUM CHLORIDE: .6; .31; .03; .02 INJECTION, SOLUTION INTRAVENOUS at 10:11

## 2022-01-01 RX ADMIN — PROCHLORPERAZINE EDISYLATE 10 MG: 5 INJECTION INTRAMUSCULAR; INTRAVENOUS at 08:09

## 2022-01-01 RX ADMIN — ALPRAZOLAM 0.25 MG: 0.25 TABLET ORAL at 09:11

## 2022-01-01 RX ADMIN — ALPRAZOLAM 0.5 MG: 0.5 TABLET ORAL at 09:09

## 2022-01-01 RX ADMIN — PHENYLEPHRINE HYDROCHLORIDE 100 MCG: 10 INJECTION INTRAVENOUS at 09:04

## 2022-01-01 RX ADMIN — NYSTATIN 400000 UNITS: 500000 SUSPENSION ORAL at 04:11

## 2022-01-01 RX ADMIN — POTASSIUM CHLORIDE: 2 INJECTION, SOLUTION, CONCENTRATE INTRAVENOUS at 05:11

## 2022-01-01 RX ADMIN — DEXAMETHASONE SODIUM PHOSPHATE 0.25 MG: 4 INJECTION, SOLUTION INTRA-ARTICULAR; INTRALESIONAL; INTRAMUSCULAR; INTRAVENOUS; SOFT TISSUE at 10:05

## 2022-01-01 RX ADMIN — DEXAMETHASONE SODIUM PHOSPHATE 4 MG: 4 INJECTION, SOLUTION INTRA-ARTICULAR; INTRALESIONAL; INTRAMUSCULAR; INTRAVENOUS; SOFT TISSUE at 12:09

## 2022-01-01 RX ADMIN — CHLORHEXIDINE GLUCONATE 15 ML: 1.2 RINSE ORAL at 08:11

## 2022-01-01 RX ADMIN — AMLODIPINE BESYLATE 5 MG: 5 TABLET ORAL at 09:11

## 2022-01-01 RX ADMIN — HYDRALAZINE HYDROCHLORIDE 5 MG: 20 INJECTION INTRAMUSCULAR; INTRAVENOUS at 11:09

## 2022-01-01 RX ADMIN — SODIUM CHLORIDE 1000 ML: 0.9 INJECTION, SOLUTION INTRAVENOUS at 07:09

## 2022-01-01 RX ADMIN — FLUCONAZOLE IN SODIUM CHLORIDE 200 MG: 2 INJECTION, SOLUTION INTRAVENOUS at 10:11

## 2022-01-01 RX ADMIN — DEXAMETHASONE 2 MG: 2 TABLET ORAL at 06:11

## 2022-01-01 RX ADMIN — ACYCLOVIR SODIUM 240 MG: 50 INJECTION, SOLUTION INTRAVENOUS at 07:11

## 2022-01-01 RX ADMIN — DEXAMETHASONE SODIUM PHOSPHATE 4 MG: 4 INJECTION, SOLUTION INTRA-ARTICULAR; INTRALESIONAL; INTRAMUSCULAR; INTRAVENOUS; SOFT TISSUE at 09:09

## 2022-01-01 RX ADMIN — ACYCLOVIR SODIUM 240 MG: 50 INJECTION, SOLUTION INTRAVENOUS at 03:11

## 2022-01-01 RX ADMIN — Medication 500 UNITS: at 02:05

## 2022-01-01 RX ADMIN — ALPRAZOLAM 0.25 MG: 0.25 TABLET ORAL at 11:11

## 2022-01-01 RX ADMIN — Medication 5000 UNITS: at 08:11

## 2022-01-01 RX ADMIN — SODIUM CHLORIDE, SODIUM LACTATE, POTASSIUM CHLORIDE, AND CALCIUM CHLORIDE: .6; .31; .03; .02 INJECTION, SOLUTION INTRAVENOUS at 11:11

## 2022-01-01 RX ADMIN — HEPARIN 500 UNITS: 100 SYRINGE at 09:09

## 2022-01-01 RX ADMIN — SODIUM CHLORIDE 1000 ML: 0.9 INJECTION, SOLUTION INTRAVENOUS at 12:04

## 2022-01-01 RX ADMIN — INSULIN ASPART 2 UNITS: 100 INJECTION, SOLUTION INTRAVENOUS; SUBCUTANEOUS at 12:11

## 2022-01-01 RX ADMIN — LEVOTHYROXINE SODIUM 88 MCG: 88 TABLET ORAL at 05:11

## 2022-01-01 RX ADMIN — SODIUM CHLORIDE, SODIUM LACTATE, POTASSIUM CHLORIDE, AND CALCIUM CHLORIDE: .6; .31; .03; .02 INJECTION, SOLUTION INTRAVENOUS at 08:04

## 2022-01-01 RX ADMIN — DEXAMETHASONE SODIUM PHOSPHATE 4 MG: 4 INJECTION, SOLUTION INTRA-ARTICULAR; INTRALESIONAL; INTRAMUSCULAR; INTRAVENOUS; SOFT TISSUE at 05:09

## 2022-01-01 RX ADMIN — GADOBUTROL 5.5 ML: 604.72 INJECTION INTRAVENOUS at 01:03

## 2022-01-01 RX ADMIN — ACETAMINOPHEN 650 MG: 325 TABLET ORAL at 09:11

## 2022-01-01 RX ADMIN — Medication 5000 UNITS: at 10:11

## 2022-01-01 RX ADMIN — GADOBUTROL 5 ML: 604.72 INJECTION INTRAVENOUS at 08:09

## 2022-01-01 RX ADMIN — DOXORUBICIN HYDROCHLORIDE 76 MG: 2 INJECTION, SOLUTION INTRAVENOUS at 11:05

## 2022-01-01 RX ADMIN — PROPOFOL 20 MG: 10 INJECTION, EMULSION INTRAVENOUS at 12:11

## 2022-01-01 RX ADMIN — DEXAMETHASONE 2 MG: 2 TABLET ORAL at 05:11

## 2022-01-01 RX ADMIN — LIDOCAINE HYDROCHLORIDE 40 MG: 20 INJECTION, SOLUTION INTRAVENOUS at 09:04

## 2022-01-01 RX ADMIN — INSULIN ASPART 1 UNITS: 100 INJECTION, SOLUTION INTRAVENOUS; SUBCUTANEOUS at 08:11

## 2022-01-01 RX ADMIN — ACETAMINOPHEN 650 MG: 325 TABLET ORAL at 10:11

## 2022-01-01 RX ADMIN — SODIUM CHLORIDE 1000 ML: 0.9 INJECTION, SOLUTION INTRAVENOUS at 12:05

## 2022-01-01 RX ADMIN — DEXAMETHASONE 4 MG: 2 TABLET ORAL at 02:11

## 2022-01-01 RX ADMIN — ACYCLOVIR SODIUM 240 MG: 50 INJECTION, SOLUTION INTRAVENOUS at 10:11

## 2022-01-01 RX ADMIN — DEXAMETHASONE 4 MG: 2 TABLET ORAL at 09:11

## 2022-01-01 RX ADMIN — CISPLATIN 76 MG: 1 INJECTION, SOLUTION INTRAVENOUS at 10:04

## 2022-01-01 RX ADMIN — ACYCLOVIR SODIUM 240 MG: 50 INJECTION, SOLUTION INTRAVENOUS at 08:11

## 2022-01-01 RX ADMIN — POTASSIUM CHLORIDE 500 ML/HR: 2 INJECTION, SOLUTION, CONCENTRATE INTRAVENOUS at 07:05

## 2022-01-01 RX ADMIN — MULTIPLE VITAMINS W/ MINERALS TAB 1 TABLET: TAB at 10:11

## 2022-01-01 RX ADMIN — FLUCONAZOLE IN SODIUM CHLORIDE 200 MG: 2 INJECTION, SOLUTION INTRAVENOUS at 12:11

## 2022-01-01 RX ADMIN — SODIUM CHLORIDE 1000 ML: 0.9 INJECTION, SOLUTION INTRAVENOUS at 04:11

## 2022-01-01 RX ADMIN — SODIUM CHLORIDE, PRESERVATIVE FREE 10 ML: 5 INJECTION INTRAVENOUS at 09:08

## 2022-01-01 RX ADMIN — SODIUM CHLORIDE, SODIUM LACTATE, POTASSIUM CHLORIDE, AND CALCIUM CHLORIDE 500 ML: .6; .31; .03; .02 INJECTION, SOLUTION INTRAVENOUS at 08:09

## 2022-01-01 RX ADMIN — HEPARIN 500 UNITS: 100 SYRINGE at 10:07

## 2022-01-01 RX ADMIN — LEVOTHYROXINE SODIUM 88 MCG: 88 TABLET ORAL at 06:11

## 2022-01-01 RX ADMIN — OXYCODONE HYDROCHLORIDE AND ACETAMINOPHEN 1000 MG: 500 TABLET ORAL at 10:11

## 2022-01-01 RX ADMIN — FOSAPREPITANT 150 MG: 150 INJECTION, POWDER, LYOPHILIZED, FOR SOLUTION INTRAVENOUS at 09:04

## 2022-01-01 RX ADMIN — ACYCLOVIR SODIUM 240 MG: 50 INJECTION, SOLUTION INTRAVENOUS at 09:11

## 2022-01-01 RX ADMIN — NYSTATIN 400000 UNITS: 500000 SUSPENSION ORAL at 11:11

## 2022-01-01 RX ADMIN — INSULIN ASPART 1 UNITS: 100 INJECTION, SOLUTION INTRAVENOUS; SUBCUTANEOUS at 09:11

## 2022-01-01 RX ADMIN — FLUCONAZOLE IN SODIUM CHLORIDE 200 MG: 2 INJECTION, SOLUTION INTRAVENOUS at 01:11

## 2022-01-01 RX ADMIN — INSULIN ASPART 2 UNITS: 100 INJECTION, SOLUTION INTRAVENOUS; SUBCUTANEOUS at 04:11

## 2022-01-01 RX ADMIN — INSULIN ASPART 2 UNITS: 100 INJECTION, SOLUTION INTRAVENOUS; SUBCUTANEOUS at 08:11

## 2022-01-01 RX ADMIN — SODIUM CHLORIDE: 9 INJECTION, SOLUTION INTRAVENOUS at 07:04

## 2022-01-01 RX ADMIN — METHYLPREDNISOLONE SODIUM SUCCINATE 125 MG: 125 INJECTION, POWDER, FOR SOLUTION INTRAMUSCULAR; INTRAVENOUS at 03:09

## 2022-01-01 RX ADMIN — MIDAZOLAM HYDROCHLORIDE 2 MG: 1 INJECTION, SOLUTION INTRAMUSCULAR; INTRAVENOUS at 08:04

## 2022-01-01 RX ADMIN — GADOBUTROL 4 ML: 604.72 INJECTION INTRAVENOUS at 10:11

## 2022-01-01 RX ADMIN — FOSAPREPITANT 150 MG: 150 INJECTION, POWDER, LYOPHILIZED, FOR SOLUTION INTRAVENOUS at 09:05

## 2022-01-01 RX ADMIN — AMLODIPINE BESYLATE 5 MG: 5 TABLET ORAL at 08:11

## 2022-01-01 RX ADMIN — DEXAMETHASONE SODIUM PHOSPHATE 4 MG: 4 INJECTION, SOLUTION INTRA-ARTICULAR; INTRALESIONAL; INTRAMUSCULAR; INTRAVENOUS; SOFT TISSUE at 11:09

## 2022-01-01 RX ADMIN — DOXORUBICIN HYDROCHLORIDE 76 MG: 2 INJECTION, SOLUTION INTRAVENOUS at 11:04

## 2022-01-01 RX ADMIN — ALPRAZOLAM 0.25 MG: 0.25 TABLET ORAL at 08:11

## 2022-01-01 RX ADMIN — NYSTATIN 400000 UNITS: 500000 SUSPENSION ORAL at 12:11

## 2022-01-01 RX ADMIN — POTASSIUM CHLORIDE 40 MEQ: 7.46 INJECTION, SOLUTION INTRAVENOUS at 03:11

## 2022-01-01 RX ADMIN — SODIUM CHLORIDE, PRESERVATIVE FREE 10 ML: 5 INJECTION INTRAVENOUS at 12:05

## 2022-01-01 RX ADMIN — DEXAMETHASONE 2 MG: 2 TABLET ORAL at 09:11

## 2022-01-01 RX ADMIN — SODIUM CHLORIDE, PRESERVATIVE FREE 10 ML: 5 INJECTION INTRAVENOUS at 02:04

## 2022-01-01 RX ADMIN — CYCLOPHOSPHAMIDE 760 MG: 200 INJECTION, SOLUTION INTRAVENOUS at 12:04

## 2022-01-01 RX ADMIN — ACYCLOVIR SODIUM 240 MG: 50 INJECTION, SOLUTION INTRAVENOUS at 06:11

## 2022-01-01 RX ADMIN — CHLORHEXIDINE GLUCONATE 15 ML: 1.2 RINSE ORAL at 10:11

## 2022-01-01 RX ADMIN — CYCLOPHOSPHAMIDE 760 MG: 200 INJECTION, SOLUTION INTRAVENOUS at 11:05

## 2022-01-01 RX ADMIN — AMLODIPINE BESYLATE 5 MG: 5 TABLET ORAL at 03:09

## 2022-01-01 RX ADMIN — BUTALBITAL, ACETAMINOPHEN AND CAFFEINE 1 TABLET: 50; 325; 40 TABLET ORAL at 11:09

## 2022-01-01 RX ADMIN — PANTOPRAZOLE SODIUM 40 MG: 40 INJECTION, POWDER, FOR SOLUTION INTRAVENOUS at 10:11

## 2022-01-01 RX ADMIN — MIRTAZAPINE 7.5 MG: 7.5 TABLET, FILM COATED ORAL at 08:11

## 2022-01-01 RX ADMIN — SODIUM CHLORIDE, PRESERVATIVE FREE 10 ML: 5 INJECTION INTRAVENOUS at 02:05

## 2022-01-01 RX ADMIN — DEXAMETHASONE 4 MG: 2 TABLET ORAL at 08:11

## 2022-01-01 RX ADMIN — FLUCONAZOLE IN SODIUM CHLORIDE 200 MG: 2 INJECTION, SOLUTION INTRAVENOUS at 11:11

## 2022-01-01 RX ADMIN — LORAZEPAM 1 MG: 2 INJECTION INTRAMUSCULAR; INTRAVENOUS at 08:09

## 2022-01-01 RX ADMIN — CLINDAMYCIN PHOSPHATE 900 MG: 900 INJECTION, SOLUTION INTRAVENOUS at 08:04

## 2022-01-01 RX ADMIN — DAPTOMYCIN 290 MG: 500 INJECTION, POWDER, LYOPHILIZED, FOR SOLUTION INTRAVENOUS at 03:11

## 2022-01-01 RX ADMIN — TUBERCULIN PURIFIED PROTEIN DERIVATIVE 5 UNITS: 5 INJECTION, SOLUTION INTRADERMAL at 08:11

## 2022-01-01 RX ADMIN — INSULIN ASPART 3 UNITS: 100 INJECTION, SOLUTION INTRAVENOUS; SUBCUTANEOUS at 05:11

## 2022-01-01 RX ADMIN — LEUCINE, PHENYLALANINE, LYSINE, METHIONINE, ISOLEUCINE, VALINE, HISTIDINE, THREONINE, TRYPTOPHAN, ALANINE, GLYCINE, ARGININE, PROLINE, SERINE, TYROSINE, SODIUM ACETATE, DIBASIC POTASSIUM PHOSPHATE, MAGNESIUM CHLORIDE, SODIUM CHLORIDE, CALCIUM CHLORIDE, DEXTROSE
311; 238; 247; 170; 255; 247; 204; 179; 77; 880; 438; 489; 289; 213; 17; 297; 261; 51; 77; 33; 5 INJECTION INTRAVENOUS at 12:11

## 2022-01-01 RX ADMIN — ACETAMINOPHEN 1000 MG: 10 INJECTION, SOLUTION INTRAVENOUS at 09:04

## 2022-01-01 RX ADMIN — LEUCINE, PHENYLALANINE, LYSINE, METHIONINE, ISOLEUCINE, VALINE, HISTIDINE, THREONINE, TRYPTOPHAN, ALANINE, GLYCINE, ARGININE, PROLINE, SERINE, TYROSINE, SODIUM ACETATE, DIBASIC POTASSIUM PHOSPHATE, MAGNESIUM CHLORIDE, SODIUM CHLORIDE, CALCIUM CHLORIDE, DEXTROSE
311; 238; 247; 170; 255; 247; 204; 179; 77; 880; 438; 489; 289; 213; 17; 297; 261; 51; 77; 33; 5 INJECTION INTRAVENOUS at 03:11

## 2022-01-18 NOTE — TELEPHONE ENCOUNTER
----- Message from Yumi Dsa sent at 1/18/2022  8:39 AM CST -----  Pt calling for refill on Levothyroxine to [facility]  [phone]

## 2022-01-24 NOTE — PROGRESS NOTES
SUBJECTIVE:    Patient ID: Antonieta Clay is a 61 y.o. female.    Chief Complaint: Follow-up (No bottles, reviewed from list, vaccines denied//dp)    Pt here for regular f/u- hypothyroidism  Pt reports overall feeling well  Follows regularly with Dr. Darden and Dr. Monroy, rad-onc for hx of salivary cancer.   Reports recently saw Dr. Ramos, ENT last week for ear pain/cerumen occlusion- reports her canal is much smaller since radiation and he's talking about having to do surgery to enlarge ear canal.  UTD with Dr. Carlton, gyn      Admission on 09/21/2021, Discharged on 09/21/2021   Component Date Value Ref Range Status    WBC 09/21/2021 5.39  3.90 - 12.70 K/uL Final    RBC 09/21/2021 4.02  4.00 - 5.40 M/uL Final    Hemoglobin 09/21/2021 11.9* 12.0 - 16.0 g/dL Final    Hematocrit 09/21/2021 36.5* 37.0 - 48.5 % Final    MCV 09/21/2021 91  82 - 98 fL Final    MCH 09/21/2021 29.6  27.0 - 31.0 pg Final    MCHC 09/21/2021 32.6  32.0 - 36.0 g/dL Final    RDW 09/21/2021 13.1  11.5 - 14.5 % Final    Platelets 09/21/2021 243  150 - 450 K/uL Final    MPV 09/21/2021 11.1  9.2 - 12.9 fL Final    Immature Granulocytes 09/21/2021 0.2  0.0 - 0.5 % Final    Gran # (ANC) 09/21/2021 3.0  1.8 - 7.7 K/uL Final    Immature Grans (Abs) 09/21/2021 0.01  0.00 - 0.04 K/uL Final    Lymph # 09/21/2021 1.6  1.0 - 4.8 K/uL Final    Mono # 09/21/2021 0.7  0.3 - 1.0 K/uL Final    Eos # 09/21/2021 0.1  0.0 - 0.5 K/uL Final    Baso # 09/21/2021 0.04  0.00 - 0.20 K/uL Final    nRBC 09/21/2021 0  0 /100 WBC Final    Gran % 09/21/2021 55.1  38.0 - 73.0 % Final    Lymph % 09/21/2021 30.2  18.0 - 48.0 % Final    Mono % 09/21/2021 12.1  4.0 - 15.0 % Final    Eosinophil % 09/21/2021 1.7  0.0 - 8.0 % Final    Basophil % 09/21/2021 0.7  0.0 - 1.9 % Final    Differential Method 09/21/2021 Automated   Final    Sodium 09/21/2021 143  136 - 145 mmol/L Final    Potassium 09/21/2021 3.5  3.5 - 5.1 mmol/L Final    Chloride  09/21/2021 108  95 - 110 mmol/L Final    CO2 09/21/2021 22* 23 - 29 mmol/L Final    Glucose 09/21/2021 89  70 - 110 mg/dL Final    BUN 09/21/2021 19  6 - 20 mg/dL Final    Creatinine 09/21/2021 0.7  0.5 - 1.4 mg/dL Final    Calcium 09/21/2021 9.4  8.7 - 10.5 mg/dL Final    Total Protein 09/21/2021 7.8  6.0 - 8.4 g/dL Final    Albumin 09/21/2021 4.4  3.5 - 5.2 g/dL Final    Total Bilirubin 09/21/2021 0.9  0.1 - 1.0 mg/dL Final    Alkaline Phosphatase 09/21/2021 71  55 - 135 U/L Final    AST 09/21/2021 22  10 - 40 U/L Final    ALT 09/21/2021 21  10 - 44 U/L Final    Anion Gap 09/21/2021 13  8 - 16 mmol/L Final    eGFR if African American 09/21/2021 >60.0  >60 mL/min/1.73 m^2 Final    eGFR if non African American 09/21/2021 >60.0  >60 mL/min/1.73 m^2 Final    Troponin I 09/21/2021 <0.030  <=0.040 ng/mL Final    BNP 09/21/2021 64  0 - 99 pg/mL Final    SARS-CoV-2 RNA, Amplification, Qual 09/21/2021 Positive* Negative Final    Ferritin 09/21/2021 250  20.0 - 300.0 ng/mL Final    CRP 09/21/2021 0.45  <0.76 mg/dL Final    Troponin I 09/21/2021 <0.030  <=0.040 ng/mL Final   Hospital Outpatient Visit on 09/03/2021   Component Date Value Ref Range Status    POC Creatinine 09/03/2021 0.9  0.5 - 1.4 mg/dL Final    Sample 09/03/2021 VENOUS   Final       Past Medical History:   Diagnosis Date    Hypothyroidism      Past Surgical History:   Procedure Laterality Date    left temporal bone resection  2020    MODIFIED RADICAL NECK DISSECTION Left 2020    parotidectomy Left 2020     Family History   Problem Relation Age of Onset    Breast cancer Mother     Breast cancer Maternal Aunt        The 10-year CVD risk score (NEVIN'Leah et al., 2008) is: 6.3%    Values used to calculate the score:      Age: 61 years      Sex: Female      Diabetic: No      Tobacco smoker: No      Systolic Blood Pressure: 112 mmHg      Is BP treated: No      HDL Cholesterol: 55 mg/dL      Total Cholesterol: 247 mg/dL     Marital  Status:   Alcohol History:  reports no history of alcohol use.  Tobacco History:  reports that she has never smoked. She has never used smokeless tobacco.  Drug History:  reports no history of drug use.    Health Maintenance Topics with due status: Not Due       Topic Last Completion Date    Lipid Panel 10/05/2021    Mammogram 01/04/2022     Immunization History   Administered Date(s) Administered    COVID-19, MRNA, LN-S, PF (MODERNA FULL 0.5 ML DOSE) 01/08/2021, 02/05/2021       Review of patient's allergies indicates:   Allergen Reactions    Pcn [penicillins] Swelling    Codeine Nausea And Vomiting       Current Outpatient Medications:     ascorbic acid, vitamin C, (VITAMIN C) 1000 MG tablet, Take 1,000 mg by mouth once daily., Disp: , Rfl:     cholecalciferol, vitamin D3, 125 mcg (5,000 unit) Tab, Take 5,000 Units by mouth once daily., Disp: , Rfl:     cholecalciferol, vitD3,/vit K2 (VITAMIN D3-VITAMIN K2 ORAL), Take by mouth., Disp: , Rfl:     fluticasone propionate (FLONASE) 50 mcg/actuation nasal spray, 2 sprays (100 mcg total) by Each Nostril route once daily., Disp: 18.2 mL, Rfl: 1    levothyroxine (SYNTHROID) 88 MCG tablet, Take 1 tablet (88 mcg total) by mouth once daily., Disp: 90 tablet, Rfl: 1    multivit-iron-min-folic acid 18-0.4 mg Tab, Take 1 tablet by mouth once daily. , Disp: , Rfl:     omega-3 fatty acids/fish oil (FISH OIL-OMEGA-3 FATTY ACIDS) 300-1,000 mg capsule, Take 1 capsule by mouth once daily., Disp: , Rfl:     ZINC ACETATE ORAL, Take 1 capsule by mouth once daily. , Disp: , Rfl:     hydrocodone-acetaminophen (HYCET) solution 7.5-325 mg/15mL, Take 15 mLs by mouth every 4 to 6 hours as needed for Pain., Disp: 750 mL, Rfl: 0    silver sulfADIAZINE 1% (SILVADENE) 1 % cream, Apply topically 2 (two) times daily., Disp: 400 g, Rfl: 2    Current Facility-Administered Medications:     acetaminophen tablet 650 mg, 650 mg, Oral, Once PRN, Rj Pompa MD    albuterol  "inhaler 2 puff, 2 puff, Inhalation, Q20 Min PRN, Rj Pompa MD    diphenhydrAMINE injection 25 mg, 25 mg, Intravenous, Once PRN, Rj Pompa MD    EPINEPHrine (EPIPEN) 0.3 mg/0.3 mL pen injection 0.3 mg, 0.3 mg, Intramuscular, PRN, Rj Pompa MD    methylPREDNISolone sodium succinate injection 40 mg, 40 mg, Intravenous, Once PRN, Rj Pompa MD    ondansetron disintegrating tablet 4 mg, 4 mg, Oral, Once PRN, Rj Pompa MD    sodium chloride 0.9% 500 mL flush bag, , Intravenous, PRN, Rj Pompa MD    sodium chloride 0.9% flush 10 mL, 10 mL, Intravenous, PRN, Rj Pompa MD    Review of Systems   Constitutional: Negative for chills, fever and unexpected weight change.   HENT: Negative for ear discharge, ear pain (resolved after cerumen obstruction removed by ENT), sore throat and trouble swallowing.    Respiratory: Negative for cough and shortness of breath.    Cardiovascular: Negative for chest pain and palpitations.   Gastrointestinal: Negative for abdominal pain, nausea and vomiting.   Genitourinary: Negative for dysuria, flank pain and hematuria.   Musculoskeletal: Positive for neck pain (mild pain left neck) and neck stiffness.   Skin: Positive for rash (dry itchy rash behind left ear from radiation).   Neurological: Negative for dizziness, weakness, numbness and headaches.   Hematological: Negative for adenopathy.          Objective:      Vitals:    01/24/22 0840   BP: 112/70   Pulse: 82   Weight: 56.2 kg (123 lb 12.8 oz)   Height: 4' 11" (1.499 m)     Physical Exam  Constitutional:       General: She is not in acute distress.     Appearance: Normal appearance.   HENT:      Head: Normocephalic and atraumatic.      Right Ear: Tympanic membrane and ear canal normal.      Ears:      Comments: Left external canal extremely narrow d/t post radiation changes  Neck:      Vascular: No carotid bruit.   Cardiovascular:      Rate and Rhythm: Normal rate and regular rhythm.      Heart " sounds: No murmur heard.      Pulmonary:      Effort: Pulmonary effort is normal. No respiratory distress.      Breath sounds: Normal breath sounds.   Abdominal:      Palpations: Abdomen is soft.      Tenderness: There is no abdominal tenderness.   Musculoskeletal:      Cervical back: No bony tenderness.      Right lower leg: No edema.      Left lower leg: No edema.   Lymphadenopathy:      Cervical: No cervical adenopathy.   Skin:     General: Skin is warm and dry.      Comments: Dry erythematous patch behind left ear with radiation changes, no blistering/drainage   Neurological:      General: No focal deficit present.      Mental Status: She is alert and oriented to person, place, and time.      Motor: No weakness.      Gait: Gait normal.           Assessment:       1. Acquired hypothyroidism    2. Pulmonary nodule    3. History of salivary gland cancer    4. Colon cancer screening    5. Post-radiation dermatitis           Plan:       Acquired hypothyroidism  Comments:  stable on last labs    Pulmonary nodule  Comments:  being monitoring by rad-onc with f/u in March    History of salivary gland cancer  Comments:  stable on f/u with rad-onc and ENT    Colon cancer screening  Comments:  discussed importance of colon CA screening and encouraged her to call to schedule cscope  Orders:  -     Ambulatory referral/consult to Gastroenterology; Future; Expected date: 01/31/2022    Post-radiation dermatitis  Comments:  recommend 50/50 mixture of hydrocortisone and aquaphor behind left ear      Follow up in about 6 months (around 7/24/2022) for thyroid.          Counseled on age and gender appropriate medical preventative services, including cancer screenings, immunizations, overall nutritional health, need for a consistent exercise regimen and an overall push towards maintaining a vigorous and active lifestyle.      1/24/2022 Casandra Yu NP

## 2022-01-27 NOTE — PROGRESS NOTES
PROGRESS NOTE    Subjective:       Patient ID: Antonieta Clay is a 61 y.o. female.    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 Postoperative changes in the left side of the neck with slightly  increased FDG activity from background.  2. No FDG avid lymphadenopathy or finding of additional distant sites  of FDG avid disease.    10/20/2020-12/4/2020  Radiation therapy.    3/11/2021:  MRI Neck: no evidence of recurrence.    MRI Brain: no acute abnormalities.    12/3/2021:  CT chest: new ground glass, RUL, infectious vs met. rec follow up     Chief Complaint:  No chief complaint on file.  Parotid cancer follow up    History of Present Illness:   Antonieta Clay is a 61 y.o. female who presents for follow up of above.        Patient has no new complaints today.  No swallowing difficulties.  Continues to see ENT as well.      Family and Social history reviewed and is unchanged from 9/21/2020      ROS:  Review of Systems   Constitutional: Negative for appetite change, fever and unexpected weight change.   HENT: Negative for mouth sores.    Eyes: Negative for visual disturbance.   Respiratory: Negative for cough and shortness of breath.    Cardiovascular: Negative for chest pain and leg swelling.   Gastrointestinal: Negative for abdominal pain, blood in stool and diarrhea.   Genitourinary: Negative for frequency and hematuria.   Musculoskeletal: Negative for back pain.   Skin: Negative for rash.   Neurological: Negative for headaches.   Hematological: Negative for adenopathy.   Psychiatric/Behavioral: The patient is not nervous/anxious.           Current Outpatient Medications:     ascorbic acid, vitamin C, (VITAMIN C) 1000 MG tablet, Take 1,000 mg by mouth once daily., Disp: , Rfl:     cholecalciferol, vitamin D3, 125 mcg (5,000  "unit) Tab, Take 5,000 Units by mouth once daily., Disp: , Rfl:     cholecalciferol, vitD3,/vit K2 (VITAMIN D3-VITAMIN K2 ORAL), Take by mouth., Disp: , Rfl:     fluticasone propionate (FLONASE) 50 mcg/actuation nasal spray, 2 sprays (100 mcg total) by Each Nostril route once daily., Disp: 18.2 mL, Rfl: 1    levothyroxine (SYNTHROID) 88 MCG tablet, Take 1 tablet (88 mcg total) by mouth once daily., Disp: 90 tablet, Rfl: 1    multivit-iron-min-folic acid 18-0.4 mg Tab, Take 1 tablet by mouth once daily. , Disp: , Rfl:     omega-3 fatty acids/fish oil (FISH OIL-OMEGA-3 FATTY ACIDS) 300-1,000 mg capsule, Take 1 capsule by mouth once daily., Disp: , Rfl:     ZINC ACETATE ORAL, Take 1 capsule by mouth once daily. , Disp: , Rfl:     Current Facility-Administered Medications:     acetaminophen tablet 650 mg, 650 mg, Oral, Once PRN, Rj Pompa MD    albuterol inhaler 2 puff, 2 puff, Inhalation, Q20 Min PRN, Rj Pompa MD    diphenhydrAMINE injection 25 mg, 25 mg, Intravenous, Once PRN, Rj Pompa MD    EPINEPHrine (EPIPEN) 0.3 mg/0.3 mL pen injection 0.3 mg, 0.3 mg, Intramuscular, PRN, Rj Pompa MD    methylPREDNISolone sodium succinate injection 40 mg, 40 mg, Intravenous, Once PRN, Rj Pompa MD    ondansetron disintegrating tablet 4 mg, 4 mg, Oral, Once PRN, Rj Pompa MD    sodium chloride 0.9% 500 mL flush bag, , Intravenous, PRN, Rj Pompa MD    sodium chloride 0.9% flush 10 mL, 10 mL, Intravenous, PRN, Rj Pompa MD        Objective:       Physical Examination:     /73   Pulse 61   Resp 18   Ht 4' 11" (1.499 m)   Wt 54.9 kg (121 lb)   BMI 24.44 kg/m²     Physical Exam  Constitutional:       Appearance: She is well-developed.   HENT:      Head: Normocephalic and atraumatic.      Right Ear: External ear normal.      Left Ear: External ear normal.      Mouth/Throat:      Mouth: Mucous membranes are moist.      Pharynx: Oropharynx is clear. No oropharyngeal " exudate or posterior oropharyngeal erythema.      Comments: No visible or palpable oral lesions.   Eyes:      Conjunctiva/sclera: Conjunctivae normal.      Pupils: Pupils are equal, round, and reactive to light.   Neck:      Thyroid: No thyromegaly.      Trachea: No tracheal deviation.   Cardiovascular:      Rate and Rhythm: Normal rate and regular rhythm.      Heart sounds: Normal heart sounds.   Pulmonary:      Effort: Pulmonary effort is normal.      Breath sounds: Normal breath sounds.   Chest:   Breasts:      Right: No axillary adenopathy or supraclavicular adenopathy.      Left: No axillary adenopathy or supraclavicular adenopathy.       Abdominal:      General: Bowel sounds are normal. There is no distension.      Palpations: Abdomen is soft. There is no mass.      Tenderness: There is no abdominal tenderness.   Lymphadenopathy:      Head:      Right side of head: No submental, submandibular, tonsillar or preauricular adenopathy.      Left side of head: No submental, submandibular, tonsillar or preauricular adenopathy.      Cervical: No cervical adenopathy.      Upper Body:      Right upper body: No supraclavicular or axillary adenopathy.      Left upper body: No supraclavicular or axillary adenopathy.   Skin:     Findings: No rash.   Neurological:      Comments: Neuro intact througout   Psychiatric:         Behavior: Behavior normal.         Thought Content: Thought content normal.         Judgment: Judgment normal.         Labs:   No results found for this or any previous visit (from the past 336 hour(s)).  CMP  Sodium   Date Value Ref Range Status   10/05/2021 141 135 - 146 mmol/L Final     Potassium   Date Value Ref Range Status   10/05/2021 4.3 3.5 - 5.3 mmol/L Final     Chloride   Date Value Ref Range Status   10/05/2021 104 98 - 110 mmol/L Final     CO2   Date Value Ref Range Status   10/05/2021 26 20 - 32 mmol/L Final     Glucose   Date Value Ref Range Status   10/05/2021 83 65 - 99 mg/dL Final      Comment:                   Fasting reference interval          BUN   Date Value Ref Range Status   10/05/2021 17 7 - 25 mg/dL Final     Creatinine   Date Value Ref Range Status   10/05/2021 0.92 0.50 - 0.99 mg/dL Final     Comment:     For patients >49 years of age, the reference limit  for Creatinine is approximately 13% higher for people  identified as -American.          Calcium   Date Value Ref Range Status   10/05/2021 9.9 8.6 - 10.4 mg/dL Final     Total Protein   Date Value Ref Range Status   10/05/2021 7.3 6.1 - 8.1 g/dL Final     Albumin   Date Value Ref Range Status   10/05/2021 4.6 3.6 - 5.1 g/dL Final     Total Bilirubin   Date Value Ref Range Status   10/05/2021 0.4 0.2 - 1.2 mg/dL Final     Alkaline Phosphatase   Date Value Ref Range Status   09/21/2021 71 55 - 135 U/L Final     AST   Date Value Ref Range Status   10/05/2021 21 10 - 35 U/L Final     ALT   Date Value Ref Range Status   10/05/2021 22 6 - 29 U/L Final     Anion Gap   Date Value Ref Range Status   09/21/2021 13 8 - 16 mmol/L Final     eGFR if    Date Value Ref Range Status   10/05/2021 78 > OR = 60 mL/min/1.73m2 Final     eGFR if non    Date Value Ref Range Status   10/05/2021 68 > OR = 60 mL/min/1.73m2 Final     No results found for: CEA  No results found for: PSA        Assessment/Plan:     Problem List Items Addressed This Visit     Malignant neoplasm of major salivary gland     Patient is doing ok from this standpoint and note is made of CT scan changes.  Will plan on another in three months and CT of the neck is planned per Dr. Pete.  She appears ZANE today.  Will arrange for these scans to be done in the next three months and will have her back with me in three months to see how she is doing.          Relevant Orders    CT Chest Without Contrast          Discussion:     Follow up in about 3 months (around 4/27/2022).      Electronically signed by Gorge Coombs

## 2022-01-27 NOTE — ASSESSMENT & PLAN NOTE
Patient is doing ok from this standpoint and note is made of CT scan changes.  Will plan on another in three months and CT of the neck is planned per Dr. Pete.  She appears ZANE today.  Will arrange for these scans to be done in the next three months and will have her back with me in three months to see how she is doing.

## 2022-03-11 NOTE — PROGRESS NOTES
Antonieta Clay  [id number]  1960  3/11/2022  José Miguel Darden MD    DIAGNOSIS: Recurrent ACC of left neck  [initial dx in 2020:  [id number], s/p WLE + adj RT]    REASON FOR VISIT: Routine scheduled follow-up.     HISTORY OF PRESENT ILLNESS:   Ms. Clay is an 61F who initially noted a fullness and discomfort of her left jaw and ear in summer 2020, and after eval and w/u by PCP and ENT was found on CT to have a large mass arising from her left parotid gland.  She was then referred to Dr. Darden at [facility] who performed a left parotidectomy w/ cervical neck dissection as well as additional cutaneous margin excisions due to dermal/cutaneous invasion and (+)SM of primary resection.  Ultimately clear SM's were achieved, however (+)PNI, (+)LVSI, and 6 of 32 dissected LN's were (+) for disease w/ some having (+)MARIJA. This was then followed w/ referrals to [facility] and [facility] for eval re: adj RT/CRT, and the pt then underwent PORT @ 6600 cGy, completed 12/4/20.  Subsequent surveillance CT & MRI neck/head/brain imaging throughout 2021 remained w/ ZANE, and pt underwent PT for left neck stiffness and tension HA.    INTERVAL HISTORY:     Since her last visit, the pt presented to Dr. Darden w/ worsening left neck pain and cutaneous swelling which was initially treated topically, however when progression noted despite this CT imaging revealed a recurrent mass in the left postauricular/temporal region w/ osseous invasion of mastoid/temporal skull base. Dermal bx revealed the cutaneous lesions to be recurrent ACC.  She now RTC for discussion re: therapy options.      CT neck & temporal bone (2/25/22):              REVIEW OF SYSTEMS:  A complete review of systems was performed and the patient denies any acute concerns/changes other than per HPI    Past Medical History:   Diagnosis Date    Hypothyroidism      Past Surgical History:   Procedure Laterality Date    left temporal bone resection  2020    MODIFIED  RADICAL NECK DISSECTION Left 2020    parotidectomy Left 2020     Social History     Socioeconomic History    Marital status:    Tobacco Use    Smoking status: Never Smoker    Smokeless tobacco: Never Used   Substance and Sexual Activity    Alcohol use: Never    Drug use: Never    Sexual activity: Not Currently     Partners: Male     Birth control/protection: None     Family History   Problem Relation Age of Onset    Breast cancer Mother     Breast cancer Maternal Aunt      Medication List with Changes/Refills   Current Medications    ASCORBIC ACID, VITAMIN C, (VITAMIN C) 1000 MG TABLET    Take 1,000 mg by mouth once daily.    CHOLECALCIFEROL, VITAMIN D3, 125 MCG (5,000 UNIT) TAB    Take 5,000 Units by mouth once daily.    CHOLECALCIFEROL, VITD3,/VIT K2 (VITAMIN D3-VITAMIN K2 ORAL)    Take by mouth.    FLUTICASONE PROPIONATE (FLONASE) 50 MCG/ACTUATION NASAL SPRAY    2 sprays (100 mcg total) by Each Nostril route once daily.    LEVOTHYROXINE (SYNTHROID) 88 MCG TABLET    Take 1 tablet (88 mcg total) by mouth once daily.    MULTIVIT-IRON-MIN-FOLIC ACID 18-0.4 MG TAB    Take 1 tablet by mouth once daily.     OMEGA-3 FATTY ACIDS/FISH OIL (FISH OIL-OMEGA-3 FATTY ACIDS) 300-1,000 MG CAPSULE    Take 1 capsule by mouth once daily.    ZINC ACETATE ORAL    Take 1 capsule by mouth once daily.      Review of patient's allergies indicates:   Allergen Reactions    Pcn [penicillins] Swelling    Codeine Nausea And Vomiting       QUALITY OF LIFE: 70%- Cares for Self: Unable to Carry on Normal Activity or Active Work    There were no vitals filed for this visit.  There is no height or weight on file to calculate BMI.    PHYSICAL EXAM:  GENERAL: alert; in no apparent distress.   HEAD: normocephalic, atraumatic  EYES: pupils are equal, round, reactive to light and accommodation. Sclera anicteric. Conjunctiva not injected.   NOSE/THROAT: no nasal erythema or rhinorrhea. Oropharynx pink, without erythema, ulcerations or  thrush.   NECK: scattered ~1cm red round cutaneous lesions across left neck as well as pinkish red plaquing of postauricular skin and deep soft tissue; neck mobility and comfort much improved since prior meetings; no bleeding, edema, or ear canal changes noted  CHEST: clear to auscultation bilaterally; no wheezes, crackles or rubs. Patient is speaking comfortably on room air with normal work of breathing without using accessory muscles of respiration.  CARDIOVASCULAR: regular rate and rhythm; no murmurs, rubs or gallops.  ABDOMEN: soft, nontender, nondistended. Bowel sounds present.   MUSCULOSKELETAL: no tenderness to palpation along the spine or scapulae. Normal range of motion.  NEUROLOGIC: cranial nerves II-XII intact bilaterally. Strength 5/5 in bilateral upper and lower extremities. No sensory deficits appreciated. Reflexes globally intact. No cerebellar signs. Normal gait.  LYMPHATIC: no cervical, supraclavicular or axillary adenopathy appreciated bilaterally.       ASSESSMENT: Antonieta Clay is a 61 y.o. female with recurrent ACC of the left neck  [initial dx in 2020:  [id number], s/p WLE + adj RT]    PLAN:   After eval of images and discussion w/ Dr. Darden who shared recs of [facility] tumor board, I explained to the patient that upfront systemic therapy, possibly then followed by further surgery and/or re-RT, is recommended at this time.    Brain MRI and PET/CT have been ordered to better assess extent of disease at this time, however repeat surgery and/or definitive re-RT for disease burden seen on CT is currently expected to be too morbid/risky w/ low chances of clearing margins/gross-dz.  She is also currently w/o neuro, pain, or other indications for palliative RT.    For eval/discussion re: this recommendation we will help her coordinate sooner f/u w/ Dr. Sagastume here at [facility], and I have also recommended reaching out and travel to [facility] for eval re: clinical trial eligibility.    All questions  answered and contact information provided. Patient verbalized understanding oif all above, and she and her  understand to call us anytime with any questions or concerns.    I have personally seen and evaluated this patient. Greater than 50% of this time was spent discussing coordination of care and/or counseling.    PHYSICIAN: Ismael Monroy III, MD

## 2022-03-21 NOTE — ASSESSMENT & PLAN NOTE
Ms. Clay has been diagnosed with a recurrence of the adenoid cystic carcinoma.  Will need a PET scan to properly stage.  She has seen Dr. Darden and Elva.  At this time surgery and radiation seems difficult and she has been referred here for recommendation on systemic therapy.  She has also reached out to MD Faye but has not heard back yet.      I discussed the use of CAP chemotherapy.  (cytoxan/Cisplatin/Adriamycin)  I discussed the risk today and the process moving forward.  Will need port and chemotherapy education.  Would like to begin ASAP.

## 2022-03-21 NOTE — PROGRESS NOTES
PROGRESS NOTE    Subjective:       Patient ID: Antonieta Clay is a 61 y.o. female.    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 Postoperative changes in the left side of the neck with slightly  increased FDG activity from background.  2. No FDG avid lymphadenopathy or finding of additional distant sites  of FDG avid disease.    10/20/2020-12/4/2020  Radiation therapy.    3/11/2021:  MRI Neck: no evidence of recurrence.    MRI Brain: no acute abnormalities.    12/3/2021:  CT chest: new ground glass, RUL, infectious vs met. 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     Chief Complaint:  No chief complaint on file.  Parotid cancer follow up    History of Present Illness:   Antonieta Clay is a 61 y.o. female who presents for follow up of above.      Ms. Clay has been diagnosed with a recurrence of disease in the mastoid bone.      Family and Social history reviewed and is unchanged from 9/21/2020      ROS:  Review of Systems   Constitutional: Negative for appetite change, fever and unexpected weight change.   HENT: Negative for mouth sores.    Eyes: Negative for visual disturbance.   Respiratory: Negative for cough and shortness of breath.    Cardiovascular: Negative for chest pain and leg swelling.   Gastrointestinal: Negative for abdominal pain, blood in stool and diarrhea.   Genitourinary: Negative for frequency and hematuria.   Musculoskeletal: Negative for back pain.   Skin: Negative for rash.   Neurological: Negative for headaches.   Hematological: Negative for adenopathy.   Psychiatric/Behavioral: The patient is not nervous/anxious.           Current Outpatient Medications:     ascorbic acid, vitamin C,  "(VITAMIN C) 1000 MG tablet, Take 1,000 mg by mouth once daily., Disp: , Rfl:     cholecalciferol, vitamin D3, 125 mcg (5,000 unit) Tab, Take 5,000 Units by mouth once daily., Disp: , Rfl:     fluticasone propionate (FLONASE) 50 mcg/actuation nasal spray, 2 sprays (100 mcg total) by Each Nostril route once daily., Disp: 18.2 mL, Rfl: 1    HYDROcodone-acetaminophen (NORCO)  mg per tablet, Take 1 tablet by mouth every 6 (six) hours as needed for Pain., Disp: , Rfl:     levothyroxine (SYNTHROID) 88 MCG tablet, Take 1 tablet (88 mcg total) by mouth once daily., Disp: 90 tablet, Rfl: 1    multivit-iron-min-folic acid 18-0.4 mg Tab, Take 1 tablet by mouth once daily. , Disp: , Rfl:     omega-3 fatty acids/fish oil (FISH OIL-OMEGA-3 FATTY ACIDS) 300-1,000 mg capsule, Take 1 capsule by mouth once daily., Disp: , Rfl:     ZINC ACETATE ORAL, Take 1 capsule by mouth once daily. , Disp: , Rfl:     Current Facility-Administered Medications:     methylPREDNISolone sodium succinate injection 40 mg, 40 mg, Intravenous, Once PRN, Rj Pompa MD        Objective:       Physical Examination:     BP (!) 146/76   Pulse 60   Temp 97.1 °F (36.2 °C)   Resp 18   Ht 4' 11" (1.499 m)   BMI 24.30 kg/m²     Physical Exam  Constitutional:       Appearance: She is well-developed.   HENT:      Head: Normocephalic and atraumatic.      Right Ear: External ear normal.      Left Ear: External ear normal.      Mouth/Throat:      Mouth: Mucous membranes are moist.      Pharynx: Oropharynx is clear. No oropharyngeal exudate or posterior oropharyngeal erythema.      Comments: No visible or palpable oral lesions.   Eyes:      Conjunctiva/sclera: Conjunctivae normal.      Pupils: Pupils are equal, round, and reactive to light.   Neck:      Thyroid: No thyromegaly.      Trachea: No tracheal deviation.   Cardiovascular:      Rate and Rhythm: Normal rate and regular rhythm.      Heart sounds: Normal heart sounds.   Pulmonary:      Effort: " Pulmonary effort is normal.      Breath sounds: Normal breath sounds.   Chest:   Breasts:      Right: No axillary adenopathy or supraclavicular adenopathy.      Left: No axillary adenopathy or supraclavicular adenopathy.       Abdominal:      General: Bowel sounds are normal. There is no distension.      Palpations: Abdomen is soft. There is no mass.      Tenderness: There is no abdominal tenderness.   Lymphadenopathy:      Head:      Right side of head: No submental, submandibular, tonsillar or preauricular adenopathy.      Left side of head: No submental, submandibular, tonsillar or preauricular adenopathy.      Cervical: No cervical adenopathy.      Upper Body:      Right upper body: No supraclavicular or axillary adenopathy.      Left upper body: No supraclavicular or axillary adenopathy.   Skin:     Findings: No rash.   Neurological:      Comments: Neuro intact througout   Psychiatric:         Behavior: Behavior normal.         Thought Content: Thought content normal.         Judgment: Judgment normal.         Labs:   No results found for this or any previous visit (from the past 336 hour(s)).  CMP  Sodium   Date Value Ref Range Status   10/05/2021 141 135 - 146 mmol/L Final     Potassium   Date Value Ref Range Status   10/05/2021 4.3 3.5 - 5.3 mmol/L Final     Chloride   Date Value Ref Range Status   10/05/2021 104 98 - 110 mmol/L Final     CO2   Date Value Ref Range Status   10/05/2021 26 20 - 32 mmol/L Final     Glucose   Date Value Ref Range Status   10/05/2021 83 65 - 99 mg/dL Final     Comment:                   Fasting reference interval          BUN   Date Value Ref Range Status   10/05/2021 17 7 - 25 mg/dL Final     Creatinine   Date Value Ref Range Status   10/05/2021 0.92 0.50 - 0.99 mg/dL Final     Comment:     For patients >49 years of age, the reference limit  for Creatinine is approximately 13% higher for people  identified as -American.          Calcium   Date Value Ref Range Status    10/05/2021 9.9 8.6 - 10.4 mg/dL Final     Total Protein   Date Value Ref Range Status   10/05/2021 7.3 6.1 - 8.1 g/dL Final     Albumin   Date Value Ref Range Status   10/05/2021 4.6 3.6 - 5.1 g/dL Final     Total Bilirubin   Date Value Ref Range Status   10/05/2021 0.4 0.2 - 1.2 mg/dL Final     Alkaline Phosphatase   Date Value Ref Range Status   09/21/2021 71 55 - 135 U/L Final     AST   Date Value Ref Range Status   10/05/2021 21 10 - 35 U/L Final     ALT   Date Value Ref Range Status   10/05/2021 22 6 - 29 U/L Final     Anion Gap   Date Value Ref Range Status   09/21/2021 13 8 - 16 mmol/L Final     eGFR if    Date Value Ref Range Status   10/05/2021 78 > OR = 60 mL/min/1.73m2 Final     eGFR if non    Date Value Ref Range Status   10/05/2021 68 > OR = 60 mL/min/1.73m2 Final     No results found for: CEA  No results found for: PSA        Assessment/Plan:     Problem List Items Addressed This Visit     Malignant neoplasm of major salivary gland     Ms. Clay has been diagnosed with a recurrence of the adenoid cystic carcinoma.  Will need a PET scan to properly stage.  She has seen Dr. Darden and Elva.  At this time surgery and radiation seems difficult and she has been referred here for recommendation on systemic therapy.  She has also reached out to MD Faye but has not heard back yet.      I discussed the use of CAP chemotherapy.  (cytoxan/Cisplatin/Adriamycin)  I discussed the risk today and the process moving forward.  Will need port and chemotherapy education.  Would like to begin ASAP.                 Relevant Orders    Ambulatory referral/consult to General Surgery    Ambulatory referral/consult to [facility]    CBC Auto Differential    Comprehensive Metabolic Panel    NM PET CT Routine Skull to Mid Thigh          Discussion:     Follow up in about 3 weeks (around 4/11/2022).      Electronically signed by Gorge Coombs

## 2022-03-22 NOTE — H&P
History & Physical    SUBJECTIVE:     History of Present Illness:  Antonieta Clay is a 61 y.o. female who presents to discuss mediport placement for treatment of salivary glad cancer.     Chief Complaint   Patient presents with    Other     Port placement       Review of patient's allergies indicates:   Allergen Reactions    Pcn [penicillins] Swelling    Codeine Nausea And Vomiting       Current Outpatient Medications   Medication Sig Dispense Refill    ascorbic acid, vitamin C, (VITAMIN C) 1000 MG tablet Take 1,000 mg by mouth once daily.      cholecalciferol, vitamin D3, 125 mcg (5,000 unit) Tab Take 5,000 Units by mouth once daily.      fluticasone propionate (FLONASE) 50 mcg/actuation nasal spray 2 sprays (100 mcg total) by Each Nostril route once daily. 18.2 mL 1    HYDROcodone-acetaminophen (NORCO)  mg per tablet Take 1 tablet by mouth every 6 (six) hours as needed for Pain.      levothyroxine (SYNTHROID) 88 MCG tablet Take 1 tablet (88 mcg total) by mouth once daily. 90 tablet 1    multivit-iron-min-folic acid 18-0.4 mg Tab Take 1 tablet by mouth once daily.       omega-3 fatty acids/fish oil (FISH OIL-OMEGA-3 FATTY ACIDS) 300-1,000 mg capsule Take 1 capsule by mouth once daily.      ZINC ACETATE ORAL Take 1 capsule by mouth once daily.        Current Facility-Administered Medications   Medication Dose Route Frequency Provider Last Rate Last Admin    methylPREDNISolone sodium succinate injection 40 mg  40 mg Intravenous Once PRN Rj Pompa MD           Past Medical History:   Diagnosis Date    Hypothyroidism      Past Surgical History:   Procedure Laterality Date    left temporal bone resection  2020    MODIFIED RADICAL NECK DISSECTION Left 2020    parotidectomy Left 2020     Family History   Problem Relation Age of Onset    Breast cancer Mother     Breast cancer Maternal Aunt      Social History     Tobacco Use    Smoking status: Never Smoker    Smokeless tobacco: Never  Used   Substance Use Topics    Alcohol use: Never    Drug use: Never        Review of Systems:  Review of Systems   Constitutional: Negative for activity change, chills and fever.   Respiratory: Negative for shortness of breath.    Cardiovascular: Negative for chest pain.   Gastrointestinal: Negative for abdominal pain, nausea and vomiting.   Genitourinary: Negative for dysuria.   Musculoskeletal: Negative for myalgias.   Skin: Negative for wound.   Neurological: Negative for weakness.   Hematological: Does not bruise/bleed easily.   Psychiatric/Behavioral: The patient is not nervous/anxious.        OBJECTIVE:     Vital Signs (Most Recent)  Temp: 97 °F (36.1 °C) (03/22/22 1030)  Pulse: 66 (03/22/22 1030)  BP: 127/79 (03/22/22 1030)           Physical Exam:  Physical Exam  Vitals reviewed.   Constitutional:       Appearance: Normal appearance. She is well-developed.   HENT:      Head: Normocephalic and atraumatic.   Cardiovascular:      Rate and Rhythm: Normal rate.   Pulmonary:      Effort: Pulmonary effort is normal. No respiratory distress.   Musculoskeletal:         General: Normal range of motion.   Skin:     General: Skin is warm and dry.   Neurological:      Mental Status: She is oriented to person, place, and time.   Psychiatric:         Thought Content: Thought content normal.             ASSESSMENT/PLAN:     The risks of mediport placement including bleeding, infection,and pneumothorax were explained to the patient and informed consent was obtained.  The patient verbalized understanding and agreed to proceed.

## 2022-03-22 NOTE — PROGRESS NOTES
FOLLOW-UP APPOINTMENT    PATIENT:   Antonieta Clay  :    1960  MR#:    [id number]  DATE OF VISIT:  2022    Dr. Darden     Dr. David Ramos- ENT     Chief Complaint: Chemo School    HPI:   Ms. Antonieta Clay presents today for chemotherapy education.  She will be starting treatment with Adriamycin, Cisplatin and Cytoxan for the above diagnosis. Patient has port placement scheduled for 2022. She is already established with audiology Dr. David Ramos at [facility] will have them do a baseline audiogram.     Depression Patient Health Questionnaire 3/23/2022 3/21/2022 3/11/2022 2022 2022 2021 2021   Over the last two weeks how often have you been bothered by little interest or pleasure in doing things 0 0 0 0 0 0 0   Over the last two weeks how often have you been bothered by feeling down, depressed or hopeless 0 0 0 0 0 0 0   PHQ-2 Total Score 0 0 0 0 0 0 0         Review of Systems   Constitutional: Negative for appetite change and unexpected weight change.   HENT:   Negative for mouth sores.    Respiratory: Negative for cough and shortness of breath.    Cardiovascular: Negative for chest pain.   Gastrointestinal: Negative for abdominal pain and diarrhea.   Genitourinary: Negative for frequency.    Musculoskeletal: Negative for back pain.   Skin: Negative for rash.   Neurological: Positive for headaches.   Hematological: Negative for adenopathy.   Psychiatric/Behavioral: The patient is nervous/anxious.        Oncology History   Malignant neoplasm of major salivary gland   2020 Initial Diagnosis    Malignant neoplasm of major salivary gland     2020 Cancer Staged    Staging form: Major Salivary Glands, AJCC 8th Edition  - Clinical stage from 2020: Stage IVB (cT4a, cN3b, cM0)     2022 -  Chemotherapy    Treatment Summary   Plan Name: ADENOID CYSTIC CARCINOMA CYCLOPHOSPHAMIDE DOXORUBICIN CISPLATIN (CAP) Q3W  Treatment Goal:  Control  Status: Active  Start Date: 4/5/2022 (Planned)  End Date: 5/17/2022 (Planned)  Provider: Gorge Sagastume MD  Chemotherapy: DOXOrubicin chemo injection 50 mg/m2 (Treatment Plan), 50 mg/m2, Intravenous, Clinic/HOD 1 time, 0 of 3 cycles  CISplatin (PLATINOL) 50 mg/m2 in sodium chloride 0.9% 500 mL chemo infusion, 50 mg/m2, Intravenous, Clinic/HOD 1 time, 0 of 3 cycles  cyclophosphamide 500 mg/m2 in sodium chloride 0.9% 250 mL chemo infusion, 500 mg/m2, Intravenous, Clinic/HOD 1 time, 0 of 3 cycles         Patient Active Problem List   Diagnosis    Malignant neoplasm of major salivary gland    Dehydration       Past Medical History:   Diagnosis Date    Hypothyroidism        Past Surgical History:   Procedure Laterality Date    left temporal bone resection  2020    MODIFIED RADICAL NECK DISSECTION Left 2020    parotidectomy Left 2020       Social History     Socioeconomic History    Marital status:    Tobacco Use    Smoking status: Never Smoker    Smokeless tobacco: Never Used   Substance and Sexual Activity    Alcohol use: Never    Drug use: Never    Sexual activity: Not Currently     Partners: Male     Birth control/protection: None     Social Determinants of Health     Financial Resource Strain: Low Risk     Difficulty of Paying Living Expenses: Not hard at all   Food Insecurity: No Food Insecurity    Worried About Running Out of Food in the Last Year: Never true    Ran Out of Food in the Last Year: Never true   Transportation Needs: No Transportation Needs    Lack of Transportation (Medical): No    Lack of Transportation (Non-Medical): No   Physical Activity: Unknown    Days of Exercise per Week: 5 days   Stress: Stress Concern Present    Feeling of Stress : To some extent   Social Connections: Unknown    Frequency of Communication with Friends and Family: More than three times a week    Frequency of Social Gatherings with Friends and Family: Once a week    Active Member of  Clubs or Organizations: No    Attends Club or Organization Meetings: Never    Marital Status:    Housing Stability: Unknown    Unable to Pay for Housing in the Last Year: No    Unstable Housing in the Last Year: No       Family History   Problem Relation Age of Onset    Breast cancer Mother     Breast cancer Maternal Aunt          Current Outpatient Medications:     ascorbic acid, vitamin C, (VITAMIN C) 1000 MG tablet, Take 1,000 mg by mouth once daily., Disp: , Rfl:     cholecalciferol, vitamin D3, 125 mcg (5,000 unit) Tab, Take 5,000 Units by mouth once daily., Disp: , Rfl:     fluticasone propionate (FLONASE) 50 mcg/actuation nasal spray, 2 sprays (100 mcg total) by Each Nostril route once daily., Disp: 18.2 mL, Rfl: 1    HYDROcodone-acetaminophen (NORCO)  mg per tablet, Take 1 tablet by mouth every 6 (six) hours as needed for Pain., Disp: , Rfl:     levothyroxine (SYNTHROID) 88 MCG tablet, Take 1 tablet (88 mcg total) by mouth once daily., Disp: 90 tablet, Rfl: 1    multivit-iron-min-folic acid 18-0.4 mg Tab, Take 1 tablet by mouth once daily. , Disp: , Rfl:     omega-3 fatty acids/fish oil (FISH OIL-OMEGA-3 FATTY ACIDS) 300-1,000 mg capsule, Take 1 capsule by mouth once daily., Disp: , Rfl:     ZINC ACETATE ORAL, Take 1 capsule by mouth once daily. , Disp: , Rfl:     ondansetron (ZOFRAN) 8 MG tablet, Take 1 tablet (8 mg total) by mouth every 8 (eight) hours as needed for Nausea., Disp: 30 tablet, Rfl: 5    promethazine (PHENERGAN) 25 MG tablet, Take 1 tablet (25 mg total) by mouth every 4 to 6 hours as needed for Nausea., Disp: 30 tablet, Rfl: 5    Current Facility-Administered Medications:     methylPREDNISolone sodium succinate injection 40 mg, 40 mg, Intravenous, Once PRN, Rj Pompa MD    Review of patient's allergies indicates:   Allergen Reactions    Pcn [penicillins] Swelling    Codeine Nausea And Vomiting       Physcial Examination  VITAL SIGNS:    Body surface area  "is 1.51 meters squared.   Pain Assessment  Vitals:    03/23/22 0858   BP: (!) 143/76   Pulse: (!) 58   Resp: 18   Temp: 97.8 °F (36.6 °C)   Height: 4' 11" (1.499 m)   PainSc: 0-No pain          Wt Readings from Last 5 Encounters:   03/11/22 54.6 kg (120 lb 4.8 oz)   01/27/22 54.9 kg (121 lb)   01/24/22 56.2 kg (123 lb 12.8 oz)   09/22/21 57.6 kg (127 lb)   09/21/21 57.6 kg (127 lb)       GENERAL:  Antonieta Clay is healthy-appearing 61 y.o. female, in no distress.   EYES:   Pupils equal, round, reactive.  Conjunctivae, sclera and lids normal.  HEENT: Head normocephalic and atraumatic, without alopecia.  Oropharynx is unremarkable.  No icterus, jaundice, stomatitis, mucositis, or ulceration is noted.  Ears are clear and unremarkable.  Nose, nares, and septum are unremarkable.    NECK:   No masses.  Thyroid and trachea are normal.    BREASTS:  Deferred.  RESPIRATORY: Clear to auscultation bilaterally.  Symmetrically effortless expansion.  No wheezing and no stridor.    CV: Heart reveals regular rate and rhythm without murmur, rub, or gallops.  ABDOMEN: Soft, non-tender.  No masses, no hernias, and no rebound or rigidity are noted.  /RECTAL:  Deferred.  LYMPHATICS: No preauricular, submandibular, cervical, supraclavicular, axillary, lymphadenopathy.  MUSCULOSKELETAL:Fair musculature, no atrophy.  No arthritic changes.  No edema or cyanosis. Back is without gross abnormal curvature.   NEUROLOGICAL: Cranial nerves II-XII grossly intact.  Motor and sensory exam intact.  SKIN:   No lesions, bruises, petechiae or rashes.  Good turgor.    PSYCHIATRIC: Patient is alert and oriented to time, place and person.  Mood and affect are appropriate.         Laboratory and Radiology   Lab Results   Component Value Date    WBC 4.1 10/05/2021    RBC 4.05 10/05/2021    HGB 12.0 10/05/2021    HCT 36.4 10/05/2021    MCV 89.9 10/05/2021    MCH 29.6 10/05/2021    MCHC 33.0 10/05/2021    RDW 13.0 10/05/2021     10/05/2021    MPV " 10.6 10/05/2021    GRAN 3.0 09/21/2021    GRAN 55.1 09/21/2021    LYMPH 1,230 10/05/2021    LYMPH 30.0 10/05/2021    MONO 492 10/05/2021    MONO 12.0 10/05/2021    EOS 90 10/05/2021    BASO 29 10/05/2021    EOSINOPHIL 2.2 10/05/2021    BASOPHIL 0.7 10/05/2021     BMP  Lab Results   Component Value Date     10/05/2021    K 4.3 10/05/2021     10/05/2021    CO2 26 10/05/2021    BUN 17 10/05/2021    CREATININE 0.92 10/05/2021    CALCIUM 9.9 10/05/2021    ANIONGAP 13 09/21/2021    ESTGFRAFRICA 78 10/05/2021    EGFRNONAA 68 10/05/2021     Lab Results   Component Value Date    ALT 22 10/05/2021    AST 21 10/05/2021    ALKPHOS 71 09/21/2021    BILITOT 0.4 10/05/2021     TITLE: PLAN OF CARE FOR THE CHEMOTHERAPY PATIENT / TEACHING PROTOCOL    PURPOSE: To involve the patient / significant other in the plan of care and to provide teaching to the significant other & patient receiving chemotherapy.    LEVEL: Independent.    CONTENT: The Plan of Care for the chemotherapy patient is individualized and appropriate to the patients needs, strengths, limitations, & goals.  Education includes information regarding chemotherapy side effects, the treatment itself, and self-care  Activities.    GOAL / OUTCOME STANDARDS    PHYSIOLOGIC: The client will remain free or experience minimal side effects or toxicities throughout the chemotherapy treatment period.     PSYCHOLOGIC: The client/significant others will demonstrate positive coping mechanisms in relation to chemotherapy and its side effects.      COGINITIVE: The client/significant others will verbalize understanding of self-care measure to avoid/minimize side effects of the chemotherapy regimen.    EVALUATION / COMMENT KEY:    V = Audiovisual/Video  S = Successfully meets outcome  N = Needs further instruction  NA = Not applicable to the patient  P = Previous knowledge  U = Unable to comprehend  * = See progress notes          PLAN OF CARE  INFORMATION TO BE DELIVERED /  NURSING INTERVENTIONS DATE EVALUATION   1. Assessment of client/caregiver,          knowledge of cancer diagnosis,          and chemotherapy as a treatment.  1a. Evaluate patient/caregiver learning ability     b. Plan teaching sessions with patient/caregiver according to needs and present anxiety level/ability to learn.     c. Provide Chemotherapy Education Packet,         Mouth Care Protocol,          Specific Patient Education Sheets. 03/23/2022  S   2. Individual chemotherapy treatment          plan.  2a. Review of Chemotherapy Education handout from [employer]              03/23/2022     S   3. Knowledge Deficit & Self-Management of general side effects common to all chemotherapy:  a. Nausea/Vomiting   b.   Diarrhea  c. Mouth Care  d. Dental care  e. Constipation  f. Hair Loss  g. Potential for infection  h. Fatigue   3a. Reinforce that the majority of side effects from chemotherapy are reversible and are  controlled both in the hospital and at home        (blood counts recover, hair grows back).   b.  Refer to the following for reinforcement of         information post-treatment:  1. Mouth Care Protocol.  2. Bowel Protocol for constipation or diarrhea.  3.  Drug Specific Chemotherapy Information Sheets for each medication patient receiving.    03/23/2022     S     PLAN OF CARE  INFORMATION TO BE DELIVERED / NURSING INTERVENTIONS DATE EVALUATION   h. Potential for bleeding         i. Potential anemia/fatigue         j. Potential sunburn         k. Birth control measures  l. Safety measures post treatment 4.  Chemotherapy Home Care Instruction  and Safety Information Sheet.  A. patient/caregivers to thoroughly cook shellfish (shrimp, crab, etc) to decrease the chance of infection.    B.  Use sunscreen and protective clothing while in the sun.   03/23/2022      4. Knowledge deficit & Self Management of Drug Specific  Side Effects.    a. BLADDER EFFECTS         (Hemorrhagic Cystitis)                      Preventable with adequate hydration; occurs 2-3 days or more post treatment.     1.  Instruct patient to:   a.   Void at least every 2 hours; increase intake.   b.   DO NOT hold urine; go when urge is felt.   c.    Empty bladder at bedtime and on          awakening.   d.   Observe for color changes (red to tea            colored), amount and frequency changes.   e.   Notify oncologist of any abnormalities           in urine or voiding or if you cannot               drink adequate fluids.    03/23/2022     S    b.   CHANGES IN URINE        COLOR:         1.   Instruct patient:   a.   Most evident in first 2-3 voidings after            administration.  b. Lasts less than 24 hours.  c. If urine is discolored 2 or more days post- treatment, notify oncologist.      03/23/2022 S   c.    KIDNEY EFFECTS           (Nephrotoxicity)   1.  Instruct patient to:  a.   Drink 8-16 glasses of fluid/day the day   pre-treatment and 3-4 days post-treatment to maintain hydration; the best way to minimize kidney problems.  b.   Notify oncologist immediately if unable to drink fluids or if changes are noted in urinary elimination.      03/23/2022     S   a. PULMONARY TOXICITY    1. Instruct patient to report symptoms such as shortness of breath, chest pain, shallow breathing, or chest wall discomfort to physician.  2. Reinforce preventative measures used by the health care team.  a. Baseline and periodic PFT and chest x-ray.   03/23/2022   S     PLAN OF CARE INFORMATION TO BE DELIVERED / NURSING INTERVENTIONS DATE EVALUATION   b. NERVE & MUSCLE EFFECTS (neurotoxocity; neuropathy, possible visual/hearing changes)        3. Instruct patient to:    a. Report numbness or tingling of the hands/feet, loss of fine motor movement (buttoning shirt, tying shoelaces), or gait changes to your oncologist.  b. If numbness/tingling are present:   protect feet with shoes at all times.   Use gloves for washing dishes/gardening & potholders in  kitchen.       03/23/2022   S   c. CARDIOTOXICITY  Decreased effectiveness of             cardiac function. Effective are                  cumulative and irreversible.                                    CARDIAC ARRYTHMIAS              4   Instruct:  a. Heart function may be tested before treatment and perdiocally during treatment.  b. Notify oncologist of irregular pulse, palpitations, shortness of breath, or swelling in lower extremities/feet.        Can cause arrhythmias on infusion that resolve once infusion discontinued. Instruct nurse if any irregularity felt.    03/23/2022   S   d. EXTRAVASTION  Occurs when vesicants leak outside of vein and cause damage to the skin and underlying tissues.   1. Reinforce preventive measures used to avoid complications.  a. Fresh IV site or central line monitored continuously with vesicant IVP.  b. Continuous infusion via central line site and blood return monitored periodically around the clock.  2. Instruct to:  a. Notify nurse of any discomfort, burning, stinging, etc. at IV site during chemotherapy administration.  b. Notify oncologist of any redness, pain, or swelling at IV site after discharge from hospital.   03/23/2022   S   e. HYPERSENSITIVITY can happen with any medication.   1. Instruct patient:  a. Nurse is with them during the initial part of treatment and will be close by to monitor.  b. Pre-medication ordered by the oncologist must be taken on time. If doses are missed, treatment will need to be re-scheduled.  c. Skin redness, itching, or hives appearing after discharge should be reported to oncologist. 03/23/2022   S       PLAN OF CARE INFORMATION TO BE DELIVERED / NURSING INTERVENTIONS DATE EVALUATION   f. FLU-LIKE SYNDROME      1. Instruct patient symptoms are hard to prevent and may include fever, shaking chills, muscle and body aches.  a. Taking prescribed medications from physician if needed.  b. Adequate fluids are important.    2. Reinforce the need to  call if temperature is         elevated to 100.4 or more  03/23/2022   S   g. HAND-FOOT SYNDROME  causes painful, symmetric swelling and redness of palms and soles                  5. Instruct patient to report any numbness or tingling in the hands or feet.  6. Explain prevention techniques, such as     a. Use heavy moisturizers to lessen skin dryness and itching, but to avoid if skin is cracked or broken  b. Bathe in tepid water, use non-perfumed soap, and wash gently. Baths with oatmeal or diluted baking soda may be soothing.  c. Avoid tight fitting shoes and repetitive actions, such as rubbing hands or applying pressure to hands/feet.  7. Review measures to take should syndrome occur:  a. Cold compresses and elevation for          edema  b. Pain medications and other measures as ordered by oncologist.   4.   Syndrome resolves few weeks after therapy. 03/23/2022   S   5. DISCHARGE PLANNING /        EDUCATION 1.    Explain importance of compliance with follow- up  tests (CBC, CMP).  2.    Verify patient/caregiver know:  a.    Oncologists office phone number.  b.    Dates of follow-up appointments.  c.    Prescriptions given for nausea  3.   Review side effects to monitor and notify          oncologist about.  4.   Reinforce the need for patient and caregivers to:  a.    Review information given.  b.    Call oncologists office with questions          or symptoms  5.   Provide [facility] Brochure make referrals if needed for financial or .   03/23/2022   S     PROGRESS NOTES: I met with the patient  today for chemotherapy education. she will be starting treatment with Adriamycin, Cytoxan and Cisplatin. We discussed the mechanism of action, potential side effects of this treatment as well as ways she can manage them at home. Some of these side effects include but or not limited to fever, nausea, vomiting, decreased appetite, fatigue, weakness, cytopenias, myalgia/arthralgia, constipation,  diarrhea, bleeding, headache, shortness of breath, nail changes, taste change, hair thinning/loss, mood disturbances, or edema. We also discussed dietary modifications she should make although this will be discussed in more detail with the dietician. she was provided with anti-emetic medication, a copy of all of the information we discussed today as well as our contact information. she will be provided a schedule on his first day of treatment. We will obtain labs on a weekly basis and the patient will follow-up with the physician for toxicity monitoring throughout treatment. All questions were answered and an informed consent was obtained. she was reminded to certainly contact us sooner if needed.  Attached to the patients folder and discussed with the patient the 24 hour/ 7 days a week after hours telephone number for the physician.  Patient notified to call anytime 24/7 because their is a physician on call for any problems that may arise.  Patient also notified to report to [facility] if they can not get in touch with a physician after hours.  Discussed the five wishes booklet with the patient and their family.           Assessment/Plan     ICD-10-CM ICD-9-CM   1. Malignant neoplasm of major salivary gland  C08.9 142.9          Medications Ordered:  Zofran 8mg 1 tab PO Q8h prn nausea  Phenergan 25mg PO Q4-6h prn nausea    Echo ordered before starting treatment    Standing Labs Ordered:  CBC weekly  CMP weekly    Follow up in about 3 weeks (around 4/13/2022) for with me and 6 weeks with Dr. Sagastume.    Total Face to Face Time: 75 minutes face to face with the patient and their family discussing the chemotherapy side-effects and when to call our office.   Electronically signed by: Louise Harrington, MSN, APRN, AGNP-C, OCN    Answers for HPI/ROS submitted by the patient on 3/21/2022  visual disturbance: No

## 2022-03-28 NOTE — TELEPHONE ENCOUNTER
----- Message from Loretta Kc sent at 3/23/2022  3:15 PM CDT -----  I submitted request on both her CT chest and PET scan with [facility], both went straight to [id number].  CT case # [id number], PET case # [id number]    A Physician-to-Physician discussion is required to complete the Notification Process. The ordering physician must call [phone] and select option #3 to engage in a Physician-to-Physician discussion. Please ensure the physician has the case number provided above available when making this call. If a Physician-to-Physician discussion is not conducted within 3 business days, this notification number request will be deemed  and you must reinitiate the Notification Process.

## 2022-04-01 NOTE — DISCHARGE INSTRUCTIONS
To confirm, Your doctor has instructed you that surgery is scheduled for: Monday April 4, 2022    Pre-Op will call the afternoon prior to surgery between 4:00 and 6:00 PM with the final arrival time.  4/1/22    Please report to Outpatient [place] via [facility] entrance, proceed to Registration.     Do not eat or drink anything after midnight the night before your surgery - THIS INCLUDES  WATER, GUM, MINTS AND CANDY.  YOU MAY BRUSH YOUR TEETH BUT DO NOT SWALLOW     TAKE ONLY THESE MEDICATIONS WITH A SMALL SIP OF WATER THE MORNING OF YOUR PROCEDURE: see list    PLEASE NOTE:  The surgery schedule has many variables which may affect the time of your surgery case.  Family members should be available if your surgery time changes.  Plan to be here the day of your procedure between 4-6 hours.    DO NOT TAKE THESE MEDICATIONS 5-7 DAYS PRIOR to your procedure or per your surgeon's request: ASPIRIN, ALEVE, ADVIL, IBUPROFEN,  BRUCE SELTZER, BC , FISH OIL , VITAMIN E, HERBALS  (May take Tylenol)                                                  IMPORTANT INSTRUCTIONS    Do not smoke, vape or drink alcoholic beverages 24 hours prior to your procedure.  Shower the night before AND the morning of your procedure with a Chlorhexidine wash such as Hibiclens or Dial antibacterial soap from the neck down.    Do not get it on your face or in your eyes.  You may use your own shampoo and face wash. This helps your skin to be as bacteria free as possible.    DO NOT remove hair from the surgery site.  Do not shave the incision site unless you are given specific instructions to do so.    Sleep in a bed with clean sheets.  Do not sleep with a pet in the bed.   If you wear contact lenses, dentures, hearing aids or glasses, bring a container to put them in during surgery and give to a family member for safe keeping.    Please leave all jewelry, piercing's and valuables at home.     Make arrangements in advance for transportation home by a  responsible adult.    You must make arrangements for transportation, TAXI'S, UBER'S OR LYFTS ARE NOT ALLOWED.      If you have any questions about these instructions, call Pre-Op Admit  Nursing at [phone] , Pre-Op Day Surgery Unit at [phone]

## 2022-04-04 NOTE — DISCHARGE INSTRUCTIONS
Call MD for severe bleeding, temp greater than 100.6, redness, swelling, foul drainage or odor, persistent nausea/vomiting, severe pain not relieved by pain medication.   May shower in 24 hours.  No swimming, tub baths, hot tubs, etc.  Showers only until incision is completely healed.

## 2022-04-04 NOTE — ANESTHESIA PREPROCEDURE EVALUATION
04/04/2022  Antonieta Clay is a 61 y.o., female.      Patient Active Problem List   Diagnosis    Malignant neoplasm of major salivary gland    Dehydration       Past Surgical History:   Procedure Laterality Date    left temporal bone resection  2020    MODIFIED RADICAL NECK DISSECTION Left 2020    parotidectomy Left 2020        Tobacco Use:  The patient  reports that she has never smoked. She has never used smokeless tobacco.     Results for orders placed or performed during the hospital encounter of 04/01/22   EKG 12-lead    Collection Time: 04/01/22 12:22 PM    Narrative    Test Reason : Z01.818,    Vent. Rate : 056 BPM     Atrial Rate : 056 BPM     P-R Int : 160 ms          QRS Dur : 080 ms      QT Int : 422 ms       P-R-T Axes : 071 077 065 degrees     QTc Int : 407 ms    Sinus bradycardia  Otherwise normal ECG  When compared with ECG of 21-SEP-2021 05:27,  No significant change was found    Referred By:  DAVIDSON           Confirmed By:              Lab Results   Component Value Date    WBC 4.46 04/01/2022    HGB 10.9 (L) 04/01/2022    HCT 34.0 (L) 04/01/2022    MCV 89 04/01/2022     04/01/2022     BMP  Lab Results   Component Value Date     04/01/2022    K 3.8 04/01/2022     04/01/2022    CO2 28 04/01/2022    BUN 16 04/01/2022    CREATININE 0.7 04/01/2022    CALCIUM 9.7 04/01/2022    ANIONGAP 10 04/01/2022     04/01/2022    GLU 83 10/05/2021    GLU 89 09/21/2021       Results for orders placed in visit on 03/30/22    Echo    Interpretation Summary  · The left ventricle is normal in size with concentric remodeling and hyperdynamic systolic function.  · The estimated ejection fraction is 75%.  · Normal right ventricular size with normal right ventricular systolic function.  · Mild tricuspid regurgitation.  · Mild mitral regurgitation.  · Normal central venous pressure (3  mmHg).  · The estimated PA systolic pressure is 23 mmHg.          Pre-op Assessment    I have reviewed the Patient Summary Reports.     I have reviewed the Nursing Notes. I have reviewed the NPO Status.   I have reviewed the Medications.     Review of Systems  Anesthesia Hx:  No problems with previous Anesthesia Denies Hx of Anesthetic complications  Denies Family Hx of Anesthesia complications.   Denies Personal Hx of Anesthesia complications.   Social:  Non-Smoker    Hematology/Oncology:  Hematology Normal      Current/Recent Cancer. (Head and neck s/p 32 rounds of radiation therapy.)   EENT/Dental:EENT/Dental Normal   Cardiovascular:  Cardiovascular Normal     Pulmonary:  Pulmonary Normal    Renal/:  Renal/ Normal     Hepatic/GI:  Hepatic/GI Normal    Musculoskeletal:  Musculoskeletal Normal    Neurological:   Headaches    Endocrine:   Hypothyroidism    Psych:  Psychiatric Normal           Physical Exam  General: Well nourished    Airway:  Mallampati: II   Mouth Opening: Normal  TM Distance: Normal  Tongue: Normal  Neck ROM: Normal ROM    Dental:  Intact    Chest/Lungs:  Clear to auscultation    Heart:  Rate: Normal  Rhythm: Regular Rhythm  Sounds: Normal        Anesthesia Plan  Type of Anesthesia, risks & benefits discussed:    Anesthesia Type: MAC  Intra-op Monitoring Plan: Standard ASA Monitors  Post Op Pain Control Plan: multimodal analgesia  Induction:  IV  Informed Consent: Informed consent signed with the Patient and all parties understand the risks and agree with anesthesia plan.  All questions answered.   ASA Score: 3  Anesthesia Plan Notes: Local /MAC  Multimodal analgesia with ofirmev 1000mg, decadron 8 mg, local by surgeon.        Ready For Surgery From Anesthesia Perspective.     .

## 2022-04-04 NOTE — TRANSFER OF CARE
"Anesthesia Transfer of Care Note    Patient: Antonieta Clay    Procedure(s) Performed: Procedure(s) (LRB):  [id number]-R-CATH (Right)    Patient location: PACU    Anesthesia Type: MAC    Transport from OR: Transported from OR on room air with adequate spontaneous ventilation    Post pain: adequate analgesia    Post assessment: no apparent anesthetic complications    Post vital signs: stable    Level of consciousness: awake and alert    Nausea/Vomiting: no nausea/vomiting    Complications: none    Transfer of care protocol was followed      Last vitals:   Visit Vitals  /67 (BP Location: Left arm, Patient Position: Lying)   Pulse (!) 57   Temp 36.7 °C (98 °F) (Oral)   Resp 16   Ht 4' 11" (1.499 m)   Wt 55.3 kg (122 lb)   SpO2 99%   Breastfeeding No   BMI 24.64 kg/m²     "

## 2022-04-04 NOTE — ANESTHESIA POSTPROCEDURE EVALUATION
Anesthesia Post Evaluation    Patient: Antonieta Clay    Procedure(s) Performed: Procedure(s) (LRB):  [id number]-N-CATH (Right)    Final Anesthesia Type: MAC      Patient location during evaluation: PACU  Patient participation: Yes- Able to Participate  Level of consciousness: awake and alert  Post-procedure vital signs: reviewed and stable  Pain management: adequate  Airway patency: patent    PONV status at discharge: No PONV  Anesthetic complications: no      Cardiovascular status: hemodynamically stable  Respiratory status: unassisted, spontaneous ventilation and room air  Hydration status: euvolemic  Follow-up not needed.          Vitals Value Taken Time   /57 04/04/22 1017   Temp 36.2 °C (97.1 °F) 04/04/22 1015   Pulse 60 04/04/22 1022   Resp 14 04/04/22 1022   SpO2 95 % 04/04/22 1022   Vitals shown include unvalidated device data.      No case tracking events are documented in the log.      Pain/Nandini Score: Nandini Score: 10 (4/4/2022 10:15 AM)

## 2022-04-04 NOTE — PLAN OF CARE
Pt oriented to unit and educated on procedure, fall prevention, pain management.  Pt encouraged to verbalize any questions or concerns.  Call light in reach, pt instructed on use.

## 2022-04-04 NOTE — DISCHARGE SUMMARY
Discharge Summary  General Surgery      Admit Date: 4/4/2022    Discharge Date :4/4/2022    Attending Physician: Charles Servin III     Discharge Physician: Charles Servin III    Discharged Condition: good    Discharge Diagnosis: Malignant neoplasm of major salivary gland [C08.9]    Treatments/Procedures: Procedure(s) (LRB):  [id number]-CATH (Right)    Hospital Course: Uneventful; Discharged home from Recovery    Significant Diagnostic Studies: none    Disposition: Home or Self Care    Diet: Regular    Follow up: Office 10-14 days    Activity: No heavy lifting till seen in office.    Patient Instructions:   Discharge Medication List as of 4/4/2022 10:45 AM      CONTINUE these medications which have NOT CHANGED    Details   ascorbic acid, vitamin C, (VITAMIN C) 1000 MG tablet Take 1,000 mg by mouth once daily., Historical Med      cholecalciferol, vitamin D3, 125 mcg (5,000 unit) Tab Take 5,000 Units by mouth once daily., Historical Med      fluticasone propionate (FLONASE) 50 mcg/actuation nasal spray 2 sprays (100 mcg total) by Each Nostril route once daily., Starting Tue 6/9/2020, OTC      HYDROcodone-acetaminophen (NORCO)  mg per tablet Take 1 tablet by mouth every 6 (six) hours as needed for Pain., Historical Med      levothyroxine (SYNTHROID) 88 MCG tablet Take 1 tablet (88 mcg total) by mouth once daily., Starting Tue 1/18/2022, Normal      multivit-iron-min-folic acid 18-0.4 mg Tab Take 1 tablet by mouth once daily. , Historical Med      omega-3 fatty acids/fish oil (FISH OIL-OMEGA-3 FATTY ACIDS) 300-1,000 mg capsule Take 1 capsule by mouth once daily., Historical Med      ondansetron (ZOFRAN) 8 MG tablet Take 1 tablet (8 mg total) by mouth every 8 (eight) hours as needed for Nausea., Starting Wed 3/23/2022, Until Thu 3/23/2023 at 2359, Normal      promethazine (PHENERGAN) 25 MG tablet Take 1 tablet (25 mg total) by mouth every 4 to 6 hours as needed for Nausea., Starting Wed 3/23/2022,  Normal      ZINC ACETATE ORAL Take 1 capsule by mouth once daily. , Historical Med             Discharge Procedure Orders   Diet Adult Regular     Lifting restrictions   Order Comments: No lifting over twenty pounds for six weeks     Remove dressing in 48 hours

## 2022-04-04 NOTE — PLAN OF CARE
IMPRESSION:     Right subclavian Port-A-Cath tip is in the anatomic region of the SVC. No pneumothorax.     Electronically signed by:  Wesley Linda DO  4/4/2022 10:01 AM

## 2022-04-04 NOTE — OP NOTE
Surgery Date: 4/4/2022     Surgeon(s) and Role:     * Charles Servin III, MD - Primary    Assisting Surgeon: None    Pre-op Diagnosis:  Malignant neoplasm of major salivary gland [C08.9], venous insufficiency     Post-op Diagnosis:  Post-Op Diagnosis Codes:     * Malignant neoplasm of major salivary gland [C08.9], venous insufficiency     Procedure(s) (LRB):  [id number]-CATH (Right) - right subclavian vein percutaneous access, using fluoroscopy to position the catheter tip into the SVC      Anesthesia: General    Operative Findings: The patient was taken to operating room and transferred to the operating room table in the supine position.  Patient was given sedation.  Bilateral neck and chest were sterilely prepped and draped.  The patient was put in Trendelenburg position.  Large gauge needle was used to percutaneously access the right subclavian vein.   A guide wire was placed through the needle and into the venous system without any issue.  Under fluoroscopy, the guide wire was seen in the venous system.  The patient was put back in the flat position.   An incision was made at the percutaneous stick site and a subcutaneous pocket was made inferiorly.  Under Seldinger technique a venous sheath was placed in into the venous system over the guide wire.  The catheter was then placed into the venous system through the sheath.   The sheath was then peeled away and discarded.   Under fluoroscopy, the catheter tip was positioned into the superior vena cava.  The catheter was cut to size.   The port was attached to the catheter.  The port was accessed and it flushed and aspirated freely.  The port was then placed into the subcutaneous pocket and sutured to the chest wall.  The incision was then closed in 2 layers, first with a deep dermal layer of Vicryl followed by a subcuticular 4-0 Monocryl to close the skin.  After that, procedure was finished.  Patient was transferred to the recovery room in stable  condition.  Chest x-ray will be performed in the recovery room.    Estimated Blood Loss: 5  Estimated Blood Loss has been documented.   Complications none  Instrument counts correct         Specimens:   Specimen (24h ago, onward)            None          [id number]

## 2022-04-05 NOTE — PROGRESS NOTES
Medical Nutrition Therapy Oncology Progress Note      Patient's PCP:Casandra Yu NP  Referring Provider: Louise Harrington  Subjective:        Patient ID: Antonieta Clay is a 61 y.o. female.    Chief Complaint: Nutrition Assessment    Past Medical History:   Diagnosis Date    Cancer     neck    HA (headache)     Hypothyroidism        Past Surgical History:   Procedure Laterality Date    INSERTION OF TUNNELED CENTRAL VENOUS CATHETER (CVC) WITH SUBCUTANEOUS PORT Right 4/4/2022    Procedure: NZWOIHTLI-DIKW-A-CATH;  Surgeon: Charles Servin III, MD;  Location: [facility];  Service: General;  Laterality: Right;    left temporal bone resection  2020    MODIFIED RADICAL NECK DISSECTION Left 2020    parotidectomy Left 2020       Social History     Socioeconomic History    Marital status:    Tobacco Use    Smoking status: Never Smoker    Smokeless tobacco: Never Used   Substance and Sexual Activity    Alcohol use: Never    Drug use: Never    Sexual activity: Not Currently     Partners: Male     Birth control/protection: None     Social Determinants of Health     Financial Resource Strain: Low Risk     Difficulty of Paying Living Expenses: Not hard at all   Food Insecurity: No Food Insecurity    Worried About Running Out of Food in the Last Year: Never true    Ran Out of Food in the Last Year: Never true   Transportation Needs: No Transportation Needs    Lack of Transportation (Medical): No    Lack of Transportation (Non-Medical): No   Physical Activity: Unknown    Days of Exercise per Week: 5 days   Stress: Stress Concern Present    Feeling of Stress : To some extent   Social Connections: Unknown    Frequency of Communication with Friends and Family: More than three times a week    Frequency of Social Gatherings with Friends and Family: Once a week    Active Member of Clubs or Organizations: No    Attends Club or Organization Meetings: Never    Marital  Status:    Housing Stability: Unknown    Unable to Pay for Housing in the Last Year: No    Unstable Housing in the Last Year: No       Family History   Problem Relation Age of Onset    Breast cancer Mother     Breast cancer Maternal Aunt        Review of patient's allergies indicates:   Allergen Reactions    Pcn [penicillins] Swelling    Codeine Nausea And Vomiting       Current Outpatient Medications:     ascorbic acid, vitamin C, (VITAMIN C) 1000 MG tablet, Take 1,000 mg by mouth once daily., Disp: , Rfl:     cholecalciferol, vitamin D3, 125 mcg (5,000 unit) Tab, Take 5,000 Units by mouth once daily., Disp: , Rfl:     fluticasone propionate (FLONASE) 50 mcg/actuation nasal spray, 2 sprays (100 mcg total) by Each Nostril route once daily., Disp: 18.2 mL, Rfl: 1    HYDROcodone-acetaminophen (NORCO)  mg per tablet, Take 1 tablet by mouth every 6 (six) hours as needed for Pain., Disp: , Rfl:     levothyroxine (SYNTHROID) 88 MCG tablet, Take 1 tablet (88 mcg total) by mouth once daily., Disp: 90 tablet, Rfl: 1    multivit-iron-min-folic acid 18-0.4 mg Tab, Take 1 tablet by mouth once daily. , Disp: , Rfl:     omega-3 fatty acids/fish oil (FISH OIL-OMEGA-3 FATTY ACIDS) 300-1,000 mg capsule, Take 1 capsule by mouth once daily., Disp: , Rfl:     ondansetron (ZOFRAN) 8 MG tablet, Take 1 tablet (8 mg total) by mouth every 8 (eight) hours as needed for Nausea., Disp: 30 tablet, Rfl: 5    promethazine (PHENERGAN) 25 MG tablet, Take 1 tablet (25 mg total) by mouth every 4 to 6 hours as needed for Nausea., Disp: 30 tablet, Rfl: 5    ZINC ACETATE ORAL, Take 1 capsule by mouth once daily. , Disp: , Rfl:     Current Facility-Administered Medications:     methylPREDNISolone sodium succinate injection 40 mg, 40 mg, Intravenous, Once PRN, Rj Pompa MD    Facility-Administered Medications Ordered in Other Visits:     cyclophosphamide 500 mg/m2 = 760 mg in sodium chloride 0.9% 253.8 mL chemo  infusion, 500 mg/m2, Intravenous, 1 time in Clinic/HOD, DIANA Marina, Last Rate: 253.8 mL/hr at 04/05/22 1203, 760 mg at 04/05/22 1203    heparin, porcine (PF) 100 unit/mL injection flush 500 Units, 500 Units, Intravenous, PRN, DIANA Marina    sodium chloride 0.9% 250 mL flush bag, , Intravenous, 1 time in Clinic/[facility], DIANA Marina    sodium chloride 0.9% bolus 1,000 mL, 1,000 mL, Intravenous, 1 time in Clinic/HOD, DIANA Marina    sodium chloride 0.9% flush 10 mL, 10 mL, Intravenous, PRN, DIANA Marina    All medications and past history have been reviewed.    ADENOID CYSTIC CARCINOMA CYCLOPHOSPHAMIDE DOXORUBICIN CISPLATIN (CAP) Q3W      Treatment Goal:   Control      Status:   Active      Start Date:   4/5/2022      End Date:   5/17/2022 (Planned)      Provider:   Gorge Sagastume MD      Chemotherapy:   DOXOrubicin chemo injection 76 mg, 50 mg/m2 = 76 mg, Intravenous, Clinic/HOD 1 time, 1 of 3 cycles    Administration: 76 mg (4/5/2022)        CISplatin (PLATINOL) 50 mg/m2 = 76 mg in sodium chloride 0.9% 576 mL chemo infusion, 50 mg/m2 = 76 mg, Intravenous, Clinic/HOD 1 time, 1 of 3 cycles    Administration: 76 mg (4/5/2022)        cyclophosphamide 500 mg/m2 = 760 mg in sodium chloride 0.9% 253.8 mL chemo infusion, 500 mg/m2 = 760 mg, Intravenous, Clinic/HOD 1 time, 1 of 3 cycles    Administration: 760 mg (4/5/2022)      Objective:      Wt Readings from Last 20 Encounters:   04/05/22 55.8 kg (123 lb 1.6 oz)   04/04/22 55.3 kg (122 lb)   04/01/22 55.3 kg (122 lb)   03/30/22 54.4 kg (120 lb)   03/11/22 54.6 kg (120 lb 4.8 oz)   01/27/22 54.9 kg (121 lb)   01/24/22 56.2 kg (123 lb 12.8 oz)   09/22/21 57.6 kg (127 lb)   09/21/21 57.6 kg (127 lb)   07/28/21 57.6 kg (127 lb)   07/22/21 57.6 kg (127 lb)   05/28/21 56.2 kg (124 lb)   01/29/21 56 kg (123 lb 6.4 oz)   01/28/21 56.7 kg (125 lb)   01/19/21 56 kg (123 lb 6.4 oz)   01/15/21 58.1 kg (128 lb)   10/16/20 58.1 kg (128  lb)   09/21/20 57.6 kg (126 lb 14.4 oz)   09/17/20 57.6 kg (127 lb)   06/09/20 58.5 kg (129 lb)         Last Labs:  Last Labs:  Glucose   Date Value Ref Range Status   04/01/2022 102 70 - 110 mg/dL Final   10/05/2021 83 65 - 99 mg/dL Final     Comment:                   Fasting reference interval          BUN   Date Value Ref Range Status   04/01/2022 16 8 - 23 mg/dL Final   10/05/2021 17 7 - 25 mg/dL Final     Creatinine   Date Value Ref Range Status   04/01/2022 0.7 0.5 - 1.4 mg/dL Final   10/05/2021 0.92 0.50 - 0.99 mg/dL Final     Comment:     For patients >49 years of age, the reference limit  for Creatinine is approximately 13% higher for people  identified as -American.          Sodium   Date Value Ref Range Status   04/01/2022 142 136 - 145 mmol/L Final   10/05/2021 141 135 - 146 mmol/L Final     Potassium   Date Value Ref Range Status   04/01/2022 3.8 3.5 - 5.1 mmol/L Final   10/05/2021 4.3 3.5 - 5.3 mmol/L Final     No results found for: PHOS  Calcium   Date Value Ref Range Status   04/01/2022 9.7 8.7 - 10.5 mg/dL Final   10/05/2021 9.9 8.6 - 10.4 mg/dL Final     No results found for: PREALBUMIN  Total Protein   Date Value Ref Range Status   04/01/2022 7.5 6.0 - 8.4 g/dL Final   10/05/2021 7.3 6.1 - 8.1 g/dL Final     Cholesterol   Date Value Ref Range Status   10/05/2021 247 (H) <200 mg/dL Final   01/28/2021 244 (H) <200 mg/dL Final     No results found for: HGBA1C  Hemoglobin   Date Value Ref Range Status   04/01/2022 10.9 (L) 12.0 - 16.0 g/dL Final   10/05/2021 12.0 11.7 - 15.5 g/dL Final     Hematocrit   Date Value Ref Range Status   04/01/2022 34.0 (L) 37.0 - 48.5 % Final   10/05/2021 36.4 35.0 - 45.0 % Final     No results found for: IRON  No components found for: FROLATE  No results found for: [id number]  WBC   Date Value Ref Range Status   04/01/2022 4.46 3.90 - 12.70 K/uL Final   10/05/2021 4.1 3.8 - 10.8 Thousand/uL Final       Assessment:     Nutrition/Diet History     Patient Reported  Diet/Restrictions/Preferences: regular diet  Food Allergies: NKFA  Factors Affecting Nutritional Intake: dysgeusia    Estimated/Assessed Needs     Weight Used For Calorie Calculations: 56 kg (123 lb)  Energy Calorie Requirements (kcal): 7004-9763 kcal/day   Energy Need Method: 25-30 Kcal/kg  Protein Requirements: 67-84 g/day   Protein Need Method: 1.2-1.5 g/kg  Fluid Requirements: 2000 ml/day  Estimated Fluid Requirement Method: 1ml/kcal      Nutrition Support  N/A    Evaluation of Received Nutrient/Fluid Intake     Calorie Intake: meeting needs  Protein Intake: meeting needs  Fluid Intake: meeting needs  Tolerance: tolerating  % Intake of Estimated Energy Needs: %      Nutrition Diagnosis Related to (Etiology) As Evidenced By (Signs/Symptoms)   No nutrition diagnosis at this time       RD Notes  Mrs. Tere Clay is a 60y/o female with recurrent salivary gland cancer. She will be receiving cisplatin and doxorubicin. She reports good appetite and intake. Denies any recent significant unintentional weight changes. She reports she does have lingering taste changes from past radiation but all other side effects have dissipated. Today she offered several questions regarding food safety, sugar, herbal/antioxidant supplements and foods she should and should not eat during treatment. She denies N/V/D/C. BMs normal. Denies having any other nutrition related questions or concerns at the current time. CW: 123#    Nutrition Intervention:      Nutrition Intervention General/healthful diet   Goals/Expected Outcomes Continue general well balanced diet to meet estimated calorie and protein needs to prevent weight loss during treatment   Progress Initial     Nutrition Intervention Fluid-modified diet   Goals/Expected Outcomes Initiate aggressive hydration via water and electrolyte rich fluids to prevent dehydration and treatment delays   Progress Initial     Plan  1. Provided nutrition chemo school packet and reviewed with  patient  2. Discussed importance of weight and hydration maintenance during treatment  3. Discussed current food safety recommendations and precautions to take during treatment  4. Discussed caution with herbal and antioxidant supplementation during chemotherapy  5. Answered pt questions to the best of my ability and to the patient's satisfaction  6. Provided RD contact info and encouraged pt to call with questions or concerns    Monitoring/Evaluation:     Monitor: po intake, weight, BMs    Next Visit: f/u as needed      I have explained and the patient understands all of  the current recommendation(s). I have answered all of their questions to the best of my ability and to their complete satisfaction.   The patient is to continue with the current management plan.    Electronically signed by: Mariela Gallegos MBA, RAJESHN, LDN

## 2022-04-06 NOTE — PROGRESS NOTES
I initially met Ms. Clay in September 2020; today I met with her to provide her with updated information and complete the NCCN Distress Screening.  Patient indicated a distress rating of 4; she denied needing mental health supports.  Patient denied needing [facility] financial assistance information or any other psychosocial supports at this time.  I provided her with the updated [facility] support group flyer and my contact information in the event she needs supportive services in the future.

## 2022-04-08 NOTE — PROGRESS NOTES
PROGRESS NOTE    Subjective:       Patient ID: Antonieta Clay is a 61 y.o. female.    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 Postoperative changes in the left side of the neck with slightly  increased FDG activity from background.  2. No FDG avid lymphadenopathy or finding of additional distant sites  of FDG avid disease.    10/20/2020-12/4/2020  Radiation therapy.    3/11/2021:  MRI Neck: no evidence of recurrence.    MRI Brain: no acute abnormalities.    12/3/2021:  CT chest: new ground glass, RUL, infectious vs met. 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     Chief Complaint:  Parotid cancer follow up    History of Present Illness:   Antonieta Clay is a 61 y.o. female who presents for follow up of above.      Ms. Clay has been diagnosed with a recurrence of disease in the mastoid bone.  She has completed cycle 1 Cytocan, Adriamycin and  She has tolerated treatment well. She does report some nausea but this is controlled by her Zofran. She continues to eat and drink well. She denies any diarrhea or constipation.     Family and Social history reviewed and is unchanged from 9/21/2020      ROS:  Review of Systems   Constitutional: Positive for appetite change and fatigue. Negative for fever and unexpected weight change.   HENT: Negative for mouth sores.    Eyes: Negative for visual disturbance.   Respiratory: Negative for cough and shortness of breath.    Cardiovascular: Negative for chest pain and leg swelling.   Gastrointestinal: Negative for abdominal pain, blood in stool and diarrhea.   Genitourinary: Negative for frequency and hematuria.   Musculoskeletal: Negative for back pain.   Skin: Negative for  "rash.   Neurological: Negative for headaches.   Hematological: Negative for adenopathy.   Psychiatric/Behavioral: The patient is not nervous/anxious.           Current Outpatient Medications:     ascorbic acid, vitamin C, (VITAMIN C) 1000 MG tablet, Take 1,000 mg by mouth once daily., Disp: , Rfl:     cholecalciferol, vitamin D3, 125 mcg (5,000 unit) Tab, Take 5,000 Units by mouth once daily., Disp: , Rfl:     fluticasone propionate (FLONASE) 50 mcg/actuation nasal spray, 2 sprays (100 mcg total) by Each Nostril route once daily., Disp: 18.2 mL, Rfl: 1    HYDROcodone-acetaminophen (NORCO)  mg per tablet, Take 1 tablet by mouth every 6 (six) hours as needed for Pain., Disp: , Rfl:     levothyroxine (SYNTHROID) 88 MCG tablet, Take 1 tablet (88 mcg total) by mouth once daily., Disp: 90 tablet, Rfl: 1    multivit-iron-min-folic acid 18-0.4 mg Tab, Take 1 tablet by mouth once daily. , Disp: , Rfl:     omega-3 fatty acids/fish oil (FISH OIL-OMEGA-3 FATTY ACIDS) 300-1,000 mg capsule, Take 1 capsule by mouth once daily., Disp: , Rfl:     ondansetron (ZOFRAN) 8 MG tablet, Take 1 tablet (8 mg total) by mouth every 8 (eight) hours as needed for Nausea., Disp: 30 tablet, Rfl: 5    promethazine (PHENERGAN) 25 MG tablet, Take 1 tablet (25 mg total) by mouth every 4 to 6 hours as needed for Nausea., Disp: 30 tablet, Rfl: 5    ZINC ACETATE ORAL, Take 1 capsule by mouth once daily. , Disp: , Rfl:     Current Facility-Administered Medications:     methylPREDNISolone sodium succinate injection 40 mg, 40 mg, Intravenous, Once PRN, Rj Pompa MD        Objective:       Physical Examination:     /73   Pulse 92   Temp 98 °F (36.7 °C)   Resp 20   Ht 4' 11" (1.499 m)   BMI 24.86 kg/m²     Physical Exam  Constitutional:       Appearance: Normal appearance. She is well-developed.   HENT:      Head: Normocephalic and atraumatic.      Right Ear: External ear normal.      Left Ear: External ear normal.      " Mouth/Throat:      Mouth: Mucous membranes are moist.      Pharynx: Oropharynx is clear. No oropharyngeal exudate or posterior oropharyngeal erythema.      Comments: No visible or palpable oral lesions.   Eyes:      Conjunctiva/sclera: Conjunctivae normal.      Pupils: Pupils are equal, round, and reactive to light.   Neck:      Thyroid: No thyromegaly.      Trachea: No tracheal deviation.   Cardiovascular:      Rate and Rhythm: Normal rate and regular rhythm.      Heart sounds: Normal heart sounds.   Pulmonary:      Effort: Pulmonary effort is normal.      Breath sounds: Normal breath sounds.   Chest:   Breasts:      Right: No axillary adenopathy or supraclavicular adenopathy.      Left: No axillary adenopathy or supraclavicular adenopathy.       Abdominal:      General: Bowel sounds are normal. There is no distension.      Palpations: Abdomen is soft. There is no mass.      Tenderness: There is no abdominal tenderness.   Lymphadenopathy:      Head:      Right side of head: No submental, submandibular, tonsillar or preauricular adenopathy.      Left side of head: No submental, submandibular, tonsillar or preauricular adenopathy.      Cervical: No cervical adenopathy.      Upper Body:      Right upper body: No supraclavicular or axillary adenopathy.      Left upper body: No supraclavicular or axillary adenopathy.   Skin:     General: Skin is warm and dry.      Findings: No rash.   Neurological:      General: No focal deficit present.      Mental Status: She is alert and oriented to person, place, and time.      Comments: Neuro intact througout   Psychiatric:         Mood and Affect: Mood normal.         Behavior: Behavior normal.         Thought Content: Thought content normal.         Judgment: Judgment normal.         Labs:   Recent Results (from the past 336 hour(s))   CBC Auto Differential    Collection Time: 04/11/22 10:01 AM   Result Value Ref Range    WBC 2.82 (L) 3.90 - 12.70 K/uL    Hemoglobin 11.8 (L) 12.0  - 16.0 g/dL    Hematocrit 35.4 (L) 37.0 - 48.5 %    Platelets 229 150 - 450 K/uL   CBC Auto Differential    Collection Time: 04/01/22  1:49 PM   Result Value Ref Range    WBC 4.46 3.90 - 12.70 K/uL    Hemoglobin 10.9 (L) 12.0 - 16.0 g/dL    Hematocrit 34.0 (L) 37.0 - 48.5 %    Platelets 263 150 - 450 K/uL     CMP  Sodium   Date Value Ref Range Status   04/11/2022 136 136 - 145 mmol/L Final     Potassium   Date Value Ref Range Status   04/11/2022 3.8 3.5 - 5.1 mmol/L Final     Chloride   Date Value Ref Range Status   04/11/2022 98 95 - 110 mmol/L Final     CO2   Date Value Ref Range Status   04/11/2022 28 23 - 29 mmol/L Final     Glucose   Date Value Ref Range Status   04/11/2022 125 (H) 70 - 110 mg/dL Final     BUN   Date Value Ref Range Status   04/11/2022 19 8 - 23 mg/dL Final     Creatinine   Date Value Ref Range Status   04/11/2022 0.9 0.5 - 1.4 mg/dL Final     Calcium   Date Value Ref Range Status   04/11/2022 9.6 8.7 - 10.5 mg/dL Final     Total Protein   Date Value Ref Range Status   04/11/2022 8.1 6.0 - 8.4 g/dL Final     Albumin   Date Value Ref Range Status   04/11/2022 4.2 3.5 - 5.2 g/dL Final     Total Bilirubin   Date Value Ref Range Status   04/11/2022 0.7 0.1 - 1.0 mg/dL Final     Comment:     For infants and newborns, interpretation of results should be based  on gestational age, weight and in agreement with clinical  observations.    Premature Infant recommended reference ranges:  Up to 24 hours.............<8.0 mg/dL  Up to 48 hours............<12.0 mg/dL  3-5 days..................<15.0 mg/dL  6-29 days.................<15.0 mg/dL       Alkaline Phosphatase   Date Value Ref Range Status   04/11/2022 85 55 - 135 U/L Final     AST   Date Value Ref Range Status   04/11/2022 23 10 - 40 U/L Final     ALT   Date Value Ref Range Status   04/11/2022 42 10 - 44 U/L Final     Anion Gap   Date Value Ref Range Status   04/11/2022 10 8 - 16 mmol/L Final     eGFR if    Date Value Ref Range  Status   04/11/2022 >60.0 >60 mL/min/1.73 m^2 Final     eGFR if non    Date Value Ref Range Status   04/11/2022 >60.0 >60 mL/min/1.73 m^2 Final     Comment:     Calculation used to obtain the estimated glomerular filtration  rate (eGFR) is the CKD-EPI equation.        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 Salivary Gland Cancer- S/p cycle 1 Cytoxan, Adriamycin and Cisplatin  2. Nausea- Zofran and Phenergan PRN- AMb ref to [facility] to eval for use medical marijuana  3. Fatigue- Increase activity    Discussion:     Follow up in about 3 weeks (around 5/2/2022) for with Dr. Sagastume and 6 weeks with me.      Electronically signed by Louise Harrington, MSN, APRN, AGNP-C, OCN

## 2022-04-18 PROBLEM — D70.1 CHEMOTHERAPY INDUCED NEUTROPENIA: Status: ACTIVE | Noted: 2022-01-01

## 2022-04-18 PROBLEM — T45.1X5A CHEMOTHERAPY INDUCED NEUTROPENIA: Status: ACTIVE | Noted: 2022-01-01

## 2022-04-18 NOTE — TELEPHONE ENCOUNTER
Critical labs  WBC 1.44    Per Louise, pt is to receive Neupogen x 3. Orders placed. Scheduling will call once we receive auth. Pt notified and verbalized understanding.

## 2022-04-26 NOTE — PROGRESS NOTES
Medical Nutrition Therapy Oncology Progress Note      Patient's PCP:Casandra Yu NP  Referring Provider: No ref. provider found  Subjective:        Patient ID: Antonieta Clay is a 61 y.o. female.    Chief Complaint: Nutrition Follow Up    Past Medical History:   Diagnosis Date    Cancer     neck    HA (headache)     Hypothyroidism        Past Surgical History:   Procedure Laterality Date    INSERTION OF TUNNELED CENTRAL VENOUS CATHETER (CVC) WITH SUBCUTANEOUS PORT Right 4/4/2022    Procedure: [id number];  Surgeon: Charles Servin III, MD;  Location: [facility];  Service: General;  Laterality: Right;    left temporal bone resection  2020    MODIFIED RADICAL NECK DISSECTION Left 2020    parotidectomy Left 2020       Social History     Socioeconomic History    Marital status:    Tobacco Use    Smoking status: Never Smoker    Smokeless tobacco: Never Used   Substance and Sexual Activity    Alcohol use: Never    Drug use: Never    Sexual activity: Not Currently     Partners: Male     Birth control/protection: None     Social Determinants of Health     Financial Resource Strain: Low Risk     Difficulty of Paying Living Expenses: Not hard at all   Food Insecurity: No Food Insecurity    Worried About Running Out of Food in the Last Year: Never true    Ran Out of Food in the Last Year: Never true   Transportation Needs: No Transportation Needs    Lack of Transportation (Medical): No    Lack of Transportation (Non-Medical): No   Physical Activity: Inactive    Days of Exercise per Week: 0 days    Minutes of Exercise per Session: 0 min   Stress: Stress Concern Present    Feeling of Stress : Very much   Social Connections: Unknown    Frequency of Communication with Friends and Family: More than three times a week    Frequency of Social Gatherings with Friends and Family: Never    Active Member of Clubs or Organizations: No    Attends Club or  Organization Meetings: Never    Marital Status:    Housing Stability: Low Risk     Unable to Pay for Housing in the Last Year: No    Number of Places Lived in the Last Year: 1    Unstable Housing in the Last Year: No       Family History   Problem Relation Age of Onset    Breast cancer Mother     Breast cancer Maternal Aunt        Review of patient's allergies indicates:   Allergen Reactions    Pcn [penicillins] Swelling    Codeine Nausea And Vomiting       Current Outpatient Medications:     ascorbic acid, vitamin C, (VITAMIN C) 1000 MG tablet, Take 1,000 mg by mouth once daily., Disp: , Rfl:     benzonatate (TESSALON PERLES) 100 MG capsule, Take 1 capsule (100 mg total) by mouth 3 (three) times daily as needed for Cough., Disp: 30 capsule, Rfl: 1    cholecalciferol, vitamin D3, 125 mcg (5,000 unit) Tab, Take 5,000 Units by mouth once daily., Disp: , Rfl:     fluticasone propionate (FLONASE) 50 mcg/actuation nasal spray, 2 sprays (100 mcg total) by Each Nostril route once daily., Disp: 18.2 mL, Rfl: 1    HYDROcodone-acetaminophen (NORCO)  mg per tablet, Take 1 tablet by mouth every 6 (six) hours as needed for Pain., Disp: , Rfl:     levothyroxine (SYNTHROID) 88 MCG tablet, Take 1 tablet (88 mcg total) by mouth once daily., Disp: 90 tablet, Rfl: 1    multivit-iron-min-folic acid 18-0.4 mg Tab, Take 1 tablet by mouth once daily. , Disp: , Rfl:     omega-3 fatty acids/fish oil (FISH OIL-OMEGA-3 FATTY ACIDS) 300-1,000 mg capsule, Take 1 capsule by mouth once daily., Disp: , Rfl:     ondansetron (ZOFRAN) 8 MG tablet, Take 1 tablet (8 mg total) by mouth every 8 (eight) hours as needed for Nausea., Disp: 30 tablet, Rfl: 5    promethazine (PHENERGAN) 25 MG tablet, Take 1 tablet (25 mg total) by mouth every 4 to 6 hours as needed for Nausea., Disp: 30 tablet, Rfl: 5    ZINC ACETATE ORAL, Take 1 capsule by mouth once daily. , Disp: , Rfl:     Current Facility-Administered Medications:      methylPREDNISolone sodium succinate injection 40 mg, 40 mg, Intravenous, Once PRN, Rj Pompa MD    Facility-Administered Medications Ordered in Other Visits:     alteplase injection 2 mg, 2 mg, Intra-Catheter, PRN, DIANA Marina    heparin, porcine (PF) 100 unit/mL injection flush 500 Units, 500 Units, Intravenous, PRN, DIANA Marina    sodium chloride 0.9% 250 mL flush bag, , Intravenous, 1 time in Clinic/HOD, DIANA Marina    sodium chloride 0.9% bolus 1,000 mL, 1,000 mL, Intravenous, 1 time in Clinic/HOD, DIANA Marina, Last Rate: 500 mL/hr at 04/26/22 1259, 1,000 mL at 04/26/22 1259    sodium chloride 0.9% flush 10 mL, 10 mL, Intravenous, PRN, DIANA Marina    All medications and past history have been reviewed.    ADENOID CYSTIC CARCINOMA CYCLOPHOSPHAMIDE DOXORUBICIN CISPLATIN (CAP) Q3W      Treatment Goal:   Control      Status:   Active      Start Date:   4/5/2022      End Date:   5/17/2022 (Planned)      Provider:   Gorge Sagastume MD      Chemotherapy:   DOXOrubicin chemo injection 76 mg, 50 mg/m2 = 76 mg, Intravenous, Clinic/HOD 1 time, 2 of 3 cycles    Administration: 76 mg (4/5/2022), 76 mg (4/26/2022)        CISplatin (PLATINOL) 50 mg/m2 = 76 mg in sodium chloride 0.9% 576 mL chemo infusion, 50 mg/m2 = 76 mg, Intravenous, Clinic/HOD 1 time, 2 of 3 cycles    Administration: 76 mg (4/5/2022), 76 mg (4/26/2022)        cyclophosphamide 500 mg/m2 = 760 mg in sodium chloride 0.9% 253.8 mL chemo infusion, 500 mg/m2 = 760 mg, Intravenous, Clinic/HOD 1 time, 2 of 3 cycles    Administration: 760 mg (4/5/2022), 760 mg (4/26/2022)      Objective:      Wt Readings from Last 20 Encounters:   04/26/22 55 kg (121 lb 4.8 oz)   04/19/22 54.4 kg (120 lb)   04/05/22 55.8 kg (123 lb 1.6 oz)   04/04/22 55.3 kg (122 lb)   04/01/22 55.3 kg (122 lb)   03/30/22 54.4 kg (120 lb)   03/11/22 54.6 kg (120 lb 4.8 oz)   01/27/22 54.9 kg (121 lb)   01/24/22 56.2 kg (123 lb 12.8 oz)    09/22/21 57.6 kg (127 lb)   09/21/21 57.6 kg (127 lb)   07/28/21 57.6 kg (127 lb)   07/22/21 57.6 kg (127 lb)   05/28/21 56.2 kg (124 lb)   01/29/21 56 kg (123 lb 6.4 oz)   01/28/21 56.7 kg (125 lb)   01/19/21 56 kg (123 lb 6.4 oz)   01/15/21 58.1 kg (128 lb)   10/16/20 58.1 kg (128 lb)   09/21/20 57.6 kg (126 lb 14.4 oz)         Last Labs:  Last Labs:  Glucose   Date Value Ref Range Status   04/25/2022 108 70 - 110 mg/dL Final   04/18/2022 83 70 - 110 mg/dL Final     BUN   Date Value Ref Range Status   04/25/2022 15 8 - 23 mg/dL Final   04/18/2022 12 8 - 23 mg/dL Final     Creatinine   Date Value Ref Range Status   04/25/2022 0.7 0.5 - 1.4 mg/dL Final   04/18/2022 0.7 0.5 - 1.4 mg/dL Final     Sodium   Date Value Ref Range Status   04/25/2022 140 136 - 145 mmol/L Final   04/18/2022 139 136 - 145 mmol/L Final     Potassium   Date Value Ref Range Status   04/25/2022 3.6 3.5 - 5.1 mmol/L Final   04/18/2022 3.9 3.5 - 5.1 mmol/L Final     No results found for: PHOS  Calcium   Date Value Ref Range Status   04/25/2022 9.6 8.7 - 10.5 mg/dL Final   04/18/2022 9.4 8.7 - 10.5 mg/dL Final     No results found for: PREALBUMIN  Total Protein   Date Value Ref Range Status   04/25/2022 7.3 6.0 - 8.4 g/dL Final   04/18/2022 7.6 6.0 - 8.4 g/dL Final     Cholesterol   Date Value Ref Range Status   10/05/2021 247 (H) <200 mg/dL Final   01/28/2021 244 (H) <200 mg/dL Final     No results found for: HGBA1C  Hemoglobin   Date Value Ref Range Status   04/25/2022 9.9 (L) 12.0 - 16.0 g/dL Final   04/18/2022 10.0 (L) 12.0 - 16.0 g/dL Final     Hematocrit   Date Value Ref Range Status   04/25/2022 30.2 (L) 37.0 - 48.5 % Final   04/18/2022 30.0 (L) 37.0 - 48.5 % Final     No results found for: IRON  No components found for: FROLATE  No results found for: [id number]  WBC   Date Value Ref Range Status   04/25/2022 4.22 3.90 - 12.70 K/uL Final   04/18/2022 1.44 (LL) 3.90 - 12.70 K/uL Final     Comment:     WBC  result(s) called and verbal  readback obtained from Tanisha Post RN by RED 04/18/2022 13:21         Assessment:     Nutrition/Diet History     Patient Reported Diet/Restrictions/Preferences: regular diet  Food Allergies: NKFA  Factors Affecting Nutritional Intake: dysgeusia    Estimated/Assessed Needs     Weight Used For Calorie Calculations: 56 kg (123 lb)  Energy Calorie Requirements (kcal): 5363-7944 kcal/day   Energy Need Method: 25-30 Kcal/kg  Protein Requirements: 67-84 g/day   Protein Need Method: 1.2-1.5 g/kg  Fluid Requirements: 2000 ml/day  Estimated Fluid Requirement Method: 1ml/kcal      Nutrition Support  N/A    Evaluation of Received Nutrient/Fluid Intake     Calorie Intake: meeting needs  Protein Intake: meeting needs  Fluid Intake: meeting needs  Tolerance: tolerating  % Intake of Estimated Energy Needs: %      Nutrition Diagnosis Related to (Etiology) As Evidenced By (Signs/Symptoms)   No nutrition diagnosis at this time       RD Notes  Mrs. Tere Clay is a 62y/o female with recurrent salivary gland cancer. She will be receiving cisplatin and doxorubicin. She reports good appetite and intake. Denies any recent significant unintentional weight changes. She reports she does have lingering taste changes from past radiation but all other side effects have dissipated. Today she offered several questions regarding food safety, sugar, herbal/antioxidant supplements and foods she should and should not eat during treatment. She denies N/V/D/C. BMs normal. Denies having any other nutrition related questions or concerns at the current time. CW: 123#    4/26: RD met with Mrs. Clay for nutrition follow up today while in infusion. Pt reports continued excellent appetite and intake. She did have some N/V after last infusion which has now resolved. She denies N/V/D/C at the current time. BMs normal. She reports excellent hydration using water and watermelon.She has occasional dry mouth which she uses baking soda/salt mouth wash  with relief. She denies having any nutrition related questions or concerns at the current time. CW: 121#    Nutrition Intervention:      Nutrition Intervention General/healthful diet   Goals/Expected Outcomes Continue general well balanced diet to meet estimated calorie and protein needs to prevent weight loss during treatment   Progress Initial     Nutrition Intervention Fluid-modified diet   Goals/Expected Outcomes Initiate aggressive hydration via water and electrolyte rich fluids to prevent dehydration and treatment delays   Progress Initial     Plan  1. Discussed importance of weight and hydration maintenance during treatment  2. Continue small, frequent meals and snacks throughout the day to maintain current weight  3. Continue baking soda/salt mouth wash as needed to prevent dry mouth, taste changes and mouth sores  4. Provided RD contact info and encouraged pt to call with questions or concerns    Monitoring/Evaluation:     Monitor: po intake, weight, BMs    Next Visit: f/u as needed      I have explained and the patient understands all of  the current recommendation(s). I have answered all of their questions to the best of my ability and to their complete satisfaction.   The patient is to continue with the current management plan.    Electronically signed by: Mariela Gallegos MBA, RDN, LDN

## 2022-05-03 NOTE — PROGRESS NOTES
PROGRESS NOTE    Subjective:       Patient ID: Antonieta Clay is a 61 y.o. female.    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 Postoperative changes in the left side of the neck with slightly  increased FDG activity from background.  2. No FDG avid lymphadenopathy or finding of additional distant sites  of FDG avid disease.    10/20/2020-12/4/2020  Radiation therapy.    3/11/2021:  MRI Neck: no evidence of recurrence.    MRI Brain: no acute abnormalities.    12/3/2021:  CT chest: new ground glass, RUL, infectious vs met. 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    Cytoxan/Kwasi/Cisplatin:  Cycle 1: 4/5/2022  Cycle 2: 4/26/2022  Cycle 3: 5/17/2022-due     Chief Complaint:  No chief complaint on file.  Parotid cancer follow up    History of Present Illness:   Antonieta Clay is a 61 y.o. female who presents for follow up of above.      Ms. Clay is s/p 2 cycle of chemotherapy.  Noted increased symptoms of fatigue.  No fever.       Family and Social history reviewed and is unchanged from 9/21/2020      ROS:  Review of Systems   Constitutional: Negative for appetite change, fever and unexpected weight change.   HENT: Negative for mouth sores.    Eyes: Negative for visual disturbance.   Respiratory: Negative for cough and shortness of breath.    Cardiovascular: Negative for chest pain and leg swelling.   Gastrointestinal: Negative for abdominal pain, blood in stool and diarrhea.   Genitourinary: Negative for frequency and hematuria.   Musculoskeletal: Negative for back pain.   Skin: Negative for rash.   Neurological: Negative for headaches.   Hematological: Negative for adenopathy.   Psychiatric/Behavioral: The patient  "is not nervous/anxious.           Current Outpatient Medications:     ascorbic acid, vitamin C, (VITAMIN C) 1000 MG tablet, Take 1,000 mg by mouth once daily., Disp: , Rfl:     benzonatate (TESSALON PERLES) 100 MG capsule, Take 1 capsule (100 mg total) by mouth 3 (three) times daily as needed for Cough., Disp: 30 capsule, Rfl: 1    cholecalciferol, vitamin D3, 125 mcg (5,000 unit) Tab, Take 5,000 Units by mouth once daily., Disp: , Rfl:     fluticasone propionate (FLONASE) 50 mcg/actuation nasal spray, 2 sprays (100 mcg total) by Each Nostril route once daily., Disp: 18.2 mL, Rfl: 1    HYDROcodone-acetaminophen (NORCO)  mg per tablet, Take 1 tablet by mouth every 6 (six) hours as needed for Pain., Disp: , Rfl:     levothyroxine (SYNTHROID) 88 MCG tablet, Take 1 tablet (88 mcg total) by mouth once daily., Disp: 90 tablet, Rfl: 1    multivit-iron-min-folic acid 18-0.4 mg Tab, Take 1 tablet by mouth once daily. , Disp: , Rfl:     omega-3 fatty acids/fish oil (FISH OIL-OMEGA-3 FATTY ACIDS) 300-1,000 mg capsule, Take 1 capsule by mouth once daily., Disp: , Rfl:     ondansetron (ZOFRAN) 8 MG tablet, Take 1 tablet (8 mg total) by mouth every 8 (eight) hours as needed for Nausea., Disp: 30 tablet, Rfl: 5    promethazine (PHENERGAN) 25 MG tablet, Take 1 tablet (25 mg total) by mouth every 4 to 6 hours as needed for Nausea., Disp: 30 tablet, Rfl: 5    ZINC ACETATE ORAL, Take 1 capsule by mouth once daily. , Disp: , Rfl:     Current Facility-Administered Medications:     methylPREDNISolone sodium succinate injection 40 mg, 40 mg, Intravenous, Once PRN, Rj Pompa MD        Objective:       Physical Examination:     /66   Pulse 85   Temp 97.3 °F (36.3 °C)   Resp 18   Ht 4' 11" (1.499 m)   Wt 52.8 kg (116 lb 4.8 oz)   BMI 23.49 kg/m²     Physical Exam  Constitutional:       Appearance: She is well-developed.   HENT:      Head: Normocephalic and atraumatic.      Right Ear: External ear normal. "      Left Ear: External ear normal.      Mouth/Throat:      Mouth: Mucous membranes are moist.      Pharynx: Oropharynx is clear. No oropharyngeal exudate or posterior oropharyngeal erythema.      Comments: No visible or palpable oral lesions.   Eyes:      Conjunctiva/sclera: Conjunctivae normal.      Pupils: Pupils are equal, round, and reactive to light.   Neck:      Thyroid: No thyromegaly.      Trachea: No tracheal deviation.   Cardiovascular:      Rate and Rhythm: Normal rate and regular rhythm.      Heart sounds: Normal heart sounds.   Pulmonary:      Effort: Pulmonary effort is normal.      Breath sounds: Normal breath sounds.   Chest:   Breasts:      Right: No axillary adenopathy or supraclavicular adenopathy.      Left: No axillary adenopathy or supraclavicular adenopathy.       Abdominal:      General: Bowel sounds are normal. There is no distension.      Palpations: Abdomen is soft. There is no mass.      Tenderness: There is no abdominal tenderness.   Lymphadenopathy:      Head:      Right side of head: No submental, submandibular, tonsillar or preauricular adenopathy.      Left side of head: No submental, submandibular, tonsillar or preauricular adenopathy.      Cervical: No cervical adenopathy.      Upper Body:      Right upper body: No supraclavicular or axillary adenopathy.      Left upper body: No supraclavicular or axillary adenopathy.   Skin:     Findings: No rash.   Neurological:      Comments: Neuro intact througout   Psychiatric:         Behavior: Behavior normal.         Thought Content: Thought content normal.         Judgment: Judgment normal.         Labs:   Recent Results (from the past 336 hour(s))   CBC Auto Differential    Collection Time: 05/02/22 11:04 AM   Result Value Ref Range    WBC 2.09 (L) 3.90 - 12.70 K/uL    Hemoglobin 11.0 (L) 12.0 - 16.0 g/dL    Hematocrit 33.0 (L) 37.0 - 48.5 %    Platelets 319 150 - 450 K/uL   CBC Auto Differential    Collection Time: 04/25/22 11:13 AM    Result Value Ref Range    WBC 4.22 3.90 - 12.70 K/uL    Hemoglobin 9.9 (L) 12.0 - 16.0 g/dL    Hematocrit 30.2 (L) 37.0 - 48.5 %    Platelets 537 (H) 150 - 450 K/uL     CMP  Sodium   Date Value Ref Range Status   05/02/2022 137 136 - 145 mmol/L Final     Potassium   Date Value Ref Range Status   05/02/2022 4.1 3.5 - 5.1 mmol/L Final     Chloride   Date Value Ref Range Status   05/02/2022 100 95 - 110 mmol/L Final     CO2   Date Value Ref Range Status   05/02/2022 27 23 - 29 mmol/L Final     Glucose   Date Value Ref Range Status   05/02/2022 99 70 - 110 mg/dL Final     BUN   Date Value Ref Range Status   05/02/2022 19 8 - 23 mg/dL Final     Creatinine   Date Value Ref Range Status   05/02/2022 0.9 0.5 - 1.4 mg/dL Final     Calcium   Date Value Ref Range Status   05/02/2022 9.7 8.7 - 10.5 mg/dL Final     Total Protein   Date Value Ref Range Status   05/02/2022 7.8 6.0 - 8.4 g/dL Final     Albumin   Date Value Ref Range Status   05/02/2022 4.1 3.5 - 5.2 g/dL Final     Total Bilirubin   Date Value Ref Range Status   05/02/2022 0.5 0.1 - 1.0 mg/dL Final     Comment:     For infants and newborns, interpretation of results should be based  on gestational age, weight and in agreement with clinical  observations.    Premature Infant recommended reference ranges:  Up to 24 hours.............<8.0 mg/dL  Up to 48 hours............<12.0 mg/dL  3-5 days..................<15.0 mg/dL  6-29 days.................<15.0 mg/dL       Alkaline Phosphatase   Date Value Ref Range Status   05/02/2022 68 55 - 135 U/L Final     AST   Date Value Ref Range Status   05/02/2022 19 10 - 40 U/L Final     ALT   Date Value Ref Range Status   05/02/2022 23 10 - 44 U/L Final     Anion Gap   Date Value Ref Range Status   05/02/2022 10 8 - 16 mmol/L Final     eGFR if    Date Value Ref Range Status   05/02/2022 >60.0 >60 mL/min/1.73 m^2 Final     eGFR if non    Date Value Ref Range Status   05/02/2022 >60.0 >60 mL/min/1.73  m^2 Final     Comment:     Calculation used to obtain the estimated glomerular filtration  rate (eGFR) is the CKD-EPI equation.        No results found for: CEA  No results found for: PSA        Assessment/Plan:     Problem List Items Addressed This Visit     Malignant neoplasm of major salivary gland     Patient is s/p 2 cycles of chemotherapy and is having expected toxicity.  Labs look ok and will continue therapy, labs allowing.  She does appears dehydrated at this time and will arrange for IVFs to be done today.      Reviewed PET scan with her today.  There are some suspicious lesions in the bones but nothing clearly diagnostic of metastatic disease.  Reviewed images with them today.     RTC 3 weeks with NP, 6 with me.            Dehydration     Will arrange IVFs today.                  Discussion:     Follow up in about 3 weeks (around 5/25/2022).      Electronically signed by Gorge Coombs

## 2022-05-17 NOTE — PLAN OF CARE
Problem: Fatigue  Goal: Improved Activity Tolerance  Outcome: Ongoing, Progressing  Intervention: Promote Improved Energy  Flowsheets (Taken 5/17/2022 0750)  Fatigue Management:   paced activity encouraged   frequent rest breaks encouraged   fatigue-related activity identified  Sleep/Rest Enhancement:   regular sleep/rest pattern promoted   relaxation techniques promoted   family presence promoted

## 2022-05-20 PROBLEM — Z51.89 AFTERCARE: Status: ACTIVE | Noted: 2022-01-01

## 2022-05-20 NOTE — PLAN OF CARE
Problem: Fatigue  Goal: Improved Activity Tolerance  Outcome: Ongoing, Progressing  Intervention: Promote Improved Energy  Flowsheets (Taken 5/20/2022 1000)  Fatigue Management:   frequent rest breaks encouraged   fatigue-related activity identified   paced activity encouraged  Sleep/Rest Enhancement:   regular sleep/rest pattern promoted   relaxation techniques promoted

## 2022-05-23 NOTE — PLAN OF CARE
Problem: Fluid Volume Deficit  Goal: Fluid Balance  5/23/2022 1035 by Elodia Herbert RN  Outcome: Met  5/23/2022 1035 by Elodia Herbert RN  Outcome: Ongoing, Progressing

## 2022-05-23 NOTE — PROGRESS NOTES
PROGRESS NOTE    Subjective:       Patient ID: Antonieta Clay is a 61 y.o. female.    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 Postoperative changes in the left side of the neck with slightly  increased FDG activity from background.  2. No FDG avid lymphadenopathy or finding of additional distant sites  of FDG avid disease.    10/20/2020-12/4/2020  Radiation therapy.    3/11/2021:  MRI Neck: no evidence of recurrence.    MRI Brain: no acute abnormalities.    12/3/2021:  CT chest: new ground glass, RUL, infectious vs met. 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    Cytoxan/Kwasi/Cisplatin:  Cycle 1: 4/5/2022  Cycle 2: 4/26/2022  Cycle 3: 5/17/2022    Chief Complaint:  Parotid cancer follow up    History of Present Illness:   Antonieta Clay is a 61 y.o. female who presents for follow up of above.      Ms. Clay is s/p 3 cycle of chemotherapy.  Noted increased symptoms of fatigue.  No fever.   She did feel a little better with fluids on Friday. She does have increase leg weakness hgb stable at 9.7.    She does tania increased GERD and has been taking Tums OTC with little relief.    Family and Social history reviewed and is unchanged from 9/21/2020      ROS:  Review of Systems   Constitutional: Positive for fatigue. Negative for appetite change, fever and unexpected weight change.   HENT: Negative for mouth sores.    Eyes: Negative for visual disturbance.   Respiratory: Negative for cough and shortness of breath.    Cardiovascular: Negative for chest pain and leg swelling.   Gastrointestinal: Negative for abdominal pain, blood in stool and diarrhea.   Genitourinary: Negative for frequency and hematuria.    Musculoskeletal: Negative for back pain.   Skin: Negative for rash.   Neurological: Negative for headaches.   Hematological: Negative for adenopathy.   Psychiatric/Behavioral: The patient is not nervous/anxious.           Current Outpatient Medications:     ascorbic acid, vitamin C, (VITAMIN C) 1000 MG tablet, Take 1,000 mg by mouth once daily., Disp: , Rfl:     benzonatate (TESSALON PERLES) 100 MG capsule, Take 1 capsule (100 mg total) by mouth 3 (three) times daily as needed for Cough., Disp: 30 capsule, Rfl: 1    cholecalciferol, vitamin D3, 125 mcg (5,000 unit) Tab, Take 5,000 Units by mouth once daily., Disp: , Rfl:     fluticasone propionate (FLONASE) 50 mcg/actuation nasal spray, 2 sprays (100 mcg total) by Each Nostril route once daily., Disp: 18.2 mL, Rfl: 1    HYDROcodone-acetaminophen (NORCO)  mg per tablet, Take 1 tablet by mouth every 6 (six) hours as needed for Pain., Disp: , Rfl:     levothyroxine (SYNTHROID) 88 MCG tablet, Take 1 tablet (88 mcg total) by mouth once daily., Disp: 90 tablet, Rfl: 1    multivit-iron-min-folic acid 18-0.4 mg Tab, Take 1 tablet by mouth once daily. , Disp: , Rfl:     omega-3 fatty acids/fish oil (FISH OIL-OMEGA-3 FATTY ACIDS) 300-1,000 mg capsule, Take 1 capsule by mouth once daily., Disp: , Rfl:     ondansetron (ZOFRAN) 8 MG tablet, Take 1 tablet (8 mg total) by mouth every 8 (eight) hours as needed for Nausea., Disp: 30 tablet, Rfl: 5    promethazine (PHENERGAN) 25 MG tablet, Take 1 tablet (25 mg total) by mouth every 4 to 6 hours as needed for Nausea., Disp: 30 tablet, Rfl: 5    ZINC ACETATE ORAL, Take 1 capsule by mouth once daily. , Disp: , Rfl:     famotidine (PEPCID) 40 MG tablet, Take 1 tablet (40 mg total) by mouth every evening., Disp: 30 tablet, Rfl: 1    Current Facility-Administered Medications:     0.9%  NaCl infusion, , Intravenous, Once, Gorge Sagastume MD, Stopped at 05/04/22 1321    methylPREDNISolone sodium succinate injection  "40 mg, 40 mg, Intravenous, Once PRN, Rj Pompa MD        Objective:       Physical Examination:     BP 96/64   Pulse 98   Temp 96.3 °F (35.7 °C)   Resp 18   Ht 4' 11" (1.499 m)   Wt 54.3 kg (119 lb 9.6 oz)   BMI 24.16 kg/m²     Physical Exam  Constitutional:       Appearance: Normal appearance. She is well-developed.   HENT:      Head: Normocephalic and atraumatic.      Right Ear: External ear normal.      Left Ear: External ear normal.      Mouth/Throat:      Mouth: Mucous membranes are moist.      Pharynx: Oropharynx is clear. No oropharyngeal exudate or posterior oropharyngeal erythema.      Comments: No visible or palpable oral lesions.   Eyes:      Conjunctiva/sclera: Conjunctivae normal.      Pupils: Pupils are equal, round, and reactive to light.   Neck:      Thyroid: No thyromegaly.      Trachea: No tracheal deviation.   Cardiovascular:      Rate and Rhythm: Normal rate and regular rhythm.      Heart sounds: Normal heart sounds.   Pulmonary:      Effort: Pulmonary effort is normal.      Breath sounds: Normal breath sounds.   Chest:   Breasts:      Right: No axillary adenopathy or supraclavicular adenopathy.      Left: No axillary adenopathy or supraclavicular adenopathy.       Abdominal:      General: Bowel sounds are normal. There is no distension.      Palpations: Abdomen is soft. There is no mass.      Tenderness: There is no abdominal tenderness.   Lymphadenopathy:      Head:      Right side of head: No submental, submandibular, tonsillar or preauricular adenopathy.      Left side of head: No submental, submandibular, tonsillar or preauricular adenopathy.      Cervical: No cervical adenopathy.      Upper Body:      Right upper body: No supraclavicular or axillary adenopathy.      Left upper body: No supraclavicular or axillary adenopathy.   Skin:     General: Skin is warm and dry.      Findings: No rash.   Neurological:      General: No focal deficit present.      Mental Status: She is alert " and oriented to person, place, and time.      Comments: Neuro intact througout   Psychiatric:         Behavior: Behavior normal.         Thought Content: Thought content normal.         Judgment: Judgment normal.         Labs:   Recent Results (from the past 336 hour(s))   CBC Auto Differential    Collection Time: 05/23/22 10:20 AM   Result Value Ref Range    WBC 2.82 (L) 3.90 - 12.70 K/uL    Hemoglobin 9.7 (L) 12.0 - 16.0 g/dL    Hematocrit 28.6 (L) 37.0 - 48.5 %    Platelets 230 150 - 450 K/uL   CBC Auto Differential    Collection Time: 05/16/22 10:48 AM   Result Value Ref Range    WBC 4.53 3.90 - 12.70 K/uL    Hemoglobin 9.0 (L) 12.0 - 16.0 g/dL    Hematocrit 27.9 (L) 37.0 - 48.5 %    Platelets 255 150 - 450 K/uL     CMP  Sodium   Date Value Ref Range Status   05/23/2022 138 136 - 145 mmol/L Final     Potassium   Date Value Ref Range Status   05/23/2022 3.8 3.5 - 5.1 mmol/L Final     Chloride   Date Value Ref Range Status   05/23/2022 99 95 - 110 mmol/L Final     CO2   Date Value Ref Range Status   05/23/2022 28 23 - 29 mmol/L Final     Glucose   Date Value Ref Range Status   05/23/2022 127 (H) 70 - 110 mg/dL Final     BUN   Date Value Ref Range Status   05/23/2022 19 8 - 23 mg/dL Final     Creatinine   Date Value Ref Range Status   05/23/2022 0.8 0.5 - 1.4 mg/dL Final     Calcium   Date Value Ref Range Status   05/23/2022 9.8 8.7 - 10.5 mg/dL Final     Total Protein   Date Value Ref Range Status   05/23/2022 7.7 6.0 - 8.4 g/dL Final     Albumin   Date Value Ref Range Status   05/23/2022 3.9 3.5 - 5.2 g/dL Final     Total Bilirubin   Date Value Ref Range Status   05/23/2022 0.7 0.1 - 1.0 mg/dL Final     Comment:     For infants and newborns, interpretation of results should be based  on gestational age, weight and in agreement with clinical  observations.    Premature Infant recommended reference ranges:  Up to 24 hours.............<8.0 mg/dL  Up to 48 hours............<12.0 mg/dL  3-5 days..................<15.0  mg/dL  6-29 days.................<15.0 mg/dL       Alkaline Phosphatase   Date Value Ref Range Status   05/23/2022 67 55 - 135 U/L Final     AST   Date Value Ref Range Status   05/23/2022 20 10 - 40 U/L Final     ALT   Date Value Ref Range Status   05/23/2022 34 10 - 44 U/L Final     Anion Gap   Date Value Ref Range Status   05/23/2022 11 8 - 16 mmol/L Final     eGFR if    Date Value Ref Range Status   05/23/2022 >60.0 >60 mL/min/1.73 m^2 Final     eGFR if non    Date Value Ref Range Status   05/23/2022 >60.0 >60 mL/min/1.73 m^2 Final     Comment:     Calculation used to obtain the estimated glomerular filtration  rate (eGFR) is the CKD-EPI equation.        No results found for: CEA          Assessment/Plan:     Problem List Items Addressed This Visit        Oncology    Malignant neoplasm of major salivary gland    Relevant Orders    CT Chest With Contrast    CT Soft Tissue Neck W WO Contrast    Chemotherapy induced neutropenia      Other Visit Diagnoses     Gastroesophageal reflux disease, unspecified whether esophagitis present    -  Primary    Relevant Medications    famotidine (PEPCID) 40 MG tablet    Anemia in neoplastic disease            1. Salivary Gland Cancer- Repeat CT Chest and Neck scan 1st week in June  2. GERD- Pepcid PRN  3. Anemia- Stable cont weekly labs  4. Chemo Induced Neutropenia- Continue weekly labs    Discussion:     Follow up in about 3 weeks (around 6/13/2022) for with Dr. Sagastume.      Electronically signed by Louise Harrington, MSN, APRN, AGNP-C, OCN

## 2022-05-30 NOTE — TELEPHONE ENCOUNTER
Called pt regarding labs. WBC count, hgb, and hct were low. Asked how she was feeling and she stated that she is feeling fine, but she starts to feel weak if she stands up for long periods of time. Asked if she would like blood and she said that she would like to hold off for now, but will call us if she starts to feel worst.

## 2022-06-12 NOTE — PROGRESS NOTES
PROGRESS NOTE    Subjective:       Patient ID: Antonieta lCay is a 61 y.o. female.    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 Postoperative changes in the left side of the neck with slightly  increased FDG activity from background.  2. No FDG avid lymphadenopathy or finding of additional distant sites  of FDG avid disease.    10/20/2020-12/4/2020  Radiation therapy.    3/11/2021:  MRI Neck: no evidence of recurrence.    MRI Brain: no acute abnormalities.    12/3/2021:  CT chest: new ground glass, RUL, infectious vs met. 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    Cytoxan/Kwasi/Cisplatin:  Cycle 1: 4/5/2022  Cycle 2: 4/26/2022  Cycle 3: 5/17/2022 5/31/2022:  CT neck and chest:  Mastoid lesion ? Less prominent.  No other significant changes or new growth.     Chief Complaint:  No chief complaint on file.  Parotid cancer follow up    History of Present Illness:   Antonieta Clay is a 61 y.o. female who presents for follow up of above.      Ms. Clay is back after CT scans and 3 cycles of chemotherapy.      Family and Social history reviewed and is unchanged from 9/21/2020      ROS:  Review of Systems   Constitutional: Negative for appetite change, fever and unexpected weight change.   HENT: Negative for mouth sores.    Eyes: Negative for visual disturbance.   Respiratory: Negative for cough and shortness of breath.    Cardiovascular: Negative for chest pain and leg swelling.   Gastrointestinal: Negative for abdominal pain, blood in stool and diarrhea.   Genitourinary: Negative for frequency and hematuria.   Musculoskeletal: Negative for back pain.   Skin: Negative for rash.   Neurological: Negative for headaches.    Hematological: Negative for adenopathy.   Psychiatric/Behavioral: The patient is not nervous/anxious.           Current Outpatient Medications:     ascorbic acid, vitamin C, (VITAMIN C) 1000 MG tablet, Take 1,000 mg by mouth once daily., Disp: , Rfl:     benzonatate (TESSALON PERLES) 100 MG capsule, Take 1 capsule (100 mg total) by mouth 3 (three) times daily as needed for Cough., Disp: 30 capsule, Rfl: 1    cholecalciferol, vitamin D3, 125 mcg (5,000 unit) Tab, Take 5,000 Units by mouth once daily., Disp: , Rfl:     fluticasone propionate (FLONASE) 50 mcg/actuation nasal spray, 2 sprays (100 mcg total) by Each Nostril route once daily., Disp: 18.2 mL, Rfl: 1    levothyroxine (SYNTHROID) 88 MCG tablet, Take 1 tablet (88 mcg total) by mouth once daily., Disp: 90 tablet, Rfl: 1    multivit-iron-min-folic acid 18-0.4 mg Tab, Take 1 tablet by mouth once daily. , Disp: , Rfl:     omega-3 fatty acids/fish oil (FISH OIL-OMEGA-3 FATTY ACIDS) 300-1,000 mg capsule, Take 1 capsule by mouth once daily., Disp: , Rfl:     ondansetron (ZOFRAN) 8 MG tablet, Take 1 tablet (8 mg total) by mouth every 8 (eight) hours as needed for Nausea., Disp: 30 tablet, Rfl: 5    promethazine (PHENERGAN) 25 MG tablet, Take 1 tablet (25 mg total) by mouth every 4 to 6 hours as needed for Nausea., Disp: 30 tablet, Rfl: 5    ZINC ACETATE ORAL, Take 1 capsule by mouth once daily. , Disp: , Rfl:     famotidine (PEPCID) 40 MG tablet, Take 1 tablet (40 mg total) by mouth every evening., Disp: 30 tablet, Rfl: 1    HYDROcodone-acetaminophen (NORCO)  mg per tablet, Take 1 tablet by mouth every 6 (six) hours as needed for Pain., Disp: , Rfl:     Current Facility-Administered Medications:     0.9%  NaCl infusion, , Intravenous, Once, Gorge Sagastume MD, Stopped at 05/04/22 1321    methylPREDNISolone sodium succinate injection 40 mg, 40 mg, Intravenous, Once PRN, Rj Pompa MD        Objective:       Physical Examination:     BP  "112/69   Pulse 68   Temp 97.4 °F (36.3 °C)   Resp 18   Ht 4' 11" (1.499 m)   Wt 55.8 kg (123 lb)   BMI 24.84 kg/m²     Physical Exam  Constitutional:       Appearance: She is well-developed.   HENT:      Head: Normocephalic and atraumatic.      Right Ear: External ear normal.      Left Ear: External ear normal.      Mouth/Throat:      Mouth: Mucous membranes are moist.      Pharynx: Oropharynx is clear. No oropharyngeal exudate or posterior oropharyngeal erythema.      Comments: No visible or palpable oral lesions.   Eyes:      Conjunctiva/sclera: Conjunctivae normal.      Pupils: Pupils are equal, round, and reactive to light.   Neck:      Thyroid: No thyromegaly.      Trachea: No tracheal deviation.   Cardiovascular:      Rate and Rhythm: Normal rate and regular rhythm.      Heart sounds: Normal heart sounds.   Pulmonary:      Effort: Pulmonary effort is normal.      Breath sounds: Normal breath sounds.   Chest:   Breasts:      Right: No axillary adenopathy or supraclavicular adenopathy.      Left: No axillary adenopathy or supraclavicular adenopathy.       Abdominal:      General: Bowel sounds are normal. There is no distension.      Palpations: Abdomen is soft. There is no mass.      Tenderness: There is no abdominal tenderness.   Lymphadenopathy:      Head:      Right side of head: No submental, submandibular, tonsillar or preauricular adenopathy.      Left side of head: No submental, submandibular, tonsillar or preauricular adenopathy.      Cervical: No cervical adenopathy.      Upper Body:      Right upper body: No supraclavicular or axillary adenopathy.      Left upper body: No supraclavicular or axillary adenopathy.   Skin:     Findings: No rash.   Neurological:      Comments: Neuro intact througout   Psychiatric:         Behavior: Behavior normal.         Thought Content: Thought content normal.         Judgment: Judgment normal.         Labs:   Recent Results (from the past 336 hour(s))   CBC Auto " Differential    Collection Time: 06/13/22  9:19 AM   Result Value Ref Range    WBC 3.26 (L) 3.90 - 12.70 K/uL    Hemoglobin 9.0 (L) 12.0 - 16.0 g/dL    Hematocrit 27.1 (L) 37.0 - 48.5 %    Platelets 379 150 - 450 K/uL   CBC Auto Differential    Collection Time: 06/06/22  8:46 AM   Result Value Ref Range    WBC 2.80 (L) 3.90 - 12.70 K/uL    Hemoglobin 8.5 (L) 12.0 - 16.0 g/dL    Hematocrit 26.0 (L) 37.0 - 48.5 %    Platelets 279 150 - 450 K/uL   CBC Auto Differential    Collection Time: 05/30/22 11:11 AM   Result Value Ref Range    WBC 1.10 (LL) 3.90 - 12.70 K/uL    Hemoglobin 8.4 (L) 12.0 - 16.0 g/dL    Hematocrit 25.1 (L) 37.0 - 48.5 %    Platelets 107 (L) 150 - 450 K/uL     CMP  Sodium   Date Value Ref Range Status   06/06/2022 141 136 - 145 mmol/L Final     Potassium   Date Value Ref Range Status   06/06/2022 3.7 3.5 - 5.1 mmol/L Final     Chloride   Date Value Ref Range Status   06/06/2022 104 95 - 110 mmol/L Final     CO2   Date Value Ref Range Status   06/06/2022 26 23 - 29 mmol/L Final     Glucose   Date Value Ref Range Status   06/06/2022 132 (H) 70 - 110 mg/dL Final     BUN   Date Value Ref Range Status   06/06/2022 17 8 - 23 mg/dL Final     Creatinine   Date Value Ref Range Status   06/06/2022 0.8 0.5 - 1.4 mg/dL Final     Calcium   Date Value Ref Range Status   06/06/2022 9.5 8.7 - 10.5 mg/dL Final     Total Protein   Date Value Ref Range Status   06/06/2022 7.4 6.0 - 8.4 g/dL Final     Albumin   Date Value Ref Range Status   06/06/2022 3.9 3.5 - 5.2 g/dL Final     Total Bilirubin   Date Value Ref Range Status   06/06/2022 0.4 0.1 - 1.0 mg/dL Final     Comment:     For infants and newborns, interpretation of results should be based  on gestational age, weight and in agreement with clinical  observations.    Premature Infant recommended reference ranges:  Up to 24 hours.............<8.0 mg/dL  Up to 48 hours............<12.0 mg/dL  3-5 days..................<15.0 mg/dL  6-29 days.................<15.0  mg/dL       Alkaline Phosphatase   Date Value Ref Range Status   06/06/2022 57 55 - 135 U/L Final     AST   Date Value Ref Range Status   06/06/2022 18 10 - 40 U/L Final     ALT   Date Value Ref Range Status   06/06/2022 15 10 - 44 U/L Final     Anion Gap   Date Value Ref Range Status   06/06/2022 11 8 - 16 mmol/L Final     eGFR if    Date Value Ref Range Status   06/06/2022 >60.0 >60 mL/min/1.73 m^2 Final     eGFR if non    Date Value Ref Range Status   06/06/2022 >60.0 >60 mL/min/1.73 m^2 Final     Comment:     Calculation used to obtain the estimated glomerular filtration  rate (eGFR) is the CKD-EPI equation.        No results found for: CEA  No results found for: PSA        Assessment/Plan:     Problem List Items Addressed This Visit     Malignant neoplasm of major salivary gland     Patient has completed three cycles of therapy and her scans show the lesion as less prominent today.  Will see Dr. Darden this week to discuss surgery.  Will await his input and can give further chemo if needed or hold and treat further after surgery.  Discussed this today.             Cancer associated pain     She is doing well from this standpoint.  Continue care approach.            Anemia in neoplastic disease     Hb is 9g/dl at this time which is up from 8.5g/dl.  Can hold on weekly labs for now.                  Discussion:     Follow up in about 4 weeks (around 7/11/2022).      Electronically signed by Gorge Coombs

## 2022-06-13 PROBLEM — G89.3 CANCER ASSOCIATED PAIN: Status: ACTIVE | Noted: 2022-01-01

## 2022-06-13 PROBLEM — D63.0 ANEMIA IN NEOPLASTIC DISEASE: Status: ACTIVE | Noted: 2022-01-01

## 2022-06-13 NOTE — ASSESSMENT & PLAN NOTE
Patient has completed three cycles of therapy and her scans show the lesion as less prominent today.  Will see Dr. Darden this week to discuss surgery.  Will await his input and can give further chemo if needed or hold and treat further after surgery.  Discussed this today.

## 2022-07-11 NOTE — PROGRESS NOTES
SUBJECTIVE:    Patient ID: Antonieta Clay is a 61 y.o. female.    Chief Complaint: Follow-up (No bottles, no complaints. )    Pt here for regular f/u- hypothyroidism  Pt reports overall feeling well  Hx of salivary cancer s/p parotidectomy- Completed chemo in May after found to have mastoid lesion. Has been seeing Dr. Coombs and Dr. Darden. Had f/u imaging which showed mastoid lesion was smaller- pt reports she's to see plastic surgeon this week and Dr. Darden later this month to decide on surgery. Reports overall did well with chemo    UTD with Dr. Carlton, gyn      Lab Visit on 06/13/2022   Component Date Value Ref Range Status    WBC 06/13/2022 3.26 (A) 3.90 - 12.70 K/uL Final    RBC 06/13/2022 2.88 (A) 4.00 - 5.40 M/uL Final    Hemoglobin 06/13/2022 9.0 (A) 12.0 - 16.0 g/dL Final    Hematocrit 06/13/2022 27.1 (A) 37.0 - 48.5 % Final    MCV 06/13/2022 94  82 - 98 fL Final    MCH 06/13/2022 31.3 (A) 27.0 - 31.0 pg Final    MCHC 06/13/2022 33.2  32.0 - 36.0 g/dL Final    RDW 06/13/2022 18.3 (A) 11.5 - 14.5 % Final    Platelets 06/13/2022 379  150 - 450 K/uL Final    MPV 06/13/2022 8.9 (A) 9.2 - 12.9 fL Final    Immature Granulocytes 06/13/2022 0.6 (A) 0.0 - 0.5 % Final    Gran # (ANC) 06/13/2022 1.5 (A) 1.8 - 7.7 K/uL Final    Immature Grans (Abs) 06/13/2022 0.02  0.00 - 0.04 K/uL Final    Lymph # 06/13/2022 0.9 (A) 1.0 - 4.8 K/uL Final    Mono # 06/13/2022 0.9  0.3 - 1.0 K/uL Final    Eos # 06/13/2022 0.0  0.0 - 0.5 K/uL Final    Baso # 06/13/2022 0.01  0.00 - 0.20 K/uL Final    nRBC 06/13/2022 0  0 /100 WBC Final    Gran % 06/13/2022 45.1  38.0 - 73.0 % Final    Lymph % 06/13/2022 27.9  18.0 - 48.0 % Final    Mono % 06/13/2022 26.1 (A) 4.0 - 15.0 % Final    Eosinophil % 06/13/2022 0.0  0.0 - 8.0 % Final    Basophil % 06/13/2022 0.3  0.0 - 1.9 % Final    Differential Method 06/13/2022 Automated   Final    Sodium 06/13/2022 139  136 - 145 mmol/L Final    Potassium  06/13/2022 3.6  3.5 - 5.1 mmol/L Final    Chloride 06/13/2022 101  95 - 110 mmol/L Final    CO2 06/13/2022 31 (A) 23 - 29 mmol/L Final    Glucose 06/13/2022 92  70 - 110 mg/dL Final    BUN 06/13/2022 16  8 - 23 mg/dL Final    Creatinine 06/13/2022 0.8  0.5 - 1.4 mg/dL Final    Calcium 06/13/2022 9.5  8.7 - 10.5 mg/dL Final    Total Protein 06/13/2022 7.5  6.0 - 8.4 g/dL Final    Albumin 06/13/2022 4.0  3.5 - 5.2 g/dL Final    Total Bilirubin 06/13/2022 0.4  0.1 - 1.0 mg/dL Final    Alkaline Phosphatase 06/13/2022 65  55 - 135 U/L Final    AST 06/13/2022 22  10 - 40 U/L Final    ALT 06/13/2022 19  10 - 44 U/L Final    Anion Gap 06/13/2022 7 (A) 8 - 16 mmol/L Final    eGFR if African American 06/13/2022 >60.0  >60 mL/min/1.73 m^2 Final    eGFR if non African American 06/13/2022 >60.0  >60 mL/min/1.73 m^2 Final   Lab Visit on 06/06/2022   Component Date Value Ref Range Status    WBC 06/06/2022 2.80 (A) 3.90 - 12.70 K/uL Final    RBC 06/06/2022 2.83 (A) 4.00 - 5.40 M/uL Final    Hemoglobin 06/06/2022 8.5 (A) 12.0 - 16.0 g/dL Final    Hematocrit 06/06/2022 26.0 (A) 37.0 - 48.5 % Final    MCV 06/06/2022 92  82 - 98 fL Final    MCH 06/06/2022 30.0  27.0 - 31.0 pg Final    MCHC 06/06/2022 32.7  32.0 - 36.0 g/dL Final    RDW 06/06/2022 16.5 (A) 11.5 - 14.5 % Final    Platelets 06/06/2022 279  150 - 450 K/uL Final    MPV 06/06/2022 10.0  9.2 - 12.9 fL Final    Immature Granulocytes 06/06/2022 1.1 (A) 0.0 - 0.5 % Final    Gran # (ANC) 06/06/2022 1.6 (A) 1.8 - 7.7 K/uL Final    Immature Grans (Abs) 06/06/2022 0.03  0.00 - 0.04 K/uL Final    Lymph # 06/06/2022 0.8 (A) 1.0 - 4.8 K/uL Final    Mono # 06/06/2022 0.4  0.3 - 1.0 K/uL Final    Eos # 06/06/2022 0.0  0.0 - 0.5 K/uL Final    Baso # 06/06/2022 0.01  0.00 - 0.20 K/uL Final    nRBC 06/06/2022 0  0 /100 WBC Final    Gran % 06/06/2022 57.1  38.0 - 73.0 % Final    Lymph % 06/06/2022 26.8  18.0 - 48.0 % Final    Mono % 06/06/2022 14.6  4.0 -  15.0 % Final    Eosinophil % 06/06/2022 0.0  0.0 - 8.0 % Final    Basophil % 06/06/2022 0.4  0.0 - 1.9 % Final    Platelet Estimate 06/06/2022 Appears normal   Final    Aniso 06/06/2022 Slight   Final    Poik 06/06/2022 Slight   Final    Poly 06/06/2022 Occasional   Final    Ovalocytes 06/06/2022 Occasional   Final    Tear Drop Cells 06/06/2022 Occasional   Final    Differential Method 06/06/2022 Automated   Final    Sodium 06/06/2022 141  136 - 145 mmol/L Final    Potassium 06/06/2022 3.7  3.5 - 5.1 mmol/L Final    Chloride 06/06/2022 104  95 - 110 mmol/L Final    CO2 06/06/2022 26  23 - 29 mmol/L Final    Glucose 06/06/2022 132 (A) 70 - 110 mg/dL Final    BUN 06/06/2022 17  8 - 23 mg/dL Final    Creatinine 06/06/2022 0.8  0.5 - 1.4 mg/dL Final    Calcium 06/06/2022 9.5  8.7 - 10.5 mg/dL Final    Total Protein 06/06/2022 7.4  6.0 - 8.4 g/dL Final    Albumin 06/06/2022 3.9  3.5 - 5.2 g/dL Final    Total Bilirubin 06/06/2022 0.4  0.1 - 1.0 mg/dL Final    Alkaline Phosphatase 06/06/2022 57  55 - 135 U/L Final    AST 06/06/2022 18  10 - 40 U/L Final    ALT 06/06/2022 15  10 - 44 U/L Final    Anion Gap 06/06/2022 11  8 - 16 mmol/L Final    eGFR if African American 06/06/2022 >60.0  >60 mL/min/1.73 m^2 Final    eGFR if non African American 06/06/2022 >60.0  >60 mL/min/1.73 m^2 Final   Lab Visit on 05/30/2022   Component Date Value Ref Range Status    WBC 05/30/2022 1.10 (A) 3.90 - 12.70 K/uL Final    RBC 05/30/2022 2.80 (A) 4.00 - 5.40 M/uL Final    Hemoglobin 05/30/2022 8.4 (A) 12.0 - 16.0 g/dL Final    Hematocrit 05/30/2022 25.1 (A) 37.0 - 48.5 % Final    MCV 05/30/2022 90  82 - 98 fL Final    MCH 05/30/2022 30.0  27.0 - 31.0 pg Final    MCHC 05/30/2022 33.5  32.0 - 36.0 g/dL Final    RDW 05/30/2022 14.2  11.5 - 14.5 % Final    Platelets 05/30/2022 107 (A) 150 - 450 K/uL Final    MPV 05/30/2022 10.2  9.2 - 12.9 fL Final    Immature Granulocytes 05/30/2022 CANCELED  0.0 - 0.5 % Final     Immature Grans (Abs) 05/30/2022 CANCELED  0.00 - 0.04 K/uL Final    nRBC 05/30/2022 0  0 /100 WBC Final    Gran % 05/30/2022 8.0 (A) 38.0 - 73.0 % Final    Lymph % 05/30/2022 52.0 (A) 18.0 - 48.0 % Final    Mono % 05/30/2022 40.0 (A) 4.0 - 15.0 % Final    Eosinophil % 05/30/2022 0.0  0.0 - 8.0 % Final    Basophil % 05/30/2022 0.0  0.0 - 1.9 % Final    Platelet Estimate 05/30/2022 Decreased (A)  Final    Aniso 05/30/2022 Slight   Final    Poik 05/30/2022 Slight   Final    Differential Method 05/30/2022 Manual   Final    Sodium 05/30/2022 137  136 - 145 mmol/L Final    Potassium 05/30/2022 3.9  3.5 - 5.1 mmol/L Final    Chloride 05/30/2022 101  95 - 110 mmol/L Final    CO2 05/30/2022 26  23 - 29 mmol/L Final    Glucose 05/30/2022 101  70 - 110 mg/dL Final    BUN 05/30/2022 16  8 - 23 mg/dL Final    Creatinine 05/30/2022 0.7  0.5 - 1.4 mg/dL Final    Calcium 05/30/2022 9.5  8.7 - 10.5 mg/dL Final    Total Protein 05/30/2022 7.7  6.0 - 8.4 g/dL Final    Albumin 05/30/2022 4.0  3.5 - 5.2 g/dL Final    Total Bilirubin 05/30/2022 0.3  0.1 - 1.0 mg/dL Final    Alkaline Phosphatase 05/30/2022 57  55 - 135 U/L Final    AST 05/30/2022 16  10 - 40 U/L Final    ALT 05/30/2022 17  10 - 44 U/L Final    Anion Gap 05/30/2022 10  8 - 16 mmol/L Final    eGFR if African American 05/30/2022 >60.0  >60 mL/min/1.73 m^2 Final    eGFR if non African American 05/30/2022 >60.0  >60 mL/min/1.73 m^2 Final   Infusion on 05/23/2022   Component Date Value Ref Range Status    Sodium 05/23/2022 138  136 - 145 mmol/L Final    Potassium 05/23/2022 3.8  3.5 - 5.1 mmol/L Final    Chloride 05/23/2022 99  95 - 110 mmol/L Final    CO2 05/23/2022 28  23 - 29 mmol/L Final    Glucose 05/23/2022 127 (A) 70 - 110 mg/dL Final    BUN 05/23/2022 19  8 - 23 mg/dL Final    Creatinine 05/23/2022 0.8  0.5 - 1.4 mg/dL Final    Calcium 05/23/2022 9.8  8.7 - 10.5 mg/dL Final    Total Protein 05/23/2022 7.7  6.0 - 8.4 g/dL Final     Albumin 05/23/2022 3.9  3.5 - 5.2 g/dL Final    Total Bilirubin 05/23/2022 0.7  0.1 - 1.0 mg/dL Final    Alkaline Phosphatase 05/23/2022 67  55 - 135 U/L Final    AST 05/23/2022 20  10 - 40 U/L Final    ALT 05/23/2022 34  10 - 44 U/L Final    Anion Gap 05/23/2022 11  8 - 16 mmol/L Final    eGFR if African American 05/23/2022 >60.0  >60 mL/min/1.73 m^2 Final    eGFR if non African American 05/23/2022 >60.0  >60 mL/min/1.73 m^2 Final    WBC 05/23/2022 2.82 (A) 3.90 - 12.70 K/uL Final    RBC 05/23/2022 3.22 (A) 4.00 - 5.40 M/uL Final    Hemoglobin 05/23/2022 9.7 (A) 12.0 - 16.0 g/dL Final    Hematocrit 05/23/2022 28.6 (A) 37.0 - 48.5 % Final    MCV 05/23/2022 89  82 - 98 fL Final    MCH 05/23/2022 30.1  27.0 - 31.0 pg Final    MCHC 05/23/2022 33.9  32.0 - 36.0 g/dL Final    RDW 05/23/2022 15.0 (A) 11.5 - 14.5 % Final    Platelets 05/23/2022 230  150 - 450 K/uL Final    MPV 05/23/2022 9.8  9.2 - 12.9 fL Final    Immature Granulocytes 05/23/2022 CANCELED  0.0 - 0.5 % Final-Edited    Immature Grans (Abs) 05/23/2022 CANCELED  0.00 - 0.04 K/uL Final-Edited    nRBC 05/23/2022 0  0 /100 WBC Final    Gran % 05/23/2022 91.0 (A) 38.0 - 73.0 % Final    Lymph % 05/23/2022 8.0 (A) 18.0 - 48.0 % Final    Mono % 05/23/2022 0.0 (A) 4.0 - 15.0 % Final    Eosinophil % 05/23/2022 0.0  0.0 - 8.0 % Final    Basophil % 05/23/2022 0.0  0.0 - 1.9 % Final    Bands 05/23/2022 1.0  % Final    Platelet Estimate 05/23/2022 Appears normal   Final    Aniso 05/23/2022 Slight   Final    Poik 05/23/2022 Slight   Final    Ovalocytes 05/23/2022 Occasional   Final    Differential Method 05/23/2022 Manual   Corrected   Lab Visit on 05/16/2022   Component Date Value Ref Range Status    WBC 05/16/2022 4.53  3.90 - 12.70 K/uL Final    RBC 05/16/2022 3.07 (A) 4.00 - 5.40 M/uL Final    Hemoglobin 05/16/2022 9.0 (A) 12.0 - 16.0 g/dL Final    Hematocrit 05/16/2022 27.9 (A) 37.0 - 48.5 % Final    MCV 05/16/2022 91  82 - 98 fL  Final    MCH 05/16/2022 29.3  27.0 - 31.0 pg Final    MCHC 05/16/2022 32.3  32.0 - 36.0 g/dL Final    RDW 05/16/2022 15.4 (A) 11.5 - 14.5 % Final    Platelets 05/16/2022 255  150 - 450 K/uL Final    MPV 05/16/2022 8.7 (A) 9.2 - 12.9 fL Final    Immature Granulocytes 05/16/2022 0.9 (A) 0.0 - 0.5 % Final    Gran # (ANC) 05/16/2022 2.6  1.8 - 7.7 K/uL Final    Immature Grans (Abs) 05/16/2022 0.04  0.00 - 0.04 K/uL Final    Lymph # 05/16/2022 0.9 (A) 1.0 - 4.8 K/uL Final    Mono # 05/16/2022 1.0  0.3 - 1.0 K/uL Final    Eos # 05/16/2022 0.0  0.0 - 0.5 K/uL Final    Baso # 05/16/2022 0.02  0.00 - 0.20 K/uL Final    nRBC 05/16/2022 0  0 /100 WBC Final    Gran % 05/16/2022 56.5  38.0 - 73.0 % Final    Lymph % 05/16/2022 19.2  18.0 - 48.0 % Final    Mono % 05/16/2022 23.0 (A) 4.0 - 15.0 % Final    Eosinophil % 05/16/2022 0.0  0.0 - 8.0 % Final    Basophil % 05/16/2022 0.4  0.0 - 1.9 % Final    Platelet Estimate 05/16/2022 Appears normal   Final    Differential Method 05/16/2022 Automated   Final    Sodium 05/16/2022 138  136 - 145 mmol/L Final    Potassium 05/16/2022 4.1  3.5 - 5.1 mmol/L Final    Chloride 05/16/2022 103  95 - 110 mmol/L Final    CO2 05/16/2022 25  23 - 29 mmol/L Final    Glucose 05/16/2022 87  70 - 110 mg/dL Final    BUN 05/16/2022 19  8 - 23 mg/dL Final    Creatinine 05/16/2022 0.7  0.5 - 1.4 mg/dL Final    Calcium 05/16/2022 9.8  8.7 - 10.5 mg/dL Final    Total Protein 05/16/2022 7.5  6.0 - 8.4 g/dL Final    Albumin 05/16/2022 3.9  3.5 - 5.2 g/dL Final    Total Bilirubin 05/16/2022 0.4  0.1 - 1.0 mg/dL Final    Alkaline Phosphatase 05/16/2022 62  55 - 135 U/L Final    AST 05/16/2022 20  10 - 40 U/L Final    ALT 05/16/2022 20  10 - 44 U/L Final    Anion Gap 05/16/2022 10  8 - 16 mmol/L Final    eGFR if African American 05/16/2022 >60.0  >60 mL/min/1.73 m^2 Final    eGFR if non African American 05/16/2022 >60.0  >60 mL/min/1.73 m^2 Final   Lab Visit on 05/10/2022    Component Date Value Ref Range Status    WBC 05/10/2022 1.67 (A) 3.90 - 12.70 K/uL Final    RBC 05/10/2022 3.23 (A) 4.00 - 5.40 M/uL Final    Hemoglobin 05/10/2022 9.5 (A) 12.0 - 16.0 g/dL Final    Hematocrit 05/10/2022 28.7 (A) 37.0 - 48.5 % Final    MCV 05/10/2022 89  82 - 98 fL Final    MCH 05/10/2022 29.4  27.0 - 31.0 pg Final    MCHC 05/10/2022 33.1  32.0 - 36.0 g/dL Final    RDW 05/10/2022 13.6  11.5 - 14.5 % Final    Platelets 05/10/2022 171  150 - 450 K/uL Final    MPV 05/10/2022 9.6  9.2 - 12.9 fL Final    nRBC 05/10/2022 0  0 /100 WBC Final    Gran % 05/10/2022 8.0 (A) 38.0 - 73.0 % Final    Lymph % 05/10/2022 48.0  18.0 - 48.0 % Final    Mono % 05/10/2022 44.0 (A) 4.0 - 15.0 % Final    Eosinophil % 05/10/2022 0.0  0.0 - 8.0 % Final    Basophil % 05/10/2022 0.0  0.0 - 1.9 % Final    Platelet Estimate 05/10/2022 Appears normal   Final    Aniso 05/10/2022 Moderate   Final    Poik 05/10/2022 Slight   Final    Differential Method 05/10/2022 Manual   Final    Sodium 05/10/2022 138  136 - 145 mmol/L Final    Potassium 05/10/2022 3.7  3.5 - 5.1 mmol/L Final    Chloride 05/10/2022 101  95 - 110 mmol/L Final    CO2 05/10/2022 28  23 - 29 mmol/L Final    Glucose 05/10/2022 91  70 - 110 mg/dL Final    BUN 05/10/2022 19  8 - 23 mg/dL Final    Creatinine 05/10/2022 0.7  0.5 - 1.4 mg/dL Final    Calcium 05/10/2022 9.6  8.7 - 10.5 mg/dL Final    Total Protein 05/10/2022 7.5  6.0 - 8.4 g/dL Final    Albumin 05/10/2022 4.1  3.5 - 5.2 g/dL Final    Total Bilirubin 05/10/2022 0.2  0.1 - 1.0 mg/dL Final    Alkaline Phosphatase 05/10/2022 62  55 - 135 U/L Final    AST 05/10/2022 18  10 - 40 U/L Final    ALT 05/10/2022 16  10 - 44 U/L Final    Anion Gap 05/10/2022 9  8 - 16 mmol/L Final    eGFR if African American 05/10/2022 >60.0  >60 mL/min/1.73 m^2 Final    eGFR if non African American 05/10/2022 >60.0  >60 mL/min/1.73 m^2 Final   Lab Visit on 05/02/2022   Component Date Value Ref Range  Status    WBC 05/02/2022 2.09 (A) 3.90 - 12.70 K/uL Final    RBC 05/02/2022 3.72 (A) 4.00 - 5.40 M/uL Final    Hemoglobin 05/02/2022 11.0 (A) 12.0 - 16.0 g/dL Final    Hematocrit 05/02/2022 33.0 (A) 37.0 - 48.5 % Final    MCV 05/02/2022 89  82 - 98 fL Final    MCH 05/02/2022 29.6  27.0 - 31.0 pg Final    MCHC 05/02/2022 33.3  32.0 - 36.0 g/dL Final    RDW 05/02/2022 13.4  11.5 - 14.5 % Final    Platelets 05/02/2022 319  150 - 450 K/uL Final    MPV 05/02/2022 9.5  9.2 - 12.9 fL Final    Immature Granulocytes 05/02/2022 1.4 (A) 0.0 - 0.5 % Final    Gran # (ANC) 05/02/2022 1.5 (A) 1.8 - 7.7 K/uL Final    Immature Grans (Abs) 05/02/2022 0.03  0.00 - 0.04 K/uL Final    Lymph # 05/02/2022 0.5 (A) 1.0 - 4.8 K/uL Final    Mono # 05/02/2022 0.0 (A) 0.3 - 1.0 K/uL Final    Eos # 05/02/2022 0.0  0.0 - 0.5 K/uL Final    Baso # 05/02/2022 0.02  0.00 - 0.20 K/uL Final    nRBC 05/02/2022 0  0 /100 WBC Final    Gran % 05/02/2022 70.4  38.0 - 73.0 % Final    Lymph % 05/02/2022 25.8  18.0 - 48.0 % Final    Mono % 05/02/2022 1.4 (A) 4.0 - 15.0 % Final    Eosinophil % 05/02/2022 0.0  0.0 - 8.0 % Final    Basophil % 05/02/2022 1.0  0.0 - 1.9 % Final    Platelet Estimate 05/02/2022 Appears normal   Final    Differential Method 05/02/2022 Automated   Final    Sodium 05/02/2022 137  136 - 145 mmol/L Final    Potassium 05/02/2022 4.1  3.5 - 5.1 mmol/L Final    Chloride 05/02/2022 100  95 - 110 mmol/L Final    CO2 05/02/2022 27  23 - 29 mmol/L Final    Glucose 05/02/2022 99  70 - 110 mg/dL Final    BUN 05/02/2022 19  8 - 23 mg/dL Final    Creatinine 05/02/2022 0.9  0.5 - 1.4 mg/dL Final    Calcium 05/02/2022 9.7  8.7 - 10.5 mg/dL Final    Total Protein 05/02/2022 7.8  6.0 - 8.4 g/dL Final    Albumin 05/02/2022 4.1  3.5 - 5.2 g/dL Final    Total Bilirubin 05/02/2022 0.5  0.1 - 1.0 mg/dL Final    Alkaline Phosphatase 05/02/2022 68  55 - 135 U/L Final    AST 05/02/2022 19  10 - 40 U/L Final    ALT  05/02/2022 23  10 - 44 U/L Final    Anion Gap 05/02/2022 10  8 - 16 mmol/L Final    eGFR if African American 05/02/2022 >60.0  >60 mL/min/1.73 m^2 Final    eGFR if non African American 05/02/2022 >60.0  >60 mL/min/1.73 m^2 Final   Lab Visit on 04/25/2022   Component Date Value Ref Range Status    WBC 04/25/2022 4.22  3.90 - 12.70 K/uL Final    RBC 04/25/2022 3.39 (A) 4.00 - 5.40 M/uL Final    Hemoglobin 04/25/2022 9.9 (A) 12.0 - 16.0 g/dL Final    Hematocrit 04/25/2022 30.2 (A) 37.0 - 48.5 % Final    MCV 04/25/2022 89  82 - 98 fL Final    MCH 04/25/2022 29.2  27.0 - 31.0 pg Final    MCHC 04/25/2022 32.8  32.0 - 36.0 g/dL Final    RDW 04/25/2022 13.2  11.5 - 14.5 % Final    Platelets 04/25/2022 537 (A) 150 - 450 K/uL Final    MPV 04/25/2022 8.4 (A) 9.2 - 12.9 fL Final    Immature Granulocytes 04/25/2022 1.2 (A) 0.0 - 0.5 % Final    Gran # (ANC) 04/25/2022 2.6  1.8 - 7.7 K/uL Final    Immature Grans (Abs) 04/25/2022 0.05 (A) 0.00 - 0.04 K/uL Final    Lymph # 04/25/2022 0.9 (A) 1.0 - 4.8 K/uL Final    Mono # 04/25/2022 0.6  0.3 - 1.0 K/uL Final    Eos # 04/25/2022 0.0  0.0 - 0.5 K/uL Final    Baso # 04/25/2022 0.02  0.00 - 0.20 K/uL Final    nRBC 04/25/2022 0  0 /100 WBC Final    Gran % 04/25/2022 62.1  38.0 - 73.0 % Final    Lymph % 04/25/2022 22.0  18.0 - 48.0 % Final    Mono % 04/25/2022 14.2  4.0 - 15.0 % Final    Eosinophil % 04/25/2022 0.0  0.0 - 8.0 % Final    Basophil % 04/25/2022 0.5  0.0 - 1.9 % Final    Platelet Estimate 04/25/2022 Increased (A)  Final    Differential Method 04/25/2022 Automated   Final    Sodium 04/25/2022 140  136 - 145 mmol/L Final    Potassium 04/25/2022 3.6  3.5 - 5.1 mmol/L Final    Chloride 04/25/2022 103  95 - 110 mmol/L Final    CO2 04/25/2022 25  23 - 29 mmol/L Final    Glucose 04/25/2022 108  70 - 110 mg/dL Final    BUN 04/25/2022 15  8 - 23 mg/dL Final    Creatinine 04/25/2022 0.7  0.5 - 1.4 mg/dL Final    Calcium 04/25/2022 9.6  8.7 - 10.5  mg/dL Final    Total Protein 04/25/2022 7.3  6.0 - 8.4 g/dL Final    Albumin 04/25/2022 3.9  3.5 - 5.2 g/dL Final    Total Bilirubin 04/25/2022 0.2  0.1 - 1.0 mg/dL Final    Alkaline Phosphatase 04/25/2022 62  55 - 135 U/L Final    AST 04/25/2022 21  10 - 40 U/L Final    ALT 04/25/2022 20  10 - 44 U/L Final    Anion Gap 04/25/2022 12  8 - 16 mmol/L Final    eGFR if African American 04/25/2022 >60.0  >60 mL/min/1.73 m^2 Final    eGFR if non African American 04/25/2022 >60.0  >60 mL/min/1.73 m^2 Final   Hospital Outpatient Visit on 04/19/2022   Component Date Value Ref Range Status    POC Glucose 04/19/2022 102  70 - 110 Final   Lab Visit on 04/18/2022   Component Date Value Ref Range Status    WBC 04/18/2022 1.44 (A) 3.90 - 12.70 K/uL Final    RBC 04/18/2022 3.42 (A) 4.00 - 5.40 M/uL Final    Hemoglobin 04/18/2022 10.0 (A) 12.0 - 16.0 g/dL Final    Hematocrit 04/18/2022 30.0 (A) 37.0 - 48.5 % Final    MCV 04/18/2022 88  82 - 98 fL Final    MCH 04/18/2022 29.2  27.0 - 31.0 pg Final    MCHC 04/18/2022 33.3  32.0 - 36.0 g/dL Final    RDW 04/18/2022 12.3  11.5 - 14.5 % Final    Platelets 04/18/2022 281  150 - 450 K/uL Final    MPV 04/18/2022 9.2  9.2 - 12.9 fL Final    Immature Granulocytes 04/18/2022 CANCELED  0.0 - 0.5 % Final    Immature Grans (Abs) 04/18/2022 CANCELED  0.00 - 0.04 K/uL Final    nRBC 04/18/2022 0  0 /100 WBC Final    Gran % 04/18/2022 12.0 (A) 38.0 - 73.0 % Final    Lymph % 04/18/2022 56.0 (A) 18.0 - 48.0 % Final    Mono % 04/18/2022 32.0 (A) 4.0 - 15.0 % Final    Eosinophil % 04/18/2022 0.0  0.0 - 8.0 % Final    Basophil % 04/18/2022 0.0  0.0 - 1.9 % Final    Platelet Estimate 04/18/2022 Appears normal   Final    Aniso 04/18/2022 Slight   Final    Poik 04/18/2022 Slight   Final    Differential Method 04/18/2022 Manual   Final    Sodium 04/18/2022 139  136 - 145 mmol/L Final    Potassium 04/18/2022 3.9  3.5 - 5.1 mmol/L Final    Chloride 04/18/2022 102  95 - 110  mmol/L Final    CO2 04/18/2022 28  23 - 29 mmol/L Final    Glucose 04/18/2022 83  70 - 110 mg/dL Final    BUN 04/18/2022 12  8 - 23 mg/dL Final    Creatinine 04/18/2022 0.7  0.5 - 1.4 mg/dL Final    Calcium 04/18/2022 9.4  8.7 - 10.5 mg/dL Final    Total Protein 04/18/2022 7.6  6.0 - 8.4 g/dL Final    Albumin 04/18/2022 3.7  3.5 - 5.2 g/dL Final    Total Bilirubin 04/18/2022 0.4  0.1 - 1.0 mg/dL Final    Alkaline Phosphatase 04/18/2022 62  55 - 135 U/L Final    AST 04/18/2022 22  10 - 40 U/L Final    ALT 04/18/2022 23  10 - 44 U/L Final    Anion Gap 04/18/2022 9  8 - 16 mmol/L Final    eGFR if African American 04/18/2022 >60.0  >60 mL/min/1.73 m^2 Final    eGFR if non African American 04/18/2022 >60.0  >60 mL/min/1.73 m^2 Final   There may be more visits with results that are not included.       Past Medical History:   Diagnosis Date    Cancer     neck    HA (headache)     Hypothyroidism      Past Surgical History:   Procedure Laterality Date    INSERTION OF TUNNELED CENTRAL VENOUS CATHETER (CVC) WITH SUBCUTANEOUS PORT Right 4/4/2022    Procedure: [id number];  Surgeon: Charles Servin III, MD;  Location: [facility];  Service: General;  Laterality: Right;    left temporal bone resection  2020    MODIFIED RADICAL NECK DISSECTION Left 2020    parotidectomy Left 2020     Family History   Problem Relation Age of Onset    Breast cancer Mother     Breast cancer Maternal Aunt        The 10-year CVD risk score (NEVIN'Agostino et al., 2008) is: 8.9%    Values used to calculate the score:      Age: 61 years      Sex: Female      Diabetic: No      Tobacco smoker: No      Systolic Blood Pressure: 128 mmHg      Is BP treated: No      HDL Cholesterol: 55 mg/dL      Total Cholesterol: 247 mg/dL     Marital Status:   Alcohol History:  reports no history of alcohol use.  Tobacco History:  reports that she has never smoked. She has never used smokeless tobacco.  Drug History:  reports no history  of drug use.    Health Maintenance Topics with due status: Not Due       Topic Last Completion Date    Lipid Panel 10/05/2021    Mammogram 01/04/2022    Influenza Vaccine Not Due     Immunization History   Administered Date(s) Administered    COVID-19, MRNA, LN-S, PF (MODERNA FULL 0.5 ML DOSE) 01/08/2021, 02/05/2021       Review of patient's allergies indicates:   Allergen Reactions    Pcn [penicillins] Swelling    Codeine Nausea And Vomiting       Current Outpatient Medications:     ascorbic acid, vitamin C, (VITAMIN C) 1000 MG tablet, Take 1,000 mg by mouth once daily., Disp: , Rfl:     benzonatate (TESSALON PERLES) 100 MG capsule, Take 1 capsule (100 mg total) by mouth 3 (three) times daily as needed for Cough., Disp: 30 capsule, Rfl: 1    cholecalciferol, vitamin D3, 125 mcg (5,000 unit) Tab, Take 5,000 Units by mouth once daily., Disp: , Rfl:     famotidine (PEPCID) 40 MG tablet, Take 1 tablet (40 mg total) by mouth every evening., Disp: 30 tablet, Rfl: 1    fluticasone propionate (FLONASE) 50 mcg/actuation nasal spray, 2 sprays (100 mcg total) by Each Nostril route once daily., Disp: 18.2 mL, Rfl: 1    HYDROcodone-acetaminophen (NORCO)  mg per tablet, Take 1 tablet by mouth every 6 (six) hours as needed for Pain., Disp: , Rfl:     levothyroxine (SYNTHROID) 88 MCG tablet, Take 1 tablet (88 mcg total) by mouth once daily., Disp: 90 tablet, Rfl: 1    multivit-iron-min-folic acid 18-0.4 mg Tab, Take 1 tablet by mouth once daily. , Disp: , Rfl:     omega-3 fatty acids/fish oil (FISH OIL-OMEGA-3 FATTY ACIDS) 300-1,000 mg capsule, Take 1 capsule by mouth once daily., Disp: , Rfl:     ondansetron (ZOFRAN) 8 MG tablet, Take 1 tablet (8 mg total) by mouth every 8 (eight) hours as needed for Nausea., Disp: 30 tablet, Rfl: 5    promethazine (PHENERGAN) 25 MG tablet, Take 1 tablet (25 mg total) by mouth every 4 to 6 hours as needed for Nausea., Disp: 30 tablet, Rfl: 5    ZINC ACETATE ORAL, Take 1  "capsule by mouth once daily. , Disp: , Rfl:     Current Facility-Administered Medications:     0.9%  NaCl infusion, , Intravenous, Once, Groge Sagastume MD, Stopped at 05/04/22 1321    methylPREDNISolone sodium succinate injection 40 mg, 40 mg, Intravenous, Once PRN, Rj Pompa MD    Review of Systems   Constitutional: Negative for appetite change, chills, fever and unexpected weight change.   HENT: Negative for ear discharge, ear pain (resolved after cerumen obstruction removed by ENT), sore throat and trouble swallowing.    Eyes: Negative for visual disturbance.   Respiratory: Negative for cough and shortness of breath.    Cardiovascular: Negative for chest pain and palpitations.   Gastrointestinal: Negative for abdominal pain, nausea and vomiting.   Genitourinary: Negative for dysuria, flank pain and hematuria.   Musculoskeletal: Positive for neck pain (mild pain left neck) and neck stiffness.   Skin: Negative for rash.   Neurological: Negative for dizziness, weakness, numbness and headaches.   Hematological: Negative for adenopathy.   Psychiatric/Behavioral: Negative for dysphoric mood. The patient is not nervous/anxious.           Objective:      Vitals:    07/11/22 0815   BP: 128/70   Pulse: 60   Weight: 56.7 kg (125 lb)   Height: 4' 11" (1.499 m)     Physical Exam  Constitutional:       General: She is not in acute distress.     Appearance: Normal appearance.   HENT:      Head: Normocephalic and atraumatic.      Right Ear: Tympanic membrane and ear canal normal.      Ears:      Comments: Left external canal extremely narrow d/t post radiation changes  Neck:      Vascular: No carotid bruit.   Cardiovascular:      Rate and Rhythm: Normal rate and regular rhythm.      Heart sounds: No murmur heard.  Pulmonary:      Effort: Pulmonary effort is normal. No respiratory distress.      Breath sounds: Normal breath sounds.   Abdominal:      Palpations: Abdomen is soft.      Tenderness: There is no abdominal " tenderness.   Musculoskeletal:      Cervical back: No bony tenderness.      Right lower leg: No edema.      Left lower leg: No edema.   Lymphadenopathy:      Cervical: No cervical adenopathy.   Skin:     General: Skin is warm and dry.      Comments: Dry erythematous patch behind left ear with radiation changes, no blistering/drainage   Neurological:      General: No focal deficit present.      Mental Status: She is alert and oriented to person, place, and time.      Motor: No weakness.      Gait: Gait normal.   Psychiatric:         Mood and Affect: Mood normal.      Comments: Good attitude and positive outlook           Assessment:       1. Acquired hypothyroidism    2. Malignant neoplasm of major salivary gland           Plan:       Acquired hypothyroidism  Comments:  labs to be drawn today  Orders:  -     levothyroxine (SYNTHROID) 88 MCG tablet; Take 1 tablet (88 mcg total) by mouth once daily.  Dispense: 90 tablet; Refill: 1  -     TSH; Future; Expected date: 07/11/2022  -     T4, Free; Future; Expected date: 07/11/2022  -     Lipid Panel; Future; Expected date: 07/11/2022  -     Urinalysis, Reflex to Urine Culture Urine, Clean Catch; Future; Expected date: 07/11/2022    Malignant neoplasm of major salivary gland  Comments:  f/u with Dr. Darden and Dr. Coombs as scheduled      Follow up in about 6 months (around 1/11/2023) for thyroid, labs to be drawn today.          Counseled on age and gender appropriate medical preventative services, including cancer screenings, immunizations, overall nutritional health, need for a consistent exercise regimen and an overall push towards maintaining a vigorous and active lifestyle.      7/11/2022 Casandra Yu NP

## 2022-07-13 NOTE — TELEPHONE ENCOUNTER
----- Message from Casandra Yu NP sent at 7/12/2022  8:27 PM CDT -----  Please call pt and let her know thyroid level is stable- TSH slightly out of range but free T4 is normal so would continue with current dose. Her bad cholesterol LDL continues to be elevated at 179 though good cholesterol HDL also good at 59. Her 10 year cardiovascular risk is low at 5% but would Recommend she continue to work on low fat diet and exercise to help lower LDL cholesterol.

## 2022-07-13 NOTE — TELEPHONE ENCOUNTER
Spoke with pt in regards to recent labs results. Verbalized per Casandra that her thyroid level is stable- TSH slightly out of range but free T4 is normal so would continue with current dose. Her bad cholesterol LDL continues to be elevated at 179 though good cholesterol HDL also good at 59. Her 10 year cardiovascular risk is low at 5% but would Recommend she continue to work on low fat diet and exercise to help lower LDL cholesterol Pt acknowledge understanding.

## 2022-07-25 NOTE — PROGRESS NOTES
PROGRESS NOTE    Subjective:       Patient ID: Antonieta Clay is a 61 y.o. female.    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 Postoperative changes in the left side of the neck with slightly  increased FDG activity from background.  2. No FDG avid lymphadenopathy or finding of additional distant sites  of FDG avid disease.    10/20/2020-12/4/2020  Radiation therapy.    3/11/2021:  MRI Neck: no evidence of recurrence.    MRI Brain: no acute abnormalities.    12/3/2021:  CT chest: new ground glass, RUL, infectious vs met. 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    Cytoxan/Kwasi/Cisplatin:  Cycle 1: 4/5/2022  Cycle 2: 4/26/2022  Cycle 3: 5/17/2022 5/31/2022:  CT neck and chest:  Mastoid lesion ? Less prominent.  No other significant changes or new growth.     Chief Complaint:  No chief complaint on file.  Parotid cancer follow up    History of Present Illness:   Antonieta Clay is a 61 y.o. female who presents for follow up of above.      Ms. Clay returns after visit with Dr. Darden     Family and Social history reviewed and is unchanged from 9/21/2020      ROS:  Review of Systems   Constitutional: Negative for appetite change, fever and unexpected weight change.   HENT: Negative for mouth sores.    Eyes: Negative for visual disturbance.   Respiratory: Negative for cough and shortness of breath.    Cardiovascular: Negative for chest pain and leg swelling.   Gastrointestinal: Negative for abdominal pain, blood in stool and diarrhea.   Genitourinary: Negative for frequency and hematuria.   Musculoskeletal: Negative for back pain.   Skin: Negative for rash.   Neurological: Negative for headaches.  "  Hematological: Negative for adenopathy.   Psychiatric/Behavioral: The patient is not nervous/anxious.           Current Outpatient Medications:     ascorbic acid, vitamin C, (VITAMIN C) 1000 MG tablet, Take 1,000 mg by mouth once daily., Disp: , Rfl:     cholecalciferol, vitamin D3, 125 mcg (5,000 unit) Tab, Take 5,000 Units by mouth once daily., Disp: , Rfl:     fluticasone propionate (FLONASE) 50 mcg/actuation nasal spray, 2 sprays (100 mcg total) by Each Nostril route once daily., Disp: 18.2 mL, Rfl: 1    levothyroxine (SYNTHROID) 88 MCG tablet, Take 1 tablet (88 mcg total) by mouth once daily., Disp: 90 tablet, Rfl: 1    multivit-iron-min-folic acid 18-0.4 mg Tab, Take 1 tablet by mouth once daily. , Disp: , Rfl:     omega-3 fatty acids/fish oil (FISH OIL-OMEGA-3 FATTY ACIDS) 300-1,000 mg capsule, Take 1 capsule by mouth once daily., Disp: , Rfl:     ZINC ACETATE ORAL, Take 1 capsule by mouth once daily. , Disp: , Rfl:     Current Facility-Administered Medications:     0.9%  NaCl infusion, , Intravenous, Once, Gorge Sagastume MD, Stopped at 05/04/22 1321    methylPREDNISolone sodium succinate injection 40 mg, 40 mg, Intravenous, Once PRN, Rj Pompa MD        Objective:       Physical Examination:     BP (!) 122/58   Pulse 60   Temp 97.7 °F (36.5 °C)   Resp 18   Ht 4' 11" (1.499 m)   Wt 56.8 kg (125 lb 4.8 oz)   BMI 25.31 kg/m²     Physical Exam  Constitutional:       Appearance: She is well-developed.   HENT:      Head: Normocephalic and atraumatic.      Right Ear: External ear normal.      Left Ear: External ear normal.      Mouth/Throat:      Mouth: Mucous membranes are moist.      Pharynx: Oropharynx is clear. No oropharyngeal exudate or posterior oropharyngeal erythema.      Comments: No visible or palpable oral lesions.   Eyes:      Conjunctiva/sclera: Conjunctivae normal.      Pupils: Pupils are equal, round, and reactive to light.   Neck:      Thyroid: No thyromegaly.      " Trachea: No tracheal deviation.   Cardiovascular:      Rate and Rhythm: Normal rate and regular rhythm.      Heart sounds: Normal heart sounds.   Pulmonary:      Effort: Pulmonary effort is normal.      Breath sounds: Normal breath sounds.   Chest:   Breasts:      Right: No axillary adenopathy or supraclavicular adenopathy.      Left: No axillary adenopathy or supraclavicular adenopathy.       Abdominal:      General: Bowel sounds are normal. There is no distension.      Palpations: Abdomen is soft. There is no mass.      Tenderness: There is no abdominal tenderness.   Lymphadenopathy:      Head:      Right side of head: No submental, submandibular, tonsillar or preauricular adenopathy.      Left side of head: No submental, submandibular, tonsillar or preauricular adenopathy.      Cervical: No cervical adenopathy.      Upper Body:      Right upper body: No supraclavicular or axillary adenopathy.      Left upper body: No supraclavicular or axillary adenopathy.   Skin:     Findings: No rash.   Neurological:      Comments: Neuro intact througout   Psychiatric:         Behavior: Behavior normal.         Thought Content: Thought content normal.         Judgment: Judgment normal.         Labs:   No results found for this or any previous visit (from the past 336 hour(s)).  CMP  Sodium   Date Value Ref Range Status   06/13/2022 139 136 - 145 mmol/L Final     Potassium   Date Value Ref Range Status   06/13/2022 3.6 3.5 - 5.1 mmol/L Final     Chloride   Date Value Ref Range Status   06/13/2022 101 95 - 110 mmol/L Final     CO2   Date Value Ref Range Status   06/13/2022 31 (H) 23 - 29 mmol/L Final     Glucose   Date Value Ref Range Status   06/13/2022 92 70 - 110 mg/dL Final     BUN   Date Value Ref Range Status   06/13/2022 16 8 - 23 mg/dL Final     Creatinine   Date Value Ref Range Status   06/13/2022 0.8 0.5 - 1.4 mg/dL Final     Calcium   Date Value Ref Range Status   06/13/2022 9.5 8.7 - 10.5 mg/dL Final     Total Protein    Date Value Ref Range Status   06/13/2022 7.5 6.0 - 8.4 g/dL Final     Albumin   Date Value Ref Range Status   06/13/2022 4.0 3.5 - 5.2 g/dL Final     Total Bilirubin   Date Value Ref Range Status   06/13/2022 0.4 0.1 - 1.0 mg/dL Final     Comment:     For infants and newborns, interpretation of results should be based  on gestational age, weight and in agreement with clinical  observations.    Premature Infant recommended reference ranges:  Up to 24 hours.............<8.0 mg/dL  Up to 48 hours............<12.0 mg/dL  3-5 days..................<15.0 mg/dL  6-29 days.................<15.0 mg/dL       Alkaline Phosphatase   Date Value Ref Range Status   06/13/2022 65 55 - 135 U/L Final     AST   Date Value Ref Range Status   06/13/2022 22 10 - 40 U/L Final     ALT   Date Value Ref Range Status   06/13/2022 19 10 - 44 U/L Final     Anion Gap   Date Value Ref Range Status   06/13/2022 7 (L) 8 - 16 mmol/L Final     eGFR if    Date Value Ref Range Status   06/13/2022 >60.0 >60 mL/min/1.73 m^2 Final     eGFR if non    Date Value Ref Range Status   06/13/2022 >60.0 >60 mL/min/1.73 m^2 Final     Comment:     Calculation used to obtain the estimated glomerular filtration  rate (eGFR) is the CKD-EPI equation.        No results found for: CEA  No results found for: PSA        Assessment/Plan:     Problem List Items Addressed This Visit     Malignant neoplasm of major salivary gland     I had a long discussion with Ms. Clay today along with her  and spoke to Dr. Darden on the phone.  He feels surgery would be quite hazardous and result in severe facial nerve damage and is not recommending this currently.  He prefers systemic therapy approach.    She has had 3 cycles of cytox/dafne/cis with only marginal response and I'm not sure this is the appropriate approach at this time.  I have done some liturature review and therapy with Lenvatinib has shown some promise by way of stable  disease(75%) in a phase II study. Abstract included below.   I will try to get this approved for her use.      Dose is generally 24mg daily but will begin a bit lower to improve tolerance to this med.  Will arrange chemotherapy education and plan to begin ASAP.    Spent over an hour in patient care today, including patient visit, charting, communication, treatment planning.                      Discussion:     Follow up in about 3 weeks (around 8/16/2022).        Phase II Study of Lenvatinib in Patients With Progressive, Recurrent or Metastatic Adenoid Cystic Carcinoma.  [place]  Tchekmbenja V, Mohamud EJ, Poli L, Bee C, Reinaldo S, Carlos N, Marilyn CR, Stephanie I, Emily SS, Sumanth DG, Ho AL   SO  J Clin Oncol. 2019;37(18):1529. Epub 2019 Apr 2.      PURPOSERecurrent or metastatic adenoid cystic carcinoma (R/M ACC) is a malignant neoplasm of predominantly salivary gland origin for which effective therapies are lacking. We conducted a phase II trial evaluating the multitargeted tyrosine kinase inhibitor lenvatinib in patients with R/M ACC.    PATIENTS AND METHODSThis study was conducted with a two-stage minimax design. Patients with histologically confirmed R/M ACC of any primary site with radiographic and/or symptomatic progression were eligible. Any prior therapy was allowed except previous lenvatinib. Patients received lenvatinib 24 mg orally per day. The primary end point was overall response rate. Secondary end points were progression-free survival and safety. An exploratory analysis of how MYB expression and genomic alterations relate to outcomes was conducted.    RESULTSThirty-three patients were enrolled; 32 were evaluable for the primary end point. Five patients (15.6%) had a confirmed partial response, 24 patients (75%) had stable disease, two patients (6.3%) discontinued treatment as a result of toxicity before the first scan, and one patient (3.1%) had progression of disease as best response. Median  progression-free survival time was 17.5 months (95% CI, 7.2 months to not reached), although only eight progression events were observed. Patients otherwise were removed for toxicity (n = 5), as a result of withdrawal of consent (n = 9), or at the treating physician's discretion (n = 6). Twenty-three patients required at least one dose modification, and 18 of 32 patients discontinued lenvatinib for drug-related issues. The most common grade 3 or 4 adverse events were hypertension (n = 9; 28.1%) and oral pain (n = 3; 9.4%). Three grade 4 adverse events were observed (myocardial infarction, n = 1; posterior reversible encephalopathy syndrome, n = 1; and intracranial hemorrhage, n = 1).    CONCLUSIONThis trial met the prespecified overall response rate primary end point, demonstrating antitumor activity with lenvatinib in R/M ACC patients. Toxicity was comparable to previous studies, requiring monitoring and management.  Electronically signed by Gorge Coombs

## 2022-07-26 NOTE — Clinical Note
I am planning on starting her on oral chemotherapy with Lenvantinib.  Will print today.  She will need chemo education and would like her to see me back in about 4-5 weeks.

## 2022-07-26 NOTE — ASSESSMENT & PLAN NOTE
I had a long discussion with Ms. Clay today along with her  and spoke to Dr. Darden on the phone.  He feels surgery would be quite hazardous and result in severe facial nerve damage and is not recommending this currently.  He prefers systemic therapy approach.    She has had 3 cycles of cytox/dafne/cis with only marginal response and I'm not sure this is the appropriate approach at this time.  I have done some liturature review and therapy with Lenvatinib has shown some promise by way of stable disease(75%) in a phase II study. Abstract included below.   I will try to get this approved for her use.      Dose is generally 24mg daily but will begin a bit lower to improve tolerance to this med.  Will arrange chemotherapy education and plan to begin ASAP.    Spent over an hour in patient care today, including patient visit, charting, communication, treatment planning.

## 2022-07-29 NOTE — TELEPHONE ENCOUNTER
PA approved. [id number], approved until 7/29/23. Pt locked into filling at [facility]. Outgoing call to pt to inform. Pt voiced understanding. Message sent to [facility] to send RX to [facility].

## 2022-08-09 NOTE — PROGRESS NOTES
FOLLOW-UP APPOINTMENT    PATIENT:   Antonieta Clay  :    1960  MR#:    [id number]  DATE OF VISIT:  2022      Chief Complaint: Chemo School Head and Neck Cancer    HPI:   Ms. Antonieta Clay presents today for chemotherapy education.  She will be starting treatment with Lenvima for the above diagnosis.     Depression Patient Health Questionnaire 2022   Over the last two weeks how often have you been bothered by little interest or pleasure in doing things Not at all Not at all Not at all Not at all Not at all Not at all Not at all   Over the last two weeks how often have you been bothered by feeling down, depressed or hopeless Not at all Not at all Not at all Not at all Not at all Not at all Not at all   PHQ-2 Total Score 0 0 0 0 0 0 0         Review of Systems   Constitutional: Negative for appetite change and unexpected weight change.   HENT:   Negative for mouth sores.    Respiratory: Negative for cough and shortness of breath.    Cardiovascular: Negative for chest pain.   Gastrointestinal: Negative for abdominal pain and diarrhea.   Genitourinary: Negative for frequency.    Musculoskeletal: Negative for back pain.   Skin: Negative for rash.   Neurological: Positive for headaches.   Hematological: Negative for adenopathy.   Psychiatric/Behavioral: The patient is nervous/anxious.        Oncology History   Malignant neoplasm of major salivary gland   2020 Initial Diagnosis    Malignant neoplasm of major salivary gland     2020 Cancer Staged    Staging form: Major Salivary Glands, AJCC 8th Edition  - Clinical stage from 2020: Stage IVB (cT4a, cN3b, cM0)     2022 - 2022 Chemotherapy    Treatment Summary   Plan Name: ADENOID CYSTIC CARCINOMA CYCLOPHOSPHAMIDE DOXORUBICIN CISPLATIN (CAP) Q3W  Treatment Goal: Control  Status: Inactive  Start Date: 2022  End Date: 2022  Provider: Gorge Sagastume,  MD  Chemotherapy: DOXOrubicin chemo injection 76 mg, 50 mg/m2 = 76 mg, Intravenous, Clinic/HOD 1 time, 3 of 3 cycles  Administration: 76 mg (4/5/2022), 76 mg (4/26/2022), 76 mg (5/17/2022)  CISplatin (PLATINOL) 50 mg/m2 = 76 mg in sodium chloride 0.9% 576 mL chemo infusion, 50 mg/m2 = 76 mg, Intravenous, Clinic/HOD 1 time, 3 of 3 cycles  Administration: 76 mg (4/5/2022), 76 mg (4/26/2022), 76 mg (5/17/2022)  cyclophosphamide 500 mg/m2 = 760 mg in sodium chloride 0.9% 253.8 mL chemo infusion, 500 mg/m2 = 760 mg, Intravenous, Clinic/HOD 1 time, 3 of 3 cycles  Administration: 760 mg (4/5/2022), 760 mg (4/26/2022), 760 mg (5/17/2022)         Patient Active Problem List   Diagnosis    Malignant neoplasm of major salivary gland    Dehydration    Chemotherapy induced neutropenia    Aftercare    Cancer associated pain    Anemia in neoplastic disease       Past Medical History:   Diagnosis Date    Cancer     neck    HA (headache)     Hypothyroidism        Past Surgical History:   Procedure Laterality Date    INSERTION OF TUNNELED CENTRAL VENOUS CATHETER (CVC) WITH SUBCUTANEOUS PORT Right 4/4/2022    Procedure: [id number]-[id number];  Surgeon: Charles Servin III, MD;  [facility];  Service: General;  Laterality: Right;    left temporal bone resection  2020    MODIFIED RADICAL NECK DISSECTION Left 2020    parotidectomy Left 2020       Social History     Socioeconomic History    Marital status:    Tobacco Use    Smoking status: Never Smoker    Smokeless tobacco: Never Used   Substance and Sexual Activity    Alcohol use: Never    Drug use: Never    Sexual activity: Not Currently     Partners: Male     Birth control/protection: None     Social Determinants of Health     Financial Resource Strain: Low Risk     Difficulty of Paying Living Expenses: Not very hard   Food Insecurity: No Food Insecurity    Worried About Running Out of Food in the Last Year: Never true    Ran Out of Food in the  Last Year: Never true   Transportation Needs: No Transportation Needs    Lack of Transportation (Medical): No    Lack of Transportation (Non-Medical): No   Physical Activity: Sufficiently Active    Days of Exercise per Week: 5 days    Minutes of Exercise per Session: 30 min   Stress: Stress Concern Present    Feeling of Stress : To some extent   Social Connections: Unknown    Frequency of Communication with Friends and Family: More than three times a week    Frequency of Social Gatherings with Friends and Family: Once a week    Active Member of Clubs or Organizations: No    Attends Club or Organization Meetings: Never    Marital Status:    Housing Stability: Low Risk     Unable to Pay for Housing in the Last Year: No    Number of Places Lived in the Last Year: 1    Unstable Housing in the Last Year: No       Family History   Problem Relation Age of Onset    Breast cancer Mother     Breast cancer Maternal Aunt          Current Outpatient Medications:     ascorbic acid, vitamin C, (VITAMIN C) 1000 MG tablet, Take 1,000 mg by mouth once daily., Disp: , Rfl:     cholecalciferol, vitamin D3, 125 mcg (5,000 unit) Tab, Take 5,000 Units by mouth once daily., Disp: , Rfl:     fluticasone propionate (FLONASE) 50 mcg/actuation nasal spray, 2 sprays (100 mcg total) by Each Nostril route once daily., Disp: 18.2 mL, Rfl: 1    lenvatinib 4 mg Cap, Take 16 mg by mouth once daily at 6am., Disp: 120 capsule, Rfl: 3    levothyroxine (SYNTHROID) 88 MCG tablet, Take 1 tablet (88 mcg total) by mouth once daily., Disp: 90 tablet, Rfl: 1    multivit-iron-min-folic acid 18-0.4 mg Tab, Take 1 tablet by mouth once daily. , Disp: , Rfl:     omega-3 fatty acids/fish oil (FISH OIL-OMEGA-3 FATTY ACIDS) 300-1,000 mg capsule, Take 1 capsule by mouth once daily., Disp: , Rfl:     ZINC ACETATE ORAL, Take 1 capsule by mouth once daily. , Disp: , Rfl:     oxyCODONE-acetaminophen (PERCOCET)  mg per tablet, Take 1  "tablet by mouth every 6 (six) hours as needed for Pain., Disp: 28 tablet, Rfl: 0    Current Facility-Administered Medications:     0.9%  NaCl infusion, , Intravenous, Once, Gorge Sagastume MD, Stopped at 05/04/22 1321    methylPREDNISolone sodium succinate injection 40 mg, 40 mg, Intravenous, Once PRN, Rj Pompa MD    Review of patient's allergies indicates:   Allergen Reactions    Pcn [penicillins] Swelling    Codeine Nausea And Vomiting       Physcial Examination  VITAL SIGNS:    Body surface area is 1.54 meters squared.   Pain Assessment  Vitals:    08/09/22 0903   BP: 132/63   Pulse: 69   Resp: 18   Temp: 98 °F (36.7 °C)   Weight: 56.7 kg (125 lb)   Height: 4' 11" (1.499 m)   PainSc: 0-No pain          Wt Readings from Last 5 Encounters:   08/09/22 56.7 kg (125 lb)   07/26/22 56.8 kg (125 lb 4.8 oz)   07/11/22 56.7 kg (125 lb)   07/01/22 56.4 kg (124 lb 4.8 oz)   06/13/22 55.8 kg (123 lb)       GENERAL:  Antonieta Clay is healthy-appearing 61 y.o. female, in no distress.   EYES:   Pupils equal, round, reactive.  Conjunctivae, sclera and lids normal.  HEENT: Head normocephalic and atraumatic, without alopecia.  Oropharynx is unremarkable.  No icterus, jaundice, stomatitis, mucositis, or ulceration is noted.  Ears are clear and unremarkable.  Nose, nares, and septum are unremarkable.    NECK:   No masses.  Thyroid and trachea are normal.    BREASTS:  Deferred.  RESPIRATORY: Clear to auscultation bilaterally.  Symmetrically effortless expansion.  No wheezing and no stridor.    CV: Heart reveals regular rate and rhythm without murmur, rub, or gallops.  ABDOMEN: Soft, non-tender.  No masses, no hernias, and no rebound or rigidity are noted.  /RECTAL:  Deferred.  LYMPHATICS: No preauricular, submandibular, cervical, supraclavicular, axillary, lymphadenopathy.  MUSCULOSKELETAL:Fair musculature, no atrophy.  No arthritic changes.  No edema or cyanosis. Back is without gross abnormal curvature. "   NEUROLOGICAL: Cranial nerves II-XII grossly intact.  Motor and sensory exam intact.  SKIN:   No lesions, bruises, petechiae or rashes.  Good turgor.    PSYCHIATRIC: Patient is alert and oriented to time, place and person.  Mood and affect are appropriate.         Laboratory and Radiology   Lab Results   Component Value Date    WBC 3.26 (L) 06/13/2022    RBC 2.88 (L) 06/13/2022    HGB 9.0 (L) 06/13/2022    HCT 27.1 (L) 06/13/2022    MCV 94 06/13/2022    MCH 31.3 (H) 06/13/2022    MCHC 33.2 06/13/2022    RDW 18.3 (H) 06/13/2022     06/13/2022    MPV 8.9 (L) 06/13/2022    GRAN 1.5 (L) 06/13/2022    GRAN 45.1 06/13/2022    LYMPH 0.9 (L) 06/13/2022    LYMPH 27.9 06/13/2022    MONO 0.9 06/13/2022    MONO 26.1 (H) 06/13/2022    EOS 0.0 06/13/2022    BASO 0.01 06/13/2022    EOSINOPHIL 0.0 06/13/2022    BASOPHIL 0.3 06/13/2022     BMP  Lab Results   Component Value Date     06/13/2022    K 3.6 06/13/2022     06/13/2022    CO2 31 (H) 06/13/2022    BUN 16 06/13/2022    CREATININE 0.8 06/13/2022    CALCIUM 9.5 06/13/2022    ANIONGAP 7 (L) 06/13/2022    ESTGFRAFRICA >60.0 06/13/2022    EGFRNONAA >60.0 06/13/2022     Lab Results   Component Value Date    ALT 19 06/13/2022    AST 22 06/13/2022    ALKPHOS 65 06/13/2022    BILITOT 0.4 06/13/2022       TITLE: PLAN OF CARE FOR THE CHEMOTHERAPY PATIENT / TEACHING PROTOCOL    PURPOSE: To involve the patient / significant other in the plan of care and to provide teaching to the significant other & patient receiving chemotherapy.    LEVEL: Independent.    CONTENT: The Plan of Care for the chemotherapy patient is individualized and appropriate to the patients needs, strengths, limitations, & goals.  Education includes information regarding chemotherapy side effects, the treatment itself, and self-care  Activities.    GOAL / OUTCOME STANDARDS    PHYSIOLOGIC: The client will remain free or experience minimal side effects or toxicities throughout the chemotherapy  treatment period.     PSYCHOLOGIC: The client/significant others will demonstrate positive coping mechanisms in relation to chemotherapy and its side effects.      COGINITIVE: The client/significant others will verbalize understanding of self-care measure to avoid/minimize side effects of the chemotherapy regimen.    EVALUATION / COMMENT KEY:    V = Audiovisual/Video  S = Successfully meets outcome  N = Needs further instruction  NA = Not applicable to the patient  P = Previous knowledge  U = Unable to comprehend  * = See progress notes          PLAN OF CARE  INFORMATION TO BE DELIVERED / NURSING INTERVENTIONS DATE EVALUATION   1. Assessment of client/caregiver,          knowledge of cancer diagnosis,          and chemotherapy as a treatment.  1a. Evaluate patient/caregiver learning ability     b. Plan teaching sessions with patient/caregiver according to needs and present anxiety level/ability to learn.     c. Provide Chemotherapy Education Packet,         Mouth Care Protocol,          Specific Patient Education Sheets. 08/09/2022  S   2. Individual chemotherapy treatment          plan.  2a. Review of Chemotherapy Education handout from [employer]              08/09/2022     S   3. Knowledge Deficit & Self-Management of general side effects common to all chemotherapy:  a. Nausea/Vomiting   b.   Diarrhea  c. Mouth Care  d. Dental care  e. Constipation  f. Hair Loss  g. Potential for infection  h. Fatigue   3a. Reinforce that the majority of side effects from chemotherapy are reversible and are  controlled both in the hospital and at home        (blood counts recover, hair grows back).   b.  Refer to the following for reinforcement of         information post-treatment:  1. Mouth Care Protocol.  2. Bowel Protocol for constipation or diarrhea.  3.  Drug Specific Chemotherapy Information Sheets for each medication patient receiving.    08/09/2022     S     PLAN OF CARE  INFORMATION TO BE DELIVERED / NURSING  INTERVENTIONS DATE EVALUATION   h. Potential for bleeding         i. Potential anemia/fatigue         j. Potential sunburn         k. Birth control measures  l. Safety measures post treatment 4.  Chemotherapy Home Care Instruction  and Safety Information Sheet.  A. patient/caregivers to thoroughly cook shellfish (shrimp, crab, etc) to decrease the chance of infection.    B.  Use sunscreen and protective clothing while in the sun.   08/09/2022      4. Knowledge deficit & Self Management of Drug Specific  Side Effects.    a. BLADDER EFFECTS         (Hemorrhagic Cystitis)                     Preventable with adequate hydration; occurs 2-3 days or more post treatment.     1.  Instruct patient to:   a.   Void at least every 2 hours; increase intake.   b.   DO NOT hold urine; go when urge is felt.   c.    Empty bladder at bedtime and on          awakening.   d.   Observe for color changes (red to tea            colored), amount and frequency changes.   e.   Notify oncologist of any abnormalities           in urine or voiding or if you cannot               drink adequate fluids.    08/09/2022     S    b.   CHANGES IN URINE        COLOR:         1.   Instruct patient:   a.   Most evident in first 2-3 voidings after            administration.  b. Lasts less than 24 hours.  c. If urine is discolored 2 or more days post- treatment, notify oncologist.      08/09/2022 S   c.    KIDNEY EFFECTS           (Nephrotoxicity)   1.  Instruct patient to:  a.   Drink 8-16 glasses of fluid/day the day   pre-treatment and 3-4 days post-treatment to maintain hydration; the best way to minimize kidney problems.  b.   Notify oncologist immediately if unable to drink fluids or if changes are noted in urinary elimination.      08/09/2022     S   a. PULMONARY TOXICITY    1. Instruct patient to report symptoms such as shortness of breath, chest pain, shallow breathing, or chest wall discomfort to physician.  2. Reinforce preventative measures used  by the health care team.  a. Baseline and periodic PFT and chest x-ray.   08/09/2022   S     PLAN OF CARE INFORMATION TO BE DELIVERED / NURSING INTERVENTIONS DATE EVALUATION   b. NERVE & MUSCLE EFFECTS (neurotoxocity; neuropathy, possible visual/hearing changes)        3. Instruct patient to:    a. Report numbness or tingling of the hands/feet, loss of fine motor movement (buttoning shirt, tying shoelaces), or gait changes to your oncologist.  b. If numbness/tingling are present:   protect feet with shoes at all times.   Use gloves for washing dishes/gardening & potholders in kitchen.       08/09/2022   S   c. CARDIOTOXICITY  Decreased effectiveness of             cardiac function. Effective are                  cumulative and irreversible.                                    CARDIAC ARRYTHMIAS              4   Instruct:  a. Heart function may be tested before treatment and perdiocally during treatment.  b. Notify oncologist of irregular pulse, palpitations, shortness of breath, or swelling in lower extremities/feet.          Can cause arrhythmias on infusion that resolve once infusion discontinued. Instruct nurse if any irregularity felt.    08/09/2022   S   d. EXTRAVASTION  Occurs when vesicants leak outside of vein and cause damage to the skin and underlying tissues.   1. Reinforce preventive measures used to avoid complications.  a. Fresh IV site or central line monitored continuously with vesicant IVP.  b. Continuous infusion via central line site and blood return monitored periodically around the clock.  2. Instruct to:  a. Notify nurse of any discomfort, burning, stinging, etc. at IV site during chemotherapy administration.  b. Notify oncologist of any redness, pain, or swelling at IV site after discharge from hospital.   08/09/2022   S   e. HYPERSENSITIVITY can happen with any medication.   1. Instruct patient:  a. Nurse is with them during the initial part of treatment and will be close by to  monitor.  b. Pre-medication ordered by the oncologist must be taken on time. If doses are missed, treatment will need to be re-scheduled.  c. Skin redness, itching, or hives appearing after discharge should be reported to oncologist. 08/09/2022   S       PLAN OF CARE INFORMATION TO BE DELIVERED / NURSING INTERVENTIONS DATE EVALUATION   f. FLU-LIKE SYNDROME      1. Instruct patient symptoms are hard to prevent and may include fever, shaking chills, muscle and body aches.  a. Taking prescribed medications from physician if needed.  b. Adequate fluids are important.    2. Reinforce the need to call if temperature is         elevated to 100.4 or more  08/09/2022   S   g. HAND-FOOT SYNDROME  causes painful, symmetric swelling and redness of palms and soles                  5. Instruct patient to report any numbness or tingling in the hands or feet.  6. Explain prevention techniques, such as     a. Use heavy moisturizers to lessen skin dryness and itching, but to avoid if skin is cracked or broken  b. Bathe in tepid water, use non-perfumed soap, and wash gently. Baths with oatmeal or diluted baking soda may be soothing.  c. Avoid tight fitting shoes and repetitive actions, such as rubbing hands or applying pressure to hands/feet.  7. Review measures to take should syndrome occur:  a. Cold compresses and elevation for          edema  b. Pain medications and other measures as ordered by oncologist.   4.   Syndrome resolves few weeks after therapy. 08/09/2022   S   5. DISCHARGE PLANNING /        EDUCATION 1.    Explain importance of compliance with follow- up  tests (CBC, CMP).  2.    Verify patient/caregiver know:  a.    Oncologists office phone number.  b.    Dates of follow-up appointments.  c.    Prescriptions given for nausea  3.   Review side effects to monitor and notify          oncologist about.  4.   Reinforce the need for patient and caregivers to:  a.    Review information given.  b.    Call oncologists office  with questions          or symptoms  5.   Provide [facility] Brochure make referrals if needed for financial or .   08/09/2022   S     PROGRESS NOTES: I met with the patient and her  today for chemotherapy education. she will be starting treatment with Lenvima. We discussed the mechanism of action, potential side effects of this treatment as well as ways she can manage them at home. Some of these side effects include but or not limited to fever, nausea, vomiting, decreased appetite, fatigue, weakness, cytopenias, myalgia/arthralgia, constipation, diarrhea, bleeding, headache, shortness of breath, nail changes, taste change, hair thinning/loss, mood disturbances, or edema. We also discussed dietary modifications she should make although this will be discussed in more detail with the dietician. she was provided with anti-emetic medication, a copy of all of the information we discussed today as well as our contact information. she will be provided a schedule on his first day of treatment. We will obtain labs on a weekly basis and the patient will follow-up with the physician for toxicity monitoring throughout treatment. All questions were answered and an informed consent was obtained. she was reminded to certainly contact us sooner if needed.  Attached to the patients folder and discussed with the patient the 24 hour/ 7 days a week after hours telephone number for the physician.  Patient notified to call anytime 24/7 because their is a physician on call for any problems that may arise.  Patient also notified to report to [facility] if they can not get in touch with a physician after hours.  Discussed the five wishes booklet with the patient and their family.           Assessment/Plan     ICD-10-CM ICD-9-CM   1. Malignant neoplasm of major salivary gland  C08.9 142.9          Medications Ordered:  Zofran 8mg 1 tab PO Q8h prn nausea  Phenergan 25mg PO Q4-6h prn nausea    Will see  patient 1 week after starting chemo pills she will call for appt once she gets her medication.    Standing Labs Ordered:  CBC weekly  CMP weekly  UA Monthly    Follow up in about 4 weeks (around 9/6/2022) for with Dr. Sagastume.    Total Face to Face Time: 60 minutes face to face with the patient and their family discussing the chemotherapy side-effects and when to call our office.   Electronically signed by: Louise Harrington, MSN, APRN, AGNP-C, OCN      Answers for HPI/ROS submitted by the patient on 8/3/2022  visual disturbance: Yes

## 2022-08-12 NOTE — PLAN OF CARE
Problem: Activity Intolerance  Goal: Enhanced Capacity and Energy  Outcome: Ongoing, Progressing  Intervention: Optimize Activity Tolerance  Flowsheets (Taken 8/12/2022 5131)  Self-Care Promotion: independence encouraged  Activity Management:   Ambulated -L4   Up in chair - L3  Environmental Support:   calm environment promoted   rest periods encouraged

## 2022-08-18 NOTE — TELEPHONE ENCOUNTER
----- Message from Tanisha Gonzalez sent at 8/18/2022  9:38 AM CDT -----  Pt. Called wanted Louise to know her medicine will be delivered on Friday and needs Louise to call her she has a question.    #[phone]

## 2022-08-18 NOTE — TELEPHONE ENCOUNTER
Spoke to pt's  and informed him that it is okay for the pt to start taking her medicine on Monday. He verbalized understanding.

## 2022-08-26 NOTE — PROGRESS NOTES
PROGRESS NOTE    Subjective:       Patient ID: Antonieta Clay is a 61 y.o. female.    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 Postoperative changes in the left side of the neck with slightly  increased FDG activity from background.  2. No FDG avid lymphadenopathy or finding of additional distant sites  of FDG avid disease.    10/20/2020-12/4/2020  Radiation therapy.    3/11/2021:  MRI Neck: no evidence of recurrence.    MRI Brain: no acute abnormalities.    12/3/2021:  CT chest: new ground glass, RUL, infectious vs met. 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    Cytoxan/Kwasi/Cisplatin:  Cycle 1: 4/5/2022  Cycle 2: 4/26/2022  Cycle 3: 5/17/2022 5/31/2022:  CT neck and chest:  Mastoid lesion ? Less prominent.  No other significant changes or new growth.     Started Lenvima daily 8/23/2022    Chief Complaint:  Parotid cancer follow up    History of Present Illness:   Antonieta Clay is a 61 y.o. female who presents for follow up of above.      Ms. Clay has been om Lenvima for 1 week. BP has been WNL. She is eating drinking well. Denies and fever, CP SOB or cough. She has had intermittent headaches and has been taking Tylenol with relief.    Family and Social history reviewed and is unchanged from 9/21/2020      ROS:  Review of Systems   Constitutional:  Negative for appetite change, fever and unexpected weight change.   HENT:  Negative for mouth sores.    Eyes:  Negative for visual disturbance.   Respiratory:  Negative for cough and shortness of breath.    Cardiovascular:  Negative for chest pain and leg swelling.   Gastrointestinal:  Negative for abdominal pain, blood in stool and diarrhea.   Genitourinary:   Negative for frequency and hematuria.   Musculoskeletal:  Negative for back pain.   Skin:  Negative for rash.   Neurological:  Positive for headaches.   Hematological:  Negative for adenopathy.   Psychiatric/Behavioral:  The patient is not nervous/anxious.         Current Outpatient Medications:     ascorbic acid, vitamin C, (VITAMIN C) 1000 MG tablet, Take 1,000 mg by mouth once daily., Disp: , Rfl:     cholecalciferol, vitamin D3, 125 mcg (5,000 unit) Tab, Take 5,000 Units by mouth once daily., Disp: , Rfl:     fluticasone propionate (FLONASE) 50 mcg/actuation nasal spray, 2 sprays (100 mcg total) by Each Nostril route once daily., Disp: 18.2 mL, Rfl: 1    lenvatinib 4 mg Cap, Take 16 mg by mouth once daily at 6am., Disp: 120 capsule, Rfl: 3    levothyroxine (SYNTHROID) 88 MCG tablet, Take 1 tablet (88 mcg total) by mouth once daily., Disp: 90 tablet, Rfl: 1    multivit-iron-min-folic acid 18-0.4 mg Tab, Take 1 tablet by mouth once daily. , Disp: , Rfl:     omega-3 fatty acids/fish oil (FISH OIL-OMEGA-3 FATTY ACIDS) 300-1,000 mg capsule, Take 1 capsule by mouth once daily., Disp: , Rfl:     oxyCODONE-acetaminophen (PERCOCET)  mg per tablet, Take 1 tablet by mouth every 6 (six) hours as needed for Pain., Disp: 28 tablet, Rfl: 0    ZINC ACETATE ORAL, Take 1 capsule by mouth once daily. , Disp: , Rfl:     Current Facility-Administered Medications:     0.9%  NaCl infusion, , Intravenous, Once, Gorge Sagastume MD, Stopped at 05/04/22 1321    methylPREDNISolone sodium succinate injection 40 mg, 40 mg, Intravenous, Once PRN, Rj Pompa MD        Objective:       Physical Examination:     BP (!) 142/85   Pulse 65   Temp 97.7 °F (36.5 °C)   Wt 56.1 kg (123 lb 9.6 oz)   BMI 24.96 kg/m²     Physical Exam  Constitutional:       Appearance: Normal appearance. She is well-developed.   HENT:      Head: Normocephalic and atraumatic.      Right Ear: External ear normal.      Left Ear: External ear normal.       Mouth/Throat:      Mouth: Mucous membranes are moist.      Pharynx: Oropharynx is clear. No oropharyngeal exudate or posterior oropharyngeal erythema.      Comments: No visible or palpable oral lesions.   Eyes:      Conjunctiva/sclera: Conjunctivae normal.      Pupils: Pupils are equal, round, and reactive to light.   Neck:      Thyroid: No thyromegaly.      Trachea: No tracheal deviation.   Cardiovascular:      Rate and Rhythm: Normal rate and regular rhythm.      Heart sounds: Normal heart sounds.   Pulmonary:      Effort: Pulmonary effort is normal.      Breath sounds: Normal breath sounds.   Abdominal:      General: Bowel sounds are normal. There is no distension.      Palpations: Abdomen is soft. There is no mass.      Tenderness: There is no abdominal tenderness.   Lymphadenopathy:      Head:      Right side of head: No submental, submandibular, tonsillar or preauricular adenopathy.      Left side of head: No submental, submandibular, tonsillar or preauricular adenopathy.      Cervical: No cervical adenopathy.      Upper Body:      Right upper body: No supraclavicular or axillary adenopathy.      Left upper body: No supraclavicular or axillary adenopathy.   Skin:     General: Skin is warm.      Findings: No rash.   Neurological:      Mental Status: She is alert.      Comments: Neuro intact througout   Psychiatric:         Behavior: Behavior normal.         Thought Content: Thought content normal.         Judgment: Judgment normal.       Labs:   Recent Results (from the past 336 hour(s))   CBC Auto Differential    Collection Time: 08/29/22 10:55 AM   Result Value Ref Range    WBC 4.96 3.90 - 12.70 K/uL    Hemoglobin 11.9 (L) 12.0 - 16.0 g/dL    Hematocrit 34.2 (L) 37.0 - 48.5 %    Platelets 278 150 - 450 K/uL     CMP  Sodium   Date Value Ref Range Status   08/29/2022 135 (L) 136 - 145 mmol/L Final     Potassium   Date Value Ref Range Status   08/29/2022 3.9 3.5 - 5.1 mmol/L Final     Chloride   Date Value Ref  Range Status   08/29/2022 98 95 - 110 mmol/L Final     CO2   Date Value Ref Range Status   08/29/2022 28 23 - 29 mmol/L Final     Glucose   Date Value Ref Range Status   08/29/2022 109 70 - 110 mg/dL Final     BUN   Date Value Ref Range Status   08/29/2022 16 8 - 23 mg/dL Final     Creatinine   Date Value Ref Range Status   08/29/2022 0.8 0.5 - 1.4 mg/dL Final     Calcium   Date Value Ref Range Status   08/29/2022 9.6 8.7 - 10.5 mg/dL Final     Total Protein   Date Value Ref Range Status   08/29/2022 8.5 (H) 6.0 - 8.4 g/dL Final     Albumin   Date Value Ref Range Status   08/29/2022 4.3 3.5 - 5.2 g/dL Final     Total Bilirubin   Date Value Ref Range Status   08/29/2022 0.7 0.1 - 1.0 mg/dL Final     Comment:     For infants and newborns, interpretation of results should be based  on gestational age, weight and in agreement with clinical  observations.    Premature Infant recommended reference ranges:  Up to 24 hours.............<8.0 mg/dL  Up to 48 hours............<12.0 mg/dL  3-5 days..................<15.0 mg/dL  6-29 days.................<15.0 mg/dL       Alkaline Phosphatase   Date Value Ref Range Status   08/29/2022 95 55 - 135 U/L Final     AST   Date Value Ref Range Status   08/29/2022 47 (H) 10 - 40 U/L Final     ALT   Date Value Ref Range Status   08/29/2022 45 (H) 10 - 44 U/L Final     Anion Gap   Date Value Ref Range Status   08/29/2022 9 8 - 16 mmol/L Final     eGFR if    Date Value Ref Range Status   06/13/2022 >60.0 >60 mL/min/1.73 m^2 Final     eGFR if non    Date Value Ref Range Status   06/13/2022 >60.0 >60 mL/min/1.73 m^2 Final     Comment:     Calculation used to obtain the estimated glomerular filtration  rate (eGFR) is the CKD-EPI equation.        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   Headaches- Tylenol PRN  3.   Nausea- Zofran and Phenergan PRN    Discussion:     Follow up in about 1 week (around 9/5/2022) for with Dr. Sagastume and 3 weeks with me.    Electronically signed by Louise Harrington, MSN, APRN, AGNP-C, OCN      Phase II Study of Lenvatinib in Patients With Progressive, Recurrent or Metastatic Adenoid Cystic Carcinoma.  AU  Tchekmbenja V, Mohamud EJ, Poli L, Bee C, Reinaldo S, Carlos N, Marilyn CR, Stephanie I, Emily SS, Sumanth DG, Ho AL   SO  J Clin Oncol. 2019;37(18):1529. Epub 2019 Apr 2.      PURPOSERecurrent or metastatic adenoid cystic carcinoma (R/M ACC) is a malignant neoplasm of predominantly salivary gland origin for which effective therapies are lacking. We conducted a phase II trial evaluating the multitargeted tyrosine kinase inhibitor lenvatinib in patients with R/M ACC.    PATIENTS AND METHODSThis study was conducted with a two-stage minimax design. Patients with histologically confirmed R/M ACC of any primary site with radiographic and/or symptomatic progression were eligible. Any prior therapy was allowed except previous lenvatinib. Patients received lenvatinib 24 mg orally per day. The primary end point was overall response rate. Secondary end points were progression-free survival and safety. An exploratory analysis of how MYB expression and genomic alterations relate to outcomes was conducted.    RESULTSThirty-three patients were enrolled; 32 were evaluable for the primary end point. Five patients (15.6%) had a confirmed partial response, 24 patients (75%) had stable disease, two patients (6.3%) discontinued treatment as a result of toxicity before the first scan, and one patient (3.1%) had progression of disease as best response. Median progression-free survival time was 17.5 months (95% CI, 7.2 months to not reached), although only eight progression events were observed. Patients otherwise were removed  for toxicity (n = 5), as a result of withdrawal of consent (n = 9), or at the treating physician's discretion (n = 6). Twenty-three patients required at least one dose modification, and 18 of 32 patients discontinued lenvatinib for drug-related issues. The most common grade 3 or 4 adverse events were hypertension (n = 9; 28.1%) and oral pain (n = 3; 9.4%). Three grade 4 adverse events were observed (myocardial infarction, n = 1; posterior reversible encephalopathy syndrome, n = 1; and intracranial hemorrhage, n = 1).    CONCLUSIONThis trial met the prespecified overall response rate primary end point, demonstrating antitumor activity with lenvatinib in R/M ACC patients. Toxicity was comparable to previous studies, requiring monitoring and management.

## 2022-08-29 PROBLEM — R51.9 HEADACHE: Status: ACTIVE | Noted: 2022-01-01

## 2022-08-29 PROBLEM — R11.0 NAUSEA: Status: ACTIVE | Noted: 2022-01-01

## 2022-09-05 NOTE — PROGRESS NOTES
PROGRESS NOTE    Subjective:       Patient ID: Antonieta Clay is a 61 y.o. female.    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 Postoperative changes in the left side of the neck with slightly  increased FDG activity from background.  2. No FDG avid lymphadenopathy or finding of additional distant sites  of FDG avid disease.    10/20/2020-12/4/2020  Radiation therapy.    3/11/2021:  MRI Neck: no evidence of recurrence.    MRI Brain: no acute abnormalities.    12/3/2021:  CT chest: new ground glass, RUL, infectious vs met. 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    Cytoxan/Kwasi/Cisplatin:  Cycle 1: 4/5/2022  Cycle 2: 4/26/2022  Cycle 3: 5/17/2022 5/31/2022:  CT neck and chest:  Mastoid lesion ? Less prominent.  No other significant changes or new growth.     8/23/2022:  Lenvatinib Start:    Chief Complaint:  Follow-up  Parotid cancer follow up    History of Present Illness:   Antonieta Clay is a 61 y.o. female who presents for follow up of above.      Ms. Clay is doing ok at this time.  Has had some elevated BP but no symptoms.  Also had dysuria and placed on levaquin over the weekend.     Family and Social history reviewed and is unchanged from 9/21/2020      ROS:  Review of Systems   Constitutional:  Negative for appetite change, fever and unexpected weight change.   HENT:  Negative for mouth sores.    Eyes:  Negative for visual disturbance.   Respiratory:  Negative for cough and shortness of breath.    Cardiovascular:  Negative for chest pain and leg swelling.   Gastrointestinal:  Negative for abdominal pain, blood in stool and diarrhea.   Genitourinary:  Negative for frequency and hematuria.    Musculoskeletal:  Negative for back pain.   Skin:  Negative for rash.   Neurological:  Negative for headaches.   Hematological:  Negative for adenopathy.   Psychiatric/Behavioral:  The patient is not nervous/anxious.         Current Outpatient Medications:     ascorbic acid, vitamin C, (VITAMIN C) 1000 MG tablet, Take 1,000 mg by mouth once daily., Disp: , Rfl:     cholecalciferol, vitamin D3, 125 mcg (5,000 unit) Tab, Take 5,000 Units by mouth once daily., Disp: , Rfl:     fluticasone propionate (FLONASE) 50 mcg/actuation nasal spray, 2 sprays (100 mcg total) by Each Nostril route once daily., Disp: 18.2 mL, Rfl: 1    lenvatinib 4 mg Cap, Take 16 mg by mouth once daily at 6am., Disp: 120 capsule, Rfl: 3    levothyroxine (SYNTHROID) 88 MCG tablet, Take 1 tablet (88 mcg total) by mouth once daily., Disp: 90 tablet, Rfl: 1    multivit-iron-min-folic acid 18-0.4 mg Tab, Take 1 tablet by mouth once daily. , Disp: , Rfl:     omega-3 fatty acids/fish oil (FISH OIL-OMEGA-3 FATTY ACIDS) 300-1,000 mg capsule, Take 1 capsule by mouth once daily., Disp: , Rfl:     oxyCODONE-acetaminophen (PERCOCET)  mg per tablet, Take 1 tablet by mouth every 6 (six) hours as needed for Pain., Disp: 28 tablet, Rfl: 0    ZINC ACETATE ORAL, Take 1 capsule by mouth once daily. , Disp: , Rfl:     Current Facility-Administered Medications:     0.9%  NaCl infusion, , Intravenous, Once, Gorge Sagastume MD, Stopped at 05/04/22 1321    methylPREDNISolone sodium succinate injection 40 mg, 40 mg, Intravenous, Once PRN, Rj Pompa MD        Objective:       Physical Examination:     BP (!) 159/81   Pulse 61   Temp 97.2 °F (36.2 °C)   Wt 55.8 kg (123 lb)   BMI 24.84 kg/m²     Physical Exam  Constitutional:       Appearance: She is well-developed.   HENT:      Head: Normocephalic and atraumatic.      Right Ear: External ear normal.      Left Ear: External ear normal.      Mouth/Throat:      Mouth: Mucous membranes are moist.       Pharynx: Oropharynx is clear. No oropharyngeal exudate or posterior oropharyngeal erythema.      Comments: No visible or palpable oral lesions.   Eyes:      Conjunctiva/sclera: Conjunctivae normal.      Pupils: Pupils are equal, round, and reactive to light.   Neck:      Thyroid: No thyromegaly.      Trachea: No tracheal deviation.   Cardiovascular:      Rate and Rhythm: Normal rate and regular rhythm.      Heart sounds: Normal heart sounds.   Pulmonary:      Effort: Pulmonary effort is normal.      Breath sounds: Normal breath sounds.   Abdominal:      General: Bowel sounds are normal. There is no distension.      Palpations: Abdomen is soft. There is no mass.      Tenderness: There is no abdominal tenderness.   Lymphadenopathy:      Head:      Right side of head: No submental, submandibular, tonsillar or preauricular adenopathy.      Left side of head: No submental, submandibular, tonsillar or preauricular adenopathy.      Cervical: No cervical adenopathy.      Upper Body:      Right upper body: No supraclavicular or axillary adenopathy.      Left upper body: No supraclavicular or axillary adenopathy.   Skin:     Findings: No rash.   Neurological:      Comments: Neuro intact througout   Psychiatric:         Behavior: Behavior normal.         Thought Content: Thought content normal.         Judgment: Judgment normal.       Labs:   Recent Results (from the past 336 hour(s))   CBC Auto Differential    Collection Time: 09/06/22 10:09 AM   Result Value Ref Range    WBC 4.12 3.90 - 12.70 K/uL    Hemoglobin 12.2 12.0 - 16.0 g/dL    Hematocrit 35.2 (L) 37.0 - 48.5 %    Platelets 202 150 - 450 K/uL   CBC Auto Differential    Collection Time: 08/29/22 10:55 AM   Result Value Ref Range    WBC 4.96 3.90 - 12.70 K/uL    Hemoglobin 11.9 (L) 12.0 - 16.0 g/dL    Hematocrit 34.2 (L) 37.0 - 48.5 %    Platelets 278 150 - 450 K/uL     CMP  Sodium   Date Value Ref Range Status   08/29/2022 135 (L) 136 - 145 mmol/L Final     Potassium    Date Value Ref Range Status   08/29/2022 3.9 3.5 - 5.1 mmol/L Final     Chloride   Date Value Ref Range Status   08/29/2022 98 95 - 110 mmol/L Final     CO2   Date Value Ref Range Status   08/29/2022 28 23 - 29 mmol/L Final     Glucose   Date Value Ref Range Status   08/29/2022 109 70 - 110 mg/dL Final     BUN   Date Value Ref Range Status   08/29/2022 16 8 - 23 mg/dL Final     Creatinine   Date Value Ref Range Status   08/29/2022 0.8 0.5 - 1.4 mg/dL Final     Calcium   Date Value Ref Range Status   08/29/2022 9.6 8.7 - 10.5 mg/dL Final     Total Protein   Date Value Ref Range Status   08/29/2022 8.5 (H) 6.0 - 8.4 g/dL Final     Albumin   Date Value Ref Range Status   08/29/2022 4.3 3.5 - 5.2 g/dL Final     Total Bilirubin   Date Value Ref Range Status   08/29/2022 0.7 0.1 - 1.0 mg/dL Final     Comment:     For infants and newborns, interpretation of results should be based  on gestational age, weight and in agreement with clinical  observations.    Premature Infant recommended reference ranges:  Up to 24 hours.............<8.0 mg/dL  Up to 48 hours............<12.0 mg/dL  3-5 days..................<15.0 mg/dL  6-29 days.................<15.0 mg/dL       Alkaline Phosphatase   Date Value Ref Range Status   08/29/2022 95 55 - 135 U/L Final     AST   Date Value Ref Range Status   08/29/2022 47 (H) 10 - 40 U/L Final     ALT   Date Value Ref Range Status   08/29/2022 45 (H) 10 - 44 U/L Final     Anion Gap   Date Value Ref Range Status   08/29/2022 9 8 - 16 mmol/L Final     eGFR if    Date Value Ref Range Status   06/13/2022 >60.0 >60 mL/min/1.73 m^2 Final     eGFR if non    Date Value Ref Range Status   06/13/2022 >60.0 >60 mL/min/1.73 m^2 Final     Comment:     Calculation used to obtain the estimated glomerular filtration  rate (eGFR) is the CKD-EPI equation.        No results found for: CEA  No results found for: PSA        Assessment/Plan:     Problem List Items Addressed This Visit        Malignant neoplasm of major salivary gland     Patient is now on Lenvatinib and seems to be tolerating this with expected toxicity.  She is having some fatigue and back ache which may be coming from the medication.  Will continue therapy for now.           Dysuria     This may be due to UTI.  Is on levaquin and will check u/a today with culture.  Will adjust if needed.          Relevant Orders    Urinalysis, Reflex to Urine Culture Urine, Clean Catch    Cancer associated pain     Patient seems to be doing ok from this standpoint.  Continue current care approach.             Discussion:     Follow up in about 4 weeks (around 10/4/2022) for NP visit.        Phase II Study of Lenvatinib in Patients With Progressive, Recurrent or Metastatic Adenoid Cystic Carcinoma.  AU  Tchekmbenja V, Mohamud EJ, Poli L, Bee C, Reinaldo S, Carlos N, Marilyn CR, Stephanie I, Emily SS, Sumanth DG, Ho AL   SO  J Clin Oncol. 2019;37(18):1529. Epub 2019 Apr 2.      PURPOSERecurrent or metastatic adenoid cystic carcinoma (R/M ACC) is a malignant neoplasm of predominantly salivary gland origin for which effective therapies are lacking. We conducted a phase II trial evaluating the multitargeted tyrosine kinase inhibitor lenvatinib in patients with R/M ACC.    PATIENTS AND METHODSThis study was conducted with a two-stage minimax design. Patients with histologically confirmed R/M ACC of any primary site with radiographic and/or symptomatic progression were eligible. Any prior therapy was allowed except previous lenvatinib. Patients received lenvatinib 24 mg orally per day. The primary end point was overall response rate. Secondary end points were progression-free survival and safety. An exploratory analysis of how MYB expression and genomic alterations relate to outcomes was conducted.    RESULTSThirty-three patients were enrolled; 32 were evaluable for the primary end point. Five patients (15.6%) had a confirmed partial response, 24  patients (75%) had stable disease, two patients (6.3%) discontinued treatment as a result of toxicity before the first scan, and one patient (3.1%) had progression of disease as best response. Median progression-free survival time was 17.5 months (95% CI, 7.2 months to not reached), although only eight progression events were observed. Patients otherwise were removed for toxicity (n = 5), as a result of withdrawal of consent (n = 9), or at the treating physician's discretion (n = 6). Twenty-three patients required at least one dose modification, and 18 of 32 patients discontinued lenvatinib for drug-related issues. The most common grade 3 or 4 adverse events were hypertension (n = 9; 28.1%) and oral pain (n = 3; 9.4%). Three grade 4 adverse events were observed (myocardial infarction, n = 1; posterior reversible encephalopathy syndrome, n = 1; and intracranial hemorrhage, n = 1).    CONCLUSIONThis trial met the prespecified overall response rate primary end point, demonstrating antitumor activity with lenvatinib in R/M ACC patients. Toxicity was comparable to previous studies, requiring monitoring and management.  Electronically signed by Gorge Coombs

## 2022-09-06 PROBLEM — R30.0 DYSURIA: Status: ACTIVE | Noted: 2022-01-01

## 2022-09-06 NOTE — ASSESSMENT & PLAN NOTE
This may be due to UTI.  Is on levaquin and will check u/a today with culture.  Will adjust if needed.

## 2022-09-06 NOTE — ASSESSMENT & PLAN NOTE
Patient is now on Lenvatinib and seems to be tolerating this with expected toxicity.  She is having some fatigue and back ache which may be coming from the medication.  Will continue therapy for now.

## 2022-09-16 NOTE — TELEPHONE ENCOUNTER
Spoke with the patienta  and she is having more persistent headaches and her BP is running up and down. She also has periods of feeling light headed and has persistent headaches. Fioricet is helping some.    Will get a brain MRI and hold Lenvima over the weekend with daily bp checks.

## 2022-09-17 NOTE — ED PROVIDER NOTES
Encounter Date: 9/16/2022       History     Chief Complaint   Patient presents with    Dizziness     Pt reports migraine headache that started this AM, dizziness. Pt is pale,  reports this starting about 1-2 days ago. Pt is A&O, VSS, resps E/U, no unifocal deficits. Started cannabis oil yesterday. Never had a blood transfusion before. Requiring assistance ambulating when she usually does not need assisstance, feeling more generalized weakness than normal. Currently on oral chemo.      HPI    60 y/o F with hx of head/neck malignancy, who presents to the ED for evaluation of headache and dizziness.  Patient reports headaches have been persistent for the last month but have worsened in intensity and frequency, worst headache was today, starts in the back, radiates towards the front, associated with photophobia, no associated visual deficits, confusion, speech difficulty, weakness, numbness.  Patient started a new chemotherapy agent 4 weeks ago.  Dizziness is intermittent, usually worsened going from lying or sitting to standing, episode today where patient almost fell while she was walking due to lightheadedness.  She reports associated nausea, unchanged since starting her chemotherapy agent.  She denies any fever chills, no chest pain, no shortness of breath, no abdominal pain, no hematuria or dysuria.     Review of patient's allergies indicates:   Allergen Reactions    Pcn [penicillins] Swelling    Codeine Nausea And Vomiting     Past Medical History:   Diagnosis Date    Cancer     neck    HA (headache)     Hypothyroidism      Past Surgical History:   Procedure Laterality Date    INSERTION OF TUNNELED CENTRAL VENOUS CATHETER (CVC) WITH SUBCUTANEOUS PORT Right 4/4/2022    Procedure: [id number];  Surgeon: Charles Servin III, MD;  Location: [facility];  Service: General;  Laterality: Right;    left temporal bone resection  2020    MODIFIED RADICAL NECK DISSECTION Left 2020    parotidectomy Left 2020      Family History   Problem Relation Age of Onset    Breast cancer Mother     Breast cancer Maternal Aunt      Social History     Tobacco Use    Smoking status: Never    Smokeless tobacco: Never   Substance Use Topics    Alcohol use: Never    Drug use: Never     Review of Systems   Constitutional:  Negative for diaphoresis and fever.   HENT:  Negative for congestion and sore throat.    Eyes:  Negative for pain and visual disturbance.   Respiratory:  Negative for cough and shortness of breath.    Cardiovascular:  Negative for chest pain and palpitations.   Gastrointestinal:  Positive for nausea. Negative for vomiting.   Genitourinary:  Negative for dysuria and flank pain.   Musculoskeletal:  Negative for joint swelling and myalgias.   Skin:  Negative for rash and wound.   Neurological:  Positive for light-headedness and headaches. Negative for dizziness and syncope.   Psychiatric/Behavioral:  Negative for agitation and confusion.      Physical Exam     Initial Vitals [09/16/22 1706]   BP Pulse Resp Temp SpO2   (!) 141/94 68 16 97.7 °F (36.5 °C) 96 %      MAP       --         Physical Exam    Constitutional: She appears well-nourished. She is not diaphoretic.   HENT:   Head: Atraumatic.   Right Ear: External ear normal.   Left Ear: External ear normal.   Eyes: Conjunctivae are normal. Pupils are equal, round, and reactive to light. No scleral icterus.   Neck: No thyromegaly present.   Normal range of motion.  Cardiovascular:            No murmur heard.  Bradycardic rate   Pulmonary/Chest: No stridor. No respiratory distress. She has no wheezes.   Abdominal: Abdomen is soft. Bowel sounds are normal. There is no abdominal tenderness. There is no guarding.   Musculoskeletal:         General: No edema. Normal range of motion.      Cervical back: Normal range of motion.     Neurological: She is alert and oriented to person, place, and time. She has normal strength. No cranial nerve deficit or sensory deficit.   Skin: Skin  is warm. Capillary refill takes less than 2 seconds. No erythema.   Psychiatric: She has a normal mood and affect. Her behavior is normal. Judgment and thought content normal.       ED Course   Procedures  Labs Reviewed   CBC W/ AUTO DIFFERENTIAL - Abnormal; Notable for the following components:       Result Value    WBC 3.71 (*)     Hematocrit 35.9 (*)     Platelets 142 (*)     All other components within normal limits   COMPREHENSIVE METABOLIC PANEL - Abnormal; Notable for the following components:    AST 77 (*)     ALT 72 (*)     All other components within normal limits   TROPONIN I   B-TYPE NATRIURETIC PEPTIDE   SARS-COV-2 RNA AMPLIFICATION, QUAL   B-TYPE NATRIURETIC PEPTIDE   TROPONIN I          Imaging Results              X-Ray Chest AP Portable (In process)                      MRI Brain Without Contrast (Final result)  Result time 09/16/22 22:12:35   Procedure changed from MRI Brain W WO Contrast     Final result by Manuel Uribe MD (09/16/22 22:12:35)                   Narrative:    EXAM: MRI BRAIN WITHOUT CONTRAST    RadLex: MR BRAIN WITHOUT IV CONTRAST    HISTORY: Headache, chronic, new features or increased frequency. 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 Recent head CT showed areas of FDG uptake concerning for metastatic disease and extension of primary tumor.    COMPARISON: MRI brain dated 3/18/2022. PET/CT dated 4/19/2022.    TECHNIQUE:  Standard MRI of the brain is performed with head coil. Axial, coronal, and sagittal short TR/TE, fast spin echo, inversion recovery, gradient echo, and diffusion images are obtained without contrast. Patient terminated the examination prior to contrast administration. T1 axial images were also not performed.    FINDING(S):  Lack of contrast limits evaluation for soft tissue mass. However, there does appear to be a mass centered in the left mastoid portion of the left temporal  bone.    This measures at least 1.9 x 1.7 cm on series 5, image 9 with lobular extension abutting the left cerebellar hemisphere on series 5, image 10 measuring 1.2 x 0.8 cm.    No other intracranial mass lesion or mass effect is identified. There is mild chronic microangiopathy and mild cerebral atrophy.    Ventricle system appears symmetric. No fluid collections are noted. Diffusion-weighted images do not show evidence of restricted diffusion to suggest acute ischemic infarct.    Gradient echo sequences are normal. Sagittal midline structures are normal. Sinuses are otherwise clear. Globes are symmetric.    No scalp edema.    IMPRESSION:    1.  Heterogeneous mass centered in the mastoid portion of left temporal bone as outlined. Consider repeat examination with contrast enhancement for further characterization.  2.  No evidence of separate intracranial mass or acute intracranial ischemia.        Electronically signed by:  Manuel Uribe MD  9/16/2022 10:12 PM CDT Workstation: [id number]                                     Medications   lorazepam injection 1 mg (has no administration in time range)   butalbital-acetaminophen-caffeine -40 mg per tablet 1 tablet (1 tablet Oral Given 9/16/22 2316)   prochlorperazine injection Soln 10 mg (10 mg Intravenous Given 9/16/22 2036)   lactated ringers bolus 500 mL (0 mLs Intravenous Stopped 9/16/22 2136)   hydrALAZINE injection 5 mg (5 mg Intravenous Given 9/16/22 2035)   ondansetron injection 8 mg (8 mg Intravenous Given 9/16/22 2217)   hydrALAZINE injection 5 mg (5 mg Intravenous Given 9/16/22 2316)     Medical Decision Making:   Initial Assessment:   61-year-old female with history of malignancy, chemotherapy presenting with increased frequency of headaches, lightheadedness, vitals within normal limits, no focal abnormalities on neurologic exam  Differential Diagnosis:   Migraine headache, tension headache, dehydration, medication side effect, metastatic disease,  intracranial lesion, cardiac arrhythmia  ED Management:  Patient received MRI showed lesion in the temporal bone, appears smaller compared to prior MRI will discuss case with Neurosurgery who recommended follow-up ENT, patient's symptoms have significantly improved with medications.  Discussed follow-up with patient and  who feel comfortable with discharge and will follow-up with their oncologist and ENT as needed.    Khang Capps M.D.  Emergency Medicine PGY4  12:01 AM             ED Course as of 09/16/22 2359   Fri Sep 16, 2022   2102 On reassessment, patient's blood pressure 146/71, patient reports headache has significantly improved [JL]   2136 Received call from MRI patient not tolerating this can say she does not want complete.  Attempted to see if there is medication for anxiety that would provide patient, patient refused and MRI is incomplete. [JL]   2226 Discussed patient with Neurosurgery, mass appears smaller than prior exam, believes it is likely consistent with cholesteatoma and recommended follow up with ENT outpatient [JL]   2357 Patient had recurrence of hypertension with headache, treated with hydralazine and Fioricet, reassessment patient reports significantly improved symptoms, reports that she would like to go home, feels well and improved compared to initial presentation.  Discussed with him precautions as well as follow-up management and referral to ENT.  Patient and  understand and agree with plan and is stable for discharge [JL]      ED Course User Index  [JL] Khang Capps MD                   Clinical Impression:   Final diagnoses:  [R42] Dizziness  [R51.9] Nonintractable headache, unspecified chronicity pattern, unspecified headache type (Primary)  [H71.90] Cholesteatoma, unspecified laterality  [I10] Hypertension, unspecified type        ED Disposition Condition    Discharge Stable          ED Prescriptions       Medication Sig Dispense Start Date End Date Auth. Provider     amLODIPine (NORVASC) 5 MG tablet Take 1 tablet (5 mg total) by mouth once daily. 30 tablet 9/16/2022 9/16/2023 Khang Capps MD          Follow-up Information       Follow up With Specialties Details Why Contact Info Additional Information    [facility] - Emergency Dept Emergency Medicine Go to  If symptoms worsen [address] [address]  [phone] 1st floor    Jarocho Mathew MD Otolaryngology Schedule an appointment as soon as possible for a visit  For follow up of suspected cholesteatoma [address] [address]  [phone]       Casandra Yu NP Family Medicine Schedule an appointment as soon as possible for a visit  For hypertension management [address]  [address]  [facility] [address]  [phone]                Khang Capps MD  Resident  09/17/22 0001

## 2022-09-17 NOTE — DISCHARGE INSTRUCTIONS
Follow-up with ENT for further management suspected cholesteatoma    You develop any new or worsening symptoms please return to the emergency department    Monitor your blood pressure and if it continues to stay elevated above 140 systolic consistently, discuss with your primary care physician about starting blood pressure medication

## 2022-09-19 NOTE — PROGRESS NOTES
PROGRESS NOTE    Subjective:       Patient ID: Antonieta Clay is a 61 y.o. female.    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 Postoperative changes in the left side of the neck with slightly  increased FDG activity from background.  2. No FDG avid lymphadenopathy or finding of additional distant sites  of FDG avid disease.    10/20/2020-12/4/2020  Radiation therapy.    3/11/2021:  MRI Neck: no evidence of recurrence.    MRI Brain: no acute abnormalities.    12/3/2021:  CT chest: new ground glass, RUL, infectious vs met. 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    Cytoxan/Kwasi/Cisplatin:  Cycle 1: 4/5/2022  Cycle 2: 4/26/2022  Cycle 3: 5/17/2022 5/31/2022:  CT neck and chest:  Mastoid lesion ? Less prominent.  No other significant changes or new growth.     8/23/2022:  Lenvatinib Start:    Chief Complaint:    Parotid cancer follow up    History of Present Illness:   Antonieta Clay is a 61 y.o. female who presents for follow up of above.      Ms. Clay is doing ok at this time.  Has had some elevated BP but no symptoms.She was in the ER on Friday for persistent headaches and had a /100's. MRI shows the mass in the mastoid portion of the temporal bone. Recs better characterization with contrast.    She is having some hallucinations and seeing people since Friday. Lenvima has been on hold and BP have been stable 130's/80's.    Family and Social history reviewed and is unchanged from 9/21/2020      ROS:  Review of Systems   Constitutional:  Positive for fatigue. Negative for appetite change, fever and unexpected weight change.   HENT:  Negative for mouth sores.    Eyes:  Negative for visual disturbance.    Respiratory:  Negative for cough and shortness of breath.    Cardiovascular:  Negative for chest pain and leg swelling.   Gastrointestinal:  Negative for abdominal pain, blood in stool and diarrhea.   Genitourinary:  Negative for frequency and hematuria.   Musculoskeletal:  Negative for back pain.   Skin:  Negative for rash.   Neurological:  Positive for headaches.        Hallucinations- seeing people dancing   Hematological:  Negative for adenopathy.   Psychiatric/Behavioral:  The patient is not nervous/anxious.         Current Outpatient Medications:     amLODIPine (NORVASC) 5 MG tablet, Take 1 tablet (5 mg total) by mouth once daily., Disp: 30 tablet, Rfl: 3    ascorbic acid, vitamin C, (VITAMIN C) 1000 MG tablet, Take 1,000 mg by mouth once daily., Disp: , Rfl:     butalbital-acetaminophen-caffeine -40 mg (FIORICET, ESGIC) -40 mg per tablet, Take 1 tablet by mouth every 4 (four) hours as needed for Pain., Disp: 30 tablet, Rfl: 2    cholecalciferol, vitamin D3, 125 mcg (5,000 unit) Tab, Take 5,000 Units by mouth once daily., Disp: , Rfl:     duke's soln (benadryl 30 mL, mylanta 30 mL, LIDOcaine 30 mL, nystatin 30 mL) 120mL, Take 5 mLs by mouth 4 (four) times daily as needed. Swish and spit, Disp: 120 mL, Rfl: 0    fluticasone propionate (FLONASE) 50 mcg/actuation nasal spray, 2 sprays (100 mcg total) by Each Nostril route once daily., Disp: 18.2 mL, Rfl: 1    lenvatinib 4 mg Cap, Take 16 mg by mouth once daily at 6am., Disp: 120 capsule, Rfl: 3    levoFLOXacin (LEVAQUIN) 250 MG tablet, Take 250 mg by mouth once daily., Disp: , Rfl:     levothyroxine (SYNTHROID) 88 MCG tablet, Take 1 tablet (88 mcg total) by mouth once daily., Disp: 90 tablet, Rfl: 1    multivit-iron-min-folic acid 18-0.4 mg Tab, Take 1 tablet by mouth once daily. , Disp: , Rfl:     omega-3 fatty acids/fish oil (FISH OIL-OMEGA-3 FATTY ACIDS) 300-1,000 mg capsule, Take 1 capsule by mouth once daily., Disp: , Rfl:      oxyCODONE-acetaminophen (PERCOCET)  mg per tablet, Take 1 tablet by mouth every 6 (six) hours as needed for Pain., Disp: 28 tablet, Rfl: 0    ZINC ACETATE ORAL, Take 1 capsule by mouth once daily. , Disp: , Rfl:     Current Facility-Administered Medications:     0.9%  NaCl infusion, , Intravenous, Once, Gorge Sagastume MD, Stopped at 05/04/22 1321    methylPREDNISolone sodium succinate injection 40 mg, 40 mg, Intravenous, Once PRN, Rj Pompa MD        Objective:       Physical Examination:     BP (!) 103/55   Pulse 77   Temp 97.8 °F (36.6 °C)   Wt 54.5 kg (120 lb 3.2 oz)   BMI 24.28 kg/m²     Physical Exam  Constitutional:       Appearance: Normal appearance. She is well-developed.   HENT:      Head: Normocephalic and atraumatic.      Right Ear: External ear normal.      Left Ear: External ear normal.      Mouth/Throat:      Mouth: Mucous membranes are moist.      Pharynx: Oropharynx is clear. No oropharyngeal exudate or posterior oropharyngeal erythema.      Comments: No visible or palpable oral lesions.   Eyes:      Conjunctiva/sclera: Conjunctivae normal.      Pupils: Pupils are equal, round, and reactive to light.   Neck:      Thyroid: No thyromegaly.      Trachea: No tracheal deviation.   Cardiovascular:      Rate and Rhythm: Normal rate and regular rhythm.      Heart sounds: Normal heart sounds.   Pulmonary:      Effort: Pulmonary effort is normal.      Breath sounds: Normal breath sounds.   Abdominal:      General: Bowel sounds are normal. There is no distension.      Palpations: Abdomen is soft. There is no mass.      Tenderness: There is no abdominal tenderness.   Lymphadenopathy:      Head:      Right side of head: No submental, submandibular, tonsillar or preauricular adenopathy.      Left side of head: No submental, submandibular, tonsillar or preauricular adenopathy.      Cervical: No cervical adenopathy.      Upper Body:      Right upper body: No supraclavicular or axillary  adenopathy.      Left upper body: No supraclavicular or axillary adenopathy.   Skin:     General: Skin is warm and dry.      Findings: No rash.   Neurological:      Mental Status: She is alert and oriented to person, place, and time.      Comments: Neuro intact througout   Psychiatric:         Mood and Affect: Mood normal.         Behavior: Behavior normal.         Thought Content: Thought content normal.         Judgment: Judgment normal.       Labs:   Recent Results (from the past 336 hour(s))   CBC Auto Differential    Collection Time: 09/19/22  1:12 PM   Result Value Ref Range    WBC 4.35 3.90 - 12.70 K/uL    Hemoglobin 11.2 (L) 12.0 - 16.0 g/dL    Hematocrit 32.8 (L) 37.0 - 48.5 %    Platelets 190 150 - 450 K/uL   CBC auto differential    Collection Time: 09/16/22  6:05 PM   Result Value Ref Range    WBC 3.71 (L) 3.90 - 12.70 K/uL    Hemoglobin 12.3 12.0 - 16.0 g/dL    Hematocrit 35.9 (L) 37.0 - 48.5 %    Platelets 142 (L) 150 - 450 K/uL   CBC Auto Differential    Collection Time: 09/12/22 10:15 AM   Result Value Ref Range    WBC 4.24 3.90 - 12.70 K/uL    Hemoglobin 14.0 12.0 - 16.0 g/dL    Hematocrit 40.9 37.0 - 48.5 %    Platelets 134 (L) 150 - 450 K/uL     CMP  Sodium   Date Value Ref Range Status   09/19/2022 137 136 - 145 mmol/L Final     Potassium   Date Value Ref Range Status   09/19/2022 3.4 (L) 3.5 - 5.1 mmol/L Final     Chloride   Date Value Ref Range Status   09/19/2022 104 95 - 110 mmol/L Final     CO2   Date Value Ref Range Status   09/19/2022 27 23 - 29 mmol/L Final     Glucose   Date Value Ref Range Status   09/19/2022 142 (H) 70 - 110 mg/dL Final     BUN   Date Value Ref Range Status   09/16/2022 10 8 - 23 mg/dL Final     Creatinine   Date Value Ref Range Status   09/16/2022 0.9 0.5 - 1.4 mg/dL Final     Calcium   Date Value Ref Range Status   09/19/2022 9.0 8.7 - 10.5 mg/dL Final     Total Protein   Date Value Ref Range Status   09/16/2022 8.0 6.0 - 8.4 g/dL Final     Albumin   Date Value Ref  Range Status   09/16/2022 4.1 3.5 - 5.2 g/dL Final     Total Bilirubin   Date Value Ref Range Status   09/16/2022 0.7 0.1 - 1.0 mg/dL Final     Comment:     For infants and newborns, interpretation of results should be based  on gestational age, weight and in agreement with clinical  observations.    Premature Infant recommended reference ranges:  Up to 24 hours.............<8.0 mg/dL  Up to 48 hours............<12.0 mg/dL  3-5 days..................<15.0 mg/dL  6-29 days.................<15.0 mg/dL       Alkaline Phosphatase   Date Value Ref Range Status   09/16/2022 129 55 - 135 U/L Final     AST   Date Value Ref Range Status   09/16/2022 77 (H) 10 - 40 U/L Final     ALT   Date Value Ref Range Status   09/16/2022 72 (H) 10 - 44 U/L Final     Anion Gap   Date Value Ref Range Status   09/19/2022 6 (L) 8 - 16 mmol/L Final     eGFR if    Date Value Ref Range Status   06/13/2022 >60.0 >60 mL/min/1.73 m^2 Final     eGFR if non    Date Value Ref Range Status   06/13/2022 >60.0 >60 mL/min/1.73 m^2 Final     Comment:     Calculation used to obtain the estimated glomerular filtration  rate (eGFR) is the CKD-EPI equation.        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 Darden and Dr. Ramos as scheduled  Hallucinations- UA today    Discussion:     Follow up in about 1 week (around 9/26/2022) for with me and 4 weeks with  Dr. Sagastume.    Phase II Study of Lenvatinib in Patients With Progressive, Recurrent or Metastatic Adenoid Cystic Carcinoma.  AU  Tchekmedyimeghann V, Mohamud EJ, Poli L, Bee C, Reinaldo S, Carlos N, Marilyn CR, Stephanie I, Emily SS, Sumanth DG, Ho AL   SO  J Clin Oncol. 2019;37(18):1529. Epub 2019 Apr 2.      PURPOSERecurrent or metastatic adenoid cystic carcinoma (R/M ACC) is a malignant neoplasm of predominantly salivary gland origin for which effective therapies are lacking. We conducted a phase II trial evaluating the multitargeted tyrosine kinase inhibitor lenvatinib in patients with R/M ACC.    PATIENTS AND METHODSThis study was conducted with a two-stage minimax design. Patients with histologically confirmed R/M ACC of any primary site with radiographic and/or symptomatic progression were eligible. Any prior therapy was allowed except previous lenvatinib. Patients received lenvatinib 24 mg orally per day. The primary end point was overall response rate. Secondary end points were progression-free survival and safety. An exploratory analysis of how MYB expression and genomic alterations relate to outcomes was conducted.    RESULTSThirty-three patients were enrolled; 32 were evaluable for the primary end point. Five patients (15.6%) had a confirmed partial response, 24 patients (75%) had stable disease, two patients (6.3%) discontinued treatment as a result of toxicity before the first scan, and one patient (3.1%) had progression of disease as best response. Median progression-free survival time was 17.5 months (95% CI, 7.2 months to not reached), although only eight progression events were observed. Patients otherwise were removed for toxicity (n = 5), as a result of withdrawal of consent (n = 9), or at the treating physician's discretion (n = 6). Twenty-three patients required at least one dose modification, and 18 of 32 patients discontinued lenvatinib for drug-related issues. The most common grade 3 or 4  adverse events were hypertension (n = 9; 28.1%) and oral pain (n = 3; 9.4%). Three grade 4 adverse events were observed (myocardial infarction, n = 1; posterior reversible encephalopathy syndrome, n = 1; and intracranial hemorrhage, n = 1).    CONCLUSIONThis trial met the prespecified overall response rate primary end point, demonstrating antitumor activity with lenvatinib in R/M ACC patients. Toxicity was comparable to previous studies, requiring monitoring and management.      Electronically signed by Louise Harrington, MSN, APRN, AGNP-C, OCN    Dr. Yeyo Ramos [facility] [place]- Sept 29th  Dr. Darden- [facility]/ Head and Neck Surgery

## 2022-09-20 NOTE — TELEPHONE ENCOUNTER
Start Dex BID for hallucinations and repeat MRI brain with contrast.    Xanax prior to MRI for anxiety

## 2022-09-21 PROBLEM — R41.82 ALTERED MENTAL STATE: Status: ACTIVE | Noted: 2022-01-01

## 2022-09-21 NOTE — H&P
[facility] - Emergency Dept  Hospital Medicine  History & Physical    Patient Name: Antonieta Clay  MRN: [id number]  Patient Class: OP- Observation  Admission Date: 9/21/2022  Attending Physician: Haider Dodson MD   Primary Care Provider: Casandra Yu NP         Patient information was obtained from patient and ER records.     Subjective:     Principal Problem:Altered mental state    Chief Complaint:   Chief Complaint   Patient presents with    Hallucinations     Visual and auditory hallucinations about casual conversation x 3 days. Pt does not have SI/HI. Pt having difficulty sleep. Recent changes to oncologic medications.         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  She was having headache and high blood pressure secondary to cancer medication when it was hold about a week.   noticed that patient was having hallucinations seeing people who were at their having conversations with people that are not there and she was prescribed decadron by oncologist .  Apparently patient was prescribed marijuana liquid by prescriber for appetite and well-being and was taking and having some found of confusions and hallucination and this stopped about few days back.  Patient was awake alert oriented and no chest pain or shortness of breath.  She is able to answer questions all appropriately.      No new subjective & objective note has been filed under this hospital service since the last note was generated.    Assessment/Plan:     * Altered mental state  Most likely from  secondary to medicinal   Marijuana    PLAN   Hold mind altering medications   I was told by  that dr johnson wants MRI brain with contrast . Will  Clarify with him.   Recent MRI 9/16 without additional finding but done with no  contrast  Consult dr johnson       Hypothyroidism  aware    Malignant neoplasm of major salivary gland    Appears stable, continue to hold cancer medication as advised by oncologist.  CT scan done in the emergency room did not show any stroke/new changes    VTE Risk Mitigation (From admission, onward)         Ordered     IP VTE HIGH RISK PATIENT  Once         09/21/22 1423     Place sequential compression device  Until discontinued         09/21/22 1423                   Haider Dodson MD  Department of Hospital Medicine   [facility] - Emergency Dept

## 2022-09-21 NOTE — ASSESSMENT & PLAN NOTE
Appears stable, continue to hold cancer medication as advised by oncologist.  CT scan done in the emergency room did not show any stroke/new changes

## 2022-09-21 NOTE — TELEPHONE ENCOUNTER
Spoke to pt's  concerning pt's frequent hallucinations. Advised him to bring pt to the ER and he stated that they are already there.

## 2022-09-21 NOTE — HPI
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  She was having headache and high blood pressure secondary to cancer medication when it was hold about a week.   noticed that patient was having hallucinations seeing people who were at their having conversations with people that are not there and she was prescribed decadron by oncologist .  Apparently patient was prescribed marijuana liquid by prescriber for appetite and well-being and was taking and having some found of confusions and hallucination and this stopped about few days back.  Patient was awake alert oriented and no chest pain or shortness of breath.  She is able to answer questions all appropriately.

## 2022-09-21 NOTE — ASSESSMENT & PLAN NOTE
Most likely from  secondary to medicinal   Marijuana    PLAN   Hold mind altering medications   I was told by  that dr johnson wants MRI brain with contrast . Will  Clarify with him.

## 2022-09-21 NOTE — ED NOTES
"Patient sitting up in bed with  and Dr Dodson at bedside. Patient is smiling and happy, asks " will I get supper soon" Explained that it is 3:15 and states "oh I thought it was 7-8 at night". Patient then asks " I guess you and your baby will be leaving soon ", reoriented to situation easily. Patient offered sandwich tray.   "

## 2022-09-21 NOTE — ED PROVIDER NOTES
Encounter Date: 9/21/2022       History     Chief Complaint   Patient presents with    Hallucinations     Visual and auditory hallucinations about casual conversation x 3 days. Pt does not have SI/HI. Pt having difficulty sleep. Recent changes to oncologic medications.      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  They present to the emergency department today looking for further evaluation of her new onset symptoms.  Patient denies weakness or numbness patient denies fever patient reports that she has been having chronic headaches but reports that this is not new or changed in character patient has no other acute complaints at this time.    Review of patient's allergies indicates:   Allergen Reactions    Pcn [penicillins] Swelling    Codeine Nausea And Vomiting     Past Medical History:   Diagnosis Date    Cancer     neck    HA (headache)     Hypothyroidism      Past Surgical History:   Procedure Laterality Date    INSERTION OF TUNNELED CENTRAL VENOUS CATHETER (CVC) WITH SUBCUTANEOUS PORT Right 4/4/2022    Procedure: [id number];  Surgeon: Charles Servin III, MD;  [facility];  Service: General;  Laterality: Right;    left temporal bone resection  2020    MODIFIED RADICAL NECK DISSECTION Left 2020    parotidectomy Left 2020     Family History   Problem Relation Age of Onset     Breast cancer Mother     Breast cancer Maternal Aunt      Social History     Tobacco Use    Smoking status: Never    Smokeless tobacco: Never   Substance Use Topics    Alcohol use: Never    Drug use: Never     Review of Systems   Constitutional:  Negative for fever.   HENT:  Negative for congestion, rhinorrhea, sore throat and trouble swallowing.    Eyes:  Negative for visual disturbance.   Respiratory:  Negative for cough, chest tightness, shortness of breath and wheezing.    Cardiovascular:  Negative for chest pain, palpitations and leg swelling.   Gastrointestinal:  Negative for abdominal distention, abdominal pain, constipation, diarrhea, nausea and vomiting.   Genitourinary:  Negative for difficulty urinating, dysuria, flank pain and frequency.   Musculoskeletal:  Negative for arthralgias, back pain, joint swelling and neck pain.   Skin:  Negative for color change and rash.   Neurological:  Positive for headaches. Negative for dizziness, syncope, speech difficulty, weakness and numbness.   Psychiatric/Behavioral:  Positive for confusion, hallucinations and sleep disturbance. Negative for self-injury and suicidal ideas.    All other systems reviewed and are negative.    Physical Exam     Initial Vitals [09/21/22 0911]   BP Pulse Resp Temp SpO2   (!) 162/83 80 17 97.2 °F (36.2 °C) 98 %      MAP       --         Physical Exam    Nursing note and vitals reviewed.  Constitutional: She appears well-developed and well-nourished. She is not diaphoretic. No distress.   HENT:   Head: Normocephalic and atraumatic.   Right Ear: External ear normal.   Left Ear: External ear normal.   Nose: Nose normal.   Mouth/Throat: Oropharynx is clear and moist. No oropharyngeal exudate.   Eyes: Conjunctivae and EOM are normal. Pupils are equal, round, and reactive to light. Right eye exhibits no discharge. Left eye exhibits no discharge. No scleral icterus.   Neck: Neck supple. No thyromegaly present. No tracheal deviation present. No  JVD present.   Normal range of motion.  Cardiovascular:  Normal rate, regular rhythm, normal heart sounds and intact distal pulses.     Exam reveals no gallop and no friction rub.       No murmur heard.  Pulmonary/Chest: Breath sounds normal. No stridor. No respiratory distress. She has no wheezes. She has no rhonchi. She has no rales. She exhibits no tenderness.   Abdominal: Abdomen is soft. Bowel sounds are normal. She exhibits no distension and no mass. There is no abdominal tenderness. There is no rebound and no guarding.   Musculoskeletal:         General: No tenderness or edema. Normal range of motion.      Cervical back: Normal range of motion and neck supple.     Lymphadenopathy:     She has no cervical adenopathy.   Neurological: She is alert and oriented to person, place, and time. She has normal strength. No cranial nerve deficit or sensory deficit.   Skin: Skin is warm and dry. No rash and no abscess noted. No erythema. No pallor.   Psychiatric:   Patient denies suicidal or homicidal ideation patient reports that she is hearing voices and seeing things that are not there patient understands that they are not there but she has trouble ignoring them.       ED Course   Procedures  Labs Reviewed   CBC W/ AUTO DIFFERENTIAL - Abnormal; Notable for the following components:       Result Value    RBC 3.65 (*)     Hemoglobin 11.2 (*)     Hematocrit 33.2 (*)     Lymph # 0.7 (*)     Gran % 80.6 (*)     Lymph % 11.2 (*)     All other components within normal limits   COMPREHENSIVE METABOLIC PANEL - Abnormal; Notable for the following components:    Glucose 144 (*)     ALT 47 (*)     All other components within normal limits   DRUG SCREEN PANEL, URINE EMERGENCY - Abnormal; Notable for the following components:    THC Presumptive Positive (*)     All other components within normal limits    Narrative:     Specimen Source->Urine   TSH - Abnormal; Notable for the following components:    TSH 0.280 (*)     All other  components within normal limits   URINALYSIS, REFLEX TO URINE CULTURE - Abnormal; Notable for the following components:    Protein, UA Trace (*)     Ketones, UA 1+ (*)     Leukocytes, UA Trace (*)     All other components within normal limits    Narrative:     Specimen Source->Urine   URINALYSIS MICROSCOPIC - Abnormal; Notable for the following components:    RBC, UA 6 (*)     Hyaline Casts, UA 6 (*)     All other components within normal limits    Narrative:     Specimen Source->Urine   CULTURE, BLOOD   CULTURE, BLOOD   SARS-COV-2 RNA AMPLIFICATION, QUAL   B-TYPE NATRIURETIC PEPTIDE   LACTIC ACID, PLASMA   MAGNESIUM   TROPONIN I   INFLUENZA A AND B ANTIGEN    Narrative:     Specimen Source->Nasopharyngeal Swab   AMMONIA    Narrative:     Recoll. [id number] by [email or web address] at 09/21/2022 10:51, reason: no specimen   T4, FREE        ECG Results              EKG 12-lead (In process)  Result time 09/21/22 10:21:13      In process by Interface, Lab In [facility] (09/21/22 10:21:13)                   Narrative:    Test Reason : R41.82,    Vent. Rate : 071 BPM     Atrial Rate : 071 BPM     P-R Int : 152 ms          QRS Dur : 074 ms      QT Int : 386 ms       P-R-T Axes : 068 020 024 degrees     QTc Int : 419 ms    Normal sinus rhythm  Normal ECG  When compared with ECG of 16-SEP-2022 20:25,  Inverted T waves have replaced nonspecific T wave abnormality in Inferior  leads    Referred By: [email or web address]   SELF           Confirmed By:                                   Imaging Results              CT Head W Wo Contrast (Final result)  Result time 09/21/22 12:05:39      Final result by Von Carlton MD (09/21/22 12:05:39)                   Narrative:    CMS MANDATED QUALITY DATA - CT RADIATION  436    All CT scans at this facility utilize dose modulation, iterative reconstruction, and/or weight based dosing when appropriate to reduce radiation dose to as low as reasonably achievable.    CT HEAD WITHOUT THEN WITH IV CONTRAST    CLINICAL  HISTORY:  61 years Female Mental status change, unknown cause; Brain metastases suspected    COMPARISON: Brain MRI September 16, 2022    FINDINGS: Negative for acute intracranial hemorrhage, midline shift, or mass effect. Ventricles and sulci are normal in size. Cerebellar hemispheres and brainstem are unremarkable. Atherosclerotic calcification of intracranial carotid arteries.    There is a soft tissue mass eroding the mastoid portion of the left temporal bone. This finding has been present on prior exams. Right-sided mastoid air cells are clear. Focal mucosal thickening posterior inferior left maxillary sinus. Otherwise paranasal sinuses are clear.    Postcontrast imaging shows no pathologic intra-axial contrast enhancement. There is heterogeneous enhancement of the mass eroding the left temporal bone.    IMPRESSION:    Enhancing soft tissue mass destroying the mastoid portion of the left temporal bone, concerning for malignancy.    Negative for acute intracranial pathology.    Electronically signed by:  Von Carlton MD  9/21/2022 12:05 PM CDT Workstation: [id number]                                     X-Ray Chest AP Portable (Final result)  Result time 09/21/22 11:10:02      Final result by Phillip Davila MD (09/21/22 11:10:02)                   Narrative:    HISTORY: altered mental status    FINDINGS: Portable chest radiograph at 1055 hours compared to 09/16/2022 shows right subclavian injection port-type central venous catheter unchanged in position. The cardiomediastinal silhouette and pulmonary vasculature are within normal limits.    The lungs are normally expanded, with no consolidation, large pleural effusion, or evidence of pulmonary edema. No confluent infiltrates or pneumothorax. There are no significant osseous abnormalities.    IMPRESSION: No evidence of active cardiopulmonary disease.    Electronically signed by:  Phillip Davila MD  9/21/2022 11:10 AM CDT Workstation: [id number]                                      Medications   ALPRAZolam tablet 0.25 mg (has no administration in time range)   amLODIPine tablet 5 mg (5 mg Oral Given 9/21/22 1514)   butalbital-acetaminophen-caffeine -40 mg per tablet 1 tablet (has no administration in time range)   dexamethasone injection 4 mg (has no administration in time range)   oxyCODONE-acetaminophen  mg per tablet 1 tablet (has no administration in time range)   levothyroxine tablet 75 mcg (has no administration in time range)   sodium chloride 0.9% flush 10 mL (has no administration in time range)   naloxone 0.4 mg/mL injection 0.02 mg (has no administration in time range)   glucose chewable tablet 16 g (has no administration in time range)   glucose chewable tablet 24 g (has no administration in time range)   glucagon (human recombinant) injection 1 mg (has no administration in time range)   dextrose 10% bolus 125 mL (has no administration in time range)   dextrose 10% bolus 250 mL (has no administration in time range)   ALPRAZolam tablet 0.5 mg (has no administration in time range)   iohexoL (OMNIPAQUE 350) injection 50 mL (50 mLs Intravenous Given 9/21/22 1143)   methylPREDNISolone sodium succinate injection 125 mg (125 mg Intravenous Given 9/21/22 1514)     Medical Decision Making:   History:   Old Medical Records: I decided to obtain old medical records.  Initial Assessment:   Emergent evaluation of a 61-year-old female presenting with hallucinations differential diagnosis includes progression of metastatic disease, electrolyte abnormality, endocrine dysfunction, infection, medication side effect, medication withdrawal side effect          Attending Attestation:             Attending ED Notes:   Patient consulted Internal Medicine for further evaluation and management with Oncology to follow.  Case was discussed with Oncology they believe that patient has some inflammation secondary to mass and this may be causing hallucinations, they suggest patient  be started on a course of steroids.                 Clinical Impression:   Final diagnoses:  [R41.82] AMS (altered mental status) (Primary)        ED Disposition Condition    Observation                 Rj Martin MD  09/21/22 9947

## 2022-09-22 PROBLEM — R44.3 HALLUCINATIONS: Status: ACTIVE | Noted: 2022-01-01

## 2022-09-22 NOTE — HPI
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 Postoperative changes in the left side of the neck with slightly  increased FDG activity from background.  2. No FDG avid lymphadenopathy or finding of additional distant sites  of FDG avid disease.     10/20/2020-12/4/2020  Radiation therapy.     3/11/2021:  MRI Neck: no evidence of recurrence.    MRI Brain: no acute abnormalities.     12/3/2021:  CT chest: new ground glass, RUL, infectious vs met. rec follow up     2/25/2022:  Mastoid bone destructive neoplastic lesion shows progression   CT neck shows mastoid lesion, 2.4cm and enlarged MS lymph nodes.     Cytoxan/Kwasi/Cisplatin:  Cycle 1:          4/5/2022  Cycle 2:          4/26/2022  Cycle 3:          5/17/2022 5/31/2022:  CT neck and chest:  Mastoid lesion ? Less prominent.  No other significant changes or new growth.      8/23/2022:  Lenvatinib Start:    Current HPI:  Ms. Clay is a 62yo female well known to me with the cancer history listed above.  She has had mastoid invasion of this process on MRI and this area has been radiated in the recent past.  She was found to have progression of diease earlier this year.  She underwent 3 cycles of chemotherapy with some marginal benefit.  After being seen by H&N surgery she was found not to be a surgical candidate and was started on Lenvatinib in August.      She is admitted now after 2 to 3 days of hallucinations that began about 5 days ago.  She underwent MRI imaging of the brain which shows possible spread to the dura/meninges in the left side.  She was started on abx and steroids and has had no MS changes in the last 24 hours.

## 2022-09-22 NOTE — SUBJECTIVE & OBJECTIVE
Oncology Treatment Plan:   [No matching plan found]    Medications:  Continuous Infusions:  Scheduled Meds:   amLODIPine  5 mg Oral Daily    dexamethasone  4 mg Intravenous Q6H    levothyroxine  75 mcg Oral Daily     PRN Meds:ALPRAZolam, ALPRAZolam, butalbital-acetaminophen-caffeine -40 mg, dextrose 10%, dextrose 10%, glucagon (human recombinant), glucose, glucose, naloxone, oxyCODONE-acetaminophen, sodium chloride 0.9%     Review of patient's allergies indicates:   Allergen Reactions    Pcn [penicillins] Swelling    Codeine Nausea And Vomiting        Past Medical History:   Diagnosis Date    Cancer     neck    HA (headache)     Hypothyroidism      Past Surgical History:   Procedure Laterality Date    INSERTION OF TUNNELED CENTRAL VENOUS CATHETER (CVC) WITH SUBCUTANEOUS PORT Right 4/4/2022    Procedure: [id number];  Surgeon: Charles Servin III, MD;  Location: [facility];  Service: General;  Laterality: Right;    left temporal bone resection  2020    MODIFIED RADICAL NECK DISSECTION Left 2020    parotidectomy Left 2020     Family History       Problem Relation (Age of Onset)    Breast cancer Mother, Maternal Aunt          Tobacco Use    Smoking status: Never    Smokeless tobacco: Never   Substance and Sexual Activity    Alcohol use: Never    Drug use: Never    Sexual activity: Not Currently     Partners: Male     Birth control/protection: None       Review of Systems   Constitutional:  Positive for activity change and fatigue. Negative for appetite change, diaphoresis, fever and unexpected weight change.   HENT:  Negative for congestion and hearing loss.    Eyes:  Negative for visual disturbance.   Respiratory:  Negative for cough, chest tightness and shortness of breath.    Cardiovascular:  Negative for chest pain and leg swelling.   Gastrointestinal:  Negative for abdominal pain, blood in stool, diarrhea, nausea and vomiting.   Endocrine: Negative for cold intolerance and heat intolerance.    Genitourinary:  Negative for difficulty urinating, dysuria and hematuria.   Neurological:  Negative for dizziness and headaches.   Hematological:  Negative for adenopathy. Does not bruise/bleed easily.   Psychiatric/Behavioral:  Positive for confusion and hallucinations. Negative for behavioral problems.    Objective:     Vital Signs (Most Recent):  Temp: 98.3 °F (36.8 °C) (09/22/22 1012)  Pulse: 67 (09/22/22 1012)  Resp: 18 (09/22/22 1012)  BP: 114/74 (09/22/22 1012)  SpO2: 97 % (09/22/22 1012) Vital Signs (24h Range):  Temp:  [97.6 °F (36.4 °C)-98.3 °F (36.8 °C)] 98.3 °F (36.8 °C)  Pulse:  [61-78] 67  Resp:  [14-18] 18  SpO2:  [95 %-97 %] 97 %  BP: (112-174)/(59-89) 114/74     Weight: 53 kg (116 lb 13.5 oz)  Body mass index is 23.6 kg/m².  Body surface area is 1.49 meters squared.    No intake or output data in the 24 hours ending 09/22/22 1455    Physical Exam  Constitutional:       Appearance: She is well-developed.   HENT:      Head: Normocephalic and atraumatic.      Right Ear: External ear normal.      Left Ear: External ear normal.   Eyes:      Conjunctiva/sclera: Conjunctivae normal.      Pupils: Pupils are equal, round, and reactive to light.   Neck:      Thyroid: No thyromegaly.      Trachea: No tracheal deviation.   Cardiovascular:      Rate and Rhythm: Normal rate and regular rhythm.      Heart sounds: Normal heart sounds.   Pulmonary:      Effort: Pulmonary effort is normal.      Breath sounds: Normal breath sounds.   Abdominal:      General: Bowel sounds are normal. There is no distension.      Palpations: Abdomen is soft. There is no mass.      Tenderness: There is no abdominal tenderness.   Skin:     Findings: No rash.   Neurological:      Comments: CN II-XII grossly intact.   Upper and lower ext strength intact.    Psychiatric:         Behavior: Behavior normal.         Thought Content: Thought content normal.         Judgment: Judgment normal.       Significant Labs:   CBC:   Recent Labs   Lab  09/21/22  1040   WBC 5.78   HGB 11.2*   HCT 33.2*       and CMP:   Recent Labs   Lab 09/21/22  1040      K 3.7      CO2 27   *   BUN 9   CREATININE 0.7   CALCIUM 9.8   PROT 7.9   ALBUMIN 4.0   BILITOT 0.4   ALKPHOS 104   AST 36   ALT 47*   ANIONGAP 8       Diagnostic Results:  None

## 2022-09-22 NOTE — PLAN OF CARE
[facility]  Initial Discharge Assessment       Primary Care Provider: Casandra Yu NP    Admission Diagnosis: AMS (altered mental status) [R41.82]    Admission Date: 9/21/2022  Expected Discharge Date: TBD     met with Pt at bedside to complete discharge assessment. Pt's spouse Fred Clay ([phone]) and sister Lidia Alejandro ([phone]) also present at time and volunteered to participate. Pt AAOx4s. Pt and sister verified Demographics, PCP, and insurance. Pt does not receive home health. Pt not on dialysis. Pt reports ability to complete ADLs without assistance. Pt verbalized plan to discharge home via family transport. Pt has no other needs to be addressed at this time.    Discharge Barriers Identified: None    Payor: [employer] / Plan: [facility] CHOICE PLUS / Product Type: Commercial /     Extended Emergency Contact Information  Primary Emergency Contact: LuisitoFred payton MICHELLE  Address: [address] [place] [address] [facility]  Home Phone: [phone]  Mobile Phone: [phone]  Relation: Spouse  Preferred language: English   needed? No    Discharge Plan A: Home with family  Discharge Plan B: Home with family      [id number] - [place], [place] - [address] AT [facility] [address]  [address]  [place] [address]  Phone: [phone] Fax: [phone]    [facility] - [place], [place] - [address]  [address] [facility] [place] [address]  Phone: [phone] Fax: [phone]      Initial Assessment (most recent)       Adult Discharge Assessment - 09/22/22 1133          Discharge Assessment    Assessment Type Discharge Planning Assessment     Confirmed/corrected address, phone number and insurance Yes     Confirmed Demographics Correct on Facesheet     Source of Information patient;family     Does patient/caregiver understand observation status Yes     Communicated BRYSON  with patient/caregiver Yes     Reason For Admission Altered Mental State     Lives With child(deandre), adult;spouse     Do you expect to return to your current living situation? Yes     Do you have help at home or someone to help you manage your care at home? Yes     Who are your caregiver(s) and their phone number(s)? Philip Clayith MICHELLE (Spouse)   [phone]     Prior to hospitilization cognitive status: Unable to Assess     Current cognitive status: Alert/Oriented     Walking or Climbing Stairs Difficulty none     Dressing/Bathing Difficulty none     Home Accessibility stairs within home     Number of Stairs, Within Home, Primary six     Surface of Stairs, Within Home, Primary hardwood     Stair Railings, Within Home, Primary railings safe and in good condition;railings on both sides of stairs     Landing, Stairs, Within Home, Primary adequate turning radius     Home Layout Able to live on 1st floor     Equipment Currently Used at Home none     Readmission within 30 days? No     Patient currently being followed by outpatient case management? No     Do you currently have service(s) that help you manage your care at home? No     Do you take prescription medications? Yes     Do you have prescription coverage? Yes     Coverage [facility] CHOICE PLUS     Do you have any problems affording any of your prescribed medications? No     Is the patient taking medications as prescribed? yes     Who is going to help you get home at discharge? Fred Clay (Spouse)   [phone]     How do you get to doctors appointments? family or friend will provide     Are you on dialysis? No     Do you take coumadin? No     Discharge Plan A Home with family     Discharge Plan B Home with family     DME Needed Upon Discharge  none     Discharge Plan discussed with: Sibling;Spouse/sig other;Patient     Name(s) and Number(s) Fred Clay (Spouse)   [phone], Lidia Alejandro (sister) [phone]     Discharge Barriers  Identified None        Physical Activity    On average, how many days per week do you engage in moderate to strenuous exercise (like a brisk walk)? 7 days     On average, how many minutes do you engage in exercise at this level? 60 min        Financial Resource Strain    How hard is it for you to pay for the very basics like food, housing, medical care, and heating? Not hard at all        Housing Stability    In the last 12 months, was there a time when you were not able to pay the mortgage or rent on time? No     In the last 12 months, how many places have you lived? 1     In the last 12 months, was there a time when you did not have a steady place to sleep or slept in a shelter (including now)? No        Transportation Needs    In the past 12 months, has lack of transportation kept you from medical appointments or from getting medications? No     In the past 12 months, has lack of transportation kept you from meetings, work, or from getting things needed for daily living? No        Food Insecurity    Within the past 12 months, you worried that your food would run out before you got the money to buy more. Never true     Within the past 12 months, the food you bought just didn't last and you didn't have money to get more. Never true        Stress    Do you feel stress - tense, restless, nervous, or anxious, or unable to sleep at night because your mind is troubled all the time - these days? Not at all        Social Connections    In a typical week, how many times do you talk on the phone with family, friends, or neighbors? More than three times a week     How often do you get together with friends or relatives? More than three times a week     How often do you attend Christianity or Moravian services? Never     Do you belong to any clubs or organizations such as Christianity groups, unions, fraternal or athletic groups, or school groups? No     How often do you attend meetings of the clubs or organizations you belong to? Never      Are you , , , , never , or living with a partner?         Alcohol Use    Q1: How often do you have a drink containing alcohol? Never     Q2: How many drinks containing alcohol do you have on a typical day when you are drinking? Patient does not drink     Q3: How often do you have six or more drinks on one occasion? Never        Relationship/Environment    Name(s) of Who Lives With Patient Fred Clay (Spouse)   [phone]

## 2022-09-22 NOTE — PROGRESS NOTES
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  She was having headache and high blood pressure secondary to cancer medication when it was hold about a week.   noticed that patient was having hallucinations seeing people who were at their having conversations with people that are not there and she was prescribed decadron by oncologist .  Apparently patient was prescribed marijuana liquid by prescriber for appetite and well-being and was taking and having some found of confusions and hallucination and this stopped about few days back.  Patient was awake alert oriented and no chest pain or shortness of breath.  She is able to answer questions all appropriately.      Overview/Hospital Course:  Normal episodes  MRI brain with contrast was done ordered and reviewed with the patient and family.  Communicated with        Interval History:     Review of Systems  As per subjective   Objective:     Vital Signs (Most Recent):  Temp: 98.3 °F (36.8 °C) (09/22/22 1012)  Pulse: 67 (09/22/22 1012)  Resp: 18 (09/22/22 1012)  BP: 114/74 (09/22/22 1012)  SpO2: 97 % (09/22/22 1012) Vital Signs (24h Range):  Temp:  [97.6 °F (36.4 °C)-98.3 °F (36.8 °C)] 98.3 °F (36.8 °C)  Pulse:  [61-78] 67  Resp:  [14-18] 18  SpO2:  [95 %-97 %] 97 %  BP: (112-174)/(59-89) 114/74     Weight: 53 kg (116 lb 13.5 oz)  Body mass index is 23.6 kg/m².  No intake or output data in the 24 hours ending 09/22/22 1323   Physical  Exam  Vitals and nursing note reviewed.   HENT:      Head: Normocephalic and atraumatic.      Right Ear: Tympanic membrane normal.   Eyes:      Conjunctiva/sclera: Conjunctivae normal.   Neck:      Vascular: No JVD.   Cardiovascular:      Heart sounds: Normal heart sounds.   Pulmonary:      Effort: Pulmonary effort is normal.      Breath sounds: Normal breath sounds.   Abdominal:      General: Bowel sounds are normal.      Palpations: Abdomen is soft.   Skin:     General: Skin is warm.   Neurological:      Mental Status: She is alert and oriented to person, place, and time.   Psychiatric:         Mood and Affect: Mood normal.       Significant Labs: All pertinent labs within the past 24 hours have been reviewed.  CBC:   Recent Labs   Lab 09/21/22  1040   WBC 5.78   HGB 11.2*   HCT 33.2*        CMP:   Recent Labs   Lab 09/21/22  1040      K 3.7      CO2 27   *   BUN 9   CREATININE 0.7   CALCIUM 9.8   PROT 7.9   ALBUMIN 4.0   BILITOT 0.4   ALKPHOS 104   AST 36   ALT 47*   ANIONGAP 8       Significant Imaging: I have reviewed all pertinent imaging results/findings within the past 24 hours.      Assessment/Plan:      * Altered mental state  Most likely from  secondary to medicinal   Marijuana  MRI with Localized intracranial spread of malignancy along the left middle cranial fossa and left tentorial leaflet pachymeningeal surfaces. But doubt this is causing the symptoms     PLAN   Hold mind altering medications   Continue IV steroid   Follow with oncology       Hypothyroidism  Aware, decreased thyroxine dose. Low TSH     Malignant neoplasm of major salivary gland    Appears stable but mild localized spread  MRI finding:Localized intracranial spread of malignancy along the left middle cranial fossa and left tentorial leaflet pachymeningeal surfaces.      PLAN   To  continue to hold cancer medication  To d/w radiologist   May need to refer back to her primary surgeon           VTE Risk Mitigation  (From admission, onward)         Ordered     IP VTE HIGH RISK PATIENT  Once         09/21/22 1423     Place sequential compression device  Until discontinued         09/21/22 1423                Discharge Planning   BRYSON:      Code Status: Full Code   Is the patient medically ready for discharge?:     Reason for patient still in hospital (select all that apply): Treatment  Discharge Plan A: Home with family                  Haider Dodson MD  Department of Hospital Medicine   [facility]

## 2022-09-22 NOTE — ASSESSMENT & PLAN NOTE
Most likely from  secondary to medicinal   Marijuana  MRI with Localized intracranial spread of malignancy along the left middle cranial fossa and left tentorial leaflet pachymeningeal surfaces. But doubt this is causing the symptoms     PLAN   Hold mind altering medications   Continue IV steroid   Follow with oncology      [facility]

## 2022-09-22 NOTE — PLAN OF CARE
Problem: Fall Injury Risk  Goal: Absence of Fall and Fall-Related Injury  9/22/2022 1546 by Khang Gaytan RN  Outcome: Ongoing, Progressing  9/22/2022 1545 by Khang Gaytan RN  Outcome: Ongoing, Progressing     Problem: Coping Ineffective (Oncology Care)  Goal: Effective Coping  Outcome: Ongoing, Progressing     Problem: Pain Acute (Oncology Care)  Goal: Optimal Pain Control  Outcome: Ongoing, Progressing     Problem: Confusion Acute  Goal: Optimal Cognitive Function  Outcome: Ongoing, Progressing

## 2022-09-22 NOTE — ASSESSMENT & PLAN NOTE
Patient has been on Lenvatinib for about 4 weeks.  Has been doing ok with this but held last week when hallucinations started.  Will continue to hold for now but may resume this depending on outcome of testing.

## 2022-09-22 NOTE — ASSESSMENT & PLAN NOTE
I have reviewed the MRI images with radiology and discussed the situation with patient and family.  I am very concerned that she has CNS spread of this diease and this is supported by her improvement on steroids.  There is no clear metastatic deposits but the dura is certainly thickened on the left side where the mastoid invasion is seen.  I will arrange a lumbar puncture to evaluate further for cytology to better understand this process.  Will continue to hold chemotherapy for now.

## 2022-09-22 NOTE — CONSULTS
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 Postoperative changes in the left side of the neck with slightly  increased FDG activity from background.  2. No FDG avid lymphadenopathy or finding of additional distant sites  of FDG avid disease.     10/20/2020-12/4/2020  Radiation therapy.     3/11/2021:  MRI Neck: no evidence of recurrence.    MRI Brain: no acute abnormalities.     12/3/2021:  CT chest: new ground glass, RUL, infectious vs met. rec follow up     2/25/2022:  Mastoid bone destructive neoplastic lesion shows progression   CT neck shows mastoid lesion, 2.4cm and enlarged MS lymph nodes.     Cytoxan/Kwasi/Cisplatin:  Cycle 1:          4/5/2022  Cycle 2:          4/26/2022  Cycle 3:          5/17/2022 5/31/2022:  CT neck and chest:  Mastoid lesion ? Less prominent.  No other significant changes or new growth.      8/23/2022:  Lenvatinib Start:    Current HPI:  Ms. Clay is a 60yo female well known to me with the cancer history listed above.  She has had mastoid invasion of this process on MRI and this area has been radiated in the recent past.  She was found to have progression of diease earlier this year.  She underwent 3 cycles of chemotherapy with some marginal benefit.  After being seen by H&N surgery she was found not to be a surgical candidate and was started on Lenvatinib in August.      She is admitted now after 2 to 3 days of hallucinations that began about 5  days ago.  She underwent MRI imaging of the brain which shows possible spread to the dura/meninges in the left side.  She was started on abx and steroids and has had no MS changes in the last 24 hours.        Oncology Treatment Plan:   [No matching plan found]    Medications:  Continuous Infusions:  Scheduled Meds:   amLODIPine  5 mg Oral Daily    dexamethasone  4 mg Intravenous Q6H    levothyroxine  75 mcg Oral Daily     PRN Meds:ALPRAZolam, ALPRAZolam, butalbital-acetaminophen-caffeine -40 mg, dextrose 10%, dextrose 10%, glucagon (human recombinant), glucose, glucose, naloxone, oxyCODONE-acetaminophen, sodium chloride 0.9%     Review of patient's allergies indicates:   Allergen Reactions    Pcn [penicillins] Swelling    Codeine Nausea And Vomiting        Past Medical History:   Diagnosis Date    Cancer     neck    HA (headache)     Hypothyroidism      Past Surgical History:   Procedure Laterality Date    INSERTION OF TUNNELED CENTRAL VENOUS CATHETER (CVC) WITH SUBCUTANEOUS PORT Right 4/4/2022    Procedure: UGMHEZYWH-LSOY-H-CATH;  Surgeon: Charles Servin III, MD;  Location: [facility];  Service: General;  Laterality: Right;    left temporal bone resection  2020    MODIFIED RADICAL NECK DISSECTION Left 2020    parotidectomy Left 2020     Family History       Problem Relation (Age of Onset)    Breast cancer Mother, Maternal Aunt          Tobacco Use    Smoking status: Never    Smokeless tobacco: Never   Substance and Sexual Activity    Alcohol use: Never    Drug use: Never    Sexual activity: Not Currently     Partners: Male     Birth control/protection: None       Review of Systems   Constitutional:  Positive for activity change and fatigue. Negative for appetite change, diaphoresis, fever and unexpected weight change.   HENT:  Negative for congestion and hearing loss.    Eyes:  Negative for visual disturbance.   Respiratory:  Negative for cough, chest tightness and shortness of breath.     Cardiovascular:  Negative for chest pain and leg swelling.   Gastrointestinal:  Negative for abdominal pain, blood in stool, diarrhea, nausea and vomiting.   Endocrine: Negative for cold intolerance and heat intolerance.   Genitourinary:  Negative for difficulty urinating, dysuria and hematuria.   Neurological:  Negative for dizziness and headaches.   Hematological:  Negative for adenopathy. Does not bruise/bleed easily.   Psychiatric/Behavioral:  Positive for confusion and hallucinations. Negative for behavioral problems.    Objective:     Vital Signs (Most Recent):  Temp: 98.3 °F (36.8 °C) (09/22/22 1012)  Pulse: 67 (09/22/22 1012)  Resp: 18 (09/22/22 1012)  BP: 114/74 (09/22/22 1012)  SpO2: 97 % (09/22/22 1012) Vital Signs (24h Range):  Temp:  [97.6 °F (36.4 °C)-98.3 °F (36.8 °C)] 98.3 °F (36.8 °C)  Pulse:  [61-78] 67  Resp:  [14-18] 18  SpO2:  [95 %-97 %] 97 %  BP: (112-174)/(59-89) 114/74     Weight: 53 kg (116 lb 13.5 oz)  Body mass index is 23.6 kg/m².  Body surface area is 1.49 meters squared.    No intake or output data in the 24 hours ending 09/22/22 1455    Physical Exam  Constitutional:       Appearance: She is well-developed.   HENT:      Head: Normocephalic and atraumatic.      Right Ear: External ear normal.      Left Ear: External ear normal.   Eyes:      Conjunctiva/sclera: Conjunctivae normal.      Pupils: Pupils are equal, round, and reactive to light.   Neck:      Thyroid: No thyromegaly.      Trachea: No tracheal deviation.   Cardiovascular:      Rate and Rhythm: Normal rate and regular rhythm.      Heart sounds: Normal heart sounds.   Pulmonary:      Effort: Pulmonary effort is normal.      Breath sounds: Normal breath sounds.   Abdominal:      General: Bowel sounds are normal. There is no distension.      Palpations: Abdomen is soft. There is no mass.      Tenderness: There is no abdominal tenderness.   Skin:     Findings: No rash.   Neurological:      Comments: CN II-XII grossly intact.    Upper and lower ext strength intact.    Psychiatric:         Behavior: Behavior normal.         Thought Content: Thought content normal.         Judgment: Judgment normal.       Significant Labs:   CBC:   Recent Labs   Lab 09/21/22  1040   WBC 5.78   HGB 11.2*   HCT 33.2*       and CMP:   Recent Labs   Lab 09/21/22  1040      K 3.7      CO2 27   *   BUN 9   CREATININE 0.7   CALCIUM 9.8   PROT 7.9   ALBUMIN 4.0   BILITOT 0.4   ALKPHOS 104   AST 36   ALT 47*   ANIONGAP 8       Diagnostic Results:  None    Assessment/Plan:     * Altered mental state  I have reviewed the MRI images with radiology and discussed the situation with patient and family.  I am very concerned that she has CNS spread of this diease and this is supported by her improvement on steroids.  There is no clear metastatic deposits but the dura is certainly thickened on the left side where the mastoid invasion is seen.  I will arrange a lumbar puncture to evaluate further for cytology to better understand this process.  Will continue to hold chemotherapy for now.     Malignant neoplasm of major salivary gland  Patient has been on Lenvatinib for about 4 weeks.  Has been doing ok with this but held last week when hallucinations started.  Will continue to hold for now but may resume this depending on outcome of testing.          Thank you for your consult. I will follow-up with patient. Please contact us if you have any additional questions.    Gorge Coombs MD  Hematology/Oncology  [facility]

## 2022-09-22 NOTE — ASSESSMENT & PLAN NOTE
Appears stable but mild localized spread  MRI finding:Localized intracranial spread of malignancy along the left middle cranial fossa and left tentorial leaflet pachymeningeal surfaces.      PLAN   To  continue to hold cancer medication  To d/w radiologist   May need to refer back to her primary surgeon

## 2022-09-22 NOTE — SUBJECTIVE & OBJECTIVE
Interval History:     Review of Systems  As per subjective   Objective:     Vital Signs (Most Recent):  Temp: 98.3 °F (36.8 °C) (09/22/22 1012)  Pulse: 67 (09/22/22 1012)  Resp: 18 (09/22/22 1012)  BP: 114/74 (09/22/22 1012)  SpO2: 97 % (09/22/22 1012) Vital Signs (24h Range):  Temp:  [97.6 °F (36.4 °C)-98.3 °F (36.8 °C)] 98.3 °F (36.8 °C)  Pulse:  [61-78] 67  Resp:  [14-18] 18  SpO2:  [95 %-97 %] 97 %  BP: (112-174)/(59-89) 114/74     Weight: 53 kg (116 lb 13.5 oz)  Body mass index is 23.6 kg/m².  No intake or output data in the 24 hours ending 09/22/22 1323   Physical Exam  Vitals and nursing note reviewed.   HENT:      Head: Normocephalic and atraumatic.      Right Ear: Tympanic membrane normal.   Eyes:      Conjunctiva/sclera: Conjunctivae normal.   Neck:      Vascular: No JVD.   Cardiovascular:      Heart sounds: Normal heart sounds.   Pulmonary:      Effort: Pulmonary effort is normal.      Breath sounds: Normal breath sounds.   Abdominal:      General: Bowel sounds are normal.      Palpations: Abdomen is soft.   Skin:     General: Skin is warm.   Neurological:      Mental Status: She is alert and oriented to person, place, and time.   Psychiatric:         Mood and Affect: Mood normal.       Significant Labs: All pertinent labs within the past 24 hours have been reviewed.  CBC:   Recent Labs   Lab 09/21/22  1040   WBC 5.78   HGB 11.2*   HCT 33.2*        CMP:   Recent Labs   Lab 09/21/22  1040      K 3.7      CO2 27   *   BUN 9   CREATININE 0.7   CALCIUM 9.8   PROT 7.9   ALBUMIN 4.0   BILITOT 0.4   ALKPHOS 104   AST 36   ALT 47*   ANIONGAP 8       Significant Imaging: I have reviewed all pertinent imaging results/findings within the past 24 hours.

## 2022-09-22 NOTE — HOSPITAL COURSE
Please see the H&P .was admitted with mild altered mental status with some hallucinations  MRI with contrast brain was done because of previous her cancer history with salivary gland tumor  and mastoid metastasis.   localized intracranial spread of malignancy along the left middle cranial fossa and left tentorial pachymeningeal surfaces and consulted oncoloy .  LP was done and no sing of infection and cytology was pending .  Her symptoms resolved since admission.  She was prescribed marijuana for appetite and it might be  contributing factor as well.  Discussed with Dr. Coombs  and later discharged with Decadron 3 times per day and he will see the patient in 1 week time with cytology result for further treatment if needed. Her cancer med was held since admission as per oncology recommendation but did not DC the meds in system .

## 2022-09-23 NOTE — PROGRESS NOTES
Discharge education and instructions provided, all questions answered and understanding voiced; Port de-accessed; NO tele in place; Pt transported off unit for discharge with safety intact

## 2022-09-23 NOTE — PLAN OF CARE
[facility]  Discharge Final Note    Primary Care Provider: Casandra Yu NP    Expected Discharge Date: 9/23/2022    Social work intern met with Pt at bedside to confirm Pt discharge plans. Pt asleep at time. Social work intern contacted Pt's spouse (Fred Clay A [phone]) to confirm discharge plans. PT's spouse wanted to know if Pt could stay overnight.     1628-Social work interned called Pt's spouse back to inform him staying overnight would be an out-of-pocket expense. Pt's spouse informed MSW intern Pt and Pt's spouse spoke to MD. MD reassured Pt and Pt's spouse discharge was safe. Pt and Pt's spouse agreeable to going home.  BUTCH Lopes to e-mail case  to assist with scheduling Pt's PCP follow-up with [facility] PCP group. Pt has no other needs to be addressed by case management. Pt cleared to discharge by case management.    Final Discharge Note (most recent)       Final Note - 09/23/22 1624          Final Note    Assessment Type Discharge Planning Assessment     Anticipated Discharge Disposition Home or Self Care     What phone number can be called within the next 1-3 days to see how you are doing after discharge? [phone]     Hospital Resources/Appts/Education Provided Provided patient/caregiver with written discharge plan information;Appointments scheduled by Navigator/Coordinator        Post-Acute Status    Coverage [facility] CHOICE PLUS     Discharge Delays None known at this time                     Important Message from Medicare             Contact Info       Casandra Yu NP   Specialty: Family Medicine   Relationship: PCP - General    [address]  [address]  [place] [address]   Phone: [phone]       Next Steps: Follow up    Gorge Coombs MD   Specialty: Hematology, Oncology, Hematology and Oncology    [address]  [address]  [place] [address]   Phone: [phone]       Next Steps: Follow up in 1 week(s)

## 2022-09-23 NOTE — DISCHARGE INSTRUCTIONS
Follow all discharge instructions. Keep all follow up appointments. Take all medications as directed

## 2022-09-24 NOTE — DISCHARGE SUMMARY
[facility] Medicine  Discharge Summary      Patient Name: Antonieta Clay  MRN: [id number]  Patient Class: IP- Inpatient  Admission Date: 9/21/2022  Hospital Length of Stay: 1 days  Discharge Date and Time:  09/23/2022 8:41 PM  Attending Physician: No att. 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  She was having headache and high blood pressure secondary to cancer medication when it was hold about a week.   noticed that patient was having hallucinations seeing people who were at their having conversations with people that are not there and she was prescribed decadron by oncologist .  Apparently patient was prescribed marijuana liquid by prescriber for appetite and well-being and was taking and having some found of confusions and hallucination and this stopped about few days back.  Patient was awake alert oriented and no chest pain or shortness of breath.  She is able to answer questions all appropriately.      * No surgery found *      Hospital Course:   Please see the H&P .was admitted with mild altered mental status with some hallucinations  MRI with contrast brain was done because of previous her cancer history with salivary gland tumor  and mastoid metastasis.   localized intracranial spread of malignancy along the left middle cranial fossa and left tentorial pachymeningeal surfaces and consulted oncoloy .  LP was done and no sing of infection and cytology was pending .  Her symptoms resolved since admission.  She was prescribed marijuana for appetite and it might be  contributing factor as well.  Discussed with Dr. Coombs  and later discharged with Decadron 3 times per day and he will see  the patient in 1 week time with cytology result for further treatment if needed. Her cancer med was held since admission as per oncology recommendation but did not DC the meds in system .       Goals of Care Treatment Preferences:  Code Status: Full Code      Consults:     No new Assessment & Plan notes have been filed under this hospital service since the last note was generated.  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    Specialty: Family Medicine  Contact information:  [address]  [address]  [place] [address]  [phone]             Gorge Coombs MD Follow up in 1 week(s).    Specialties: Hematology, Oncology, Hematology and Oncology  Contact information:  [address]  [address]  [place] [address]  [phone]                       Patient Instructions:      Diet Cardiac     Activity as tolerated       Significant Diagnostic Studies: Labs: CMP No results for input(s): NA, K, CL, CO2, GLU, BUN, CREATININE, CALCIUM, PROT, ALBUMIN, BILITOT, ALKPHOS, AST, ALT, ANIONGAP, ESTGFRAFRICA, EGFRNONAA in the last 48 hours. and CBC No results for input(s): WBC, HGB, HCT, PLT in the last 48 hours.    Pending Diagnostic Studies:     Procedure Component Value Units Date/Time    Cytology Specimen-Medical Cytology (Fluid/Wash/Brush) [[id number]] Collected: 09/23/22 0918    Order Status: Sent Lab Status: No result     Specimen: Cerebrospinal fluid (CSF)          Medications:  Reconciled Home Medications:      Medication List      CHANGE how you take these medications     dexAMETHasone 4 MG Tab  Commonly known as: DECADRON  Take 1 tablet (4 mg total) by mouth every 8 (eight) hours.  What changed: when to take this     DUKE'S SOLUTION (BENADRYL 30 ML, MYLANTA 30 ML, LIDOCAINE 30 ML, NYSTATIN 30 ML)  Take 5 mLs by mouth 4 (four) times daily as needed. Swish and spit  What changed: Another medication with the same name was removed. Continue taking this medication, and follow the directions you see here.        CONTINUE taking these medications    ALPRAZolam 0.5 MG tablet  Commonly known as: XANAX  One tab 1 hour prior to MRI may repeat as needed.     amLODIPine 5 MG tablet  Commonly known as: NORVASC  Take 1 tablet (5 mg total) by mouth once daily.     ascorbic acid (vitamin C) 1000 MG tablet  Commonly known as: VITAMIN C  Take 1,000 mg by mouth once daily.     butalbital-acetaminophen-caffeine -40 mg -40 mg per tablet  Commonly known as: FIORICET, ESGIC  Take 1 tablet by mouth every 4 (four) hours as needed for Pain.     cholecalciferol (vitamin D3) 125 mcg (5,000 unit) Tab  Take 5,000 Units by mouth once daily.     fish oil-omega-3 fatty acids 300-1,000 mg capsule  Take 1 capsule by mouth once daily.     fluticasone propionate 50 mcg/actuation nasal spray  Commonly known as: FLONASE  2 sprays (100 mcg total) by Each Nostril route once daily.     lenvatinib 4 mg Cap  Take 16 mg by mouth once daily at 6am.     levothyroxine 88 MCG tablet  Commonly known as: SYNTHROID  Take 1 tablet (88 mcg total) by mouth once daily.     multivit-iron-min-folic acid 18-0.4 mg Tab  Take 1 tablet by mouth once daily.     oxyCODONE-acetaminophen  mg per tablet  Commonly known as: PERCOCET  Take 1 tablet by mouth every 6 (six) hours as needed for Pain.     ZINC ACETATE ORAL  Take 1 capsule by mouth once daily.        STOP taking these medications    levoFLOXacin 250 MG tablet  Commonly known as: LEVAQUIN            Indwelling Lines/Drains at time of discharge:   Lines/Drains/Airways      Central Venous Catheter Line  Duration                PowerPort A Cath Single Lumen 04/04/22 0920 right subclavian 172 days              General: Patient resting comfortably in no acute distress. Appears as stated age. Calm  Eyes: EOM intact. No conjunctivae injection. No scleral icterus.  ENT: Hearing grossly intact. No discharge from ears. No nasal discharge.   CVS: RRR. No LE edema BL.  Lungs: CTA BL, no wheezing or crackles. Good breath sounds. No accessory muscle use. No acute respiratory distress  Neuro: non focal , Follows commands. Responds appropriately  Time spent on the discharge of patient: 23 minutes         Haider Dodson MD  Department of Hospital Medicine  [facility]

## 2022-09-26 NOTE — PROGRESS NOTES
Discharge Information     Discharge Date:   9/23/22    Primary Discharge Diagnosis:   Altered mental state     Discharge Summary:  Reviewed      Medication & Order Review     Were medication changes made or new medications added?   No    If so, has the patient filled the prescriptions?  No     Was Home Health ordered? No    If so, has Home Health contacted patient and/or initiated services?  NO    Name of Home Health Agency? N/A    Durable Medical Equipment ordered?  No     If so, has the DME provider contacted patient and delivered equipment?  N/A    Follow Up               Any problems since discharge? No    How is the patient feeling since returning home?  Pt  states she is doing much better.     Have you set up recommended follow up appointments?  Dr. Balbuena tomorrow.    Schedule Hospital Follow-up appointment within 7-14 days (preferably 7).      Notes:              Shruti Hagen

## 2022-09-26 NOTE — TELEPHONE ENCOUNTER
----- Message from Shruti Hagen LPN sent at 9/22/2022  4:48 PM CDT -----  Regarding: HFU  Call patient - needs post-hospital phone call within 2 business days and hospital follow up visit scheduled within 7-14 days.    Expected discharge- 9/23/22    Scheduled. - 9/28/22 @ 2:00

## 2022-09-27 NOTE — PROGRESS NOTES
PROGRESS NOTE    Subjective:       Patient ID: Antonieta Clay is a 61 y.o. female.    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 Postoperative changes in the left side of the neck with slightly  increased FDG activity from background.  2. No FDG avid lymphadenopathy or finding of additional distant sites  of FDG avid disease.    10/20/2020-12/4/2020  Radiation therapy.    3/11/2021:  MRI Neck: no evidence of recurrence.    MRI Brain: no acute abnormalities.    12/3/2021:  CT chest: new ground glass, RUL, infectious vs met. 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    Cytoxan/Kwasi/Cisplatin:  Cycle 1: 4/5/2022  Cycle 2: 4/26/2022  Cycle 3: 5/17/2022 5/31/2022:  CT neck and chest:  Mastoid lesion ? Less prominent.  No other significant changes or new growth.     8/23/2022:  Lenvatinib Start:    Chief Complaint:    Parotid cancer follow up    History of Present Illness:   Antonieta Clay is a 61 y.o. female who presents for follow up of above.      Ms. Clay is doing ok at this time.  Has had some elevated BP but no symptoms.She was in the ER on Friday for persistent headaches and had a /100's. MRI shows the mass in the mastoid portion of the temporal bone. Recs better characterization with contrast.    She was having some hallucinations and seeing people she went to the ER. Lenvima has been on hold and BP has been stable 130's/80's.    Family and Social history reviewed and is unchanged from 9/21/2020      ROS:  Review of Systems   Constitutional:  Positive for fatigue. Negative for appetite change, fever and unexpected weight change.   HENT:  Negative for mouth sores.    Eyes:  Negative for visual  disturbance.   Respiratory:  Negative for cough and shortness of breath.    Cardiovascular:  Negative for chest pain and leg swelling.   Gastrointestinal:  Negative for abdominal pain, blood in stool and diarrhea.   Genitourinary:  Negative for frequency and hematuria.   Musculoskeletal:  Negative for back pain.   Skin:  Negative for rash.   Neurological:  Positive for headaches.        Hallucinations- seeing people dancing   Hematological:  Negative for adenopathy.   Psychiatric/Behavioral:  The patient is not nervous/anxious.         Current Outpatient Medications:     ALPRAZolam (XANAX) 0.5 MG tablet, One tab 1 hour prior to MRI may repeat as needed., Disp: 3 tablet, Rfl: 0    amLODIPine (NORVASC) 5 MG tablet, Take 1 tablet (5 mg total) by mouth once daily., Disp: 30 tablet, Rfl: 3    ascorbic acid, vitamin C, (VITAMIN C) 1000 MG tablet, Take 1,000 mg by mouth once daily., Disp: , Rfl:     cholecalciferol, vitamin D3, 125 mcg (5,000 unit) Tab, Take 5,000 Units by mouth once daily., Disp: , Rfl:     dexAMETHasone (DECADRON) 4 MG Tab, Take 1 tablet (4 mg total) by mouth every 8 (eight) hours., Disp: 90 tablet, Rfl: 0    duke's soln (benadryl 30 mL, mylanta 30 mL, LIDOcaine 30 mL, nystatin 30 mL) 120mL, Take 5 mLs by mouth 4 (four) times daily as needed. Swish and spit, Disp: 120 mL, Rfl: 0    fluticasone propionate (FLONASE) 50 mcg/actuation nasal spray, 2 sprays (100 mcg total) by Each Nostril route once daily., Disp: 18.2 mL, Rfl: 1    lenvatinib 4 mg Cap, Take 16 mg by mouth once daily at 6am., Disp: 120 capsule, Rfl: 3    levothyroxine (SYNTHROID) 88 MCG tablet, Take 1 tablet (88 mcg total) by mouth once daily., Disp: 90 tablet, Rfl: 1    multivit-iron-min-folic acid 18-0.4 mg Tab, Take 1 tablet by mouth once daily. , Disp: , Rfl:     omega-3 fatty acids/fish oil (FISH OIL-OMEGA-3 FATTY ACIDS) 300-1,000 mg capsule, Take 1 capsule by mouth once daily., Disp: , Rfl:     oxyCODONE-acetaminophen (PERCOCET)   mg per tablet, Take 1 tablet by mouth every 6 (six) hours as needed for Pain., Disp: 28 tablet, Rfl: 0    ZINC ACETATE ORAL, Take 1 capsule by mouth once daily. , Disp: , Rfl:     Current Facility-Administered Medications:     0.9%  NaCl infusion, , Intravenous, Once, Gorge Sagastume MD, Stopped at 05/04/22 1321    methylPREDNISolone sodium succinate injection 40 mg, 40 mg, Intravenous, Once PRN, Rj Pompa MD        Objective:       Physical Examination:     BP (!) 147/75   Pulse 83   Temp 97.2 °F (36.2 °C)   Wt 51.9 kg (114 lb 6.4 oz)   BMI 23.11 kg/m²     Physical Exam  Constitutional:       Appearance: Normal appearance. She is well-developed.   HENT:      Head: Normocephalic and atraumatic.      Right Ear: External ear normal.      Left Ear: External ear normal.      Mouth/Throat:      Mouth: Mucous membranes are moist.      Pharynx: Oropharynx is clear. No oropharyngeal exudate or posterior oropharyngeal erythema.      Comments: No visible or palpable oral lesions.   Eyes:      Conjunctiva/sclera: Conjunctivae normal.      Pupils: Pupils are equal, round, and reactive to light.   Neck:      Thyroid: No thyromegaly.      Trachea: No tracheal deviation.   Cardiovascular:      Rate and Rhythm: Normal rate and regular rhythm.      Heart sounds: Normal heart sounds.   Pulmonary:      Effort: Pulmonary effort is normal.      Breath sounds: Normal breath sounds.   Abdominal:      General: Bowel sounds are normal. There is no distension.      Palpations: Abdomen is soft. There is no mass.      Tenderness: There is no abdominal tenderness.   Lymphadenopathy:      Head:      Right side of head: No submental, submandibular, tonsillar or preauricular adenopathy.      Left side of head: No submental, submandibular, tonsillar or preauricular adenopathy.      Cervical: No cervical adenopathy.      Upper Body:      Right upper body: No supraclavicular or axillary adenopathy.      Left upper body: No  supraclavicular or axillary adenopathy.   Skin:     General: Skin is warm and dry.      Findings: No rash.   Neurological:      Mental Status: She is alert and oriented to person, place, and time.      Comments: Neuro intact througout   Psychiatric:         Mood and Affect: Mood normal.         Behavior: Behavior normal.         Thought Content: Thought content normal.         Judgment: Judgment normal.       Labs:   Recent Results (from the past 336 hour(s))   CBC Auto Differential    Collection Time: 10/10/22 10:00 AM   Result Value Ref Range    WBC 8.77 3.90 - 12.70 K/uL    Hemoglobin 13.0 12.0 - 16.0 g/dL    Hematocrit 37.1 37.0 - 48.5 %    Platelets 239 150 - 450 K/uL   CBC Auto Differential    Collection Time: 10/03/22  9:52 AM   Result Value Ref Range    WBC 13.46 (H) 3.90 - 12.70 K/uL    Hemoglobin 13.0 12.0 - 16.0 g/dL    Hematocrit 37.6 37.0 - 48.5 %    Platelets 296 150 - 450 K/uL     CMP  Sodium   Date Value Ref Range Status   10/10/2022 135 (L) 136 - 145 mmol/L Final     Potassium   Date Value Ref Range Status   10/10/2022 4.0 3.5 - 5.1 mmol/L Final     Chloride   Date Value Ref Range Status   10/10/2022 98 95 - 110 mmol/L Final     CO2   Date Value Ref Range Status   10/10/2022 27 23 - 29 mmol/L Final     Glucose   Date Value Ref Range Status   10/10/2022 122 (H) 70 - 110 mg/dL Final     BUN   Date Value Ref Range Status   10/10/2022 26 (H) 8 - 23 mg/dL Final     Creatinine   Date Value Ref Range Status   10/10/2022 0.8 0.5 - 1.4 mg/dL Final     Calcium   Date Value Ref Range Status   10/10/2022 9.1 8.7 - 10.5 mg/dL Final     Total Protein   Date Value Ref Range Status   10/10/2022 7.1 6.0 - 8.4 g/dL Final     Albumin   Date Value Ref Range Status   10/10/2022 3.6 3.5 - 5.2 g/dL Final     Total Bilirubin   Date Value Ref Range Status   10/10/2022 0.8 0.1 - 1.0 mg/dL Final     Comment:     For infants and newborns, interpretation of results should be based  on gestational age, weight and in agreement  with clinical  observations.    Premature Infant recommended reference ranges:  Up to 24 hours.............<8.0 mg/dL  Up to 48 hours............<12.0 mg/dL  3-5 days..................<15.0 mg/dL  6-29 days.................<15.0 mg/dL       Alkaline Phosphatase   Date Value Ref Range Status   10/10/2022 62 55 - 135 U/L Final     AST   Date Value Ref Range Status   10/10/2022 25 10 - 40 U/L Final     ALT   Date Value Ref Range Status   10/10/2022 42 10 - 44 U/L Final     Anion Gap   Date Value Ref Range Status   10/10/2022 10 8 - 16 mmol/L Final     eGFR if    Date Value Ref Range Status   06/13/2022 >60.0 >60 mL/min/1.73 m^2 Final     eGFR if non    Date Value Ref Range Status   06/13/2022 >60.0 >60 mL/min/1.73 m^2 Final     Comment:     Calculation used to obtain the estimated glomerular filtration  rate (eGFR) is the CKD-EPI equation.        No results found for: CEA          Assessment/Plan:     Problem List Items Addressed This Visit          Neuro    Headache       Oncology    Malignant neoplasm of major salivary gland - Primary     Other Visit Diagnoses       Hypertension, unspecified type              Head and neck cancer- Hold Lenvima if BP stable will restart at 8 mg po daily and monitor BP  HTN- Hold Lenvima 1 week BP's daily  Headaches- MRI brain with contrast f/u with Dr. Darden and Dr. Ramos as scheduled      Discussion:     Follow up in about 2 weeks (around 10/11/2022) for with Dr. Sagastume and 4 weeks with me.    Phase II Study of Lenvatinib in Patients With Progressive, Recurrent or Metastatic Adenoid Cystic Carcinoma.  AU  Isela MOY, Mohamud BURRIS, Poli L, Bee C, Reinaldo S, Carlos N, Marilyn CR, Stephanie I, Emily SS, Sumanth DG, Ho AL   SO  J Clin Oncol. 2019;37(18):1529. Epub 2019 Apr 2.      PURPOSERecurrent or metastatic adenoid cystic carcinoma (R/M ACC) is a malignant neoplasm of predominantly salivary gland origin for which effective therapies are  lacking. We conducted a phase II trial evaluating the multitargeted tyrosine kinase inhibitor lenvatinib in patients with R/M ACC.    PATIENTS AND METHODSThis study was conducted with a two-stage minimax design. Patients with histologically confirmed R/M ACC of any primary site with radiographic and/or symptomatic progression were eligible. Any prior therapy was allowed except previous lenvatinib. Patients received lenvatinib 24 mg orally per day. The primary end point was overall response rate. Secondary end points were progression-free survival and safety. An exploratory analysis of how MYB expression and genomic alterations relate to outcomes was conducted.    RESULTSThirty-three patients were enrolled; 32 were evaluable for the primary end point. Five patients (15.6%) had a confirmed partial response, 24 patients (75%) had stable disease, two patients (6.3%) discontinued treatment as a result of toxicity before the first scan, and one patient (3.1%) had progression of disease as best response. Median progression-free survival time was 17.5 months (95% CI, 7.2 months to not reached), although only eight progression events were observed. Patients otherwise were removed for toxicity (n = 5), as a result of withdrawal of consent (n = 9), or at the treating physician's discretion (n = 6). Twenty-three patients required at least one dose modification, and 18 of 32 patients discontinued lenvatinib for drug-related issues. The most common grade 3 or 4 adverse events were hypertension (n = 9; 28.1%) and oral pain (n = 3; 9.4%). Three grade 4 adverse events were observed (myocardial infarction, n = 1; posterior reversible encephalopathy syndrome, n = 1; and intracranial hemorrhage, n = 1).    CONCLUSIONThis trial met the prespecified overall response rate primary end point, demonstrating antitumor activity with lenvatinib in R/M ACC patients. Toxicity was comparable to previous studies, requiring monitoring and  management.      Electronically signed by Louise Harrington, MSN, APRN, AGNP-C, OCN    Dr. Yeyo Ramos [place] [facility]- Sept 29th  Dr. Darden- [place] [facility]/ [facility] and Neck Surgery

## 2022-09-28 NOTE — PROGRESS NOTES
Patient ID: Antonieta Clay is a 61 y.o. female.    Chief Complaint: Follow-up (No bottles//Pt is here for a HFU from altered mental state. Pt is feeling a little better. )        Admit Date: 9/21/22   Discharge Date: 9/23/22  Discharge Facility: Hospital    Medication Reconciliation:  Medications changed/added/deleted. 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   reports patient had been having headaches and high blood pressure since starting lenvima for metastatic cancer a few weeks earlier.  She was seen in the ER a couple days earlier for headache and HTN- treated and discharged with lenvima on hold.   reports she seemed to be declining and started with reporting hallucinations and seemed confused.   reports pt had been started on medical marijuana 5-6 days earliers to help with headache and loss of appetite- she seemed to be doing okay with medication the first few days. She presented back to the emergency room and was admitted- seen by Dr. Coombs, underwent LP to rule out metastasis/infection which was negative and started on decadron. MRI of brain with contrast showed aggressive enhancing mass centered at the mastoid portion of the left temporal bone concerning for malignancy, Localized intracranial spread of malignancy along the left middle cranial fossa and left tentorial leaflet pachymeningeal surfaces.  Pt improved during hospital stay with no further hallucinations  "and cleared for discharge with oncology f/u      How patient is feeling since discharge from the hospital?  Pt here with her  today. Pt appears somewhat lethargic,  does most of the talking for her. She tells me she's feeling "okay", +fatigued. No further hallucinations per . Appetite is slowly improving and headache has improved- c/o's of pain/pressure left ear/mastoid area  Saw oncology NP since discharge and has f/u with Dr. Coombs in 2 weeks- has plans to resume lenvima oral chemo next week.  has been monitoring BP at home and it's been well controlled.. Has appt tomorrow with Dr. Ramos ENT tomorrow at [facility].  reports he needs to reschedule f/u with Dr. Darden, ENT at [facility]      Patient follow up phone call documented on separate encounter.    Lab Visit on 09/26/2022   Component Date Value Ref Range Status    WBC 09/26/2022 9.46  3.90 - 12.70 K/uL Final    RBC 09/26/2022 4.22  4.00 - 5.40 M/uL Final    Hemoglobin 09/26/2022 12.7  12.0 - 16.0 g/dL Final    Hematocrit 09/26/2022 37.9  37.0 - 48.5 % Final    MCV 09/26/2022 90  82 - 98 fL Final    MCH 09/26/2022 30.1  27.0 - 31.0 pg Final    MCHC 09/26/2022 33.5  32.0 - 36.0 g/dL Final    RDW 09/26/2022 14.2  11.5 - 14.5 % Final    Platelets 09/26/2022 385  150 - 450 K/uL Final    MPV 09/26/2022 9.7  9.2 - 12.9 fL Final    Immature Granulocytes 09/26/2022 0.5  0.0 - 0.5 % Final    Gran # (ANC) 09/26/2022 7.4  1.8 - 7.7 K/uL Final    Immature Grans (Abs) 09/26/2022 0.05 (H)  0.00 - 0.04 K/uL Final    Lymph # 09/26/2022 1.1  1.0 - 4.8 K/uL Final    Mono # 09/26/2022 1.0  0.3 - 1.0 K/uL Final    Eos # 09/26/2022 0.0  0.0 - 0.5 K/uL Final    Baso # 09/26/2022 0.00  0.00 - 0.20 K/uL Final    nRBC 09/26/2022 0  0 /100 WBC Final    Gran % 09/26/2022 77.9 (H)  38.0 - 73.0 % Final    Lymph % 09/26/2022 11.6 (L)  18.0 - 48.0 % Final    Mono % 09/26/2022 10.0  4.0 - 15.0 % Final    Eosinophil % 09/26/2022 0.0  0.0 - 8.0 % Final    " Basophil % 09/26/2022 0.0  0.0 - 1.9 % Final    Platelet Estimate 09/26/2022 Appears normal   Final    Differential Method 09/26/2022 Automated   Final    Sodium 09/26/2022 139  136 - 145 mmol/L Final    Potassium 09/26/2022 3.6  3.5 - 5.1 mmol/L Final    Chloride 09/26/2022 104  95 - 110 mmol/L Final    CO2 09/26/2022 24  23 - 29 mmol/L Final    Glucose 09/26/2022 125 (H)  70 - 110 mg/dL Final    BUN 09/26/2022 27 (H)  8 - 23 mg/dL Final    Creatinine 09/26/2022 0.8  0.5 - 1.4 mg/dL Final    Calcium 09/26/2022 9.6  8.7 - 10.5 mg/dL Final    Total Protein 09/26/2022 8.0  6.0 - 8.4 g/dL Final    Albumin 09/26/2022 4.1  3.5 - 5.2 g/dL Final    Total Bilirubin 09/26/2022 0.5  0.1 - 1.0 mg/dL Final    Alkaline Phosphatase 09/26/2022 84  55 - 135 U/L Final    AST 09/26/2022 21  10 - 40 U/L Final    ALT 09/26/2022 43  10 - 44 U/L Final    Anion Gap 09/26/2022 11  8 - 16 mmol/L Final    eGFR 09/26/2022 >60.0  >60 mL/min/1.73 m^2 Final   Admission on 09/21/2022, Discharged on 09/23/2022   Component Date Value Ref Range Status    SARS-CoV-2 RNA, Amplification, Qual 09/21/2022 Negative  Negative Final    BNP 09/21/2022 33  0 - 99 pg/mL Final    WBC 09/21/2022 5.78  3.90 - 12.70 K/uL Final    RBC 09/21/2022 3.65 (L)  4.00 - 5.40 M/uL Final    Hemoglobin 09/21/2022 11.2 (L)  12.0 - 16.0 g/dL Final    Hematocrit 09/21/2022 33.2 (L)  37.0 - 48.5 % Final    MCV 09/21/2022 91  82 - 98 fL Final    MCH 09/21/2022 30.7  27.0 - 31.0 pg Final    MCHC 09/21/2022 33.7  32.0 - 36.0 g/dL Final    RDW 09/21/2022 14.4  11.5 - 14.5 % Final    Platelets 09/21/2022 247  150 - 450 K/uL Final    MPV 09/21/2022 10.6  9.2 - 12.9 fL Final    Immature Granulocytes 09/21/2022 0.2  0.0 - 0.5 % Final    Gran # (ANC) 09/21/2022 4.7  1.8 - 7.7 K/uL Final    Immature Grans (Abs) 09/21/2022 0.01  0.00 - 0.04 K/uL Final    Lymph # 09/21/2022 0.7 (L)  1.0 - 4.8 K/uL Final    Mono # 09/21/2022 0.4  0.3 - 1.0 K/uL Final    Eos # 09/21/2022 0.0  0.0 - 0.5 K/uL  Final    Baso # 09/21/2022 0.01  0.00 - 0.20 K/uL Final    nRBC 09/21/2022 0  0 /100 WBC Final    Gran % 09/21/2022 80.6 (H)  38.0 - 73.0 % Final    Lymph % 09/21/2022 11.2 (L)  18.0 - 48.0 % Final    Mono % 09/21/2022 7.6  4.0 - 15.0 % Final    Eosinophil % 09/21/2022 0.2  0.0 - 8.0 % Final    Basophil % 09/21/2022 0.2  0.0 - 1.9 % Final    Differential Method 09/21/2022 Automated   Final    Sodium 09/21/2022 140  136 - 145 mmol/L Final    Potassium 09/21/2022 3.7  3.5 - 5.1 mmol/L Final    Chloride 09/21/2022 105  95 - 110 mmol/L Final    CO2 09/21/2022 27  23 - 29 mmol/L Final    Glucose 09/21/2022 144 (H)  70 - 110 mg/dL Final    BUN 09/21/2022 9  8 - 23 mg/dL Final    Creatinine 09/21/2022 0.7  0.5 - 1.4 mg/dL Final    Calcium 09/21/2022 9.8  8.7 - 10.5 mg/dL Final    Total Protein 09/21/2022 7.9  6.0 - 8.4 g/dL Final    Albumin 09/21/2022 4.0  3.5 - 5.2 g/dL Final    Total Bilirubin 09/21/2022 0.4  0.1 - 1.0 mg/dL Final    Alkaline Phosphatase 09/21/2022 104  55 - 135 U/L Final    AST 09/21/2022 36  10 - 40 U/L Final    ALT 09/21/2022 47 (H)  10 - 44 U/L Final    Anion Gap 09/21/2022 8  8 - 16 mmol/L Final    eGFR 09/21/2022 >60.0  >60 mL/min/1.73 m^2 Final    Benzodiazepines 09/21/2022 Negative  Negative Final    Cocaine (Metab.) 09/21/2022 Negative  Negative Final    Opiate Scrn, Ur 09/21/2022 Negative  Negative Final    Barbiturate Screen, Ur 09/21/2022 Negative  Negative Final    Amphetamine Screen, Ur 09/21/2022 Negative  Negative Final    THC 09/21/2022 Presumptive Positive (A)  Negative Final    Phencyclidine 09/21/2022 Negative  Negative Final    Creatinine, Urine 09/21/2022 84.0  15.0 - 325.0 mg/dL Final    Toxicology Information 09/21/2022 SEE COMMENT   Final    Lactate (Lactic Acid) 09/21/2022 1.3  0.5 - 1.9 mmol/L Final    Magnesium 09/21/2022 2.3  1.6 - 2.6 mg/dL Final    Troponin I 09/21/2022 <0.030  <=0.040 ng/mL Final    Influenza A, Molecular 09/21/2022 Negative  Negative Final    Influenza B,  Molecular 09/21/2022 Negative  Negative Final    Flu A & B Source 09/21/2022 Nasal swab   Final    TSH 09/21/2022 0.280 (L)  0.340 - 5.600 uIU/mL Final    Specimen UA 09/21/2022 Urine, Clean Catch   Final    Color, UA 09/21/2022 Yellow  Yellow, Straw, Izabela Final    Appearance, UA 09/21/2022 Clear  Clear Final    pH, UA 09/21/2022 7.0  5.0 - 8.0 Final    Specific Gravity, UA 09/21/2022 1.010  1.005 - 1.030 Final    Protein, UA 09/21/2022 Trace (A)  Negative Final    Glucose, UA 09/21/2022 Negative  Negative Final    Ketones, UA 09/21/2022 1+ (A)  Negative Final    Bilirubin (UA) 09/21/2022 Negative  Negative Final    Occult Blood UA 09/21/2022 Negative  Negative Final    Nitrite, UA 09/21/2022 Negative  Negative Final    Urobilinogen, UA 09/21/2022 Negative  Negative EU/dL Final    Leukocytes, UA 09/21/2022 Trace (A)  Negative Final    Blood Culture, Routine 09/21/2022 No growth after 5 days.   Final    Blood Culture, Routine 09/21/2022 No growth after 5 days.   Final    Ammonia 09/21/2022 10  10 - 50 umol/L Final    Free T4 09/21/2022 1.20  0.71 - 1.51 ng/dL Final    RBC, UA 09/21/2022 6 (H)  0 - 4 /hpf Final    WBC, UA 09/21/2022 2  0 - 5 /hpf Final    Bacteria 09/21/2022 Negative  None-Occ /hpf Final    Squam Epithel, UA 09/21/2022 4  /hpf Final    Hyaline Casts, UA 09/21/2022 6 (A)  0-1/lpf /lpf Final    Microscopic Comment 09/21/2022 SEE COMMENT   Final    Protein, CSF 09/23/2022 42 (H)  15 - 40 mg/dL Final    Glucose, CSF 09/23/2022 86 (H)  40 - 70 mg/dL Final    Heme Aliquot 09/23/2022 1.5  mL Final    Appearance, CSF 09/23/2022 Clear  Clear Final    Color, CSF 09/23/2022 Colorless  Colorless Final    WBC, CSF 09/23/2022 6 (H)  0 - 5 /cu mm Final    RBC, CSF 09/23/2022 1 (A)  0 /cu mm Final    Lymphs, CSF 09/23/2022 50  40 - 80 % Final    Mono/Macrophage, CSF 09/23/2022 50 (H)  15 - 45 % Final    CSF CULTURE 09/23/2022 No growth   Final    Gram Stain Result 09/23/2022 Rare WBC's   Final    Gram Stain Result  09/23/2022 No organisms seen   Final    Heme Aliquot 09/23/2022 2.0  mL Final    Appearance, CSF 09/23/2022 Clear  Clear Final    Color, CSF 09/23/2022 Colorless  Colorless Final    WBC, CSF 09/23/2022 6 (H)  0 - 5 /cu mm Final    RBC, CSF 09/23/2022 0  0 /cu mm Final    Lymphs, CSF 09/23/2022 52  40 - 80 % Final    Mono/Macrophage, CSF 09/23/2022 48 (H)  15 - 45 % Final   Lab Visit on 09/19/2022   Component Date Value Ref Range Status    Specimen UA 09/19/2022 Urine, Clean Catch   Final    Color, UA 09/19/2022 Yellow  Yellow, Straw, Izabela Final    Appearance, UA 09/19/2022 Clear  Clear Final    pH, UA 09/19/2022 6.0  5.0 - 8.0 Final    Specific Gravity, UA 09/19/2022 1.010  1.005 - 1.030 Final    Protein, UA 09/19/2022 Negative  Negative Final    Glucose, UA 09/19/2022 Negative  Negative Final    Ketones, UA 09/19/2022 Trace (A)  Negative Final    Bilirubin (UA) 09/19/2022 Negative  Negative Final    Occult Blood UA 09/19/2022 Negative  Negative Final    Nitrite, UA 09/19/2022 Negative  Negative Final    Urobilinogen, UA 09/19/2022 Negative  Negative EU/dL Final    Leukocytes, UA 09/19/2022 Negative  Negative Final   Lab Visit on 09/19/2022   Component Date Value Ref Range Status    WBC 09/19/2022 4.35  3.90 - 12.70 K/uL Final    RBC 09/19/2022 3.64 (L)  4.00 - 5.40 M/uL Final    Hemoglobin 09/19/2022 11.2 (L)  12.0 - 16.0 g/dL Final    Hematocrit 09/19/2022 32.8 (L)  37.0 - 48.5 % Final    MCV 09/19/2022 90  82 - 98 fL Final    MCH 09/19/2022 30.8  27.0 - 31.0 pg Final    MCHC 09/19/2022 34.1  32.0 - 36.0 g/dL Final    RDW 09/19/2022 14.1  11.5 - 14.5 % Final    Platelets 09/19/2022 190  150 - 450 K/uL Final    MPV 09/19/2022 9.8  9.2 - 12.9 fL Final    Immature Granulocytes 09/19/2022 0.2  0.0 - 0.5 % Final    Gran # (ANC) 09/19/2022 2.7  1.8 - 7.7 K/uL Final    Immature Grans (Abs) 09/19/2022 0.01  0.00 - 0.04 K/uL Final    Lymph # 09/19/2022 1.1  1.0 - 4.8 K/uL Final    Mono # 09/19/2022 0.3  0.3 - 1.0 K/uL  Final    Eos # 09/19/2022 0.2  0.0 - 0.5 K/uL Final    Baso # 09/19/2022 0.02  0.00 - 0.20 K/uL Final    nRBC 09/19/2022 0  0 /100 WBC Final    Gran % 09/19/2022 61.6  38.0 - 73.0 % Final    Lymph % 09/19/2022 24.8  18.0 - 48.0 % Final    Mono % 09/19/2022 7.8  4.0 - 15.0 % Final    Eosinophil % 09/19/2022 5.1  0.0 - 8.0 % Final    Basophil % 09/19/2022 0.5  0.0 - 1.9 % Final    Differential Method 09/19/2022 Automated   Final    Sodium 09/19/2022 137  136 - 145 mmol/L Final    Potassium 09/19/2022 3.4 (L)  3.5 - 5.1 mmol/L Final    Chloride 09/19/2022 104  95 - 110 mmol/L Final    CO2 09/19/2022 27  23 - 29 mmol/L Final    Glucose 09/19/2022 142 (H)  70 - 110 mg/dL Final    BUN 09/19/2022 10  8 - 23 mg/dL Final    Creatinine 09/19/2022 0.8  0.5 - 1.4 mg/dL Final    Calcium 09/19/2022 9.0  8.7 - 10.5 mg/dL Final    Total Protein 09/19/2022 7.5  6.0 - 8.4 g/dL Final    Albumin 09/19/2022 3.0 (L)  3.5 - 5.2 g/dL Final    Total Bilirubin 09/19/2022 0.4  0.1 - 1.0 mg/dL Final    Alkaline Phosphatase 09/19/2022 96  55 - 135 U/L Final    AST 09/19/2022 41 (H)  10 - 40 U/L Final    ALT 09/19/2022 47 (H)  10 - 44 U/L Final    Anion Gap 09/19/2022 6 (L)  8 - 16 mmol/L Final    eGFR 09/19/2022 >60.0  >60 mL/min/1.73 m^2 Final   Admission on 09/16/2022, Discharged on 09/17/2022   Component Date Value Ref Range Status    WBC 09/16/2022 3.71 (L)  3.90 - 12.70 K/uL Final    RBC 09/16/2022 4.04  4.00 - 5.40 M/uL Final    Hemoglobin 09/16/2022 12.3  12.0 - 16.0 g/dL Final    Hematocrit 09/16/2022 35.9 (L)  37.0 - 48.5 % Final    MCV 09/16/2022 89  82 - 98 fL Final    MCH 09/16/2022 30.4  27.0 - 31.0 pg Final    MCHC 09/16/2022 34.3  32.0 - 36.0 g/dL Final    RDW 09/16/2022 13.5  11.5 - 14.5 % Final    Platelets 09/16/2022 142 (L)  150 - 450 K/uL Final    MPV 09/16/2022 11.0  9.2 - 12.9 fL Final    Immature Granulocytes 09/16/2022 0.3  0.0 - 0.5 % Final    Gran # (ANC) 09/16/2022 1.9  1.8 - 7.7 K/uL Final    Immature Grans (Abs)  09/16/2022 0.01  0.00 - 0.04 K/uL Final    Lymph # 09/16/2022 1.1  1.0 - 4.8 K/uL Final    Mono # 09/16/2022 0.5  0.3 - 1.0 K/uL Final    Eos # 09/16/2022 0.2  0.0 - 0.5 K/uL Final    Baso # 09/16/2022 0.02  0.00 - 0.20 K/uL Final    nRBC 09/16/2022 0  0 /100 WBC Final    Gran % 09/16/2022 52.0  38.0 - 73.0 % Final    Lymph % 09/16/2022 28.3  18.0 - 48.0 % Final    Mono % 09/16/2022 14.3  4.0 - 15.0 % Final    Eosinophil % 09/16/2022 4.6  0.0 - 8.0 % Final    Basophil % 09/16/2022 0.5  0.0 - 1.9 % Final    Differential Method 09/16/2022 Automated   Final    Sodium 09/16/2022 141  136 - 145 mmol/L Final    Potassium 09/16/2022 4.2  3.5 - 5.1 mmol/L Final    Chloride 09/16/2022 104  95 - 110 mmol/L Final    CO2 09/16/2022 26  23 - 29 mmol/L Final    Glucose 09/16/2022 98  70 - 110 mg/dL Final    BUN 09/16/2022 10  8 - 23 mg/dL Final    Creatinine 09/16/2022 0.9  0.5 - 1.4 mg/dL Final    Calcium 09/16/2022 10.0  8.7 - 10.5 mg/dL Final    Total Protein 09/16/2022 8.0  6.0 - 8.4 g/dL Final    Albumin 09/16/2022 4.1  3.5 - 5.2 g/dL Final    Total Bilirubin 09/16/2022 0.7  0.1 - 1.0 mg/dL Final    Alkaline Phosphatase 09/16/2022 129  55 - 135 U/L Final    AST 09/16/2022 77 (H)  10 - 40 U/L Final    ALT 09/16/2022 72 (H)  10 - 44 U/L Final    Anion Gap 09/16/2022 11  8 - 16 mmol/L Final    eGFR 09/16/2022 >60.0  >60 mL/min/1.73 m^2 Final    SARS-CoV-2 RNA, Amplification, Qual 09/16/2022 Negative  Negative Final    BNP 09/16/2022 36  0 - 99 pg/mL Final    Troponin I 09/16/2022 0.037  <=0.040 ng/mL Final   Lab Visit on 09/12/2022   Component Date Value Ref Range Status    WBC 09/12/2022 4.24  3.90 - 12.70 K/uL Final    RBC 09/12/2022 4.60  4.00 - 5.40 M/uL Final    Hemoglobin 09/12/2022 14.0  12.0 - 16.0 g/dL Final    Hematocrit 09/12/2022 40.9  37.0 - 48.5 % Final    MCV 09/12/2022 89  82 - 98 fL Final    MCH 09/12/2022 30.4  27.0 - 31.0 pg Final    MCHC 09/12/2022 34.2  32.0 - 36.0 g/dL Final    RDW 09/12/2022 13.0  11.5 -  14.5 % Final    Platelets 09/12/2022 134 (L)  150 - 450 K/uL Final    MPV 09/12/2022 9.9  9.2 - 12.9 fL Final    Immature Granulocytes 09/12/2022 0.2  0.0 - 0.5 % Final    Gran # (ANC) 09/12/2022 2.5  1.8 - 7.7 K/uL Final    Immature Grans (Abs) 09/12/2022 0.01  0.00 - 0.04 K/uL Final    Lymph # 09/12/2022 1.1  1.0 - 4.8 K/uL Final    Mono # 09/12/2022 0.5  0.3 - 1.0 K/uL Final    Eos # 09/12/2022 0.2  0.0 - 0.5 K/uL Final    Baso # 09/12/2022 0.02  0.00 - 0.20 K/uL Final    nRBC 09/12/2022 0  0 /100 WBC Final    Gran % 09/12/2022 59.2  38.0 - 73.0 % Final    Lymph % 09/12/2022 25.5  18.0 - 48.0 % Final    Mono % 09/12/2022 10.6  4.0 - 15.0 % Final    Eosinophil % 09/12/2022 4.0  0.0 - 8.0 % Final    Basophil % 09/12/2022 0.5  0.0 - 1.9 % Final    Differential Method 09/12/2022 Automated   Final    Sodium 09/12/2022 137  136 - 145 mmol/L Final    Potassium 09/12/2022 3.9  3.5 - 5.1 mmol/L Final    Chloride 09/12/2022 100  95 - 110 mmol/L Final    CO2 09/12/2022 30 (H)  23 - 29 mmol/L Final    Glucose 09/12/2022 108  70 - 110 mg/dL Final    BUN 09/12/2022 13  8 - 23 mg/dL Final    Creatinine 09/12/2022 0.9  0.5 - 1.4 mg/dL Final    Calcium 09/12/2022 9.3  8.7 - 10.5 mg/dL Final    Total Protein 09/12/2022 7.9  6.0 - 8.4 g/dL Final    Albumin 09/12/2022 4.1  3.5 - 5.2 g/dL Final    Total Bilirubin 09/12/2022 0.6  0.1 - 1.0 mg/dL Final    Alkaline Phosphatase 09/12/2022 102  55 - 135 U/L Final    AST 09/12/2022 50 (H)  10 - 40 U/L Final    ALT 09/12/2022 48 (H)  10 - 44 U/L Final    Anion Gap 09/12/2022 7 (L)  8 - 16 mmol/L Final    eGFR 09/12/2022 >60.0  >60 mL/min/1.73 m^2 Final   Lab Visit on 09/06/2022   Component Date Value Ref Range Status    WBC 09/06/2022 4.12  3.90 - 12.70 K/uL Final    RBC 09/06/2022 3.95 (L)  4.00 - 5.40 M/uL Final    Hemoglobin 09/06/2022 12.2  12.0 - 16.0 g/dL Final    Hematocrit 09/06/2022 35.2 (L)  37.0 - 48.5 % Final    MCV 09/06/2022 89  82 - 98 fL Final    MCH 09/06/2022 30.9  27.0 -  31.0 pg Final    MCHC 09/06/2022 34.7  32.0 - 36.0 g/dL Final    RDW 09/06/2022 12.2  11.5 - 14.5 % Final    Platelets 09/06/2022 202  150 - 450 K/uL Final    MPV 09/06/2022 8.9 (L)  9.2 - 12.9 fL Final    Immature Granulocytes 09/06/2022 0.2  0.0 - 0.5 % Final    Gran # (ANC) 09/06/2022 2.1  1.8 - 7.7 K/uL Final    Immature Grans (Abs) 09/06/2022 0.01  0.00 - 0.04 K/uL Final    Lymph # 09/06/2022 1.0  1.0 - 4.8 K/uL Final    Mono # 09/06/2022 0.7  0.3 - 1.0 K/uL Final    Eos # 09/06/2022 0.4  0.0 - 0.5 K/uL Final    Baso # 09/06/2022 0.04  0.00 - 0.20 K/uL Final    nRBC 09/06/2022 0  0 /100 WBC Final    Gran % 09/06/2022 50.3  38.0 - 73.0 % Final    Lymph % 09/06/2022 23.3  18.0 - 48.0 % Final    Mono % 09/06/2022 16.0 (H)  4.0 - 15.0 % Final    Eosinophil % 09/06/2022 9.2 (H)  0.0 - 8.0 % Final    Basophil % 09/06/2022 1.0  0.0 - 1.9 % Final    Differential Method 09/06/2022 Automated   Final    Sodium 09/06/2022 134 (L)  136 - 145 mmol/L Final    Potassium 09/06/2022 3.9  3.5 - 5.1 mmol/L Final    Chloride 09/06/2022 97  95 - 110 mmol/L Final    CO2 09/06/2022 30 (H)  23 - 29 mmol/L Final    Glucose 09/06/2022 81  70 - 110 mg/dL Final    BUN 09/06/2022 14  8 - 23 mg/dL Final    Creatinine 09/06/2022 0.9  0.5 - 1.4 mg/dL Final    Calcium 09/06/2022 9.5  8.7 - 10.5 mg/dL Final    Total Protein 09/06/2022 8.3  6.0 - 8.4 g/dL Final    Albumin 09/06/2022 4.3  3.5 - 5.2 g/dL Final    Total Bilirubin 09/06/2022 0.6  0.1 - 1.0 mg/dL Final    Alkaline Phosphatase 09/06/2022 102  55 - 135 U/L Final    AST 09/06/2022 39  10 - 40 U/L Final    ALT 09/06/2022 39  10 - 44 U/L Final    Anion Gap 09/06/2022 7 (L)  8 - 16 mmol/L Final    eGFR 09/06/2022 >60.0  >60 mL/min/1.73 m^2 Final   Lab Visit on 09/06/2022   Component Date Value Ref Range Status    Specimen UA 09/06/2022 Urine, Clean Catch   Final    Color, UA 09/06/2022 Yellow  Yellow, Straw, Izabela Final    Appearance, UA 09/06/2022 Clear  Clear Final    pH, UA 09/06/2022  6.0  5.0 - 8.0 Final    Specific Gravity, UA 09/06/2022 1.015  1.005 - 1.030 Final    Protein, UA 09/06/2022 Negative  Negative Final    Glucose, UA 09/06/2022 Negative  Negative Final    Ketones, UA 09/06/2022 Negative  Negative Final    Bilirubin (UA) 09/06/2022 Negative  Negative Final    Occult Blood UA 09/06/2022 1+ (A)  Negative Final    Nitrite, UA 09/06/2022 Negative  Negative Final    Urobilinogen, UA 09/06/2022 Negative  Negative EU/dL Final    Leukocytes, UA 09/06/2022 Trace (A)  Negative Final    RBC, UA 09/06/2022 6 (H)  0 - 4 /hpf Final    WBC, UA 09/06/2022 4  0 - 5 /hpf Final    Bacteria 09/06/2022 Negative  None-Occ /hpf Final    Squam Epithel, UA 09/06/2022 3  /hpf Final    Hyaline Casts, UA 09/06/2022 6 (A)  0-1/lpf /lpf Final    Microscopic Comment 09/06/2022 SEE COMMENT   Final   Lab Visit on 08/29/2022   Component Date Value Ref Range Status    WBC 08/29/2022 4.96  3.90 - 12.70 K/uL Final    RBC 08/29/2022 3.84 (L)  4.00 - 5.40 M/uL Final    Hemoglobin 08/29/2022 11.9 (L)  12.0 - 16.0 g/dL Final    Hematocrit 08/29/2022 34.2 (L)  37.0 - 48.5 % Final    MCV 08/29/2022 89  82 - 98 fL Final    MCH 08/29/2022 31.0  27.0 - 31.0 pg Final    MCHC 08/29/2022 34.8  32.0 - 36.0 g/dL Final    RDW 08/29/2022 11.6  11.5 - 14.5 % Final    Platelets 08/29/2022 278  150 - 450 K/uL Final    MPV 08/29/2022 8.7 (L)  9.2 - 12.9 fL Final    Immature Granulocytes 08/29/2022 0.2  0.0 - 0.5 % Final    Gran # (ANC) 08/29/2022 2.2  1.8 - 7.7 K/uL Final    Immature Grans (Abs) 08/29/2022 0.01  0.00 - 0.04 K/uL Final    Lymph # 08/29/2022 1.4  1.0 - 4.8 K/uL Final    Mono # 08/29/2022 0.9  0.3 - 1.0 K/uL Final    Eos # 08/29/2022 0.5  0.0 - 0.5 K/uL Final    Baso # 08/29/2022 0.06  0.00 - 0.20 K/uL Final    nRBC 08/29/2022 0  0 /100 WBC Final    Gran % 08/29/2022 44.0  38.0 - 73.0 % Final    Lymph % 08/29/2022 28.2  18.0 - 48.0 % Final    Mono % 08/29/2022 17.1 (H)  4.0 - 15.0 % Final    Eosinophil % 08/29/2022 9.3 (H)   0.0 - 8.0 % Final    Basophil % 08/29/2022 1.2  0.0 - 1.9 % Final    Differential Method 08/29/2022 Automated   Final    Sodium 08/29/2022 135 (L)  136 - 145 mmol/L Final    Potassium 08/29/2022 3.9  3.5 - 5.1 mmol/L Final    Chloride 08/29/2022 98  95 - 110 mmol/L Final    CO2 08/29/2022 28  23 - 29 mmol/L Final    Glucose 08/29/2022 109  70 - 110 mg/dL Final    BUN 08/29/2022 16  8 - 23 mg/dL Final    Creatinine 08/29/2022 0.8  0.5 - 1.4 mg/dL Final    Calcium 08/29/2022 9.6  8.7 - 10.5 mg/dL Final    Total Protein 08/29/2022 8.5 (H)  6.0 - 8.4 g/dL Final    Albumin 08/29/2022 4.3  3.5 - 5.2 g/dL Final    Total Bilirubin 08/29/2022 0.7  0.1 - 1.0 mg/dL Final    Alkaline Phosphatase 08/29/2022 95  55 - 135 U/L Final    AST 08/29/2022 47 (H)  10 - 40 U/L Final    ALT 08/29/2022 45 (H)  10 - 44 U/L Final    Anion Gap 08/29/2022 9  8 - 16 mmol/L Final    eGFR 08/29/2022 >60.0  >60 mL/min/1.73 m^2 Final   Infusion on 08/12/2022   Component Date Value Ref Range Status    Specimen UA 08/12/2022 Urine, Clean Catch   Final    Color, UA 08/12/2022 Yellow  Yellow, Straw, [place] Final    Appearance, UA 08/12/2022 Clear  Clear Final    pH, UA 08/12/2022 6.0  5.0 - 8.0 Final    Specific Gravity, UA 08/12/2022 1.020  1.005 - 1.030 Final    Protein, UA 08/12/2022 Trace (A)  Negative Final    Glucose, UA 08/12/2022 Negative  Negative Final    Ketones, UA 08/12/2022 Negative  Negative Final    Bilirubin (UA) 08/12/2022 Negative  Negative Final    Occult Blood UA 08/12/2022 Negative  Negative Final    Nitrite, UA 08/12/2022 Negative  Negative Final    Urobilinogen, UA 08/12/2022 Negative  Negative EU/dL Final    Leukocytes, UA 08/12/2022 3+ (A)  Negative Final    Sodium 08/12/2022 137  136 - 145 mmol/L Final    Potassium 08/12/2022 3.9  3.5 - 5.1 mmol/L Final    Chloride 08/12/2022 102  95 - 110 mmol/L Final    CO2 08/12/2022 26  23 - 29 mmol/L Final    Glucose 08/12/2022 89  70 - 110 mg/dL Final    BUN 08/12/2022 10  8 - 23 mg/dL  Final    Creatinine 08/12/2022 0.8  0.5 - 1.4 mg/dL Final    Calcium 08/12/2022 9.5  8.7 - 10.5 mg/dL Final    Total Protein 08/12/2022 8.2  6.0 - 8.4 g/dL Final    Albumin 08/12/2022 4.2  3.5 - 5.2 g/dL Final    Total Bilirubin 08/12/2022 0.5  0.1 - 1.0 mg/dL Final    Alkaline Phosphatase 08/12/2022 79  55 - 135 U/L Final    AST 08/12/2022 30  10 - 40 U/L Final    ALT 08/12/2022 28  10 - 44 U/L Final    Anion Gap 08/12/2022 9  8 - 16 mmol/L Final    eGFR 08/12/2022 >60.0  >60 mL/min/1.73 m^2 Final    WBC 08/12/2022 4.69  3.90 - 12.70 K/uL Final    RBC 08/12/2022 3.41 (L)  4.00 - 5.40 M/uL Final    Hemoglobin 08/12/2022 10.6 (L)  12.0 - 16.0 g/dL Final    Hematocrit 08/12/2022 31.8 (L)  37.0 - 48.5 % Final    MCV 08/12/2022 93  82 - 98 fL Final    MCH 08/12/2022 31.1 (H)  27.0 - 31.0 pg Final    MCHC 08/12/2022 33.3  32.0 - 36.0 g/dL Final    RDW 08/12/2022 11.7  11.5 - 14.5 % Final    Platelets 08/12/2022 263  150 - 450 K/uL Final    MPV 08/12/2022 9.6  9.2 - 12.9 fL Final    Immature Granulocytes 08/12/2022 0.2  0.0 - 0.5 % Final    Gran # (ANC) 08/12/2022 2.7  1.8 - 7.7 K/uL Final    Immature Grans (Abs) 08/12/2022 0.01  0.00 - 0.04 K/uL Final    Lymph # 08/12/2022 0.9 (L)  1.0 - 4.8 K/uL Final    Mono # 08/12/2022 0.8  0.3 - 1.0 K/uL Final    Eos # 08/12/2022 0.2  0.0 - 0.5 K/uL Final    Baso # 08/12/2022 0.02  0.00 - 0.20 K/uL Final    nRBC 08/12/2022 0  0 /100 WBC Final    Gran % 08/12/2022 57.2  38.0 - 73.0 % Final    Lymph % 08/12/2022 19.8  18.0 - 48.0 % Final    Mono % 08/12/2022 17.3 (H)  4.0 - 15.0 % Final    Eosinophil % 08/12/2022 5.1  0.0 - 8.0 % Final    Basophil % 08/12/2022 0.4  0.0 - 1.9 % Final    Differential Method 08/12/2022 Automated   Final    RBC, UA 08/12/2022 13 (H)  0 - 4 /hpf Final    WBC, UA 08/12/2022 17 (H)  0 - 5 /hpf Final    Bacteria 08/12/2022 Negative  None-Occ /hpf Final    Squam Epithel, UA 08/12/2022 13  /hpf Final    Hyaline Casts, UA 08/12/2022 23 (A)  0-1/lpf /lpf Final     Microscopic Comment 08/12/2022 SEE COMMENT   Final   There may be more visits with results that are not included.       Past Medical History:   Diagnosis Date    Cancer     neck    HA (headache)     Hypothyroidism      Past Surgical History:   Procedure Laterality Date    INSERTION OF TUNNELED CENTRAL VENOUS CATHETER (CVC) WITH SUBCUTANEOUS PORT Right 4/4/2022    Procedure: [id number];  Surgeon: Charles Servin III, MD;  Location: [facility];  Service: General;  Laterality: Right;    left temporal bone resection  2020    MODIFIED RADICAL NECK DISSECTION Left 2020    parotidectomy Left 2020     Family History   Problem Relation Age of Onset    Breast cancer Mother     Breast cancer Maternal Aunt        Marital Status:   Alcohol History:  reports no history of alcohol use.  Tobacco History:  reports that she has never smoked. She has never used smokeless tobacco.  Drug History:  reports no history of drug use.    Review of patient's allergies indicates:   Allergen Reactions    Pcn [penicillins] Swelling    Codeine Nausea And Vomiting       Current Outpatient Medications:     ALPRAZolam (XANAX) 0.5 MG tablet, One tab 1 hour prior to MRI may repeat as needed., Disp: 3 tablet, Rfl: 0    amLODIPine (NORVASC) 5 MG tablet, Take 1 tablet (5 mg total) by mouth once daily., Disp: 30 tablet, Rfl: 3    ascorbic acid, vitamin C, (VITAMIN C) 1000 MG tablet, Take 1,000 mg by mouth once daily., Disp: , Rfl:     butalbital-acetaminophen-caffeine -40 mg (FIORICET, ESGIC) -40 mg per tablet, Take 1 tablet by mouth every 4 (four) hours as needed for Pain., Disp: 30 tablet, Rfl: 2    cholecalciferol, vitamin D3, 125 mcg (5,000 unit) Tab, Take 5,000 Units by mouth once daily., Disp: , Rfl:     dexAMETHasone (DECADRON) 4 MG Tab, Take 1 tablet (4 mg total) by mouth every 8 (eight) hours., Disp: 90 tablet, Rfl: 0    duke's soln (benadryl 30 mL, mylanta 30 mL, LIDOcaine 30 mL, nystatin 30 mL) 120mL, Take 5 mLs by  mouth 4 (four) times daily as needed. Swish and spit, Disp: 120 mL, Rfl: 0    fluticasone propionate (FLONASE) 50 mcg/actuation nasal spray, 2 sprays (100 mcg total) by Each Nostril route once daily., Disp: 18.2 mL, Rfl: 1    lenvatinib 4 mg Cap, Take 16 mg by mouth once daily at 6am., Disp: 120 capsule, Rfl: 3    levothyroxine (SYNTHROID) 88 MCG tablet, Take 1 tablet (88 mcg total) by mouth once daily., Disp: 90 tablet, Rfl: 1    multivit-iron-min-folic acid 18-0.4 mg Tab, Take 1 tablet by mouth once daily. , Disp: , Rfl:     omega-3 fatty acids/fish oil (FISH OIL-OMEGA-3 FATTY ACIDS) 300-1,000 mg capsule, Take 1 capsule by mouth once daily., Disp: , Rfl:     oxyCODONE-acetaminophen (PERCOCET)  mg per tablet, Take 1 tablet by mouth every 6 (six) hours as needed for Pain., Disp: 28 tablet, Rfl: 0    ZINC ACETATE ORAL, Take 1 capsule by mouth once daily. , Disp: , Rfl:     Current Facility-Administered Medications:     0.9%  NaCl infusion, , Intravenous, Once, Gorge Sagastume MD, Stopped at 05/04/22 1321    methylPREDNISolone sodium succinate injection 40 mg, 40 mg, Intravenous, Once PRN, Rj Pompa MD    Review of Systems   Constitutional:  Positive for appetite change, fatigue and unexpected weight change (wt down 10lbs in past 2 months). Negative for chills and fever.   HENT:  Negative for ear discharge, ear pain, sore throat and trouble swallowing.    Eyes:  Negative for visual disturbance.   Respiratory:  Negative for cough and shortness of breath.    Cardiovascular:  Negative for chest pain, palpitations and leg swelling.   Gastrointestinal:  Negative for abdominal pain, nausea and vomiting.   Genitourinary:  Negative for dysuria, flank pain and hematuria.   Skin:  Negative for rash.   Neurological:  Positive for headaches (improved, c/o's pressure around left ear/mastoid). Negative for dizziness, weakness and numbness.   Hematological:  Negative for adenopathy.   Psychiatric/Behavioral:   "Positive for hallucinations (none since discharge). Negative for dysphoric mood. The patient is not nervous/anxious.       Objective:      Vitals:    09/28/22 1403   BP: 134/72   Pulse: 77   SpO2: 98%   Weight: 52.2 kg (115 lb)   Height: 4' 11" (1.499 m)     Physical Exam  Constitutional:       General: She is not in acute distress.     Appearance: Normal appearance. She is ill-appearing.      Comments: Chronically ill appearing WF   HENT:      Head: Normocephalic and atraumatic.      Ears:      Comments: Thick black scab behind left ear crease, no drainage, no ulceration. Radiation changes post auricular/left neck area  Neck:      Vascular: No carotid bruit.   Cardiovascular:      Rate and Rhythm: Normal rate and regular rhythm.      Heart sounds: No murmur heard.  Pulmonary:      Effort: Pulmonary effort is normal. No respiratory distress.      Breath sounds: Normal breath sounds.   Abdominal:      Palpations: Abdomen is soft.      Tenderness: There is no abdominal tenderness.   Musculoskeletal:      Cervical back: No bony tenderness.      Right lower leg: No edema.      Left lower leg: No edema.   Lymphadenopathy:      Cervical: No cervical adenopathy.   Skin:     General: Skin is warm and dry.   Neurological:      General: No focal deficit present.      Mental Status: She is alert and oriented to person, place, and time.      Motor: No weakness.      Gait: Gait normal.      Comments: Appears more lethargic, answers questions when asked otherwise doesn't really talk/interact  Gait stable though  helps guide her by her elbow while walking       Assessment:       1. Altered mental status, unspecified altered mental status type    2. Malignant neoplasm of major salivary gland    3. Metastatic malignant neoplasm, unspecified site    4. Acquired hypothyroidism         Plan:       Altered mental status, unspecified altered mental status type  Comments:  clinically improved, now on decadron and off medical " marijuana-  cautioned to report any worsening    Malignant neoplasm of major salivary gland  Comments:  plans to restart lenvima next week, f/u with Dr. Coombs as scheduled    Metastatic malignant neoplasm, unspecified site    Acquired hypothyroidism  Comments:  stable on labs    Follow up if symptoms worsen or fail to improve, for as scheduled.

## 2022-10-11 NOTE — PROGRESS NOTES
PROGRESS NOTE    Subjective:       Patient ID: Antonieta Clay is a 61 y.o. female.    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 Postoperative changes in the left side of the neck with slightly  increased FDG activity from background.  2. No FDG avid lymphadenopathy or finding of additional distant sites  of FDG avid disease.    10/20/2020-12/4/2020  Radiation therapy.    3/11/2021:  MRI Neck: no evidence of recurrence.    MRI Brain: no acute abnormalities.    12/3/2021:  CT chest: new ground glass, RUL, infectious vs met. 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    Cytoxan/Kwasi/Cisplatin:  Cycle 1: 4/5/2022  Cycle 2: 4/26/2022  Cycle 3: 5/17/2022 5/31/2022:  CT neck and chest:  Mastoid lesion ? Less prominent.  No other significant changes or new growth.     8/23/2022:  Lenvatinib Start:    Chief Complaint:  No chief complaint on file.  Parotid cancer follow up    History of Present Illness:   Antonieta Clay is a 61 y.o. female who presents for follow up of above.      Ms. Clay is doing much better now.  Walking and more active.  Much less dizziness.    She is now on Lenvmia 8mg daily as of today, 10/12/2022.  BP at home under much better control.       Family and Social history reviewed and is unchanged from 9/21/2020      ROS:  Review of Systems   Constitutional:  Negative for appetite change, fever and unexpected weight change.   HENT:  Negative for mouth sores.    Eyes:  Negative for visual disturbance.   Respiratory:  Negative for cough and shortness of breath.    Cardiovascular:  Negative for chest pain and leg swelling.   Gastrointestinal:  Negative for abdominal pain, blood in stool and diarrhea.    Genitourinary:  Negative for frequency and hematuria.   Musculoskeletal:  Negative for back pain.   Skin:  Negative for rash.   Neurological:  Negative for headaches.   Hematological:  Negative for adenopathy.   Psychiatric/Behavioral:  The patient is not nervous/anxious.         Current Outpatient Medications:     amLODIPine (NORVASC) 5 MG tablet, Take 1 tablet (5 mg total) by mouth once daily., Disp: 30 tablet, Rfl: 3    ascorbic acid, vitamin C, (VITAMIN C) 1000 MG tablet, Take 1,000 mg by mouth once daily., Disp: , Rfl:     cholecalciferol, vitamin D3, 125 mcg (5,000 unit) Tab, Take 5,000 Units by mouth once daily., Disp: , Rfl:     dexAMETHasone (DECADRON) 4 MG Tab, Take 1 tablet (4 mg total) by mouth every 8 (eight) hours., Disp: 90 tablet, Rfl: 0    duke's soln (benadryl 30 mL, mylanta 30 mL, LIDOcaine 30 mL, nystatin 30 mL) 120mL, Take 5 mLs by mouth 4 (four) times daily as needed. Swish and spit, Disp: 120 mL, Rfl: 0    fluticasone propionate (FLONASE) 50 mcg/actuation nasal spray, 2 sprays (100 mcg total) by Each Nostril route once daily., Disp: 18.2 mL, Rfl: 1    lenvatinib 4 mg Cap, Take 16 mg by mouth once daily at 6am., Disp: 120 capsule, Rfl: 3    levothyroxine (SYNTHROID) 88 MCG tablet, Take 1 tablet (88 mcg total) by mouth once daily., Disp: 90 tablet, Rfl: 1    multivit-iron-min-folic acid 18-0.4 mg Tab, Take 1 tablet by mouth once daily. , Disp: , Rfl:     omega-3 fatty acids/fish oil (FISH OIL-OMEGA-3 FATTY ACIDS) 300-1,000 mg capsule, Take 1 capsule by mouth once daily., Disp: , Rfl:     oxyCODONE-acetaminophen (PERCOCET)  mg per tablet, Take 1 tablet by mouth every 6 (six) hours as needed for Pain., Disp: 28 tablet, Rfl: 0    ZINC ACETATE ORAL, Take 1 capsule by mouth once daily. , Disp: , Rfl:     ALPRAZolam (XANAX) 0.5 MG tablet, One tab 1 hour prior to MRI may repeat as needed., Disp: 3 tablet, Rfl: 0    Current Facility-Administered Medications:     0.9%  NaCl infusion, ,  "Intravenous, Once, Gorge Sagastume MD, Stopped at 05/04/22 1321    methylPREDNISolone sodium succinate injection 40 mg, 40 mg, Intravenous, Once PRN, Rj Pompa MD        Objective:       Physical Examination:     BP (!) 168/82   Pulse 61   Temp 98 °F (36.7 °C)   Resp 18   Ht 4' 11" (1.499 m)   Wt 50.8 kg (112 lb)   BMI 22.62 kg/m²     Physical Exam  Constitutional:       Appearance: She is well-developed.   HENT:      Head: Normocephalic and atraumatic.      Right Ear: External ear normal.      Left Ear: External ear normal.      Mouth/Throat:      Mouth: Mucous membranes are moist.      Pharynx: Oropharynx is clear. No oropharyngeal exudate or posterior oropharyngeal erythema.      Comments: No visible or palpable oral lesions.   Eyes:      Conjunctiva/sclera: Conjunctivae normal.      Pupils: Pupils are equal, round, and reactive to light.   Neck:      Thyroid: No thyromegaly.      Trachea: No tracheal deviation.   Cardiovascular:      Rate and Rhythm: Normal rate and regular rhythm.      Heart sounds: Normal heart sounds.   Pulmonary:      Effort: Pulmonary effort is normal.      Breath sounds: Normal breath sounds.   Abdominal:      General: Bowel sounds are normal. There is no distension.      Palpations: Abdomen is soft. There is no mass.      Tenderness: There is no abdominal tenderness.   Lymphadenopathy:      Head:      Right side of head: No submental, submandibular, tonsillar or preauricular adenopathy.      Left side of head: No submental, submandibular, tonsillar or preauricular adenopathy.      Cervical: No cervical adenopathy.      Upper Body:      Right upper body: No supraclavicular or axillary adenopathy.      Left upper body: No supraclavicular or axillary adenopathy.   Skin:     Findings: No rash.   Neurological:      Comments: Neuro intact througout   Psychiatric:         Behavior: Behavior normal.         Thought Content: Thought content normal.         Judgment: Judgment " normal.       Labs:   Recent Results (from the past 336 hour(s))   CBC Auto Differential    Collection Time: 10/10/22 10:00 AM   Result Value Ref Range    WBC 8.77 3.90 - 12.70 K/uL    Hemoglobin 13.0 12.0 - 16.0 g/dL    Hematocrit 37.1 37.0 - 48.5 %    Platelets 239 150 - 450 K/uL   CBC Auto Differential    Collection Time: 10/03/22  9:52 AM   Result Value Ref Range    WBC 13.46 (H) 3.90 - 12.70 K/uL    Hemoglobin 13.0 12.0 - 16.0 g/dL    Hematocrit 37.6 37.0 - 48.5 %    Platelets 296 150 - 450 K/uL     CMP  Sodium   Date Value Ref Range Status   10/10/2022 135 (L) 136 - 145 mmol/L Final     Potassium   Date Value Ref Range Status   10/10/2022 4.0 3.5 - 5.1 mmol/L Final     Chloride   Date Value Ref Range Status   10/10/2022 98 95 - 110 mmol/L Final     CO2   Date Value Ref Range Status   10/10/2022 27 23 - 29 mmol/L Final     Glucose   Date Value Ref Range Status   10/10/2022 122 (H) 70 - 110 mg/dL Final     BUN   Date Value Ref Range Status   10/10/2022 26 (H) 8 - 23 mg/dL Final     Creatinine   Date Value Ref Range Status   10/10/2022 0.8 0.5 - 1.4 mg/dL Final     Calcium   Date Value Ref Range Status   10/10/2022 9.1 8.7 - 10.5 mg/dL Final     Total Protein   Date Value Ref Range Status   10/10/2022 7.1 6.0 - 8.4 g/dL Final     Albumin   Date Value Ref Range Status   10/10/2022 3.6 3.5 - 5.2 g/dL Final     Total Bilirubin   Date Value Ref Range Status   10/10/2022 0.8 0.1 - 1.0 mg/dL Final     Comment:     For infants and newborns, interpretation of results should be based  on gestational age, weight and in agreement with clinical  observations.    Premature Infant recommended reference ranges:  Up to 24 hours.............<8.0 mg/dL  Up to 48 hours............<12.0 mg/dL  3-5 days..................<15.0 mg/dL  6-29 days.................<15.0 mg/dL       Alkaline Phosphatase   Date Value Ref Range Status   10/10/2022 62 55 - 135 U/L Final     AST   Date Value Ref Range Status   10/10/2022 25 10 - 40 U/L Final      ALT   Date Value Ref Range Status   10/10/2022 42 10 - 44 U/L Final     Anion Gap   Date Value Ref Range Status   10/10/2022 10 8 - 16 mmol/L Final     eGFR if    Date Value Ref Range Status   06/13/2022 >60.0 >60 mL/min/1.73 m^2 Final     eGFR if non    Date Value Ref Range Status   06/13/2022 >60.0 >60 mL/min/1.73 m^2 Final     Comment:     Calculation used to obtain the estimated glomerular filtration  rate (eGFR) is the CKD-EPI equation.        No results found for: CEA  No results found for: PSA        Assessment/Plan:     Problem List Items Addressed This Visit       Malignant neoplasm of major salivary gland     Patient is doing much better now on the 8mg of Lenvima and she feels better than she has in quite some time.  Her CSF was largely negative and no cancer was seen on cytology.  Will keep the Javi at 8mg for now but consider advancing to 12mg if she is still doing well in 2 weeks.  If she is doing well then will continue therapy and plan scans for about 6 to 8 weeks from now.      She will go up to 12mg on 11/1/2022 if doing well and I will see her mid-November to see how she is doing.     Will also wean steroids at next visit.  Continue for now         Hallucinations     These have resolved.  Her  discussed that she was on CBD oil prior to this happening and that these began after being off of this for 2 days.  Never resumed.  Not sure if this was causative or not.          Cancer associated pain     She is doing much better at this time.  Continue current care approach.              Discussion:     Follow up in about 5 weeks (around 11/17/2022).        Phase II Study of Lenvatinib in Patients With Progressive, Recurrent or Metastatic Adenoid Cystic Carcinoma.  YUN  Tchebarry MOY, Mohamud EJ, Poli L, Bee C, Reinaldo S, Carlos N, Marilyn CR, Stephanie I, Emily SS, Sumanth DG, Ho AL   SO  J Clin Oncol. 2019;37(18):1529. Epub 2019 Apr 2.      PURPOSERecurrent or  metastatic adenoid cystic carcinoma (R/M ACC) is a malignant neoplasm of predominantly salivary gland origin for which effective therapies are lacking. We conducted a phase II trial evaluating the multitargeted tyrosine kinase inhibitor lenvatinib in patients with R/M ACC.    PATIENTS AND METHODSThis study was conducted with a two-stage minimax design. Patients with histologically confirmed R/M ACC of any primary site with radiographic and/or symptomatic progression were eligible. Any prior therapy was allowed except previous lenvatinib. Patients received lenvatinib 24 mg orally per day. The primary end point was overall response rate. Secondary end points were progression-free survival and safety. An exploratory analysis of how MYB expression and genomic alterations relate to outcomes was conducted.    RESULTSThirty-three patients were enrolled; 32 were evaluable for the primary end point. Five patients (15.6%) had a confirmed partial response, 24 patients (75%) had stable disease, two patients (6.3%) discontinued treatment as a result of toxicity before the first scan, and one patient (3.1%) had progression of disease as best response. Median progression-free survival time was 17.5 months (95% CI, 7.2 months to not reached), although only eight progression events were observed. Patients otherwise were removed for toxicity (n = 5), as a result of withdrawal of consent (n = 9), or at the treating physician's discretion (n = 6). Twenty-three patients required at least one dose modification, and 18 of 32 patients discontinued lenvatinib for drug-related issues. The most common grade 3 or 4 adverse events were hypertension (n = 9; 28.1%) and oral pain (n = 3; 9.4%). Three grade 4 adverse events were observed (myocardial infarction, n = 1; posterior reversible encephalopathy syndrome, n = 1; and intracranial hemorrhage, n = 1).    CONCLUSIONThis trial met the prespecified overall response rate primary end point,  demonstrating antitumor activity with lenvatinib in R/M ACC patients. Toxicity was comparable to previous studies, requiring monitoring and management.  Electronically signed by Gorge Coombs

## 2022-10-12 NOTE — ASSESSMENT & PLAN NOTE
These have resolved.  Her  discussed that she was on CBD oil prior to this happening and that these began after being off of this for 2 days.  Never resumed.  Not sure if this was causative or not.

## 2022-10-12 NOTE — ASSESSMENT & PLAN NOTE
Patient is doing much better now on the 8mg of Lenvima and she feels better than she has in quite some time.  Her CSF was largely negative and no cancer was seen on cytology.  Will keep the Javi at 8mg for now but consider advancing to 12mg if she is still doing well in 2 weeks.  If she is doing well then will continue therapy and plan scans for about 6 to 8 weeks from now.      She will go up to 12mg on 11/1/2022 if doing well and I will see her mid-November to see how she is doing.     Will also wean steroids at next visit.  Continue for now

## 2022-10-24 NOTE — TELEPHONE ENCOUNTER
Spoke with the patients  and discussed her having increased leg weakness will consult home health for PT OT and weekly labs.

## 2022-10-25 NOTE — PROGRESS NOTES
PROGRESS NOTE    Subjective:       Patient ID: Antonieta Clay is a 62 y.o. female.    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 Postoperative changes in the left side of the neck with slightly  increased FDG activity from background.  2. No FDG avid lymphadenopathy or finding of additional distant sites  of FDG avid disease.    10/20/2020-12/4/2020  Radiation therapy.    3/11/2021:  MRI Neck: no evidence of recurrence.    MRI Brain: no acute abnormalities.    12/3/2021:  CT chest: new ground glass, RUL, infectious vs met. 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    Cytoxan/Kwasi/Cisplatin:  Cycle 1: 4/5/2022  Cycle 2: 4/26/2022  Cycle 3: 5/17/2022 5/31/2022:  CT neck and chest:  Mastoid lesion ? Less prominent.  No other significant changes or new growth.     8/23/2022:  Lenvatinib Start:    Chief Complaint:  Parotid cancer follow up    History of Present Illness:   Antonieta Clay is a 62 y.o. female who presents for follow up of above.      Ms. Clay is doing ok today. She is now on Lenvmia 8mg daily as of today, 10/26/2022.  BP still running 150/90's.    She is starting PT/OT with HH this week for leg weakness and unsteady gait.     She is also complains of mouth soreness and decrease appetite. She has had a 13lb weight loss in 6 weeks.    Family and Social history reviewed and is unchanged from 9/21/2020      ROS:  Review of Systems   Constitutional:  Positive for appetite change, fatigue and unexpected weight change. Negative for fever.   HENT:  Negative for mouth sores.    Eyes:  Negative for visual disturbance.   Respiratory:  Negative for cough and shortness of breath.    Cardiovascular:  Negative for  chest pain and leg swelling.   Gastrointestinal:  Negative for abdominal pain, blood in stool and diarrhea.   Genitourinary:  Negative for frequency and hematuria.   Musculoskeletal:  Negative for back pain.   Skin:  Negative for rash.   Neurological:  Positive for weakness. Negative for headaches.   Hematological:  Negative for adenopathy.   Psychiatric/Behavioral:  The patient is not nervous/anxious.         Current Outpatient Medications:     amLODIPine (NORVASC) 5 MG tablet, Take 1 tablet (5 mg total) by mouth once daily., Disp: 30 tablet, Rfl: 3    ascorbic acid, vitamin C, (VITAMIN C) 1000 MG tablet, Take 1,000 mg by mouth once daily., Disp: , Rfl:     cholecalciferol, vitamin D3, 125 mcg (5,000 unit) Tab, Take 5,000 Units by mouth once daily., Disp: , Rfl:     duke's soln (benadryl 30 mL, mylanta 30 mL, LIDOcaine 30 mL, nystatin 30 mL) 120mL, Take 5 mLs by mouth 4 (four) times daily as needed. Swish and spit, Disp: 120 mL, Rfl: 0    fluticasone propionate (FLONASE) 50 mcg/actuation nasal spray, 2 sprays (100 mcg total) by Each Nostril route once daily., Disp: 18.2 mL, Rfl: 1    lenvatinib 4 mg Cap, Take 16 mg by mouth once daily at 6am., Disp: 120 capsule, Rfl: 3    levothyroxine (SYNTHROID) 88 MCG tablet, Take 1 tablet (88 mcg total) by mouth once daily., Disp: 90 tablet, Rfl: 1    megestroL (MEGACE) 400 mg/10 mL (40 mg/mL) Susp, Take 10 mLs (400 mg total) by mouth 2 (two) times daily., Disp: 600 mL, Rfl: 11    multivit-iron-min-folic acid 18-0.4 mg Tab, Take 1 tablet by mouth once daily. , Disp: , Rfl:     nystatin (MYCOSTATIN) 100,000 unit/mL suspension, Take 4 mLs (400,000 Units total) by mouth 4 (four) times daily. for 10 days, Disp: 160 mL, Rfl: 0    omega-3 fatty acids/fish oil (FISH OIL-OMEGA-3 FATTY ACIDS) 300-1,000 mg capsule, Take 1 capsule by mouth once daily., Disp: , Rfl:     oxyCODONE-acetaminophen (PERCOCET)  mg per tablet, Take 1 tablet by mouth every 6 (six) hours as needed for Pain.,  Disp: 28 tablet, Rfl: 0    ZINC ACETATE ORAL, Take 1 capsule by mouth once daily. , Disp: , Rfl:     Current Facility-Administered Medications:     0.9%  NaCl infusion, , Intravenous, Once, Gorge Sagastume MD, Stopped at 05/04/22 1321    methylPREDNISolone sodium succinate injection 40 mg, 40 mg, Intravenous, Once PRN, Rj Pompa MD        Objective:       Physical Examination:     BP (!) 152/98   Pulse 85   Temp 97.3 °F (36.3 °C)   Wt 48.6 kg (107 lb 1.6 oz)   BMI 21.63 kg/m²     Physical Exam  Constitutional:       Appearance: Normal appearance. She is well-developed.   HENT:      Head: Normocephalic and atraumatic.      Right Ear: External ear normal.      Left Ear: External ear normal.      Mouth/Throat:      Mouth: Mucous membranes are moist.      Pharynx: Oropharynx is clear. No oropharyngeal exudate or posterior oropharyngeal erythema.      Comments: Thrush  Eyes:      Conjunctiva/sclera: Conjunctivae normal.      Pupils: Pupils are equal, round, and reactive to light.   Neck:      Thyroid: No thyromegaly.      Trachea: No tracheal deviation.   Cardiovascular:      Rate and Rhythm: Normal rate and regular rhythm.      Heart sounds: Normal heart sounds.   Pulmonary:      Effort: Pulmonary effort is normal.      Breath sounds: Normal breath sounds.   Abdominal:      General: Bowel sounds are normal. There is no distension.      Palpations: Abdomen is soft. There is no mass.      Tenderness: There is no abdominal tenderness.   Musculoskeletal:      Right lower leg: No edema.      Left lower leg: No edema.      Comments: Unsteady gait   Lymphadenopathy:      Head:      Right side of head: No submental, submandibular, tonsillar or preauricular adenopathy.      Left side of head: No submental, submandibular, tonsillar or preauricular adenopathy.      Cervical: No cervical adenopathy.      Upper Body:      Right upper body: No supraclavicular or axillary adenopathy.      Left upper body: No  supraclavicular or axillary adenopathy.   Skin:     General: Skin is warm and dry.      Findings: No rash.   Neurological:      General: No focal deficit present.      Mental Status: She is alert and oriented to person, place, and time.      Comments: Neuro intact througout   Psychiatric:         Mood and Affect: Mood normal.         Behavior: Behavior normal.         Thought Content: Thought content normal.         Judgment: Judgment normal.       Labs:   Recent Results (from the past 336 hour(s))   CBC Auto Differential    Collection Time: 10/24/22  1:35 PM   Result Value Ref Range    WBC 6.68 3.90 - 12.70 K/uL    Hemoglobin 13.3 12.0 - 16.0 g/dL    Hematocrit 37.5 37.0 - 48.5 %    Platelets 133 (L) 150 - 450 K/uL   CBC Auto Differential    Collection Time: 10/17/22 10:12 AM   Result Value Ref Range    WBC 8.15 3.90 - 12.70 K/uL    Hemoglobin 13.0 12.0 - 16.0 g/dL    Hematocrit 37.3 37.0 - 48.5 %    Platelets 192 150 - 450 K/uL     CMP  Sodium   Date Value Ref Range Status   10/24/2022 131 (L) 136 - 145 mmol/L Final     Potassium   Date Value Ref Range Status   10/24/2022 4.2 3.5 - 5.1 mmol/L Final     Chloride   Date Value Ref Range Status   10/24/2022 93 (L) 95 - 110 mmol/L Final     CO2   Date Value Ref Range Status   10/24/2022 24 23 - 29 mmol/L Final     Glucose   Date Value Ref Range Status   10/24/2022 180 (H) 70 - 110 mg/dL Final     BUN   Date Value Ref Range Status   10/24/2022 27 (H) 8 - 23 mg/dL Final     Creatinine   Date Value Ref Range Status   10/24/2022 0.6 0.5 - 1.4 mg/dL Final     Calcium   Date Value Ref Range Status   10/24/2022 8.7 8.7 - 10.5 mg/dL Final     Total Protein   Date Value Ref Range Status   10/24/2022 6.7 6.0 - 8.4 g/dL Final     Albumin   Date Value Ref Range Status   10/24/2022 3.4 (L) 3.5 - 5.2 g/dL Final     Total Bilirubin   Date Value Ref Range Status   10/24/2022 1.0 0.1 - 1.0 mg/dL Final     Comment:     For infants and newborns, interpretation of results should be  based  on gestational age, weight and in agreement with clinical  observations.    Premature Infant recommended reference ranges:  Up to 24 hours.............<8.0 mg/dL  Up to 48 hours............<12.0 mg/dL  3-5 days..................<15.0 mg/dL  6-29 days.................<15.0 mg/dL       Alkaline Phosphatase   Date Value Ref Range Status   10/24/2022 54 (L) 55 - 135 U/L Final     AST   Date Value Ref Range Status   10/24/2022 47 (H) 10 - 40 U/L Final     ALT   Date Value Ref Range Status   10/24/2022 96 (H) 10 - 44 U/L Final     Anion Gap   Date Value Ref Range Status   10/24/2022 14 8 - 16 mmol/L Final     eGFR if    Date Value Ref Range Status   06/13/2022 >60.0 >60 mL/min/1.73 m^2 Final     eGFR if non    Date Value Ref Range Status   06/13/2022 >60.0 >60 mL/min/1.73 m^2 Final     Comment:     Calculation used to obtain the estimated glomerular filtration  rate (eGFR) is the CKD-EPI equation.        No results found for: CEA  No results found for: PSA        Assessment/Plan:     Problem List Items Addressed This Visit          Oncology    Malignant neoplasm of major salivary gland     Other Visit Diagnoses       Anorexia    -  Primary    Relevant Medications    megestroL (MEGACE) 400 mg/10 mL (40 mg/mL) Susp    Thrush, oral        Relevant Medications    nystatin (MYCOSTATIN) 100,000 unit/mL suspension    Weakness of lower extremity, unspecified laterality        Hypertension, unspecified type              Parotid Gland Cancer- Continue Lenvima at 8 mg   Anorexia- Start Megace BID  Thrush- Nystatin QID  HTN- Start Amlodipine 2.5 mg  Leg Weakness- Continue PT/OT; Pediatric Walker    Discussion:     Follow up in about 3 weeks (around 11/16/2022) for with Dr. Sagastume and 6 weeks with me.    Electronically signed by Louise Harrington, MSN, APRN, AGNP-C, OCN    Phase II Study of Lenvatinib in Patients With Progressive, Recurrent or Metastatic Adenoid Cystic Carcinoma.  [place]  Tchekmedyian  V, Mohamud BURRIS, Poli L, Bee C, Reinaldo S, Carlos N, Marilyn CR, Ostrosebastian I, Emily SS, Sumanth DG, Ho AL   SO  J Clin Oncol. 2019;37(18):1529. Epub 2019 Apr 2.      PURPOSERecurrent or metastatic adenoid cystic carcinoma (R/M ACC) is a malignant neoplasm of predominantly salivary gland origin for which effective therapies are lacking. We conducted a phase II trial evaluating the multitargeted tyrosine kinase inhibitor lenvatinib in patients with R/M ACC.    PATIENTS AND METHODSThis study was conducted with a two-stage minimax design. Patients with histologically confirmed R/M ACC of any primary site with radiographic and/or symptomatic progression were eligible. Any prior therapy was allowed except previous lenvatinib. Patients received lenvatinib 24 mg orally per day. The primary end point was overall response rate. Secondary end points were progression-free survival and safety. An exploratory analysis of how MYB expression and genomic alterations relate to outcomes was conducted.    RESULTSThirty-three patients were enrolled; 32 were evaluable for the primary end point. Five patients (15.6%) had a confirmed partial response, 24 patients (75%) had stable disease, two patients (6.3%) discontinued treatment as a result of toxicity before the first scan, and one patient (3.1%) had progression of disease as best response. Median progression-free survival time was 17.5 months (95% CI, 7.2 months to not reached), although only eight progression events were observed. Patients otherwise were removed for toxicity (n = 5), as a result of withdrawal of consent (n = 9), or at the treating physician's discretion (n = 6). Twenty-three patients required at least one dose modification, and 18 of 32 patients discontinued lenvatinib for drug-related issues. The most common grade 3 or 4 adverse events were hypertension (n = 9; 28.1%) and oral pain (n = 3; 9.4%). Three grade 4 adverse events were observed (myocardial infarction, n =  1; posterior reversible encephalopathy syndrome, n = 1; and intracranial hemorrhage, n = 1).    CONCLUSIONThis trial met the prespecified overall response rate primary end point, demonstrating antitumor activity with lenvatinib in R/M ACC patients. Toxicity was comparable to previous studies, requiring monitoring and management.

## 2022-11-03 PROBLEM — R74.01 TRANSAMINITIS: Status: ACTIVE | Noted: 2022-01-01

## 2022-11-03 PROBLEM — E87.1 DEHYDRATION WITH HYPONATREMIA: Status: ACTIVE | Noted: 2021-01-29

## 2022-11-03 PROBLEM — R53.81 PHYSICAL DEBILITY: Status: ACTIVE | Noted: 2022-01-01

## 2022-11-03 PROBLEM — E44.0 MODERATE PROTEIN-CALORIE MALNUTRITION: Status: ACTIVE | Noted: 2022-01-01

## 2022-11-03 PROBLEM — E44.1 MILD PROTEIN-CALORIE MALNUTRITION: Status: ACTIVE | Noted: 2022-01-01

## 2022-11-03 NOTE — TELEPHONE ENCOUNTER
Spoke with pt's . Pt cannot walk on her own. She has been crying and barely eating or drinking anything. She is having trouble swallowing. Her urine is tea colored. She is feeling very weak and is sleeping all day. Per Louise,  was advised to take pt to the ER to get evaluated. He said that they are already there waiting.

## 2022-11-03 NOTE — ED NOTES
REPORTED PROGRESSIVE WEAKNESS OVER A TWO WEEK PERIOD.  PALE AND FRAIL. FAMILY STATES MOUTH RASH.  DIFFICULT FOR PT TO EAT, DRINK AND SPEAK.  HX SALIVARY CANCER. EXCORIATED AREAS AROUND AND IN MOUTH.  AIRWAY CLEAR. FALLS PRECAUTIONS.

## 2022-11-03 NOTE — ED PROVIDER NOTES
Encounter Date: 11/3/2022       History     Chief Complaint   Patient presents with    Weakness    Dehydration    Mouth Lesions     Pt sent in by home health for dehydration. Pt's  states she has not been eating or drinking and has thrush.     Patient presents emergency department with reported generalized weakness worsening fatigue dehydration unable to eat or drink without significant difficulty patient has a history of modified radical neck dissection secondary to salivary gland cancer she is currently being treated for thrush but the speech therapist thought she may be having mechanical difficulty swallowing she is concerned she may have a stricture  reports hoarseness he reports weight loss states her urine output has been severely diminished and urine is tea-colored there has been no noted fever chills no diarrhea      Review of patient's allergies indicates:   Allergen Reactions    Pcn [penicillins] Swelling    Codeine Nausea And Vomiting     Past Medical History:   Diagnosis Date    Cancer     neck    HA (headache)     Hypothyroidism      Past Surgical History:   Procedure Laterality Date    INSERTION OF TUNNELED CENTRAL VENOUS CATHETER (CVC) WITH SUBCUTANEOUS PORT Right 4/4/2022    Procedure: [id number];  Surgeon: Charles Servin III, MD;  Location: [facility];  Service: General;  Laterality: Right;    left temporal bone resection  2020    MODIFIED RADICAL NECK DISSECTION Left 2020    parotidectomy Left 2020     Family History   Problem Relation Age of Onset    Breast cancer Mother     Breast cancer Maternal Aunt      Social History     Tobacco Use    Smoking status: Never    Smokeless tobacco: Never   Substance Use Topics    Alcohol use: Never    Drug use: Never     Review of Systems   Constitutional:  Positive for activity change, appetite change, fatigue and unexpected weight change. Negative for diaphoresis and fever.   HENT:  Positive for mouth sores, trouble swallowing and  voice change. Negative for congestion.    Respiratory:  Negative for cough and shortness of breath.    Cardiovascular:  Negative for chest pain and leg swelling.   Gastrointestinal:  Positive for abdominal pain and nausea. Negative for constipation, diarrhea and vomiting.   Endocrine: Negative for polydipsia and polyuria.   Genitourinary:  Positive for decreased urine volume. Negative for dysuria and hematuria.   Neurological:  Negative for headaches.   All other systems reviewed and are negative.    Physical Exam     Initial Vitals [11/03/22 1241]   BP Pulse Resp Temp SpO2   (!) 114/91 (!) 111 20 97.5 °F (36.4 °C) 98 %      MAP       --         Physical Exam    Constitutional: She appears well-developed. She appears listless. She appears cachectic. She has a sickly appearance. She appears ill. She appears distressed.   HENT:   Head: Normocephalic and atraumatic.   Right Ear: External ear normal.   Left Ear: External ear normal.   Oral thrush mucosa dry sticky   Eyes: Conjunctivae and EOM are normal. Pupils are equal, round, and reactive to light.   Neck: Neck supple.   Normal range of motion.  Cardiovascular:  Regular rhythm, normal heart sounds and intact distal pulses.           Tachycardic   Pulmonary/Chest: Breath sounds normal. No respiratory distress. She has no rhonchi.   Abdominal: Abdomen is soft. Bowel sounds are normal. There is abdominal tenderness in the epigastric area and left upper quadrant.   Musculoskeletal:         General: No edema. Normal range of motion.      Cervical back: Normal range of motion and neck supple.     Neurological: She is oriented to person, place, and time. She has normal strength. She appears listless. GCS score is 15. GCS eye subscore is 4. GCS verbal subscore is 5. GCS motor subscore is 6.   Skin: Skin is warm and dry. Capillary refill takes less than 2 seconds. No rash noted. There is pallor.   Psychiatric: She has a normal mood and affect. Her behavior is normal.       ED  Course   Procedures  Labs Reviewed   CBC W/ AUTO DIFFERENTIAL - Abnormal; Notable for the following components:       Result Value    RDW 15.7 (*)     Platelets 71 (*)     Immature Granulocytes 1.6 (*)     Immature Grans (Abs) 0.10 (*)     All other components within normal limits   COMPREHENSIVE METABOLIC PANEL - Abnormal; Notable for the following components:    Sodium 133 (*)     CO2 22 (*)     Glucose 148 (*)     BUN 29 (*)     Albumin 3.3 (*)     Total Bilirubin 1.2 (*)     AST 51 (*)      (*)     All other components within normal limits   MAGNESIUM   PHOSPHORUS   URINALYSIS, REFLEX TO URINE CULTURE          Imaging Results              CT Abdomen Pelvis  Without Contrast (Final result)  Result time 11/03/22 16:58:58      Final result by Von Carlton MD (11/03/22 16:58:58)                   Narrative:    CMS MANDATED QUALITY DATA - CT RADIATION  436    All CT scans at this facility utilize dose modulation, iterative reconstruction, and/or weight based dosing when appropriate to reduce radiation dose to as low as reasonably achievable.    CT ABDOMEN PELVIS WITHOUT IV CONTRAST    CLINICAL HISTORY:  62 years Female Abdominal abscess/infection suspected    COMPARISON: PET/CT images through the abdomen and pelvis April 19, 2022    FINDINGS: Lung bases are clear. Bone window images show no acute or aggressive osseous abnormality.    On this noncontrast exam, no focal hepatic lesion. Gallbladder and biliary tree are unremarkable. Spleen appears normal. No evidence of pancreatitis. Duodenal diverticulum noted. No adrenal gland lesion. Left kidney is unremarkable. Right renal cyst measuring 4.2 cm. No renal calculi. No hydronephrosis. Ureters are normal in caliber. Urinary bladder is unremarkable.    Stomach is within normal limits. No evidence of small bowel obstruction. Suture material at the base of the cecum and nonvisualization of the appendix suggestive of prior appendectomy. No findings of  colitis.    No free fluid or free air within the abdomen or pelvis. Atherosclerotic calcification of the aorta. Uterus and adnexal regions are within normal limits.    IMPRESSION:    No noncontrast CT evidence of acute pathology involving the abdomen or pelvis.    Specifically, negative for abdominal abscess.    Aortic atherosclerosis, right renal cyst, and other incidental findings as above.    Electronically signed by:  Von Carlton MD  11/3/2022 4:58 PM CDT Workstation: [id number]                                     Medications   sodium chloride 0.9% bolus 1,000 mL (1,000 mLs Intravenous New Bag 11/3/22 1637)   ondansetron injection 4 mg (4 mg Intravenous Given 11/3/22 1635)   0.9%  NaCl infusion (1,000 mLs Intravenous New Bag 11/3/22 1636)     Medical Decision Making:   ED Management:  Laboratory evaluation reviewed CT scan of the abdomen pelvis showed no acute intra-abdominal abnormalities I have begun fluid resuscitation emergency department given patient's severe dysphagia for liquids and solids will admit for GI evaluation continued hydration                        Clinical Impression:   Final diagnoses:  [E86.0] Dehydration (Primary)  [K12.1] Stomatitis  [R13.10] Dysphagia, unspecified type      ED Disposition Condition    Admit Stable                  Lukasz Hardy MD  11/03/22 1908       Lukasz Hardy MD  11/03/22 1931

## 2022-11-04 PROBLEM — R62.7 ADULT FAILURE TO THRIVE: Status: ACTIVE | Noted: 2022-01-01

## 2022-11-04 NOTE — CONSULTS
[facility]  Adult Nutrition   Consult Note (Nutrition Support Management)    SUMMARY     Recommendations  Recommendation/Intervention:   1) TPN ordered to start at 1700. Run @ 60 mL/hr.   2) NST to monitor and manage TPN.   3) Electrolytes to trend towards normal limits/ranges.   4) RD will monitor daily weights, and follow for SLP recommendations.  5) Monitor for signs and symptoms of refeedings syndrome (Hypophosphatemia, hypomagnesemia, hypokalemia, hyperglycemia). Ordered appropriate labs.    Goals:   1) Pt to tolerate TPN.   2) Nutrition provisions to be met.   3) Gradual weight gain + maintenance.  Nutrition Goal Status: new    Dietitian Rounds Brief  Pt is a 63 y/o F admitted for dehydration w/ hyponatremia. Pt presented to ED w/ reported generalized weakness, worsening fatigue, dehydration, inability to eat/drink w/o significant difficulty. Pt has a h/x of modified radial neck dissection s/t salivary gland cancer. She is currently being treated for thrush. SLP suspected pt is having mechanical difficulty swallowing, concerned she may have stricture.  reports hoarseness, wt loss, and severely diminished urine output. She also has difficulty communicating. Pt has had very poor appetite + eating little to no food - failure to thrive. Pt is w/ profound weakness to the point that she can't sit up on her own. Per chart review, pt was being seen by RD at [facility] - last f/u appointment was on 4/26/22. Pt last weighted 123 lbs on 9/6/22 and now currently weights 107 lbs. Pt is w/ a 16 lbs weight loss (13% body wt loss) within a 2 month time span - significant. Pt is not an ideal candidate for NGT placement + initiation of tube feedings d/t low platelet count (60). TPN to start today. Will order ONS when appropriate and per SLP recommendations. RD to monitor labs and plan of care.     PARENTERAL NUTRITION PROGRESS NOTE:    Parenteral Nutrition Day # 1  Diagnosis/Indication for PN: Wt  loss, unable to eat/drink w/o difficulty, salivary gland cancer, swallowing difficulty, dehydration   IV Access: Port A cath  Diet/Tube Feeding: NPO     Admixture Type: 3 in 1  Infusion Rate and Frequency: 60 mL/hr  Patient Acuity: Acute     PN Composition:   100 grams Amino Acid, 116 grams Dextrose, and 50 grams Lipid.    Today's TPN provides 1295 kcal.  *Dextrose is started at less than full dextrose goal to reduce risk for Refeeding Syndrome. Dextrose content will be advanced as appropriate over the next few days.     Please see order for electrolytes and additives content and adjustments.   *The Nutrition Support Team will continue to monitor electrolytes daily and adjust parenteral nutrition as warranted.    Malnutrition Assessment:  Severe malnutrition related to decreased intake in pt w/ increased needs d/t salivary gland cancer as evidenced by wt loss of 16 lbs (13% body wt loss) within 2 months, and < 75% of estimated energy requirements for > 1 month.      Diet order:   Current Diet Order: NPO     Evaluation of Received Nutrient/Fluid Intake  Energy Calories Required: not meeting needs  Protein Required: not meeting needs  Fluid Required: not meeting needs  Tolerance: other (see comments) (NPO)     % Intake of Estimated Energy Needs: 0%  % Meal Intake: NPO    No intake or output data in the 24 hours ending 11/04/22 1021     Anthropometrics  Temp: 97.7 °F (36.5 °C)  Weight: 48.5 kg (107 lb)  Weight (lb): 107 lb  BMI Grade: 18.5-24.9 - normal       Estimated/Assessed Needs  Weight Used For Calorie Calculations: 48.5 kg (106 lb 14.8 oz)  Energy Calorie Requirements (kcal): 2577-3707 (30-35)  Energy Need Method: Kcal/kg  Protein Requirements:  (1.5-2.5 gm/kg)  Weight Used For Protein Calculations: 48.5 kg (106 lb 14.8 oz)  Fluid Requirements (mL): 1455 (30 ml/kg)     RDA Method (mL): 1455       Reason for Assessment  Reason For Assessment: consult, new TPN  Diagnosis: other (see comments) (Dehydration  with hyponatremia)  Relevant Medical History: Malignant neoplasm of major salivary gland, Hypothyroidism, Chemotherapy induced neutropenia    Nutrition/Diet History  Food Allergies: NKFA  Factors Affecting Nutritional Intake: decreased appetite, painful swallowing, difficulty/impaired swallowing    Nutrition Risk Screen  Nutrition Risk Screen: no indicators present     MST Score: 2  Have you recently lost weight without trying?: Yes: 2-13 lbs  Weight loss score: 1  Have you been eating poorly because of a decreased appetite?: Yes  Appetite score: 1       Weight History:  Wt Readings from Last 5 Encounters:   11/03/22 48.5 kg (107 lb)   10/26/22 48.6 kg (107 lb 1.6 oz)   10/12/22 50.8 kg (112 lb)   09/28/22 52.2 kg (115 lb)   09/27/22 51.9 kg (114 lb 6.4 oz)        Medications:Pertinent Medications Reviewed  Scheduled Meds:   amLODIPine  5 mg Oral Daily    ascorbic acid (vitamin C)  1,000 mg Oral Daily    chlorhexidine  15 mL Mouth/Throat BID    cholecalciferol (vitamin D3)  5,000 Units Oral Daily    fluconazole (DIFLUCAN) IV (PEDS and ADULTS)  200 mg Intravenous Q24H    levothyroxine  88 mcg Oral Daily    multivitamin  1 tablet Oral Daily    nystatin  4 mL Oral QID    pantoprazole  40 mg Intravenous Q24H     Continuous Infusions:   lactated ringers 100 mL/hr at 11/03/22 2259    TPN ADULT CENTRAL LINE CUSTOM (3 in 1)       PRN Meds:.acetaminophen, dextrose 10%, dextrose 10%, glucagon (human recombinant), glucose, glucose, insulin aspart U-100, magnesium oxide, magnesium oxide, melatonin, naloxone, ondansetron, potassium bicarbonate, potassium bicarbonate, potassium bicarbonate, potassium, sodium phosphates, potassium, sodium phosphates, potassium, sodium phosphates, sodium chloride 0.9%    Labs: Pertinent Labs Reviewed  Clinical Chemistry:  Recent Labs   Lab 11/03/22  1341 11/04/22  0603   * 132*   K 4.2 3.7   CL 99 103   CO2 22* 20*   * 100   BUN 29* 15   CREATININE 0.7 0.4*   CALCIUM 8.9 8.3*   PROT  7.0 5.6*   ALBUMIN 3.3* 2.6*   BILITOT 1.2* 1.1*   ALKPHOS 63 49*   AST 51* 38   * 94*   ANIONGAP 12 9   MG 2.6 2.1   PHOS 3.2 2.7     CBC:   Recent Labs   Lab 11/04/22  0603   WBC 4.94   RBC 3.93*   HGB 12.2   HCT 33.9*   PLT 60*   MCV 86   MCH 31.0   MCHC 36.0       Monitor and Evaluation  Food and Nutrient Intake: energy intake, parenteral nutrition intake  Food and Nutrient Adminstration: enteral and parenteral nutrition administration  Knowledge/Beliefs/Attitudes: food and nutrition knowledge/skill  Physical Activity and Function: nutrition-related ADLs and IADLs  Anthropometric Measurements: weight, weight change, body mass index  Biochemical Data, Medical Tests and Procedures: electrolyte and renal panel, gastrointestinal profile, glucose/endocrine profile, inflammatory profile, lipid profile  Nutrition-Focused Physical Findings: overall appearance     Nutrition Risk  Level of Risk/Frequency of Follow-up: high     Nutrition Follow-Up  RD Follow-up?: Yes      Saira Winters RD 11/04/2022 10:21 AM

## 2022-11-04 NOTE — SUBJECTIVE & OBJECTIVE
Oncology Treatment Plan:   [No matching plan found]    Medications:  Continuous Infusions:   lactated ringers 100 mL/hr at 11/03/22 7332     Scheduled Meds:   amLODIPine  5 mg Oral Daily    ascorbic acid (vitamin C)  1,000 mg Oral Daily    chlorhexidine  15 mL Mouth/Throat BID    cholecalciferol (vitamin D3)  5,000 Units Oral Daily    lenvatinib  16 mg Oral Daily    levothyroxine  88 mcg Oral Daily    multivitamin  1 tablet Oral Daily    nystatin  4 mL Oral QID    pantoprazole  40 mg Intravenous Q24H     PRN Meds:acetaminophen, dextrose 10%, dextrose 10%, glucagon (human recombinant), glucose, glucose, insulin aspart U-100, magnesium oxide, magnesium oxide, melatonin, naloxone, ondansetron, potassium bicarbonate, potassium bicarbonate, potassium bicarbonate, potassium, sodium phosphates, potassium, sodium phosphates, potassium, sodium phosphates, sodium chloride 0.9%     Review of patient's allergies indicates:   Allergen Reactions    Pcn [penicillins] Swelling    Codeine Nausea And Vomiting        Past Medical History:   Diagnosis Date    Cancer     neck    HA (headache)     Hypothyroidism      Past Surgical History:   Procedure Laterality Date    INSERTION OF TUNNELED CENTRAL VENOUS CATHETER (CVC) WITH SUBCUTANEOUS PORT Right 4/4/2022    Procedure: [id number];  Surgeon: Charles Servin III, MD;  Location: [facility];  Service: General;  Laterality: Right;    left temporal bone resection  2020    MODIFIED RADICAL NECK DISSECTION Left 2020    parotidectomy Left 2020     Family History       Problem Relation (Age of Onset)    Breast cancer Mother, Maternal Aunt          Tobacco Use    Smoking status: Never    Smokeless tobacco: Never   Substance and Sexual Activity    Alcohol use: Never    Drug use: Never    Sexual activity: Not Currently     Partners: Male     Birth control/protection: None       Review of Systems   Constitutional:  Positive for activity change, appetite change and fatigue. Negative for  diaphoresis, fever and unexpected weight change.   HENT:  Negative for congestion and hearing loss.    Eyes:  Negative for visual disturbance.   Respiratory:  Negative for cough, chest tightness and shortness of breath.    Cardiovascular:  Negative for chest pain and leg swelling.   Gastrointestinal:  Negative for abdominal pain, blood in stool, diarrhea, nausea and vomiting.   Endocrine: Negative for cold intolerance and heat intolerance.   Genitourinary:  Negative for difficulty urinating, dysuria and hematuria.   Neurological:  Negative for dizziness and headaches.   Hematological:  Negative for adenopathy. Does not bruise/bleed easily.   Psychiatric/Behavioral:  Negative for behavioral problems.    Objective:     Vital Signs (Most Recent):  Temp: 97.7 °F (36.5 °C) (11/04/22 0802)  Pulse: 66 (11/04/22 0802)  Resp: 16 (11/04/22 0802)  BP: (!) 140/95 (11/04/22 0802)  SpO2: 95 % (11/04/22 0802)   Vital Signs (24h Range):  Temp:  [97.4 °F (36.3 °C)-97.7 °F (36.5 °C)] 97.7 °F (36.5 °C)  Pulse:  [] 66  Resp:  [16-20] 16  SpO2:  [85 %-100 %] 95 %  BP: (114-176)/() 140/95     Weight: 48.5 kg (107 lb)  Body mass index is 21.61 kg/m².  Body surface area is 1.42 meters squared.    No intake or output data in the 24 hours ending 11/04/22 0950    Physical Exam  Constitutional:       Appearance: She is well-developed. She is ill-appearing.   HENT:      Head: Normocephalic and atraumatic.      Right Ear: External ear normal.      Left Ear: External ear normal.   Eyes:      Conjunctiva/sclera: Conjunctivae normal.      Pupils: Pupils are equal, round, and reactive to light.   Neck:      Thyroid: No thyromegaly.      Trachea: No tracheal deviation.   Cardiovascular:      Rate and Rhythm: Normal rate and regular rhythm.      Heart sounds: Normal heart sounds.   Pulmonary:      Effort: Pulmonary effort is normal.      Breath sounds: Normal breath sounds.   Abdominal:      General: Bowel sounds are normal. There is no  distension.      Palpations: Abdomen is soft. There is no mass.      Tenderness: There is no abdominal tenderness.   Skin:     Findings: No rash.   Neurological:      Comments: Weak and sleepy but oriented x 3 with no focal deficits.    Psychiatric:         Behavior: Behavior normal.         Thought Content: Thought content normal.         Judgment: Judgment normal.       Significant Labs:   CBC:   Recent Labs   Lab 11/03/22  1341 11/04/22  0603   WBC 6.38 4.94   HGB 14.6 12.2   HCT 41.4 33.9*   PLT 71* 60*    and CMP:   Recent Labs   Lab 11/03/22  1341 11/04/22  0603   * 132*   K 4.2 3.7   CL 99 103   CO2 22* 20*   * 100   BUN 29* 15   CREATININE 0.7 0.4*   CALCIUM 8.9 8.3*   PROT 7.0 5.6*   ALBUMIN 3.3* 2.6*   BILITOT 1.2* 1.1*   ALKPHOS 63 49*   AST 51* 38   * 94*   ANIONGAP 12 9       Diagnostic Results:  None

## 2022-11-04 NOTE — HPI
Antonieta Clay is a 62 y.o. female with a history as  has a past medical history of Cancer, HA (headache), and Hypothyroidism. who presented to the ED with a Weakness, Dehydration, and Mouth Lesions (Pt sent in by home health for dehydration. Pt's  states she has not been eating or drinking and has thrush.)    Patient presented to the emergency room for evaluation of dysphagia, thrush in mouth, dehydration, gradually worsening weakness, poor nutrition, 20 lb weight loss over last 6 weeks.   at bedside reports patient has cancer of the salivary gland and is currently being treated with oral chemo medication (Lenvatinib) for about 3 months.  She is followed by Dr. Sagastume, who is aware of these changes.  Spouse further reports home health services was ordered (ST/PT/OT and nurse) with services starting this week.   also reports she was also started on nystatin for thrush, me yesterday all for appetite.  Weakness and debility has significantly changed over the last couple days, as patient is too weak to walk.   further states, on October 28 patient was out shopping on her own without any problems.      reports patient was seen by speech therapy today, who was concerned about possible esophageal narrowing and recommended that she be evaluated in the emergency room.     Lab and imaging obtained and reviewed.  CBC completed and showed RDW 16.7 platelets 71 otherwise unremarkable.  Chemistry profile shows  sodium 133 CO2 22 BUN 29 glucose 148 albumin 3.3 total albumin 1.2 AST/ALT 51/132    CT abdomen and pelvis without acute intra-abdominal findings.      In ED, IV fluid hydration initiated with recommendations for GI consult.

## 2022-11-04 NOTE — ANESTHESIA PREPROCEDURE EVALUATION
11/04/2022  Antonieta Clay is a 62 y.o., female.      Patient Active Problem List   Diagnosis    Malignant neoplasm of major salivary gland    Dehydration with hyponatremia    Chemotherapy induced neutropenia    Aftercare    Cancer associated pain    Anemia in neoplastic disease    Headache    Nausea    Dysuria    Altered mental state    Hypothyroidism    Hallucinations    Physical debility    Mild protein-calorie malnutrition    Transaminitis    Adult failure to thrive       Past Surgical History:   Procedure Laterality Date    INSERTION OF TUNNELED CENTRAL VENOUS CATHETER (CVC) WITH SUBCUTANEOUS PORT Right 4/4/2022    Procedure: [id number];  Surgeon: Charles Servin III, MD;  Location: [facility];  Service: General;  Laterality: Right;    left temporal bone resection  2020    MODIFIED RADICAL NECK DISSECTION Left 2020    parotidectomy Left 2020        Tobacco Use:  The patient  reports that she has never smoked. She has never used smokeless tobacco.     Results for orders placed or performed during the hospital encounter of 09/21/22   EKG 12-lead    Collection Time: 09/21/22  9:46 AM    Narrative    Test Reason : R41.82,    Vent. Rate : 071 BPM     Atrial Rate : 071 BPM     P-R Int : 152 ms          QRS Dur : 074 ms      QT Int : 386 ms       P-R-T Axes : 068 020 024 degrees     QTc Int : 419 ms    Normal sinus rhythm  Normal ECG  When compared with ECG of 16-SEP-2022 20:25,  Inverted T waves have replaced nonspecific T wave abnormality in Inferior  leads  Confirmed by Milton FLOREZ, Kalia CHAN ([id number]) on 9/23/2022 7:02:54 PM    Referred By: [email or web address]   SELF           Confirmed By:Kalia Rose MD             Lab Results   Component Value Date    WBC 4.94 11/04/2022    HGB 12.2 11/04/2022    HCT 33.9 (L) 11/04/2022    MCV 86 11/04/2022    PLT 60 (L) 11/04/2022     BMP  Lab  Results   Component Value Date     (L) 11/04/2022    K 3.7 11/04/2022     11/04/2022    CO2 20 (L) 11/04/2022    BUN 15 11/04/2022    CREATININE 0.4 (L) 11/04/2022    CALCIUM 8.3 (L) 11/04/2022    ANIONGAP 9 11/04/2022     11/04/2022     (H) 11/03/2022     (H) 10/31/2022       Results for orders placed in visit on 03/30/22    Echo    Interpretation Summary  · The left ventricle is normal in size with concentric remodeling and hyperdynamic systolic function.  · The estimated ejection fraction is 75%.  · Normal right ventricular size with normal right ventricular systolic function.  · Mild tricuspid regurgitation.  · Mild mitral regurgitation.  · Normal central venous pressure (3 mmHg).  · The estimated PA systolic pressure is 23 mmHg.          Pre-op Assessment    I have reviewed the Patient Summary Reports.     I have reviewed the Nursing Notes. I have reviewed the NPO Status.   I have reviewed the Medications.     Review of Systems  Anesthesia Hx:  No problems with previous Anesthesia  Denies Family Hx of Anesthesia complications.   Denies Personal Hx of Anesthesia complications.   Social:  Non-Smoker    Hematology/Oncology:         -- Anemia: Current/Recent Cancer. (Head and neck s/p 32 rounds of radiation therapy.)   EENT/Dental:EENT/Dental Normal   Cardiovascular:  Cardiovascular Normal     Pulmonary:  Pulmonary Normal    Renal/:  Renal/ Normal     Hepatic/GI:  Hepatic/GI Normal  Hepatic/GI Symptoms: (Dysphasia, thrush)    Musculoskeletal:  Musculoskeletal Normal    Neurological:   Headaches    Endocrine:   Hypothyroidism    Psych:  Psychiatric Normal           Physical Exam  General: Well nourished    Airway:  Mallampati: IV / III  Mouth Opening: Small, but > 3cm  TM Distance: Normal  Tongue: Normal, Decreased Mobility  Neck ROM: Normal ROM    Dental:  Intact    Chest/Lungs:  Clear to auscultation    Heart:  Rate: Normal  Rhythm: Regular Rhythm  Sounds:  Normal        Anesthesia Plan  Type of Anesthesia, risks & benefits discussed:    Anesthesia Type: MAC  Intra-op Monitoring Plan: Standard ASA Monitors  Induction:  IV  Informed Consent: Informed consent signed with the Patient and all parties understand the risks and agree with anesthesia plan.  All questions answered.   ASA Score: 3  Anesthesia Plan Notes: MAC with propofol.  POM        Ready For Surgery From Anesthesia Perspective.     .

## 2022-11-04 NOTE — HPI
Had a family conference on the phone last night.  Patient has no new complaints this am.  She denies pain or head ache.

## 2022-11-04 NOTE — PT/OT/SLP EVAL
Speech Language Pathology Evaluation  Bedside Swallow    Patient Name:  Antonieta Clay   MRN:  [id number]  Admitting Diagnosis: Dehydration with hyponatremia    Recommendations:                 General Recommendations:  GI evaluation and Dysphagia therapy  Diet recommendations:  Minced & Moist Diet - IDDSI Level 5, Thin   Aspiration Precautions: 1 bite/sip at a time, Alternating bites/sips, Assistance with meals, HOB to 90 degrees, Meds whole buried in puree, Small bites/sips, Standard aspiration precautions, and Wear oxygen during intake   General Precautions: Standard, aspiration  Communication strategies:  none    History:   Principal Problem:Dehydration with hyponatremia     Chief Complaint:        Chief Complaint   Patient presents with    Weakness    Dehydration    Mouth Lesions       Pt sent in by home health for dehydration. Pt's  states she has not been eating or drinking and has thrush.         HPI: Antonieta Clay is a 62 y.o. female with a history as  has a past medical history of Cancer, HA (headache), and Hypothyroidism. who presented to the ED with a Weakness, Dehydration, and Mouth Lesions (Pt sent in by home health for dehydration. Pt's  states she has not been eating or drinking and has thrush.)     Patient presented to the emergency room for evaluation of dysphagia, thrush in mouth, dehydration, gradually worsening weakness, poor nutrition, 20 lb weight loss over last 6 weeks.   at bedside reports patient has cancer of the salivary gland and is currently being treated with oral chemo medication (Lenvatinib) for about 3 months.  She is followed by Dr. Sagastume, who is aware of these changes.  Spouse further reports home health services was ordered (ST/PT/OT and nurse) with services starting this week.   also reports she was also started on nystatin for thrush, me yesterday all for appetite.  Weakness and debility has significantly changed over the last couple days,  as patient is too weak to walk.   further states, on October 28 patient was out shopping on her own without any problems.       reports patient was seen by speech therapy(home health) today, who was concerned about possible esophageal narrowing and recommended that she be evaluated in the emergency room.      Lab and imaging obtained and reviewed.  CBC completed and showed RDW 16.7 platelets 71 otherwise unremarkable.  Chemistry profile shows  sodium 133 CO2 22 BUN 29 glucose 148 albumin 3.3 total albumin 1.2 AST/ALT 51/132     CT abdomen and pelvis without acute intra-abdominal findings.        In ED, IV fluid hydration initiated with recommendations for GI consult.     Hospital Course--Nsg Notes:    Pt passed swallow screen and demonstrated no difficulty with taking meds.    Swallowing Hx--Pt caregiver report:  Pt with recent thrush dx; pain (~1 week) with eating and swallowing; preference for only puree-to-soft consistencies 2 to pain and general weakness; s/s of discomfort and effortful swallow with transit of food following the swallow, through upper esophagus area.          Past Medical History:   Diagnosis Date    Cancer     neck    HA (headache)     Hypothyroidism        Past Surgical History:   Procedure Laterality Date    INSERTION OF TUNNELED CENTRAL VENOUS CATHETER (CVC) WITH SUBCUTANEOUS PORT Right 4/4/2022    Procedure: [id number];  Surgeon: Charles Servin III, MD;  Location: [facility];  Service: General;  Laterality: Right;    left temporal bone resection  2020    MODIFIED RADICAL NECK DISSECTION Left 2020    parotidectomy Left 2020       Social History: Patient lives with .    Prior Intubation HX:  N/A    Modified Barium Swallow: Not in EPIC    Chest X-Rays: N/A    Prior diet: Soft-to-puree foods / thin liquids, 2 to general weakness.    Occupation/hobbies/homemaking: N/A.    Subjective   Discussed case with Angeli prior to swallow evaluation.  Nsg reported pt is  "not NPO for GI/medical procedure. Unclear why pt is currently NPO since pt "passed swallow screen with nsg staff". Pt seen at b/s with spouse present. Note:  Pt demonstrated an eagerness for po intake, and fully accepted all presented po trials.    Patient goals: "I'm hungry"     Pain/Comfort:  Pain Rating 1: 0/10    Respiratory Status: Nasal cannula, flow 2 L/min    Objective:     Oral Musculature Evaluation  Oral Musculature: general weakness  Dentition: scattered dentition (Upper dentures are now too large 2 to rapid weight loss)  Secretion Management: other (see comments) (Cx-Salivary gland)  Mucosal Quality: dry  Mandibular Strength and Mobility: WFL  Oral Labial Strength and Mobility: functional coordination  Lingual Strength and Mobility:  (general weakness)  Velar Elevation: WFL  Buccal Strength and Mobility: WFL  Volitional Cough: Low cough production  Volitional Swallow: Difficult to initiate 2 to dry mucosal.  Voice Prior to PO Intake: Breathy voice quality, 2 to general weakness/low respiratory volume    Bedside Swallow Eval:   Consistencies Assessed:  Thin liquids :  1/2- 1 tsp, cup isolated and consecutive sip intake, straw isolated and consecutive sip intake; (~2-3 ounces ).  Puree    Mixed consistencies    Soft solids    Solids        Oral Phase:   Note:  Pt accepting only ~1/2 of bolus per each 1 full (1/2 tsp - 1 tsp) presentation  Gross-general weakness evidenced with overall slow oral acceptance/manipulation.  With successive swallows rate of oral movement became more timely.  Decreased closure around utensil    Pharyngeal Phase:   Minimal s/s of effortful swallow during only initial po trials.  With successive swallows those s/s were eliminated.  no overt clinical signs/symptoms of aspiration    Esophageal Phase:  NO reported, current, s/s of sensation of slow transit or food getting stuck, after the swallow, in the area of the upper esophagus. (Note:  CG reported these symptoms occurred " frequently one week prior.)    Compensatory Strategies  None    Treatment: Initiated training on facilitative swallowing precautions, and recommended diet level.    Assessment:     Antonieta Clay is a 62 y.o. female with an SLP diagnosis of Mild-Moderate Oral Dysphagia based on today's initial clinical-swallow evaluation.  Primary swallowing deficits include:  gross-general weakness; and slow oral manipulation/transit of textured food consistency (even with 1/4- 1/2 tsp boluses).  CG reported a prior hx of extreme low po acceptance, pain with chewing/swallowing, and s/s of slow-to-effortful esophageal transit.  None of these symptoms were observed during today's clinical evaluation.      Rec:  Start po intake--IDDSI-5 (MINCED AND MOIST)/ THIN LIQUIDS, with previously recommended swallowing precautions.  SLP will continue to f/u with pt for the goals listed below.      Goals:   Multidisciplinary Problems       SLP Goals          Problem: SLP    Goal Priority Disciplines Outcome   SLP Goal     SLP Ongoing, Progressing   Description: 1.  Pt will demonstrate no overt s/s of aspiration, >95% of the time, with least restrictive diet level.  2.  Pt will demonstrate no overt s/s of aspiration, >95% of the time, with IDDSI-5 MINCED and MOIST / THIN liquid meal intake.  3.  Pt will utilize trained swallowing precautions and compensatory strategies with MOD IND.                       Plan:     Patient to be seen:  3 x/week   Plan of Care expires:  11/25/22  Plan of Care reviewed with:  patient, spouse   SLP Follow-Up:  Yes       Discharge recommendations:  home health speech therapy   Barriers to Discharge:  None    Time Tracking:     SLP Treatment Date:   11/04/22  Speech Start Time:  0900  Speech Stop Time:  0945     Speech Total Time (min):  45 min    Billable Minutes: Eval 45mins     11/04/2022

## 2022-11-04 NOTE — CONSULTS
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 Postoperative changes in the left side of the neck with slightly  increased FDG activity from background.  2. No FDG avid lymphadenopathy or finding of additional distant sites  of FDG avid disease.     10/20/2020-12/4/2020  Radiation therapy.     3/11/2021:  MRI Neck: no evidence of recurrence.    MRI Brain: no acute abnormalities.     12/3/2021:  CT chest: new ground glass, RUL, infectious vs met. rec follow up     2/25/2022:  Mastoid bone destructive neoplastic lesion shows progression   CT neck shows mastoid lesion, 2.4cm and enlarged MS lymph nodes.     Cytoxan/Kwasi/Cisplatin:  Cycle 1:          4/5/2022  Cycle 2:          4/26/2022  Cycle 3:          5/17/2022 5/31/2022:  CT neck and chest:  Mastoid lesion ? Less prominent.  No other significant changes or new growth.      8/23/2022:  Lenvatinib Start:      Ms. 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  She has most recently been on Lenvatinib therapy at 8mg daily for the last 4 weeks.  This represents a dose reduction from 16mg which was originally started on 8/23/2022.  She was admitted several weeks ago with confusion and lethargy and an extensive work up for leptomeningeal spread resulted as negative.  She did improve and looked good in the office 4 weeks ago and this is when the 8mg dose of Javi was  resumed.      She is now admitted with very poor appetite with little to now eating over the last 10 days and is clearly failing to thrive.  She has profound weakness to the point that she cant sit up on her own.  She has no fever, pain or breathing difficulty at this time.       Oncology Treatment Plan:   [No matching plan found]    Medications:  Continuous Infusions:   lactated ringers 100 mL/hr at 11/03/22 4106     Scheduled Meds:   amLODIPine  5 mg Oral Daily    ascorbic acid (vitamin C)  1,000 mg Oral Daily    chlorhexidine  15 mL Mouth/Throat BID    cholecalciferol (vitamin D3)  5,000 Units Oral Daily    lenvatinib  16 mg Oral Daily    levothyroxine  88 mcg Oral Daily    multivitamin  1 tablet Oral Daily    nystatin  4 mL Oral QID    pantoprazole  40 mg Intravenous Q24H     PRN Meds:acetaminophen, dextrose 10%, dextrose 10%, glucagon (human recombinant), glucose, glucose, insulin aspart U-100, magnesium oxide, magnesium oxide, melatonin, naloxone, ondansetron, potassium bicarbonate, potassium bicarbonate, potassium bicarbonate, potassium, sodium phosphates, potassium, sodium phosphates, potassium, sodium phosphates, sodium chloride 0.9%     Review of patient's allergies indicates:   Allergen Reactions    Pcn [penicillins] Swelling    Codeine Nausea And Vomiting        Past Medical History:   Diagnosis Date    Cancer     neck    HA (headache)     Hypothyroidism      Past Surgical History:   Procedure Laterality Date    INSERTION OF TUNNELED CENTRAL VENOUS CATHETER (CVC) WITH SUBCUTANEOUS PORT Right 4/4/2022    Procedure: [id number]-CATH;  Surgeon: Charles Servin III, MD;  Location: [facility];  Service: General;  Laterality: Right;    left temporal bone resection  2020    MODIFIED RADICAL NECK DISSECTION Left 2020    parotidectomy Left 2020     Family History       Problem Relation (Age of Onset)    Breast cancer Mother, Maternal Aunt          Tobacco Use    Smoking status: Never     Smokeless tobacco: Never   Substance and Sexual Activity    Alcohol use: Never    Drug use: Never    Sexual activity: Not Currently     Partners: Male     Birth control/protection: None       Review of Systems   Constitutional:  Positive for activity change, appetite change and fatigue. Negative for diaphoresis, fever and unexpected weight change.   HENT:  Negative for congestion and hearing loss.    Eyes:  Negative for visual disturbance.   Respiratory:  Negative for cough, chest tightness and shortness of breath.    Cardiovascular:  Negative for chest pain and leg swelling.   Gastrointestinal:  Negative for abdominal pain, blood in stool, diarrhea, nausea and vomiting.   Endocrine: Negative for cold intolerance and heat intolerance.   Genitourinary:  Negative for difficulty urinating, dysuria and hematuria.   Neurological:  Negative for dizziness and headaches.   Hematological:  Negative for adenopathy. Does not bruise/bleed easily.   Psychiatric/Behavioral:  Negative for behavioral problems.    Objective:     Vital Signs (Most Recent):  Temp: 97.7 °F (36.5 °C) (11/04/22 0802)  Pulse: 66 (11/04/22 0802)  Resp: 16 (11/04/22 0802)  BP: (!) 140/95 (11/04/22 0802)  SpO2: 95 % (11/04/22 0802)   Vital Signs (24h Range):  Temp:  [97.4 °F (36.3 °C)-97.7 °F (36.5 °C)] 97.7 °F (36.5 °C)  Pulse:  [] 66  Resp:  [16-20] 16  SpO2:  [85 %-100 %] 95 %  BP: (114-176)/() 140/95     Weight: 48.5 kg (107 lb)  Body mass index is 21.61 kg/m².  Body surface area is 1.42 meters squared.    No intake or output data in the 24 hours ending 11/04/22 0950    Physical Exam  Constitutional:       Appearance: She is well-developed. She is ill-appearing.   HENT:      Head: Normocephalic and atraumatic.      Right Ear: External ear normal.      Left Ear: External ear normal.   Eyes:      Conjunctiva/sclera: Conjunctivae normal.      Pupils: Pupils are equal, round, and reactive to light.   Neck:      Thyroid: No thyromegaly.       Trachea: No tracheal deviation.   Cardiovascular:      Rate and Rhythm: Normal rate and regular rhythm.      Heart sounds: Normal heart sounds.   Pulmonary:      Effort: Pulmonary effort is normal.      Breath sounds: Normal breath sounds.   Abdominal:      General: Bowel sounds are normal. There is no distension.      Palpations: Abdomen is soft. There is no mass.      Tenderness: There is no abdominal tenderness.   Skin:     Findings: No rash.   Neurological:      Comments: Weak and sleepy but oriented x 3 with no focal deficits.    Psychiatric:         Behavior: Behavior normal.         Thought Content: Thought content normal.         Judgment: Judgment normal.       Significant Labs:   CBC:   Recent Labs   Lab 11/03/22  1341 11/04/22  0603   WBC 6.38 4.94   HGB 14.6 12.2   HCT 41.4 33.9*   PLT 71* 60*    and CMP:   Recent Labs   Lab 11/03/22  1341 11/04/22  0603   * 132*   K 4.2 3.7   CL 99 103   CO2 22* 20*   * 100   BUN 29* 15   CREATININE 0.7 0.4*   CALCIUM 8.9 8.3*   PROT 7.0 5.6*   ALBUMIN 3.3* 2.6*   BILITOT 1.2* 1.1*   ALKPHOS 63 49*   AST 51* 38   * 94*   ANIONGAP 12 9       Diagnostic Results:  None    Assessment/Plan:     Adult failure to thrive  I had a long discussion with patient and  about the potential cause underlying this.  This could be due to the medication or progression of the cancer.  Will get MRI of the brain to eval the lesion again and hold the therapy.  Will also place her on TPN as she has had very little nutrition in the last 10 days.  I further discussed that, depending on the cause of this process, she will either improve(if due to medication), or worsen(if due to cancer), and that often the imaging can by unremarkable but still have malignant progression in the CNS.  Would keep her her for the next few days, improve her nutrition and see how she does overall.  Dietary consult placed and will order MRI.          Thank you for your consult. I will follow-up  with patient. Please contact us if you have any additional questions.    Gorge Coombs MD  Hematology/Oncology  [facility]

## 2022-11-04 NOTE — CONSULTS
GASTROENTEROLOGY INPATIENT CONSULT NOTE  Patient Name: Antonieta Clay  Patient MRN: [id number]  Patient : 1960    Admit Date: 11/3/2022  Service date: 2022    Reason for Consult:  Dysphagia.  Feeling of food getting stuck.    PCP: Casandra Yu NP    Chief Complaint   Patient presents with    Weakness    Dehydration    Mouth Lesions     Pt sent in by home health for dehydration. Pt's  states she has not been eating or drinking and has thrush.       HPI: Patient is a 62 y.o. female with PMHx  head neck cancer.  Currently being treated chemotherapy.  Has been having food dysphagia mostly to solids.  Has seen the nurse and speech pathologist's who recommended starting p.o. intake TPN as well because of malnutrition.  Has had progressive weight loss.  CT of the abdomen was unremarkable.      Past Medical History:  Past Medical History:   Diagnosis Date    Cancer     neck    HA (headache)     Hypothyroidism         Past Surgical History:  Past Surgical History:   Procedure Laterality Date    INSERTION OF TUNNELED CENTRAL VENOUS CATHETER (CVC) WITH SUBCUTANEOUS PORT Right 2022    Procedure: [id number];  Surgeon: Charles Servin III, MD;  Location: [facility] OR;  Service: General;  Laterality: Right;    left temporal bone resection  2020    MODIFIED RADICAL NECK DISSECTION Left 2020    parotidectomy Left 2020        Home Medications:  Facility-Administered Medications Prior to Admission   Medication Dose Route Frequency Provider Last Rate Last Admin    0.9%  NaCl infusion   Intravenous Once Gorge Sagastume MD   Stopped at 22 1321    methylPREDNISolone sodium succinate injection 40 mg  40 mg Intravenous Once PRN Rj Pompa MD         Medications Prior to Admission   Medication Sig Dispense Refill Last Dose    ascorbic acid, vitamin C, (VITAMIN C) 1000 MG tablet Take 1,000 mg by mouth once daily.   11/3/2022    cholecalciferol, vitamin D3, 125 mcg (5,000 unit) Tab Take  5,000 Units by mouth once daily.   11/3/2022    fluticasone propionate (FLONASE) 50 mcg/actuation nasal spray 2 sprays (100 mcg total) by Each Nostril route once daily. 18.2 mL 1 11/3/2022    lenvatinib 4 mg Cap Take 16 mg by mouth once daily at 6am. 120 capsule 3 11/3/2022    levothyroxine (SYNTHROID) 88 MCG tablet Take 1 tablet (88 mcg total) by mouth once daily. 90 tablet 1 11/3/2022    megestroL (MEGACE) 400 mg/10 mL (40 mg/mL) Susp Take 10 mLs (400 mg total) by mouth 2 (two) times daily. 600 mL 11 11/3/2022    multivit-iron-min-folic acid 18-0.4 mg Tab Take 1 tablet by mouth once daily.    11/3/2022    nystatin (MYCOSTATIN) 100,000 unit/mL suspension Take 4 mLs (400,000 Units total) by mouth 4 (four) times daily. for 10 days 160 mL 0 11/3/2022    omega-3 fatty acids/fish oil (FISH OIL-OMEGA-3 FATTY ACIDS) 300-1,000 mg capsule Take 1 capsule by mouth once daily.   11/3/2022    ZINC ACETATE ORAL Take 1 capsule by mouth once daily.    11/3/2022    amLODIPine (NORVASC) 5 MG tablet Take 1 tablet (5 mg total) by mouth once daily. (Patient not taking: Reported on 11/3/2022) 30 tablet 3 Not Taking    oxyCODONE-acetaminophen (PERCOCET)  mg per tablet Take 1 tablet by mouth every 6 (six) hours as needed for Pain. (Patient not taking: Reported on 11/3/2022) 28 tablet 0 Not Taking       Inpatient Medications:   amLODIPine  5 mg Oral Daily    ascorbic acid (vitamin C)  1,000 mg Oral Daily    chlorhexidine  15 mL Mouth/Throat BID    cholecalciferol (vitamin D3)  5,000 Units Oral Daily    fluconazole (DIFLUCAN) IV (PEDS and ADULTS)  200 mg Intravenous Q24H    levothyroxine  88 mcg Oral Daily    multivitamin  1 tablet Oral Daily    nystatin  4 mL Oral QID    pantoprazole  40 mg Intravenous Q24H     acetaminophen, dextrose 10%, dextrose 10%, glucagon (human recombinant), glucose, glucose, insulin aspart U-100, magnesium oxide, magnesium oxide, melatonin, naloxone, ondansetron, potassium bicarbonate, potassium bicarbonate,  potassium bicarbonate, potassium, sodium phosphates, potassium, sodium phosphates, potassium, sodium phosphates, sodium chloride 0.9%    Review of patient's allergies indicates:   Allergen Reactions    Pcn [penicillins] Swelling    Codeine Nausea And Vomiting       Social History:   Social History     Occupational History    Not on file   Tobacco Use    Smoking status: Never    Smokeless tobacco: Never   Substance and Sexual Activity    Alcohol use: Never    Drug use: Never    Sexual activity: Not Currently     Partners: Male     Birth control/protection: None       Family History:   Family History   Problem Relation Age of Onset    Breast cancer Mother     Breast cancer Maternal Aunt        Review of Systems:  A 10 point review of systems was performed and was normal, except as mentioned in the HPI, including constitutional, HEENT, heme, lymph, cardiovascular, respiratory, gastrointestinal, genitourinary, neurologic, endocrine, psychiatric and musculoskeletal.      OBJECTIVE:    Physical Exam:  24 Hour Vital Sign Ranges: Temp:  [97.3 °F (36.3 °C)-97.7 °F (36.5 °C)] 97.3 °F (36.3 °C)  Pulse:  [] 87  Resp:  [16-20] 16  SpO2:  [85 %-100 %] 98 %  BP: (114-176)/() 136/79  Most recent vitals: /79   Pulse 87   Temp 97.3 °F (36.3 °C)   Resp 16   Wt 48.5 kg (107 lb)   SpO2 98%   BMI 21.61 kg/m²    GEN:  Chronically ill-appearing white female obvious changes in the neck from previous surgery  HEENT: PERRL, sclera anicteric, oral mucosa pink and moist without lesion  NECK: trachea midline;   CV: regular rate and rhythm, no murmurs or gallops  RESP: clear to auscultation bilaterally, no wheezes, rhonci or rales  ABD: soft, non-tender, non-distended, normal bowel sounds  EXT: no swelling or edema, 2+ pulses distally  SKIN: no rashes or jaundice  PSYCH: normal affect    Labs:   Recent Labs     11/03/22  1341 11/04/22  0603   WBC 6.38 4.94   MCV 87 86   PLT 71* 60*     Recent Labs     11/03/22  1341  11/04/22  0603   * 132*   K 4.2 3.7   CL 99 103   CO2 22* 20*   BUN 29* 15   * 100     No results for input(s): ALB in the last 72 hours.    Invalid input(s): ALKP, SGOT, SGPT, TBIL, DBIL, TPRO  No results for input(s): PT, INR, PTT in the last 72 hours.      Radiology Review:  CT Abdomen Pelvis  Without Contrast   Final Result      X-Ray Chest AP Portable    (Results Pending)         IMPRESSION / RECOMMENDATIONS:  head neck cancer with progressive weight loss inability to may nutrition and some dysphagia.  Plans an upper endoscopy which arranged.  Further recommendations to follow.    Thank you for this consult.    Demarcus Murray  11/4/2022  12:35 PM

## 2022-11-04 NOTE — PLAN OF CARE
Formerly [facility], later [facility]  Initial Discharge Assessment       Primary Care Provider: Casandra Yu NP    Admission Diagnosis: Dehydration [E86.0]    Admission Date: 11/3/2022  Expected Discharge Date:     Discharge Barriers Identified: None    Assessment completed at bedside.  Advanced directives not addressed at this time.  Patient intends to discharge home with her  and KAMILAH [facility](POC Carlos will need new orders)  no other needs identified at this time.    Payor: [employer] / Plan: [facility] CHOICE PLUS / Product Type: Commercial /     Extended Emergency Contact Information  Primary Emergency Contact: Fred Clay  Address: [address] [place] [address] [facility]  Home Phone: [phone]  Mobile Phone: [phone]  Relation: Spouse  Preferred language: English   needed? No  Secondary Emergency Contact: Lidia Alejandro  Mobile Phone: [phone]  Relation: Sister   needed? No    Discharge Plan A: Home with family, Home Health  Discharge Plan B: Home Health      [id number] - [place], [place] - [address] AT [facility] [address] [facility] [address]  [address]  [facility] [address]  Phone: [phone] Fax: [phone]    [facility] - [place], [place] - [address]  [address] [facility] [place] [address]  Phone: [phone] Fax: [phone]      Initial Assessment (most recent)       Adult Discharge Assessment - 11/04/22 1558          Discharge Assessment    Assessment Type Discharge Planning Assessment     Confirmed/corrected address, phone number and insurance Yes     Confirmed Demographics Correct on Facesheet     Source of Information patient     When was your last doctors appointment? --   2 weeks ago    Communicated BRYSON with patient/caregiver Date not available/Unable to determine     Reason For Admission dehydarated     Lives With spouse     Facility Arrived From: home     Do you expect to  return to your current living situation? Yes     Do you have help at home or someone to help you manage your care at home? Yes     Who are your caregiver(s) and their phone number(s)? Fred [phone]     Prior to hospitilization cognitive status: Alert/Oriented     Current cognitive status: Alert/Oriented     Walking or Climbing Stairs Difficulty none     Equipment Currently Used at Home none     Readmission within 30 days? No     Patient currently being followed by outpatient case management? No     Do you currently have service(s) that help you manage your care at home? Yes     Name and Contact number of agency [facility] ([facility] moris [phone])     Is the pt/caregiver preference to resume services with current agency Yes     Do you take prescription medications? Yes     Do you have prescription coverage? Yes     Coverage [facility]     Do you have any problems affording any of your prescribed medications? No     Is the patient taking medications as prescribed? yes     Who is going to help you get home at discharge? Fred [phone]     How do you get to doctors appointments? car, drives self     Are you on dialysis? No     Do you take coumadin? No     Discharge Plan A Home with family;Home Health     Discharge Plan B Home Health     DME Needed Upon Discharge  none     Discharge Plan discussed with: Patient     Discharge Barriers Identified None

## 2022-11-04 NOTE — ANESTHESIA POSTPROCEDURE EVALUATION
Anesthesia Post Evaluation    Patient: Antonieta Clay    Procedure(s) Performed: Procedure(s) (LRB):  EGD (ESOPHAGOGASTRODUODENOSCOPY) (N/A)    Final Anesthesia Type: MAC      Patient location during evaluation: GI PACU  Patient participation: Yes- Able to Participate  Level of consciousness: awake and alert, oriented and awake  Post-procedure vital signs: reviewed and stable  Pain management: adequate  Airway patency: patent    PONV status at discharge: No PONV  Anesthetic complications: no      Cardiovascular status: blood pressure returned to baseline, hemodynamically stable and stable  Respiratory status: unassisted, spontaneous ventilation and room air  Hydration status: euvolemic  Follow-up not needed.          Vitals Value Taken Time   /86 11/04/22 1301   Temp 36.1 °C (97 °F) 11/04/22 1301   Pulse 82 11/04/22 1305   Resp 18 11/04/22 1301   SpO2 100 % 11/04/22 1305   Vitals shown include unvalidated device data.      No case tracking events are documented in the log.      Pain/Nandini Score: No data recorded

## 2022-11-04 NOTE — SUBJECTIVE & OBJECTIVE
Past Medical History:   Diagnosis Date    Cancer     neck    HA (headache)     Hypothyroidism        Past Surgical History:   Procedure Laterality Date    INSERTION OF TUNNELED CENTRAL VENOUS CATHETER (CVC) WITH SUBCUTANEOUS PORT Right 4/4/2022    Procedure: [id number];  Surgeon: Charles Servin III, MD;  Location: [facility];  Service: General;  Laterality: Right;    left temporal bone resection  2020    MODIFIED RADICAL NECK DISSECTION Left 2020    parotidectomy Left 2020       Review of patient's allergies indicates:   Allergen Reactions    Pcn [penicillins] Swelling    Codeine Nausea And Vomiting       Current Facility-Administered Medications on File Prior to Encounter   Medication    0.9%  NaCl infusion    methylPREDNISolone sodium succinate injection 40 mg     Current Outpatient Medications on File Prior to Encounter   Medication Sig    ascorbic acid, vitamin C, (VITAMIN C) 1000 MG tablet Take 1,000 mg by mouth once daily.    cholecalciferol, vitamin D3, 125 mcg (5,000 unit) Tab Take 5,000 Units by mouth once daily.    fluticasone propionate (FLONASE) 50 mcg/actuation nasal spray 2 sprays (100 mcg total) by Each Nostril route once daily.    lenvatinib 4 mg Cap Take 16 mg by mouth once daily at 6am.    levothyroxine (SYNTHROID) 88 MCG tablet Take 1 tablet (88 mcg total) by mouth once daily.    megestroL (MEGACE) 400 mg/10 mL (40 mg/mL) Susp Take 10 mLs (400 mg total) by mouth 2 (two) times daily.    multivit-iron-min-folic acid 18-0.4 mg Tab Take 1 tablet by mouth once daily.     nystatin (MYCOSTATIN) 100,000 unit/mL suspension Take 4 mLs (400,000 Units total) by mouth 4 (four) times daily. for 10 days    omega-3 fatty acids/fish oil (FISH OIL-OMEGA-3 FATTY ACIDS) 300-1,000 mg capsule Take 1 capsule by mouth once daily.    ZINC ACETATE ORAL Take 1 capsule by mouth once daily.     amLODIPine (NORVASC) 5 MG tablet Take 1 tablet (5 mg total) by mouth once daily. (Patient not taking: Reported on  11/3/2022)    oxyCODONE-acetaminophen (PERCOCET)  mg per tablet Take 1 tablet by mouth every 6 (six) hours as needed for Pain. (Patient not taking: Reported on 11/3/2022)    [DISCONTINUED] duke's soln (benadryl 30 mL, mylanta 30 mL, LIDOcaine 30 mL, nystatin 30 mL) 120mL Take 5 mLs by mouth 4 (four) times daily as needed. Swish and spit     Family History       Problem Relation (Age of Onset)    Breast cancer Mother, Maternal Aunt          Tobacco Use    Smoking status: Never    Smokeless tobacco: Never   Substance and Sexual Activity    Alcohol use: Never    Drug use: Never    Sexual activity: Not Currently     Partners: Male     Birth control/protection: None     Review of Systems   Constitutional:  Positive for activity change, appetite change and unexpected weight change. Negative for chills, diaphoresis and fever.   HENT:  Positive for mouth sores. Negative for congestion, postnasal drip, sinus pressure and sore throat.    Eyes:  Negative for visual disturbance.   Respiratory:  Negative for cough, chest tightness, shortness of breath and wheezing.    Cardiovascular:  Negative for chest pain, palpitations and leg swelling.   Gastrointestinal:  Negative for abdominal distention, abdominal pain, blood in stool, constipation, diarrhea, nausea and vomiting.   Endocrine: Negative.    Genitourinary:  Negative for dysuria.   Musculoskeletal:  Positive for gait problem.   Skin:  Positive for pallor.   Allergic/Immunologic: Negative.    Neurological:  Positive for weakness. Negative for dizziness, numbness and headaches.   Hematological: Negative.    Psychiatric/Behavioral: Negative.     Objective:     Vital Signs (Most Recent):  Temp: 97.5 °F (36.4 °C) (11/03/22 1241)  Pulse: 66 (11/03/22 2000)  Resp: 19 (11/03/22 2000)  BP: (!) 156/90 (11/03/22 2000)  SpO2: 99 % (11/03/22 2000)   Vital Signs (24h Range):  Temp:  [97.5 °F (36.4 °C)] 97.5 °F (36.4 °C)  Pulse:  [] 66  Resp:  [17-20] 19  SpO2:  [85 %-100 %] 99  %  BP: (114-176)/() 156/90     Weight: 48.5 kg (107 lb)  Body mass index is 21.61 kg/m².    Physical Exam  Constitutional:       General: She is sleeping. She is not in acute distress.     Appearance: She is underweight. She is ill-appearing. She is not toxic-appearing or diaphoretic.      Interventions: Nasal cannula in place.   HENT:      Head: Normocephalic and atraumatic.      Mouth/Throat:      Mouth: Mucous membranes are moist. Oral lesions (backof throat and hard palate) present.   Eyes:      General: Lids are normal.      Conjunctiva/sclera: Conjunctivae normal.      Pupils: Pupils are equal, round, and reactive to light.   Neck:      Thyroid: No thyroid mass or thyromegaly.      Vascular: Normal carotid pulses. No JVD.      Trachea: Trachea normal. No tracheal deviation.   Cardiovascular:      Rate and Rhythm: Normal rate and regular rhythm.      Pulses: Normal pulses.      Heart sounds: Normal heart sounds, S1 normal and S2 normal.   Pulmonary:      Effort: Pulmonary effort is normal.      Breath sounds: Normal breath sounds. No stridor.   Abdominal:      General: Bowel sounds are normal.      Palpations: Abdomen is soft.      Tenderness: There is no abdominal tenderness.   Musculoskeletal:         General: Normal range of motion.      Cervical back: Full passive range of motion without pain, normal range of motion and neck supple.   Skin:     General: Skin is warm and dry.      Coloration: Skin is pale.      Nails: There is no clubbing.   Neurological:      Mental Status: She is oriented to person, place, and time and easily aroused.      GCS: GCS eye subscore is 4. GCS verbal subscore is 5. GCS motor subscore is 6.      Cranial Nerves: No cranial nerve deficit.      Sensory: No sensory deficit.   Psychiatric:         Speech: Speech normal.         Behavior: Behavior normal. Behavior is cooperative.         Thought Content: Thought content normal.         Judgment: Judgment normal.         CRANIAL  NERVES     CN III, IV, VI   Pupils are equal, round, and reactive to light.     Significant Labs: All pertinent labs within the past 24 hours have been reviewed.  Bilirubin:   Recent Labs   Lab 10/10/22  1000 10/17/22  1012 10/24/22  1335 10/31/22  1200 11/03/22  1341   BILITOT 0.8 0.8 1.0 0.9 1.2*     BMP:   Recent Labs   Lab 11/03/22  1341   *   *   K 4.2   CL 99   CO2 22*   BUN 29*   CREATININE 0.7   CALCIUM 8.9   MG 2.6     CBC:   Recent Labs   Lab 11/03/22  1341   WBC 6.38   HGB 14.6   HCT 41.4   PLT 71*     CMP:   Recent Labs   Lab 11/03/22  1341   *   K 4.2   CL 99   CO2 22*   *   BUN 29*   CREATININE 0.7   CALCIUM 8.9   PROT 7.0   ALBUMIN 3.3*   BILITOT 1.2*   ALKPHOS 63   AST 51*   *   ANIONGAP 12     Magnesium:   Recent Labs   Lab 11/03/22  1341   MG 2.6     TSH:   Recent Labs   Lab 09/21/22  1040   TSH 0.280*       Significant Imaging: I have reviewed all pertinent imaging results/findings within the past 24 hours.  CT Abdomen Pelvis  Without Contrast    Result Date: 11/3/2022  CMS MANDATED QUALITY DATA - CT RADIATION  436 All CT scans at this facility utilize dose modulation, iterative reconstruction, and/or weight based dosing when appropriate to reduce radiation dose to as low as reasonably achievable. CT ABDOMEN PELVIS WITHOUT IV CONTRAST CLINICAL HISTORY: 62 years Female Abdominal abscess/infection suspected COMPARISON: PET/CT images through the abdomen and pelvis April 19, 2022 FINDINGS: Lung bases are clear. Bone window images show no acute or aggressive osseous abnormality. On this noncontrast exam, no focal hepatic lesion. Gallbladder and biliary tree are unremarkable. Spleen appears normal. No evidence of pancreatitis. Duodenal diverticulum noted. No adrenal gland lesion. Left kidney is unremarkable. Right renal cyst measuring 4.2 cm. No renal calculi. No hydronephrosis. Ureters are normal in caliber. Urinary bladder is unremarkable. Stomach is within normal  limits. No evidence of small bowel obstruction. Suture material at the base of the cecum and nonvisualization of the appendix suggestive of prior appendectomy. No findings of colitis. No free fluid or free air within the abdomen or pelvis. Atherosclerotic calcification of the aorta. Uterus and adnexal regions are within normal limits. IMPRESSION: No noncontrast CT evidence of acute pathology involving the abdomen or pelvis. Specifically, negative for abdominal abscess. Aortic atherosclerosis, right renal cyst, and other incidental findings as above. Electronically signed by:  Von Carlton MD  11/3/2022 4:58 PM CDT Workstation: [id number]

## 2022-11-04 NOTE — ASSESSMENT & PLAN NOTE
I had a long discussion with patient and  about the potential cause underlying this.  This could be due to the medication or progression of the cancer.  Will get MRI of the brain to eval the lesion again and hold the therapy.  Will also place her on TPN as she has had very little nutrition in the last 10 days.  I further discussed that, depending on the cause of this process, she will either improve(if due to medication), or worsen(if due to cancer), and that often the imaging can by unremarkable but still have malignant progression in the CNS.  Would keep her her for the next few days, improve her nutrition and see how she does overall.  Dietary consult placed and will order MRI.

## 2022-11-04 NOTE — TRANSFER OF CARE
Anesthesia Transfer of Care Note    Patient: Antonieta Clay    Procedure(s) Performed: Procedure(s) (LRB):  EGD (ESOPHAGOGASTRODUODENOSCOPY) (N/A)    Patient location: GI    Anesthesia Type: MAC    Transport from OR: Transported from OR on room air with adequate spontaneous ventilation    Post pain: adequate analgesia    Post assessment: no apparent anesthetic complications    Post vital signs: stable    Level of consciousness: awake and alert    Nausea/Vomiting: no nausea/vomiting    Complications: none    Transfer of care protocol was followed      Last vitals:   Visit Vitals  /79   Pulse 87   Temp 36.3 °C (97.3 °F)   Resp 16   Wt 48.5 kg (107 lb)   SpO2 98%   BMI 21.61 kg/m²

## 2022-11-04 NOTE — H&P
[facility] - Emergency Dept  Hospital Medicine  History & Physical    Patient Name: Antonieta Clay  MRN: [id number]  Patient Class: OP- Observation  Admission Date: 11/3/2022  Attending Physician: Kat Serrano MD   Primary Care Provider: Casandra Yu NP         Patient information was obtained from patient, spouse/SO and ER records.     Subjective:     Principal Problem:Dehydration with hyponatremia    Chief Complaint:   Chief Complaint   Patient presents with    Weakness    Dehydration    Mouth Lesions     Pt sent in by home health for dehydration. Pt's  states she has not been eating or drinking and has thrush.        HPI: Antonieta Clay is a 62 y.o. female with a history as  has a past medical history of Cancer, HA (headache), and Hypothyroidism. who presented to the ED with a Weakness, Dehydration, and Mouth Lesions (Pt sent in by home health for dehydration. Pt's  states she has not been eating or drinking and has thrush.)    Patient presented to the emergency room for evaluation of dysphagia, thrush in mouth, dehydration, gradually worsening weakness, poor nutrition, 20 lb weight loss over last 6 weeks.   at bedside reports patient has cancer of the salivary gland and is currently being treated with oral chemo medication (Lenvatinib) for about 3 months.  She is followed by Dr. Sagastume, who is aware of these changes.  Spouse further reports home health services was ordered (ST/PT/OT and nurse) with services starting this week.   also reports she was also started on nystatin for thrush, me yesterday all for appetite.  Weakness and debility has significantly changed over the last couple days, as patient is too weak to walk.   further states, on October 28 patient was out shopping on her own without any problems.      reports patient was seen by speech therapy today, who was concerned about possible esophageal narrowing and recommended that  she be evaluated in the emergency room.     Lab and imaging obtained and reviewed.  CBC completed and showed RDW 16.7 platelets 71 otherwise unremarkable.  Chemistry profile shows  sodium 133 CO2 22 BUN 29 glucose 148 albumin 3.3 total albumin 1.2 AST/ALT 51/132    CT abdomen and pelvis without acute intra-abdominal findings.      In ED, IV fluid hydration initiated with recommendations for GI consult.       Past Medical History:   Diagnosis Date    Cancer     neck    HA (headache)     Hypothyroidism        Past Surgical History:   Procedure Laterality Date    INSERTION OF TUNNELED CENTRAL VENOUS CATHETER (CVC) WITH SUBCUTANEOUS PORT Right 4/4/2022    Procedure: [id number];  Surgeon: Charles Servin III, MD;  Location: [facility];  Service: General;  Laterality: Right;    left temporal bone resection  2020    MODIFIED RADICAL NECK DISSECTION Left 2020    parotidectomy Left 2020       Review of patient's allergies indicates:   Allergen Reactions    Pcn [penicillins] Swelling    Codeine Nausea And Vomiting       Current Facility-Administered Medications on File Prior to Encounter   Medication    0.9%  NaCl infusion    methylPREDNISolone sodium succinate injection 40 mg     Current Outpatient Medications on File Prior to Encounter   Medication Sig    ascorbic acid, vitamin C, (VITAMIN C) 1000 MG tablet Take 1,000 mg by mouth once daily.    cholecalciferol, vitamin D3, 125 mcg (5,000 unit) Tab Take 5,000 Units by mouth once daily.    fluticasone propionate (FLONASE) 50 mcg/actuation nasal spray 2 sprays (100 mcg total) by Each Nostril route once daily.    lenvatinib 4 mg Cap Take 16 mg by mouth once daily at 6am.    levothyroxine (SYNTHROID) 88 MCG tablet Take 1 tablet (88 mcg total) by mouth once daily.    megestroL (MEGACE) 400 mg/10 mL (40 mg/mL) Susp Take 10 mLs (400 mg total) by mouth 2 (two) times daily.    multivit-iron-min-folic acid 18-0.4 mg Tab Take 1 tablet by mouth once daily.     nystatin  (MYCOSTATIN) 100,000 unit/mL suspension Take 4 mLs (400,000 Units total) by mouth 4 (four) times daily. for 10 days    omega-3 fatty acids/fish oil (FISH OIL-OMEGA-3 FATTY ACIDS) 300-1,000 mg capsule Take 1 capsule by mouth once daily.    ZINC ACETATE ORAL Take 1 capsule by mouth once daily.     amLODIPine (NORVASC) 5 MG tablet Take 1 tablet (5 mg total) by mouth once daily. (Patient not taking: Reported on 11/3/2022)    oxyCODONE-acetaminophen (PERCOCET)  mg per tablet Take 1 tablet by mouth every 6 (six) hours as needed for Pain. (Patient not taking: Reported on 11/3/2022)    [DISCONTINUED] duke's soln (benadryl 30 mL, mylanta 30 mL, LIDOcaine 30 mL, nystatin 30 mL) 120mL Take 5 mLs by mouth 4 (four) times daily as needed. Swish and spit     Family History       Problem Relation (Age of Onset)    Breast cancer Mother, Maternal Aunt          Tobacco Use    Smoking status: Never    Smokeless tobacco: Never   Substance and Sexual Activity    Alcohol use: Never    Drug use: Never    Sexual activity: Not Currently     Partners: Male     Birth control/protection: None     Review of Systems   Constitutional:  Positive for activity change, appetite change and unexpected weight change. Negative for chills, diaphoresis and fever.   HENT:  Positive for mouth sores. Negative for congestion, postnasal drip, sinus pressure and sore throat.    Eyes:  Negative for visual disturbance.   Respiratory:  Negative for cough, chest tightness, shortness of breath and wheezing.    Cardiovascular:  Negative for chest pain, palpitations and leg swelling.   Gastrointestinal:  Negative for abdominal distention, abdominal pain, blood in stool, constipation, diarrhea, nausea and vomiting.   Endocrine: Negative.    Genitourinary:  Negative for dysuria.   Musculoskeletal:  Positive for gait problem.   Skin:  Positive for pallor.   Allergic/Immunologic: Negative.    Neurological:  Positive for weakness. Negative for dizziness, numbness and  headaches.   Hematological: Negative.    Psychiatric/Behavioral: Negative.     Objective:     Vital Signs (Most Recent):  Temp: 97.5 °F (36.4 °C) (11/03/22 1241)  Pulse: 66 (11/03/22 2000)  Resp: 19 (11/03/22 2000)  BP: (!) 156/90 (11/03/22 2000)  SpO2: 99 % (11/03/22 2000)   Vital Signs (24h Range):  Temp:  [97.5 °F (36.4 °C)] 97.5 °F (36.4 °C)  Pulse:  [] 66  Resp:  [17-20] 19  SpO2:  [85 %-100 %] 99 %  BP: (114-176)/() 156/90     Weight: 48.5 kg (107 lb)  Body mass index is 21.61 kg/m².    Physical Exam  Constitutional:       General: She is sleeping. She is not in acute distress.     Appearance: She is underweight. She is ill-appearing. She is not toxic-appearing or diaphoretic.      Interventions: Nasal cannula in place.   HENT:      Head: Normocephalic and atraumatic.      Mouth/Throat:      Mouth: Mucous membranes are moist. Oral lesions (backof throat and hard palate) present.   Eyes:      General: Lids are normal.      Conjunctiva/sclera: Conjunctivae normal.      Pupils: Pupils are equal, round, and reactive to light.   Neck:      Thyroid: No thyroid mass or thyromegaly.      Vascular: Normal carotid pulses. No JVD.      Trachea: Trachea normal. No tracheal deviation.   Cardiovascular:      Rate and Rhythm: Normal rate and regular rhythm.      Pulses: Normal pulses.      Heart sounds: Normal heart sounds, S1 normal and S2 normal.   Pulmonary:      Effort: Pulmonary effort is normal.      Breath sounds: Normal breath sounds. No stridor.   Abdominal:      General: Bowel sounds are normal.      Palpations: Abdomen is soft.      Tenderness: There is no abdominal tenderness.   Musculoskeletal:         General: Normal range of motion.      Cervical back: Full passive range of motion without pain, normal range of motion and neck supple.   Skin:     General: Skin is warm and dry.      Coloration: Skin is pale.      Nails: There is no clubbing.   Neurological:      Mental Status: She is oriented to  person, place, and time and easily aroused.      GCS: GCS eye subscore is 4. GCS verbal subscore is 5. GCS motor subscore is 6.      Cranial Nerves: No cranial nerve deficit.      Sensory: No sensory deficit.   Psychiatric:         Speech: Speech normal.         Behavior: Behavior normal. Behavior is cooperative.         Thought Content: Thought content normal.         Judgment: Judgment normal.         CRANIAL NERVES     CN III, IV, VI   Pupils are equal, round, and reactive to light.     Significant Labs: All pertinent labs within the past 24 hours have been reviewed.  Bilirubin:   Recent Labs   Lab 10/10/22  1000 10/17/22  1012 10/24/22  1335 10/31/22  1200 11/03/22  1341   BILITOT 0.8 0.8 1.0 0.9 1.2*     BMP:   Recent Labs   Lab 11/03/22  1341   *   *   K 4.2   CL 99   CO2 22*   BUN 29*   CREATININE 0.7   CALCIUM 8.9   MG 2.6     CBC:   Recent Labs   Lab 11/03/22  1341   WBC 6.38   HGB 14.6   HCT 41.4   PLT 71*     CMP:   Recent Labs   Lab 11/03/22  1341   *   K 4.2   CL 99   CO2 22*   *   BUN 29*   CREATININE 0.7   CALCIUM 8.9   PROT 7.0   ALBUMIN 3.3*   BILITOT 1.2*   ALKPHOS 63   AST 51*   *   ANIONGAP 12     Magnesium:   Recent Labs   Lab 11/03/22  1341   MG 2.6     TSH:   Recent Labs   Lab 09/21/22  1040   TSH 0.280*       Significant Imaging: I have reviewed all pertinent imaging results/findings within the past 24 hours.  CT Abdomen Pelvis  Without Contrast    Result Date: 11/3/2022  CMS MANDATED QUALITY DATA - CT RADIATION  436 All CT scans at this facility utilize dose modulation, iterative reconstruction, and/or weight based dosing when appropriate to reduce radiation dose to as low as reasonably achievable. CT ABDOMEN PELVIS WITHOUT IV CONTRAST CLINICAL HISTORY: 62 years Female Abdominal abscess/infection suspected COMPARISON: PET/CT images through the abdomen and pelvis April 19, 2022 FINDINGS: Lung bases are clear. Bone window images show no acute or aggressive  osseous abnormality. On this noncontrast exam, no focal hepatic lesion. Gallbladder and biliary tree are unremarkable. Spleen appears normal. No evidence of pancreatitis. Duodenal diverticulum noted. No adrenal gland lesion. Left kidney is unremarkable. Right renal cyst measuring 4.2 cm. No renal calculi. No hydronephrosis. Ureters are normal in caliber. Urinary bladder is unremarkable. Stomach is within normal limits. No evidence of small bowel obstruction. Suture material at the base of the cecum and nonvisualization of the appendix suggestive of prior appendectomy. No findings of colitis. No free fluid or free air within the abdomen or pelvis. Atherosclerotic calcification of the aorta. Uterus and adnexal regions are within normal limits. IMPRESSION: No noncontrast CT evidence of acute pathology involving the abdomen or pelvis. Specifically, negative for abdominal abscess. Aortic atherosclerosis, right renal cyst, and other incidental findings as above. 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       Plan:  Admit to observation   IVF hydration  Continue nystatin for thrush  GI consulted for dysphagia rule out esophageal stricture  Oncology consult appreciated  ST eval with recs   Nutrition eval  NPO post midnight  Bedside swallow study  Continue chronic home medications  Patient okay to have home chemo mediations  at bedside for administration (lenvatinib 4mg tabs - take 4 tabs (16 mg) at 0600 daily  Daily labs  Electrolyte replacement scale    VTE Risk Mitigation (From admission, onward)           Ordered     IP VTE HIGH RISK PATIENT  Once         11/03/22 2020     Place sequential compression device  Until discontinued         11/03/22 2020                When using       JANI Hardy  Department of Hospital Medicine   Formerly [facility] - Emergency Dept

## 2022-11-04 NOTE — [employer]OVATION PATIENT INSTRUCTIONS
Discharge Summary/Instructions after an Endoscopic Procedure  Patient Name: Antonieta Clay  Patient MRN: [id number]  Patient YOB: 1960  Friday, November 4, 2022  Demarcus Murray MD  RESTRICTIONS:  During your procedure today, you received medications for sedation.  These   medications may affect your judgment, balance and coordination.  Therefore,   for 24 hours, you have the following restrictions:   - DO NOT drive a car, operate machinery, make legal/financial decisions,   sign important papers or drink alcohol.    ACTIVITY:  Today: no heavy lifting, straining or running due to procedural   sedation/anesthesia.  The following day: return to full activity including work.  DIET:  Eat and drink normally unless instructed otherwise.     TREATMENT FOR COMMON SIDE EFFECTS:  - Mild abdominal pain, nausea, belching, bloating or excessive gas:  rest,   eat lightly and use a heating pad.  - Sore Throat: treat with throat lozenges and/or gargle with warm salt   water.  - Because air was used during the procedure, expelling large amounts of air   from your rectum or belching is normal.  - If a bowel prep was taken, you may not have a bowel movement for 1-3 days.    This is normal.  SYMPTOMS TO WATCH FOR AND REPORT TO YOUR PHYSICIAN:  1. Abdominal pain or bloating, other than gas cramps.  2. Chest pain.  3. Back pain.  4. Signs of infection such as: chills or fever occurring within 24 hours   after the procedure.  5. Rectal bleeding, which would show as bright red, maroon, or black stools.   (A tablespoon of blood from the rectum is not serious, especially if   hemorrhoids are present.)  6. Vomiting.  7. Weakness or dizziness.  GO DIRECTLY TO THE NEAREST EMERGENCY ROOM IF YOU HAVE ANY OF THE FOLLOWING:      Difficulty breathing              Chills and/or fever over 101 F   Persistent vomiting and/or vomiting blood   Severe abdominal pain   Severe chest pain   Black, tarry stools   Bleeding- more than one  tablespoon   Any other symptom or condition that you feel may need urgent attention  Your doctor recommends these additional instructions:  If any biopsies were taken, your doctors clinic will contact you in 1 to 2   weeks with any results.  - Return patient to hospital [facility] for ongoing care.   - Await pathology results.   - Await viral studies.  For questions, problems or results please call your physician - Demarcus Murray MD at Work:  [phone].  [facility], EMERGENCY ROOM PHONE NUMBER: [phone]  IF A COMPLICATION OR EMERGENCY SITUATION ARISES AND YOU ARE UNABLE TO REACH   YOUR PHYSICIAN - GO DIRECTLY TO THE EMERGENCY ROOM.  Demarcus Murray MD  11/4/2022 12:55:43 PM  This report has been verified and signed electronically.  Dear patient,  As a result of recent federal legislation (The Federal Cures Act), you may   receive lab or pathology results from your procedure in your [facility]   account before your physician is able to contact you. Your physician or   their representative will relay the results to you with their   recommendations at their soonest availability.  Thank you,  [employer]ATION

## 2022-11-04 NOTE — PLAN OF CARE
Problem: Parenteral Nutrition  Goal: Effective Intravenous Nutrition Therapy Delivery  Outcome: Ongoing, Progressing  Intervention: Optimize Intravenous Nutrition Delivery  Flowsheets (Taken 11/4/2022 1041)  Nutrition Support Management: (New TPN. TPN adjusted for macronutrients and electrolytes.) other (see comments)

## 2022-11-05 NOTE — PROGRESS NOTES
MRI of the Brain was done with and without contrast. Patient has a hx of lung ca. R/o mets. Patient received 4.0ml of gadavist in l-arm iv.

## 2022-11-05 NOTE — PROGRESS NOTES
[facility]  Adult Nutrition   Progress Note (Nutrition Support Management)    SUMMARY     Recommendations  Recommendation/Intervention:   New TPN ordered to start @ 1700. Run @ 60 ml/hr.   Noted hypophosphatemia and hypokalemia, recommend replacement and continued monitoring.   CLD started. Recommend diet advancement as medically able and as per SLP recommendations.   Once diet is advanced to full liquid or greater, RD to add Strawberry Ensure Plus HP + Ice Cream Milkshake TID to better meet needs (provides 590 kcal and 34 g protein per milkshake)   Once patient's oral intake is anticipated to meet 60% or more of EEN/EPN, recommend transitional feeding from TPN to PO.  Goals:   Patient to tolerate TPN.   Electrolytes to trend towards target range.   PO intake + PN to meet at least 75% EEN/EPN.  Nutrition Goal Status: new, progressing towards goal  Communication of RD Recs: reviewed with physician (reviewed with RN)    Dietitian Rounds Brief  Patient resting at rounds, RD spoke with patient's . PO intake/tolerance o CLD is good. Patient did trial some apple sauce and did well.  noted that once diet is advanced patient may have trouble chewing foods due to upper dentures no longer fit. Pt likes cold foods. Also likes beef broth. Agreed to ONS (Strawberry Ensure Plus HP + Ice cream milkshake) once diet is advanced. RD notified MD of recommendations/plan of care regarding nutrition support. RD requested RN replace electrolytes. LR @ 100 ml/hr still running, requested adjustment or discontinuation of LR, MD agrees. RD will continue to monitor and manage nutrition support.     PARENTERAL NUTRITION PROGRESS NOTE:      Parenteral Nutrition Day # 2  Diagnosis/Indication for PN: Wt loss, unable to eat/drink w/o difficulty, salivary gland cancer, swallowing difficulty, dehydration   IV Access: Port A cath  Diet/Tube Feeding: clear liquid      Admixture Type: 3 in 1  Infusion Rate and Frequency: 60  "mL/hr  Patient Acuity: Acute     PN Composition:   100 grams Amino Acid, 112 grams Dextrose, and 50 grams Lipid.    Today's TPN provides 1281 kcal.  *Dextrose is started at less than full dextrose goal to reduce risk for Refeeding Syndrome. Dextrose content will be advanced as appropriate over the next few days.     Please see order for electrolytes and additives content and adjustments.   *The Nutrition Support Team will continue to monitor electrolytes daily and adjust parenteral nutrition as warranted.     Malnutrition Assessment:  Severe malnutrition related to decreased intake in pt w/ increased needs d/t salivary gland cancer as evidenced by wt loss of 16 lbs (13% body wt loss) within 2 months, and < 75% of estimated energy requirements for > 1 month.     Reason for Assessment  Reason For Assessment: RD follow-up, new TPN  Diagnosis: other (see comments) (Dehydration with hyponatremia)  Relevant Medical History: Malignant neoplasm of major salivary gland, Hypothyroidism, Chemotherapy induced neutropenia    Nutrition Risk Screen  Nutrition Risk Screen: tube feeding or parenteral nutrition     MST Score: 2  Have you recently lost weight without trying?: Yes: 2-13 lbs  Weight loss score: 1  Have you been eating poorly because of a decreased appetite?: Yes  Appetite score: 1       Nutrition/Diet History  Food Allergies: NKFA  Factors Affecting Nutritional Intake: clear liquid diet, chewing difficulties/inability to chew food, difficulty/impaired swallowing, decreased appetite    Anthropometrics  Temp: 98.2 °F (36.8 °C)  Height Method: Stated  Height: 5' 4" (162.6 cm)  Height (inches): 64 in  Weight Method: Bed Scale  Weight: 48.5 kg (107 lb)  Weight (lb): 107 lb  Ideal Body Weight (IBW), Female: 120 lb  % Ideal Body Weight, Female (lb): 89.17 %  BMI (Calculated): 18.4  BMI Grade: 18.5-24.9 - normal       Weight History:  Wt Readings from Last 10 Encounters:   11/05/22 48.5 kg (107 lb)   10/26/22 48.6 kg (107 lb " 1.6 oz)   10/12/22 50.8 kg (112 lb)   09/28/22 52.2 kg (115 lb)   09/27/22 51.9 kg (114 lb 6.4 oz)   09/21/22 53 kg (116 lb 13.5 oz)   09/19/22 54.5 kg (120 lb 3.2 oz)   09/16/22 54.4 kg (120 lb)   09/14/22 54.5 kg (120 lb 1.6 oz)   09/06/22 55.8 kg (123 lb)       Lab/Procedures/Meds: Pertinent Labs Reviewed    Clinical Chemistry:  Recent Labs   Lab 11/03/22  1341 11/04/22  0603 11/05/22  0440   * 132* 131*   K 4.2 3.7 3.2*   CL 99 103 93*   CO2 22* 20* 29   * 100 167*   BUN 29* 15 20   CREATININE 0.7 0.4* 0.3*   CALCIUM 8.9 8.3* 7.8*   PROT 7.0 5.6* 5.7*   ALBUMIN 3.3* 2.6* 2.6*   BILITOT 1.2* 1.1* 0.9   ALKPHOS 63 49* 52*   AST 51* 38 35   * 94* 80*   ANIONGAP 12 9 9   MG 2.6 2.1 2.3   PHOS 3.2 2.7 2.0*       CBC:   Recent Labs   Lab 11/05/22  0440   WBC 3.98   RBC 4.04   HGB 12.4   HCT 34.9*   PLT 52*   MCV 86   MCH 30.7   MCHC 35.5       Lipid Panel:  Recent Labs   Lab 11/05/22  0440   TRIG 266*         Medications: Pertinent Medications reviewed    Scheduled Meds:   amLODIPine  5 mg Oral Daily    ascorbic acid (vitamin C)  1,000 mg Oral Daily    chlorhexidine  15 mL Mouth/Throat BID    cholecalciferol (vitamin D3)  5,000 Units Oral Daily    dexAMETHasone  4 mg Oral Q8H    fluconazole (DIFLUCAN) IV (PEDS and ADULTS)  200 mg Intravenous Q24H    levothyroxine  88 mcg Oral Daily    multivitamin  1 tablet Oral Daily    nystatin  4 mL Oral QID    pantoprazole  40 mg Intravenous Q24H       Continuous Infusions:   TPN ADULT CENTRAL LINE CUSTOM (3 in 1) 60 mL/hr at 11/04/22 1707    TPN ADULT CENTRAL LINE CUSTOM (3 in 1)         PRN Meds:.acetaminophen, ALPRAZolam, dextrose 10%, dextrose 10%, glucagon (human recombinant), glucose, glucose, insulin aspart U-100, magnesium oxide, magnesium oxide, naloxone, ondansetron, potassium bicarbonate, potassium bicarbonate, potassium bicarbonate, potassium, sodium phosphates, potassium, sodium phosphates, potassium, sodium phosphates, sodium chloride  0.9%    Estimated/Assessed Needs  Weight Used For Calorie Calculations: 48.5 kg (106 lb 14.8 oz)  Energy Calorie Requirements (kcal): 3544-4599 (30-35)  Energy Need Method: Kcal/kg  Protein Requirements:  (1.5-2.5 gm/kg)  Weight Used For Protein Calculations: 48.5 kg (106 lb 14.8 oz)  Fluid Requirements (mL): 1455 (30 ml/kg)     RDA Method (mL): 1455       Nutrition Prescription Ordered  Current Diet Order: clear liquid  Current Nutrition Support Formula Ordered: Other (Comment) ([place] 3-in-1 TPN)    Evaluation of Received Nutrient/Fluid Intake  Parenteral Calories (kcal): 1295  Parenteral Protein (gm): 100  Parenteral Fluid (mL): 1440  Energy Calories Required: not meeting needs  Protein Required: meeting needs  Fluid Required: exceeds needs  Tolerance: tolerating     Intake/Output Summary (Last 24 hours) at 11/5/2022 1159  Last data filed at 11/5/2022 0551  Gross per 24 hour   Intake 720 ml   Output 800 ml   Net -80 ml      % Intake of Estimated Energy Needs: 75 - 100 %  % Meal Intake: 0 - 25 %    Nutrition Risk  Level of Risk/Frequency of Follow-up: high     Monitor and Evaluation  Food and Nutrient Intake: energy intake, food and beverage intake, parenteral nutrition intake  Food and Nutrient Adminstration: diet order, enteral and parenteral nutrition administration  Knowledge/Beliefs/Attitudes: food and nutrition knowledge/skill  Physical Activity and Function: nutrition-related ADLs and IADLs, factors affecting access to physical activity  Anthropometric Measurements: weight, weight change, body mass index  Biochemical Data, Medical Tests and Procedures: electrolyte and renal panel, inflammatory profile, gastrointestinal profile, lipid profile, glucose/endocrine profile  Nutrition-Focused Physical Findings: overall appearance     Nutrition Follow-Up  RD Follow-up?: Yes    Janet Hummel RD 11/05/2022 11:59 AM

## 2022-11-05 NOTE — PLAN OF CARE
Problem: Adult Inpatient Plan of Care  Goal: Plan of Care Review  Outcome: Ongoing, Progressing  Goal: Patient-Specific Goal (Individualized)  Outcome: Ongoing, Progressing  Goal: Absence of Hospital-Acquired Illness or Injury  Outcome: Ongoing, Progressing  Goal: Optimal Comfort and Wellbeing  Outcome: Ongoing, Progressing  Goal: Readiness for Transition of Care  Outcome: Ongoing, Progressing     Problem: Fall Injury Risk  Goal: Absence of Fall and Fall-Related Injury  Outcome: Ongoing, Progressing     Problem: Parenteral Nutrition  Goal: Effective Intravenous Nutrition Therapy Delivery  Outcome: Ongoing, Progressing     Problem: Skin Injury Risk Increased  Goal: Skin Health and Integrity  Outcome: Ongoing, Progressing     Problem: Infection  Goal: Absence of Infection Signs and Symptoms  Outcome: Ongoing, Progressing

## 2022-11-05 NOTE — CARE UPDATE
11/05/22 0857   Patient Assessment/Suction   Level of Consciousness (AVPU) alert   Respiratory Effort Normal;Unlabored   Expansion/Accessory Muscles/Retractions no use of accessory muscles   All Lung Fields Breath Sounds diminished   Rhythm/Pattern, Respiratory unlabored   PRE-TX-O2   O2 Device (Oxygen Therapy) nasal cannula   $ Is the patient on Low Flow Oxygen? Yes   Oxygen Concentration (%) 2   SpO2 99 %   Pulse Oximetry Type Intermittent   $ Pulse Oximetry - Multiple Charge Pulse Oximetry - Multiple   Pulse 82   Resp 17   Education   $ Education Other (see comment);15 min  (sats)   Respiratory Evaluation   $ Care Plan Tech Time 15 min   $ Eval/Re-eval Charges Re-evaluation

## 2022-11-05 NOTE — PROGRESS NOTES
formerly [facility] Medicine  Progress Note    Patient name: Antonieta Clay  MRN: [id number]  Admit Date: 11/3/2022   LOS: 1 day     SUBJECTIVE:     Principal problem: Dehydration with hyponatremia    Interval History: MRI of the brain performed and negative for acute process. I have put her on room air given CXR negative for acute process and will monitor for any o2 needs. Long discussion with patient and her . It sounds like she was ambulatory and able to go out and shop approximately 10/19.  By 10/21, she was showing signs of weakness and needing to be assisted to get off the toilet or out of the bath tub.  For the last few days prior to admission, she was sleeping a good deal and not getting off the couch and her  was having to provide a lot of physical support to hep her ambulate.  I have asked that the purewick be discontinued and we will get her up to the bedside toilet and start mobilizing her out of bed to work on getting her stronger.  PT/OT consult.  I have advanced her diet to full liquid and will keep advancing as she tolerates. I've told her  he can bring her whatever she would like.  Presently, she denies odynophagia and instead tells me she does not know what she would like to eat and thus has been not eating due to lack of desire.  She does want some mashed potatoes today.      Hospital Course:    Patient admitted after being sent by home health for dehydration concerns.  She has been having poor po intake thought to be related to thrush.  In addition, reports of dysphagia and odynophagia. It is estimated that she has last 20lbs over the last 6 weeks.  She is on oral chemo Lenvatinib.  She had recent hospitalization for confusion.  There was concern regarding CNS involvement of her malignancy. This work up was ultimately negative and she improved.  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 There was also  concern for espophageal stenosis and thus GI consulted.  She underwent EGD 11/4 which revealed ulcerated and inflamed mucosa of the esophagus with multiple shallow ulcers that is consistent with viral esophagitis.  Biopsies taken. She was seen by Dr. Sagastume who plans for repeat MRI of the brain to help differentiate if her clinical decline is CNS involvement of her malignancy versus chemo induced. Lenvatinib has been stopped.  He has ordered TPN.  I have added IV Diflucan in addition to oral nystatin.  She is requiring supplemental oxygen and thus CXR ordered- no acute processes. Supplemental oxygen discontinued.     Scheduled Meds:   amLODIPine  5 mg Oral Daily    ascorbic acid (vitamin C)  1,000 mg Oral Daily    chlorhexidine  15 mL Mouth/Throat BID    cholecalciferol (vitamin D3)  5,000 Units Oral Daily    dexAMETHasone  4 mg Oral Q8H    fluconazole (DIFLUCAN) IV (PEDS and ADULTS)  200 mg Intravenous Q24H    levothyroxine  88 mcg Oral Daily    multivitamin  1 tablet Oral Daily    nystatin  4 mL Oral QID    pantoprazole  40 mg Intravenous Q24H     Continuous Infusions:   TPN ADULT CENTRAL LINE CUSTOM (3 in 1) 60 mL/hr at 11/05/22 1700     PRN Meds:acetaminophen, ALPRAZolam, calcium gluconate IVPB, calcium gluconate IVPB, calcium gluconate IVPB, dextrose 10%, dextrose 10%, glucagon (human recombinant), glucose, glucose, insulin aspart U-100, magnesium oxide, magnesium oxide, magnesium sulfate IVPB, magnesium sulfate IVPB, naloxone, ondansetron, potassium bicarbonate, potassium bicarbonate, potassium bicarbonate, potassium chloride **AND** potassium chloride **AND** potassium chloride, potassium, sodium phosphates, potassium, sodium phosphates, potassium, sodium phosphates, sodium chloride 0.9%, sodium phosphate IVPB, sodium phosphate IVPB, sodium phosphate IVPB    Review of patient's allergies indicates:   Allergen Reactions    Pcn [penicillins] Swelling    Codeine Nausea And Vomiting       Review of Systems: As  per interval history    OBJECTIVE:     Vital Signs (Most Recent)  Temp: 98.2 °F (36.8 °C) (11/05/22 0705)  Pulse: 82 (11/05/22 0857)  Resp: 17 (11/05/22 0857)  BP: 106/72 (11/05/22 0705)  SpO2: 99 % (11/05/22 0857)    Vital Signs Range (Last 24H):  Temp:  [98 °F (36.7 °C)-98.7 °F (37.1 °C)]   Pulse:  [64-88]   Resp:  [17-20]   BP: (102-138)/(69-89)   SpO2:  [93 %-99 %]     I & O (Last 24H):  Intake/Output Summary (Last 24 hours) at 11/5/2022 1852  Last data filed at 11/5/2022 0551  Gross per 24 hour   Intake 720 ml   Output 800 ml   Net -80 ml       Physical Exam:    Vitals and nursing note reviewed.     Constitutional:       General: Not in acute distress. Frail appearing.      Appearance: Well-developed.   HENT:      Head: Normocephalic and atraumatic.   Eyes:      Pupils: Pupils are equal, round, and reactive to light.   Cardiovascular:      Rate and Rhythm: Regular rhythm.   Pulmonary:      Effort: Pulmonary effort is normal.      Breath sounds: Normal breath sounds. No wheezing.   Abdominal:      General: There is no distension.      Palpations: Abdomen is soft.      Tenderness: There is no abdominal tenderness. There is no guarding or rebound.   Musculoskeletal:         General: Normal range of motion.      Cervical back: Normal range of motion.   Skin:     Findings: No rash.   Neurological:      Mental Status: Alert and oriented to person, place, and time.      Cranial Nerves: No cranial nerve deficit.      Sensory: No sensory deficit.     Laboratory:  I have reviewed all pertinent lab results within the past 24 hours.  CBC:   Recent Labs   Lab 11/05/22  0440   WBC 3.98   RBC 4.04   HGB 12.4   HCT 34.9*   PLT 52*   MCV 86   MCH 30.7   MCHC 35.5       CMP:   Recent Labs   Lab 11/05/22  0440   *   CALCIUM 7.8*   ALBUMIN 2.6*   PROT 5.7*   *   K 3.2*   CO2 29   CL 93*   BUN 20   CREATININE 0.3*   ALKPHOS 52*   ALT 80*   AST 35   BILITOT 0.9         Diagnostic Results:      ASSESSMENT/PLAN:          Active Hospital Problems    Diagnosis  POA    *Dehydration with hyponatremia [E86.0, E87.1]  Yes    Adult failure to thrive [R62.7]  Unknown    Physical debility [R53.81]  Yes    Mild protein-calorie malnutrition [E44.1]  Yes    Transaminitis [R74.01]  Yes    Hypothyroidism [E03.9]  Yes    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 Margin positive  T4aN3b          Resolved Hospital Problems   No resolved problems to display.         Plan:     -Nystatin and IV Diflucan for thrush and concern for fungal esophagitis  -s/p EGD with ulcerated esophagus. Biopsies taken with results pending.  -CXR given need for supplemental oxygen.  CXR with no acute process. Supplemental oxygen discontinued.   -Trending Na and stable  -TPN. Advancing diet as tolerates due to pain.  No pain today.  -MRI brain as recommended by Oncology-negative for metastatic disease or other acute process.  -She has been on daily dexamethasone since last admission in September.  I have restarted and will discuss with heme onc if needs to wean down and off.  -PT/OT.  Out of bed as much as possible to work on strengthening and conditioning.     VTE Risk Mitigation (From admission, onward)           Ordered     IP VTE HIGH RISK PATIENT  Once         11/03/22 2020     Place sequential compression device  Until discontinued         11/03/22 2020                      Department Hospital Medicine  [facility]  Amairani Kaplan MD  Date of service: 11/05/2022

## 2022-11-05 NOTE — PLAN OF CARE
Problem: Parenteral Nutrition  Goal: Effective Intravenous Nutrition Therapy Delivery  Outcome: Ongoing, Progressing  Intervention: Optimize Intravenous Nutrition Delivery  Flowsheets (Taken 11/5/2022 1159)  Nutrition Support Management: parenteral nutrition continued as ordered     Problem: Oral Intake Inadequate  Goal: Improved Oral Intake  Outcome: Ongoing, Progressing  Intervention: Promote and Optimize Oral Intake  Flowsheets (Taken 11/5/2022 1151)  Oral Nutrition Promotion:   calorie-dense liquids provided   nutritional therapy counseling provided

## 2022-11-05 NOTE — PROGRESS NOTES
[facility] Medicine  Progress Note    Patient name: Antonieta Clay  MRN: [id number]  Admit Date: 11/3/2022   LOS: 0 days     SUBJECTIVE:     Principal problem: Dehydration with hyponatremia    Interval History:  Patient admitted overnight after being sent by home health for dehydration concerns.  She has been having poor po intake thought to be related to thrush.  In addition, reports of dysphagia and odynophagia. It is estimated that she has last 20lbs over the last 6 weeks.  She is on oral chemo Lenvatinib.  She had recent hospitalization for confusion.  There was concern regarding CNS involvement of her malignancy. This work up was ultimately negative and she improved.  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 There was also concern for espophageal stenosis and thus GI consulted.  She underwent EGD 11/4 which revealed ulcerated and inflamed mucosa of the esophagus with multiple shallow ulcers that is consistent with viral esophagitis.  Biopsies taken. She was seen by Dr. Sagastume who plans for repeat MRI of the brain to help differentiate if her clinical decline is CNS involvement of her malignancy versus chemo induced. Lenvatinib has been stopped.  He has ordered TPN.  I have added IV Diflucan in addition to oral nystatin.  She is requiring supplemental oxygen and thus CXR ordered- no acute processes.     Hospital Course:    Scheduled Meds:   amLODIPine  5 mg Oral Daily    ascorbic acid (vitamin C)  1,000 mg Oral Daily    chlorhexidine  15 mL Mouth/Throat BID    cholecalciferol (vitamin D3)  5,000 Units Oral Daily    fluconazole (DIFLUCAN) IV (PEDS and ADULTS)  200 mg Intravenous Q24H    levothyroxine  88 mcg Oral Daily    multivitamin  1 tablet Oral Daily    nystatin  4 mL Oral QID    pantoprazole  40 mg Intravenous Q24H     Continuous Infusions:   lactated ringers 100 mL/hr at 11/03/22 1861    TPN ADULT CENTRAL LINE CUSTOM (3  in 1) 60 mL/hr at 11/04/22 1707     PRN Meds:acetaminophen, dextrose 10%, dextrose 10%, glucagon (human recombinant), glucose, glucose, insulin aspart U-100, magnesium oxide, magnesium oxide, melatonin, naloxone, ondansetron, potassium bicarbonate, potassium bicarbonate, potassium bicarbonate, potassium, sodium phosphates, potassium, sodium phosphates, potassium, sodium phosphates, sodium chloride 0.9%    Review of patient's allergies indicates:   Allergen Reactions    Pcn [penicillins] Swelling    Codeine Nausea And Vomiting       Review of Systems: As per interval history    OBJECTIVE:     Vital Signs (Most Recent)  Temp: 97.8 °F (36.6 °C) (11/04/22 1630)  Pulse: 62 (11/04/22 1630)  Resp: 16 (11/04/22 1630)  BP: (!) 145/92 (11/04/22 1630)  SpO2: 96 % (11/04/22 1630)    Vital Signs Range (Last 24H):  Temp:  [97 °F (36.1 °C)-97.8 °F (36.6 °C)]   Pulse:  [56-87]   Resp:  [12-19]   BP: ()/(55-95)   SpO2:  [95 %-100 %]     I & O (Last 24H):No intake or output data in the 24 hours ending 11/04/22 1926    Physical Exam:    Vitals and nursing note reviewed.     Constitutional:       General: Not in acute distress. Frail appearing.      Appearance: Well-developed.   HENT:      Head: Normocephalic and atraumatic.   Eyes:      Pupils: Pupils are equal, round, and reactive to light.   Cardiovascular:      Rate and Rhythm: Regular rhythm.   Pulmonary:      Effort: Pulmonary effort is normal.      Breath sounds: Normal breath sounds. No wheezing.   Abdominal:      General: There is no distension.      Palpations: Abdomen is soft.      Tenderness: There is no abdominal tenderness. There is no guarding or rebound.   Musculoskeletal:         General: Normal range of motion.      Cervical back: Normal range of motion.   Skin:     Findings: No rash.   Neurological:      Mental Status: Alert and oriented to person, place, and time.      Cranial Nerves: No cranial nerve deficit.      Sensory: No sensory deficit.      Laboratory:  I have reviewed all pertinent lab results within the past 24 hours.  CBC:   Recent Labs   Lab 11/04/22  0603   WBC 4.94   RBC 3.93*   HGB 12.2   HCT 33.9*   PLT 60*   MCV 86   MCH 31.0   MCHC 36.0     CMP:   Recent Labs   Lab 11/04/22  0603      CALCIUM 8.3*   ALBUMIN 2.6*   PROT 5.6*   *   K 3.7   CO2 20*      BUN 15   CREATININE 0.4*   ALKPHOS 49*   ALT 94*   AST 38   BILITOT 1.1*       Diagnostic Results:      ASSESSMENT/PLAN:         Active Hospital Problems    Diagnosis  POA    *Dehydration with hyponatremia [E86.0, E87.1]  Yes    Adult failure to thrive [R62.7]  Unknown    Physical debility [R53.81]  Yes    Mild protein-calorie malnutrition [E44.1]  Yes    Transaminitis [R74.01]  Yes    Hypothyroidism [E03.9]  Yes    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 Margin positive  T4aN3b          Resolved Hospital Problems   No resolved problems to display.         Plan:     -Nystatin and IV Diflucan for thrush and concern for fungal esophagitis  -s/p EGD with ulcerated esophagus. Biopsies taken.  -IVFs  -CXR given need for supplemental oxygen.  CXR with no acute process.   -Trending Na and stable  -TPN  -Oncology documents intention for MRI of the brain  -She has been on daily dexamethasone since last admission in September.  I have restarted and will discuss with heme onc if needs to wean down and off.    VTE Risk Mitigation (From admission, onward)           Ordered     IP VTE HIGH RISK PATIENT  Once         11/03/22 2020     Place sequential compression device  Until discontinued         11/03/22 2020                      Department Hospital Medicine  [facility]  Amairani Kaplan MD  Date of service: 11/04/2022

## 2022-11-06 NOTE — PT/OT/SLP EVAL
Physical Therapy Evaluation    Patient Name:  Antonieta Clay   MRN:  [id number]    Recommendations:     Discharge Recommendations:  nursing facility, skilled   Discharge Equipment Recommendations: none   Barriers to discharge: None    Assessment:     Antonieta Clay is a 62 y.o. female admitted with a medical diagnosis of Dehydration with hyponatremia.  She presents with the following impairments/functional limitations:  weakness, impaired endurance, impaired self care skills, impaired functional mobility, gait instability, impaired balance, decreased upper extremity function, decreased lower extremity function, impaired cardiopulmonary response to activity.    Rehab Prognosis: Fair; patient would benefit from acute skilled PT services to address these deficits and reach maximum level of function.    Recent Surgery: Procedure(s) (LRB):  EGD (ESOPHAGOGASTRODUODENOSCOPY) (N/A) 2 Days Post-Op    Plan:     During this hospitalization, patient to be seen 6 x/week to address the identified rehab impairments via gait training, therapeutic activities, therapeutic exercises and progress toward the following goals:    Plan of Care Expires:       Subjective     Chief Complaint: weakness  Patient/Family Comments/goals: get stronger  Pain/Comfort:  Pain Rating 1: 0/10    Patients cultural, spiritual, [employer] conflicts given the current situation:      Living Environment:  Pt lives 2 story home w/  , 1st floor setup  Prior to admission, patients level of function was required occasional assist for ADLs.  Equipment used at home: shower chair, walker, rolling.  DME owned (not currently used): none.  Upon discharge, patient will have assistance from .    Objective:     Communicated with RN prior to session.  Patient found HOB elevated with peripheral IV, oxygen, telemetry  upon PT entry to room.    General Precautions: Standard, fall, aspiration   Orthopedic Precautions:    Braces:    Respiratory Status: Room  air    Exams:  Cognitive Exam:  Patient is oriented to Person  Postural Exam:  Patient presented with the following abnormalities:    -       Rounded shoulders  -       Forward head  RLE ROM: WFL  RLE Strength: 4-/5  LLE ROM: WFL  LLE Strength: 4-/5    Functional Mobility:  Bed Mobility:     Supine to Sit: moderate assistance  Transfers:     Sit to Stand:  moderate assistance with rolling walker  Bed to Chair: moderate assistance with  rolling walker  using  Step Transfer  Gait: gait x25' w/RW and Berta for management of RW and balance       AM-PAC 6 CLICK MOBILITY  Total Score:14       Treatment & Education:  Pt was educated on the following: call light use, importance of OOB activity and functional mobility to negate the negative effects of prolonged bed rest during this hospitalization, safe transfers/ambulation and discharge planning recommendations/options.     Patient left up in chair with all lines intact, call button in reach, RN notified, and  present.    GOALS:   Multidisciplinary Problems       Physical Therapy Goals          Problem: Physical Therapy    Goal Priority Disciplines Outcome Goal Variances Interventions   Physical Therapy Goal     PT, PT/OT      Description: All physical therapy goals to be met by discharge:    1. Supine to sit with Stand-by Assistance  2. Sit to stand transfer with Stand-by Assistance  3.. Bed to chair transfer with Supervision using Rolling Walker  4. Gait  x 50 feet with Minimal Assistance using Rolling Walker.                          History:     Past Medical History:   Diagnosis Date    Cancer     neck    HA (headache)     Hypothyroidism        Past Surgical History:   Procedure Laterality Date    INSERTION OF TUNNELED CENTRAL VENOUS CATHETER (CVC) WITH SUBCUTANEOUS PORT Right 04/04/2022    Procedure: [id number];  Surgeon: Charles Servin III, MD;  Location: [facility];  Service: General;  Laterality: Right;    left temporal bone resection  2020     MODIFIED RADICAL NECK DISSECTION Left 2020    parotidectomy Left 2020    UPPER GASTROINTESTINAL ENDOSCOPY  11/04/2022    with biopsies and viral studies       Time Tracking:     PT Received On: 11/06/22  PT Start Time: 0930     PT Stop Time: 0951  PT Total Time (min): 21 min     Billable Minutes: Evaluation 8 and Gait Training 13      11/06/2022

## 2022-11-06 NOTE — PT/OT/SLP EVAL
Occupational Therapy   Evaluation    Name: Antonieta Clay  MRN: [id number]  Admitting Diagnosis:  Dehydration with hyponatremia  Recent Surgery: Procedure(s) (LRB):  EGD (ESOPHAGOGASTRODUODENOSCOPY) (N/A) 2 Days Post-Op    Recommendations:     Discharge Recommendations: nursing facility, skilled  Discharge Equipment Recommendations:  none  Barriers to discharge:  None    Assessment:     Antonieta Clay is a 62 y.o. female with a medical diagnosis of Dehydration with hyponatremia.  She presents with the following Performance deficits affecting function: weakness, impaired endurance, impaired self care skills, impaired functional mobility, gait instability, impaired balance, decreased lower extremity function, decreased safety awareness.      Rehab Prognosis: Good; patient would benefit from acute skilled OT services to address these deficits and reach maximum level of function.       Plan:     Patient to be seen 5 x/week to address the above listed problems via self-care/home management, therapeutic activities, therapeutic exercises  Plan of Care Expires: 12/06/22  Plan of Care Reviewed with: patient, spouse    Subjective     Chief Complaint: Decreased endurance and weakness  Patient/Family Comments/goals: Return to PLOF    Occupational Profile:  Living Environment: Pt lives with spouse in a 2 story home with 1st floor setup. No steps to enter. Pt uses a tub/shower combo and high toilet  Previous level of function: SPV to assistance as needed from spouse for ADLs  Roles and Routines: spouse  Equipment Used at Home:  shower chair, walker, rolling  Assistance upon Discharge: facility or     Pain/Comfort:  Pain Rating 1: 0/10  Pain Rating Post-Intervention 1: 0/10    Patients cultural, spiritual, Mormonism conflicts given the current situation: no    Objective:     Communicated with: nurse prior to session.  Patient found up in chair with peripheral IV, oxygen, telemetry upon OT entry to room.    General  Precautions: Standard, fall, aspiration   Orthopedic Precautions:N/A   Braces: N/A  Respiratory Status: Nasal cannula, flow 1 L/min    Occupational Performance:    Functional Mobility/Transfers:  Patient completed Sit <> Stand Transfer with minimum assistance  with  rolling walker   Functional Mobility: ambulated 4 feet with RW, min A    Activities of Daily Living:  Grooming: minimum assistance brushing teeth while sitting in chair unable to complete standing at sink  Lower Body Dressing: minimum assistance donning socks in chair    Cognitive/Visual Perceptual:  Cognitive/Psychosocial Skills:     -       Oriented to: Person, Place, Time, and Situation   -       Follows Commands/attention:Follows multistep  commands  -       Safety awareness/insight to disability: impaired   -       Mood/Affect/Coping skills/emotional control: Appropriate to situation and Flat affect    Physical Exam:  Balance:    -       min A standing balance and CGA sitting balance  Upper Extremity Range of Motion:     -       Right Upper Extremity: WFL  -       Left Upper Extremity: WFL  Upper Extremity Strength:    -       Right Upper Extremity: 3+/5  -       Left Upper Extremity: 3+/5    AMPAC 6 Click ADL:  AMPAC Total Score: 20    Treatment & Education:  OT role and POC, safety with mobility, call bell use    Patient left up in chair with all lines intact, call button in reach, chair alarm on, nurse notified, and spouse present    GOALS:   Multidisciplinary Problems       Occupational Therapy Goals          Problem: Occupational Therapy    Goal Priority Disciplines Outcome Interventions   Occupational Therapy Goal     OT, PT/OT     Description: Goals to be met by: 12/6/2022     Patient will increase functional independence with ADLs by performing:    UE Dressing with Stand-by Assistance.  LE Dressing with Stand-by Assistance.  Grooming while standing with Stand-by Assistance.  Toileting from toilet with Stand-by Assistance for hygiene and  clothing management.   Bathing from  shower chair/bench with Stand-by Assistance.  Toilet transfer to toilet with Stand-by Assistance.                         History:     Past Medical History:   Diagnosis Date    Cancer     neck    HA (headache)     Hypothyroidism          Past Surgical History:   Procedure Laterality Date    INSERTION OF TUNNELED CENTRAL VENOUS CATHETER (CVC) WITH SUBCUTANEOUS PORT Right 04/04/2022    Procedure: [id number]-[id number];  Surgeon: Charles Servin III, MD;  Location: [facility];  Service: General;  Laterality: Right;    left temporal bone resection  2020    MODIFIED RADICAL NECK DISSECTION Left 2020    parotidectomy Left 2020    UPPER GASTROINTESTINAL ENDOSCOPY  11/04/2022    with biopsies and viral studies       Time Tracking:     OT Date of Treatment: 11/06/22  OT Start Time: 1027  OT Stop Time: 1045  OT Total Time (min): 18 min    Billable Minutes:Evaluation 5  Self Care/Home Management 13    11/6/2022

## 2022-11-06 NOTE — PLAN OF CARE
Problem: Parenteral Nutrition  Goal: Effective Intravenous Nutrition Therapy Delivery  Outcome: Ongoing, Progressing  Intervention: Optimize Intravenous Nutrition Delivery  Flowsheets (Taken 11/6/2022 1132)  Nutrition Support Management: (Allow TPN to finish infusing, then transition to PO only)   parenteral nutrition continued as ordered   other (see comments)     Problem: Oral Intake Inadequate  Goal: Improved Oral Intake  Outcome: Ongoing, Progressing  Intervention: Promote and Optimize Oral Intake  Flowsheets (Taken 11/6/2022 1132)  Oral Nutrition Promotion: calorie-dense liquids provided

## 2022-11-06 NOTE — PLAN OF CARE
Problem: Adult Inpatient Plan of Care  Goal: Plan of Care Review  Outcome: Ongoing, Progressing  Goal: Patient-Specific Goal (Individualized)  Outcome: Ongoing, Progressing     Problem: Fall Injury Risk  Goal: Absence of Fall and Fall-Related Injury  Outcome: Ongoing, Progressing     Problem: Parenteral Nutrition  Goal: Effective Intravenous Nutrition Therapy Delivery  Outcome: Ongoing, Progressing     Problem: Oral Intake Inadequate  Goal: Improved Oral Intake  Outcome: Ongoing, Progressing      07/23/2020  Foot and Ankle Surgery - Established Patient/Follow-up  Provider: Dr. Bong Baker DPM  Location: [facility]    Subjective:  Kuldeep Adhikari is a 56 y.o. male.     Chief Complaint   Patient presents with   • Right Foot - Follow-up, Wound Check       HPI: Patient returns for evaluation of his right foot.  He continues to ambulate fairly excessively in and out of the cam boot.  He has been intermittently performing dressings but he continues to be seen by home health.  Home health has notified us of noncompliance.  Patient continues to receive IV antibiotics but this is his last week.    Allergies   Allergen Reactions   • Lisinopril Cough       Current Outpatient Medications on File Prior to Visit   Medication Sig Dispense Refill   • Accu-Chek FastClix Lancets misc 1 each 4 (Four) Times a Day Before Meals & at Bedtime. Dx: E11.65 204 each 2   • apixaban (ELIQUIS) 5 MG tablet tablet Take 1 tablet by mouth 2 (Two) Times a Day. 180 tablet 3   • aspirin (ASPIR-LOW) 81 MG EC tablet Take 81 mg by mouth Daily.     • atorvastatin (LIPITOR) 40 MG tablet Take 40 mg by mouth Daily.     • B-D UF III MINI PEN NEEDLES 31G X 5 MM misc      • cefTRIAXone (ROCEPHIN) 10 g injection      • cefTRIAXone Sodium (ROCEPHIN IV) Infuse  into a venous catheter.     • DULoxetine (CYMBALTA) 60 MG capsule Take 1 capsule by mouth Every Morning. 90 capsule 3   • EPINEPHrine (EPIPEN JR) 0.15 MG/0.3ML solution auto-injector injection      • FLOVENT  MCG/ACT inhaler      • glucose blood (Accu-Chek Guide) test strip 1 each by Other route 4 (Four) Times a Day Before Meals & at Bedtime. Dx: E11.65. Use as instructed 150 each 2   • insulin aspart (NovoLOG FlexPen) 100 UNIT/ML solution pen-injector sc pen Inject 10 Units under the skin into the appropriate area as directed 3 (Three) Times a Day With Meals. 15 mL 5   • Insulin Glargine (Lantus SoloStar) 100 UNIT/ML injection pen Inject 30 Units under the skin into the  "appropriate area as directed Every Night. 5 pen 5   • levothyroxine (SYNTHROID) 100 MCG tablet Take 1 tablet by mouth Daily. 60 tablet 0   • losartan (COZAAR) 50 MG tablet Take 50 mg by mouth Daily.     • metoprolol succinate XL (TOPROL-XL) 50 MG 24 hr tablet Take 1 tablet by mouth once daily 30 tablet 0   • nitroglycerin (NITROSTAT) 0.4 MG SL tablet Place 0.4 mg under the tongue Every 5 (Five) Minutes As Needed for Chest Pain.     • SITagliptin-metFORMIN HCl ER (Janumet XR)  MG tablet Take 2 tablet p.o. daily 60 tablet 6     No current facility-administered medications on file prior to visit.        Objective   /79   Pulse 83   Ht 172.7 cm (68\")   Wt 84.4 kg (186 lb)   BMI 28.28 kg/m²     General:   Appearance: appears stated age and healthy    Orientation: AAOx3    Affect: appropriate       VASCULAR       Right Foot Vascularity   Dorsalis pedis:  2+  Posterior tibial:  2+  Skin Temperature: warm    Edema Gradin+  CFT:  < 3 seconds  Pedal Hair Growth:  Absent      NEUROLOGIC      Right Foot Neurologic   Light touch sensation:  Diminished  Hot/Cold sensation: diminished    Protective Sensation using [place]-Debi Monofilament:  Diminished  Achilles reflex:  1+      MUSCULOSKELETAL       Right Foot Musculoskeletal    Amputation   Toes amputated: first toe  Ecchymosis:  None  Tenderness: none    Hammertoe:  Second toe, third toe, fourth toe and fifth toe      MUSCLE STRENGTH      Right Foot Muscle Strength   Normal strength    Foot dorsiflexion:  5  Foot plantar flexion:  5  Foot inversion:  5  Foot eversion:  5      DERMATOLOGIC      Right Foot Dermatologic   Skin: Erythema has increased to the plantar medial aspect of the foot.  He only has the plantar medial wound at this time.  Wound does extend to the level of ligament.  Wound measures 2 cm in diameter.  Mild swelling to this level.  No obvious deep extension to bone.  No fluctuance, malodor or obvious purulence    Assessment/Plan "   Kuldeep was seen today for follow-up and wound check.    Diagnoses and all orders for this visit:    Chronic ulcer of right foot with necrosis of muscle (CMS/HCC)    DM type 2 with diabetic peripheral neuropathy (CMS/HCC)      Patient does have skin changes involving the plantar medial aspect of the foot with increased erythema and mild swelling.  The wound does extend to the level of ligament without any obvious deeper exposure or cas purulence.  I have explained to him that I am concerned about progressive changes involving the medial aspect of the foot.  The wound is mostly granular with central fibrotic base that extends to the level of ligament.  Debridement was performed to this level removing all nonviable tissue.  I have asked that he finish the IV antibiotics.  I again reviewed the importance of strict nonweightbearing activity to improve symptoms.  He is to continue with dressing care as ordered and home health.  I will see him next week for reevaluation.  Imaging was performed after visit and will be reviewed next week.    Excisional Debridement to the level of muscle: Right foot     Pre ulcer measurement: 1.5 x 1.8 x 0.2cm    Post ulcer measurement: 2.0 x 2.1 x 0.4cm    Anesthesia: None, as patient is neuropathic    Bleeding: <5cc    Pre-op pain: 0    Post-op pain: 0    Complications: None    Informed consent was obtained prior to procedure. Excisional debridement to the level of was performed with a 15 blade and curette without complication.  Viable and nonviable skin, subcutaneous tissue, and ligament was excised to a healthy bleeding base.  No purulence or proximal extension was identified. Hemostasis was achieved with pressure.  Dry sterile dressings were applied.  Patient tolerated the procedure well.      No orders of the defined types were placed in this encounter.         Note is dictated utilizing voice recognition software. Unfortunately this leads to occasional typographical errors. I  apologize in advance if the situation occurs. If questions occur please do not hesitate to call our office.

## 2022-11-06 NOTE — PROGRESS NOTES
Count includes the [facility]  Adult Nutrition   Progress Note (Follow-Up)    SUMMARY      * Note: Assessment completed remotely by review of EMR and communication with/assistance from other healthcare team members on premises as needed.*    Recommendations  Recommendation/Intervention:   Allow TPN from yesterday to finish infusing. After infusion commences, TPN to be discontinued.   Hypophosphatemia (Phos = 1.9), recommend continued monitoring and management of BG and electrolytes as needed.   Continue  Ensure Plus HP + Ice Cream Milkshake TID to better meet needs (provides 590 kcal and 34 g protein per milkshake)   Continue regular diet and encourage PO intake of meals and supplements.  Goals:   Transition from TPN to PO diet only.   Patient to meet at least 75% of estimated energy and protein needs via PO intake.   Blood glucose and electrolytes to trend towards normal limits/ target ranges.  Nutrition Goal Status: new, progressing towards goal  Communication of RD Recs: reviewed with physician (reviewed with RN)    Dietitian Rounds Brief  Diet advanced to regular. PO intake of meals is good ~75% per RN. Patient ate grits,eggs, and pudding this morning. Patient is wanting mashed potatoes. She was also able to swallow pills whole this morning. She has not drank ensure shake yet, but is drinking Gatorade and apple juice. RN states she is tolerating diet texture well. Will continue to monitor tolerance of texture as patient's  states upper dentures do not fit. With current intake estimated to meet at least 60% of needs, plans to allow current TPN to infuse then transition to PO diet only.     Nutritional Diagnosis (PES Statement)   Severe malnutrition related to decreased intake in pt w/ increased needs d/t salivary gland cancer as evidenced by wt loss of 16 lbs (13% body wt loss) within 2 months, and < 75% of estimated energy requirements for > 1 month.     Reason for Assessment  Reason For Assessment: RD  "follow-up  Diagnosis: other (see comments) (Dehydration with hyponatremia)  Relevant Medical History: Malignant neoplasm of major salivary gland, Hypothyroidism, Chemotherapy induced neutropenia    Nutrition Risk Screen  Nutrition Risk Screen: tube feeding or parenteral nutrition     MST Score: 2  Have you recently lost weight without trying?: Yes: 2-13 lbs  Weight loss score: 1  Have you been eating poorly because of a decreased appetite?: Yes  Appetite score: 1       Nutrition/Diet History  Food Allergies: NKFA  Factors Affecting Nutritional Intake: None identified at this time    Anthropometrics  Temp: 97.8 °F (36.6 °C)  Height Method: Stated  Height: 5' 4" (162.6 cm)  Height (inches): 64 in  Weight Method: Bed Scale  Weight: 48.5 kg (107 lb)  Weight (lb): 107 lb  Ideal Body Weight (IBW), Female: 120 lb  % Ideal Body Weight, Female (lb): 89.17 %  BMI (Calculated): 18.4  BMI Grade: 18.5-24.9 - normal       Weight History:  Wt Readings from Last 10 Encounters:   11/05/22 48.5 kg (107 lb)   10/26/22 48.6 kg (107 lb 1.6 oz)   10/12/22 50.8 kg (112 lb)   09/28/22 52.2 kg (115 lb)   09/27/22 51.9 kg (114 lb 6.4 oz)   09/21/22 53 kg (116 lb 13.5 oz)   09/19/22 54.5 kg (120 lb 3.2 oz)   09/16/22 54.4 kg (120 lb)   09/14/22 54.5 kg (120 lb 1.6 oz)   09/06/22 55.8 kg (123 lb)       Lab/Procedures/Meds: Pertinent Labs Reviewed    Clinical Chemistry:  Recent Labs   Lab 11/04/22  0603 11/05/22  0440 11/06/22  0455   * 131* 136   K 3.7 3.2* 4.0    93* 100   CO2 20* 29 29    167* 197*   BUN 15 20 25*   CREATININE 0.4* 0.3* 0.4*   CALCIUM 8.3* 7.8* 7.8*   PROT 5.6* 5.7* 5.4*   ALBUMIN 2.6* 2.6* 2.2*   BILITOT 1.1* 0.9 0.6   ALKPHOS 49* 52* 51*   AST 38 35 38   ALT 94* 80* 75*   ANIONGAP 9 9 7*   MG 2.1 2.3 2.6   PHOS 2.7 2.0* 1.9*       CBC:   Recent Labs   Lab 11/06/22  0455   WBC 5.77   RBC 3.66*   HGB 11.1*   HCT 32.2*   PLT 50*   MCV 88   MCH 30.3   MCHC 34.5       Lipid Panel:  Recent Labs   Lab " 11/05/22  0440   TRIG 266*         Medications: Pertinent Medications reviewed    Scheduled Meds:   amLODIPine  5 mg Oral Daily    ascorbic acid (vitamin C)  1,000 mg Oral Daily    chlorhexidine  15 mL Mouth/Throat BID    cholecalciferol (vitamin D3)  5,000 Units Oral Daily    dexAMETHasone  4 mg Oral Q8H    fluconazole (DIFLUCAN) IV (PEDS and ADULTS)  200 mg Intravenous Q24H    levothyroxine  88 mcg Oral Daily    multivitamin  1 tablet Oral Daily    nystatin  4 mL Oral QID    pantoprazole  40 mg Intravenous Q24H       Continuous Infusions:   TPN ADULT CENTRAL LINE CUSTOM (3 in 1) 60 mL/hr at 11/05/22 1700       PRN Meds:.acetaminophen, ALPRAZolam, calcium gluconate IVPB, calcium gluconate IVPB, calcium gluconate IVPB, dextrose 10%, dextrose 10%, glucagon (human recombinant), glucose, glucose, insulin aspart U-100, magnesium oxide, magnesium oxide, magnesium sulfate IVPB, magnesium sulfate IVPB, naloxone, ondansetron, potassium bicarbonate, potassium bicarbonate, potassium bicarbonate, potassium chloride **AND** potassium chloride **AND** potassium chloride, potassium, sodium phosphates, potassium, sodium phosphates, potassium, sodium phosphates, sodium chloride 0.9%, sodium phosphate IVPB, sodium phosphate IVPB, sodium phosphate IVPB    Estimated/Assessed Needs  Weight Used For Calorie Calculations: 48.5 kg (106 lb 14.8 oz)  Energy Calorie Requirements (kcal): 2995-7717 (30-35)  Energy Need Method: Kcal/kg  Protein Requirements:  (1.5-2.5 gm/kg)  Weight Used For Protein Calculations: 48.5 kg (106 lb 14.8 oz)  Fluid Requirements (mL): 1455 (30 ml/kg)     RDA Method (mL): 1455       Nutrition Prescription Ordered  Current Diet Order: Regular  Current Nutrition Support Formula Ordered: Other (Comment) (custom 3-in-1 TPN)  Current Nutrition Support Rate Ordered: 60 (ml)  Current Nutrition Support Frequency Ordered: ml/hr  Oral Nutrition Supplement: Ensure Plus HP Milkshake TID    Evaluation of Received  Nutrient/Fluid Intake  Parenteral Calories (kcal): 1281  Parenteral Protein (gm): 100  Parenteral Fluid (mL): 1440  Energy Calories Required: meeting needs  Protein Required: meeting needs  Fluid Required: meeting needs  Tolerance: tolerating     Intake/Output Summary (Last 24 hours) at 11/6/2022 1128  Last data filed at 11/5/2022 2103  Gross per 24 hour   Intake 60 ml   Output --   Net 60 ml      % Intake of Estimated Energy Needs: 75 - 100 %  % Meal Intake: 50 - 75 %    Nutrition Risk  Level of Risk/Frequency of Follow-up: high     Monitor and Evaluation  Food and Nutrient Intake: energy intake, food and beverage intake  Food and Nutrient Adminstration: diet order  Knowledge/Beliefs/Attitudes: food and nutrition knowledge/skill, beliefs and attitudes  Physical Activity and Function: nutrition-related ADLs and IADLs, factors affecting access to physical activity  Anthropometric Measurements: weight, weight change, body mass index  Biochemical Data, Medical Tests and Procedures: electrolyte and renal panel, glucose/endocrine profile, lipid profile, inflammatory profile, gastrointestinal profile  Nutrition-Focused Physical Findings: overall appearance     Nutrition Follow-Up  RD Follow-up?: Yes    Janet Hummel RD 11/06/2022 11:30 AM

## 2022-11-07 PROBLEM — B00.89 HERPES SIMPLEX ESOPHAGITIS: Status: ACTIVE | Noted: 2022-01-01

## 2022-11-07 PROBLEM — K20.80 HERPES SIMPLEX ESOPHAGITIS: Status: ACTIVE | Noted: 2022-01-01

## 2022-11-07 PROBLEM — R13.10 DYSPHAGIA: Status: ACTIVE | Noted: 2022-01-01

## 2022-11-07 NOTE — PT/OT/SLP PROGRESS
Occupational Therapy   Treatment    Name: Antonieta Clay  MRN: [id number]  Admitting Diagnosis:  Dehydration with hyponatremia  3 Days Post-Op    Recommendations:     Discharge Recommendations: nursing facility, skilled  Discharge Equipment Recommendations:  none  Barriers to discharge:  None    Assessment:     Antonieta Clay is a 62 y.o. female with a medical diagnosis of Dehydration with hyponatremia.  She presents with improving medical acuity and functional mobility. Patient participated in bed mobility, unsupported sitting EOB, bed/chair transfer using rolling walker and LB dressing sitting in chair. Performance deficits affecting function are weakness, impaired endurance, impaired self care skills, impaired functional mobility, gait instability, impaired balance, impaired cognition, decreased lower extremity function, decreased safety awareness, impaired cardiopulmonary response to activity.     Rehab Prognosis:  Fair; patient would benefit from acute skilled OT services to address these deficits and reach maximum level of function.       Plan:     Patient to be seen 5 x/week to address the above listed problems via self-care/home management, therapeutic activities, therapeutic exercises  Plan of Care Expires: 12/06/22  Plan of Care Reviewed with: patient, spouse    Subjective     Pain/Comfort:  Pain Rating 1: 0/10  Pain Rating Post-Intervention 1: 0/10    Objective:     Communicated with: nurse prior to session.  Patient found HOB elevated with telemetry, peripheral IV, oxygen upon OT entry to room.    General Precautions: Standard, fall   Orthopedic Precautions:N/A   Braces: N/A  Respiratory Status: Nasal cannula, flow 1 L/min     Occupational Performance:     Bed Mobility:    Patient completed Scooting/Bridging with minimum assistance  Patient completed Supine to Sit with minimum assistance   Performed unsupported sitting EOB with minimal assistance.    Functional Mobility/Transfers:  Patient completed  Bed <> Chair Transfer using Step Transfer technique with minimum assistance with hand-held assist and rolling walker  Functional Mobility: ambulated 5 feet using rolling walker with minimal assistance.    Activities of Daily Living:  Lower Body Dressing: minimum assistance to don/doff socks sitting in chair.      [facility] 6 Click ADL:      Treatment & Education:  Patient required increased time to follow ADL commands. Did not verbally communicate during session, but appropriately nodded head yes/no.    Patient left up in chair with all lines intact, call button in reach, and spouse present    GOALS:   Multidisciplinary Problems       Occupational Therapy Goals          Problem: Occupational Therapy    Goal Priority Disciplines Outcome Interventions   Occupational Therapy Goal     OT, PT/OT     Description: Goals to be met by: 12/6/2022     Patient will increase functional independence with ADLs by performing:    UE Dressing with Stand-by Assistance.  LE Dressing with Stand-by Assistance.  Grooming while standing with Stand-by Assistance.  Toileting from toilet with Stand-by Assistance for hygiene and clothing management.   Bathing from  shower chair/bench with Stand-by Assistance.  Toilet transfer to toilet with Stand-by Assistance.                         Time Tracking:     OT Date of Treatment: 11/07/22  OT Start Time: 0940  OT Stop Time: 1003  OT Total Time (min): 23 min    Billable Minutes:Self Care/Home Management 12  Therapeutic Activity 11    OT/FREDERICK: OT          11/7/2022

## 2022-11-07 NOTE — PROGRESS NOTES
[facility]  Hematology/Oncology  Progress Note    Patient Name: Antonieta Clay  Admission Date: 11/3/2022  Hospital Length of Stay: 3 days  Code Status: Full Code     Subjective:     HPI:  Ms. Clay was doing better yesterday, eating more and TPN d/c'd.  She is now refusing to eat and not cooperative.  Increased confusion as well.       Interval History:     Oncology Treatment Plan:   [No matching plan found]    Medications:  Continuous Infusions:  Scheduled Meds:   acyclovir  5 mg/kg Intravenous Q8H    amLODIPine  5 mg Oral Daily    cholecalciferol (vitamin D3)  5,000 Units Oral Daily    dexAMETHasone  2 mg Oral Q8H    dronabinoL  2.5 mg Oral BID    levothyroxine  88 mcg Oral Daily    nystatin  4 mL Oral QID    pantoprazole  40 mg Intravenous Q24H     PRN Meds:acetaminophen, ALPRAZolam, calcium gluconate IVPB, calcium gluconate IVPB, calcium gluconate IVPB, dextrose 10%, dextrose 10%, glucagon (human recombinant), glucose, glucose, insulin aspart U-100, magnesium oxide, magnesium oxide, magnesium sulfate IVPB, magnesium sulfate IVPB, naloxone, ondansetron, potassium bicarbonate, potassium bicarbonate, potassium bicarbonate, potassium chloride **AND** potassium chloride **AND** potassium chloride, potassium, sodium phosphates, potassium, sodium phosphates, potassium, sodium phosphates, sodium chloride 0.9%, sodium phosphate IVPB, sodium phosphate IVPB, sodium phosphate IVPB     Review of Systems   Unable to perform ROS: Other   Objective:     Vital Signs (Most Recent):  Temp: 97.9 °F (36.6 °C) (11/07/22 1227)  Pulse: 82 (11/07/22 1227)  Resp: 15 (11/07/22 1227)  BP: 127/79 (11/07/22 1227)  SpO2: 95 % (11/07/22 1227) Vital Signs (24h Range):  Temp:  [97.8 °F (36.6 °C)-98.9 °F (37.2 °C)] 97.9 °F (36.6 °C)  Pulse:  [73-82] 82  Resp:  [14-16] 15  SpO2:  [86 %-96 %] 95 %  BP: (114-131)/(76-82) 127/79     Weight: 48.5 kg (107 lb)  Body mass index is 18.37 kg/m².  Body surface area is 1.48  meters squared.      Intake/Output Summary (Last 24 hours) at 11/7/2022 1710  Last data filed at 11/6/2022 1949  Gross per 24 hour   Intake 120 ml   Output --   Net 120 ml       Physical Exam  Constitutional:       Appearance: She is well-developed.   HENT:      Head: Normocephalic and atraumatic.      Right Ear: External ear normal.      Left Ear: External ear normal.   Eyes:      Conjunctiva/sclera: Conjunctivae normal.      Pupils: Pupils are equal, round, and reactive to light.   Neck:      Thyroid: No thyromegaly.      Trachea: No tracheal deviation.   Cardiovascular:      Rate and Rhythm: Normal rate and regular rhythm.      Heart sounds: Normal heart sounds.   Pulmonary:      Effort: Pulmonary effort is normal.      Breath sounds: Normal breath sounds.   Abdominal:      General: Abdomen is flat.   Skin:     Findings: No rash.   Neurological:      Comments: Neuro intact througout       Significant Labs:   CBC:   Recent Labs   Lab 11/06/22 0455 11/07/22 0457   WBC 5.77 6.40   HGB 11.1* 10.9*   HCT 32.2* 31.9*   PLT 50* 46*    and CMP:   Recent Labs   Lab 11/06/22 0455 11/07/22 0457    139   K 4.0 4.1    106   CO2 29 26   * 207*   BUN 25* 24*   CREATININE 0.4* 0.4*   CALCIUM 7.8* 7.9*   PROT 5.4* 5.6*   ALBUMIN 2.2* 2.3*   BILITOT 0.6 0.8   ALKPHOS 51* 76   AST 38 81*   ALT 75* 157*   ANIONGAP 7* 7*       Diagnostic Results:  None    Assessment/Plan:     Adult failure to thrive  Patient seemed to making some improvement but is not not eating again and does not speak and is not assisting in her daily care, PT etc.  She has been diagnosed with herpetic esophagitis and recommended for IV Acyclovir and this was discussed today.      Her MRI of the brain did not show new disease/cancer growth so the cause of this mood/activity change is not definitively a cancer issue.  This being said,  Her nutritional status remains poor and she is certainly very weak due to this but she seems to have lost  the will to continue to get better.      Will give her a day to see how she does, discuss again with  tomorrow as to a plan of action moving forward.  For now, SNF, IV acyclovir is the plan.          Thank you for your consult. I will follow-up with patient. Please contact us if you have any additional questions.     Gorge Coombs MD  Hematology/Oncology  [facility]

## 2022-11-07 NOTE — PLAN OF CARE
Problem: Adult Inpatient Plan of Care  Goal: Plan of Care Review  Outcome: Ongoing, Progressing  Goal: Patient-Specific Goal (Individualized)  Outcome: Ongoing, Progressing  Goal: Absence of Hospital-Acquired Illness or Injury  Outcome: Ongoing, Progressing  Goal: Optimal Comfort and Wellbeing  Outcome: Ongoing, Progressing     Problem: Fall Injury Risk  Goal: Absence of Fall and Fall-Related Injury  Outcome: Ongoing, Progressing       Problem: Skin Injury Risk Increased  Goal: Skin Health and Integrity  Outcome: Ongoing, Progressing     Problem: Oral Intake Inadequate  Goal: Improved Oral Intake  Outcome: Ongoing, Progressing

## 2022-11-07 NOTE — PLAN OF CARE
Per discharge recommendation of SNF, referrals sent in [facility] to:    [facility] Phone: [phone]      [facility] Phone: [phone]      [facility] Phone: [phone]       [facility] Phone: [phone]       [facility] Phone: [phone]      [facility] Phone: [phone]       [facility] Phone: [phone]      [facility] Phone: [phone]      [facility] Phone: [phone]      [facility] Phone: [phone]      [facility] Phone: [phone]         11/07/22 1120   Discharge Reassessment   Assessment Type Discharge Planning Reassessment   Did the patient's condition or plan change since previous assessment? Yes   Discharge Plan A Skilled Nursing Facility   Discharge Plan B Home Health   Post-Acute Status   Post-Acute Authorization Placement   Post-Acute Placement Status Referrals Sent

## 2022-11-07 NOTE — PROGRESS NOTES
[facility]  Adult Nutrition   Progress Note (Follow-Up)    SUMMARY      Recommendations:   1. Changed diet to Regular (IDDSI 7 a) Easy to Chew. Patient with ill-fitted dentures per .  2. Recommend an appetite stimulant.  3. Consider nutrition support for long-term needs if intake does not improve.Consult RD for nutrition support.  4. RD to follow patient for intake, labs and weight PRN.      Goals:   Goals:   1. Transition from TPN to PO diet only.   2. Patient to meet at least 75% of estimated energy and protein needs via PO intake.   3. Blood glucose and electrolytes to trend towards normal limits/ target ranges.Ongoing    Dietitian Rounds Brief  Patient was eating 75% of meals yesterday and TPN was stopped. Pt today would not answer questions. Pt  states her dentures do not fit well. RD changed to IDDSI 7a for easy-to-chew foods. Pt has not touched her breakfast or lunch today. Last BM 11/07/22. RD to recommend an appetite stimulant. May need long-term nutrition support. Plan is for SNF placement.    Diet order: Regular (IDDSI 7a) Easy to chew    Oral Supplement: Strawberry Ensure ice cream milkshake.    % Intake of Estimated Energy Needs: 0 - 25 %  % Meal Intake: 0 - 25 %    Estimated/Assessed Needs  Weight Used For Calorie Calculations: 48.5 kg (106 lb 14.8 oz)  Energy Calorie Requirements (kcal): 1025-7465 (30-35)  Energy Need Method: Kcal/kg  Protein Requirements:  (1.5-2.5 gm/kg)  Weight Used For Protein Calculations: 48.5 kg (106 lb 14.8 oz)  Fluid Requirements (mL): 1455 (30 ml/kg)     RDA Method (mL): 1455     Weight History:  Wt Readings from Last 5 Encounters:   11/05/22 48.5 kg (107 lb)   10/26/22 48.6 kg (107 lb 1.6 oz)   10/12/22 50.8 kg (112 lb)   09/28/22 52.2 kg (115 lb)   09/27/22 51.9 kg (114 lb 6.4 oz)      Reason for Assessment  Reason For Assessment: RD follow-up  Diagnosis: other (see comments) (Dehydration with hyponatremia)  Relevant Medical History:  Malignant neoplasm of major salivary gland, Hypothyroidism, Chemotherapy induced neutropenia    Medications:Pertinent Medications Reviewed  Scheduled Meds:   acyclovir  5 mg/kg Intravenous Q8H    amLODIPine  5 mg Oral Daily    ascorbic acid (vitamin C)  1,000 mg Oral Daily    chlorhexidine  15 mL Mouth/Throat BID    cholecalciferol (vitamin D3)  5,000 Units Oral Daily    dexAMETHasone  2 mg Oral Q8H    levothyroxine  88 mcg Oral Daily    multivitamin  1 tablet Oral Daily    nystatin  4 mL Oral QID    pantoprazole  40 mg Intravenous Q24H     Continuous Infusions:  PRN Meds:.acetaminophen, ALPRAZolam, calcium gluconate IVPB, calcium gluconate IVPB, calcium gluconate IVPB, dextrose 10%, dextrose 10%, glucagon (human recombinant), glucose, glucose, insulin aspart U-100, magnesium oxide, magnesium oxide, magnesium sulfate IVPB, magnesium sulfate IVPB, naloxone, ondansetron, potassium bicarbonate, potassium bicarbonate, potassium bicarbonate, potassium chloride **AND** potassium chloride **AND** potassium chloride, potassium, sodium phosphates, potassium, sodium phosphates, potassium, sodium phosphates, sodium chloride 0.9%, sodium phosphate IVPB, sodium phosphate IVPB, sodium phosphate IVPB    Labs: Pertinent Labs Reviewed  Clinical Chemistry:  Recent Labs   Lab 11/05/22 0440 11/06/22 0455 11/07/22 0457   * 136 139   K 3.2* 4.0 4.1   CL 93* 100 106   CO2 29 29 26   * 197* 207*   BUN 20 25* 24*   CREATININE 0.3* 0.4* 0.4*   CALCIUM 7.8* 7.8* 7.9*   PROT 5.7* 5.4* 5.6*   ALBUMIN 2.6* 2.2* 2.3*   BILITOT 0.9 0.6 0.8   ALKPHOS 52* 51* 76   AST 35 38 81*   ALT 80* 75* 157*   ANIONGAP 9 7* 7*   MG 2.3 2.6 2.4   PHOS 2.0* 1.9* 2.9     CBC:   Recent Labs   Lab 11/07/22 0457   WBC 6.40   RBC 3.57*   HGB 10.9*   HCT 31.9*   PLT 46*   MCV 89   MCH 30.5   MCHC 34.2     Lipid Panel:  Recent Labs   Lab 11/05/22 0440   TRIG 266*     Monitor and Evaluation  Food and Nutrient Intake: energy intake, food and beverage  intake  Food and Nutrient Adminstration: diet order  Knowledge/Beliefs/Attitudes: food and nutrition knowledge/skill, beliefs and attitudes  Physical Activity and Function: nutrition-related ADLs and IADLs, factors affecting access to physical activity  Anthropometric Measurements: weight, weight change, body mass index  Biochemical Data, Medical Tests and Procedures: electrolyte and renal panel, glucose/endocrine profile, lipid profile, inflammatory profile, gastrointestinal profile  Nutrition-Focused Physical Findings: overall appearance     Nutrition Risk  Level of Risk/Frequency of Follow-up: high     Nutrition Follow-Up  RD Follow-up?: Yes    Joyce Galvan RD 11/07/2022 2:02 PM

## 2022-11-07 NOTE — PT/OT/SLP PROGRESS
"Physical Therapy Treatment    Patient Name:  Antonieta Clay   MRN:  [id number]    Recommendations:     Discharge Recommendations:  nursing facility, skilled   Discharge Equipment Recommendations: none   Barriers to discharge: None    Assessment:     Antonieta Clay is a 62 y.o. female admitted with a medical diagnosis of Dehydration with hyponatremia.  She presents with the following impairments/functional limitations:  weakness, impaired endurance, impaired self care skills, impaired functional mobility, gait instability, impaired balance, impaired cognition, decreased lower extremity function, decreased safety awareness, impaired coordination, impaired cardiopulmonary response to activity.    Pt found up in chair with nurse and multiple family members present upon PT arrival to room.  RN states pt had episode of knee buckling with attempted gait earlier this afternoon and needed supplemental O2.  Pt alert, but minimally verbal this day.  Pt performed gait training with assist needed for advancement and management of RW, pt unable to propel AD without assist.  Decreased foot clearance and intermittant knee buckling.  Fair tolerance to PT today, however overall decreased motivation and desire for mobility.  Pt left up in chair with all needs met and family at bedside.    Rehab Prognosis: Fair; patient would benefit from acute skilled PT services to address these deficits and reach maximum level of function.    Recent Surgery: Procedure(s) (LRB):  EGD (ESOPHAGOGASTRODUODENOSCOPY) (N/A) 3 Days Post-Op    Plan:     During this hospitalization, patient to be seen 6 x/week to address the identified rehab impairments via gait training, therapeutic activities, therapeutic exercises and progress toward the following goals:    Plan of Care Expires:       Subjective     Chief Complaint: "I want to be left alone"  Patient/Family Comments/goals: go back to work  Pain/Comfort:  Pain Rating 1: 0/10      Objective: "     Communicated with MADELYN Bruno  prior to session.  Patient found up in chair with telemetry, peripheral IV, oxygen upon PT entry to room.     General Precautions: Standard, fall   Orthopedic Precautions:    Braces:    Respiratory Status: Nasal cannula, flow 2 L/min     Functional Mobility:  Transfers:     Sit to Stand:  moderate assistance with rolling walker  Gait: 2x30' w/RW Berta and assist to advance RW and maintain path without running into objects in hallway and room.       AM-PAC 6 CLICK MOBILITY  Turning over in bed (including adjusting bedclothes, sheets and blankets)?: 3  Sitting down on and standing up from a chair with arms (e.g., wheelchair, bedside commode, etc.): 3  Moving from lying on back to sitting on the side of the bed?: 3  Moving to and from a bed to a chair (including a wheelchair)?: 3  Need to walk in hospital room?: 3  Climbing 3-5 steps with a railing?: 1  Basic Mobility Total Score: 16       Treatment & Education:  Pt was educated on the following: call light use, importance of OOB activity and functional mobility to negate the negative effects of prolonged bed rest during this hospitalization, safe transfers/ambulation and discharge planning recommendations/options.     Patient left up in chair with all lines intact, call button in reach, RN notified, and , sister, and neice present..    GOALS:   Multidisciplinary Problems       Physical Therapy Goals          Problem: Physical Therapy    Goal Priority Disciplines Outcome Goal Variances Interventions   Physical Therapy Goal     PT, PT/OT      Description: All physical therapy goals to be met by discharge:    1. Supine to sit with Stand-by Assistance  2. Sit to stand transfer with Stand-by Assistance  3.. Bed to chair transfer with Supervision using Rolling Walker  4. Gait  x 50 feet with Minimal Assistance using Rolling Walker.                          Time Tracking:     PT Received On: 11/07/22  PT Start Time: 1346     PT Stop  Time: 1404  PT Total Time (min): 18 min     Billable Minutes: Gait Training 18    Treatment Type: Treatment  PT/PTA: PT           11/07/2022

## 2022-11-07 NOTE — PLAN OF CARE
[facility] - denied due to payor source  [facility] - denied due to insufficient funding  [facility] - denied due to out of network  [facility] - denied due to no beds  [facility] - denied due to cost of oral chemo. Replied in careport reminding them that MD note states cancer treatment to be postponed while in SNF and asking them to reconsider.    [facility] - reviewing  [facility] - accepted, but has not submitted for auth yet as family prefers [facility]    Met with patient and  at bedside to update on SNF placement.  would prefer [facility] or [facility] facility. Agreed to give facilities until morning of 11/8/22 to give decision, but may need to move forward with [facility] as accepting facility.    Will need [facility]ET if [facility] or [facility]

## 2022-11-07 NOTE — PROGRESS NOTES
[facility]  Progress Note    Patient name: Antonieta Clay  MRN: [id number]  Admit Date: 11/3/2022   LOS: 2 days     SUBJECTIVE:     Principal problem: Dehydration with hyponatremia    Interval History:     Patient does not recall getting out of bed or seeing PT/OT at the time of my exam.  Per my discussion with RN, she has done significantly better and had been out of bed all morning. She had gotten up with PT/OT as well.  Pt/OT is recommending SNF.  CM consulted.  Patient ate 75% of breakfast.  TPN will be discontinued after this present bag is complete.  She denies oral pain or odynophagia.     Hospital Course:    Patient admitted after being sent by home health for dehydration concerns.  She has been having poor po intake thought to be related to thrush.  In addition, reports of dysphagia and odynophagia. It is estimated that she has last 20lbs over the last 6 weeks.  She is on oral chemo Lenvatinib.  She had recent hospitalization for confusion.  There was concern regarding CNS involvement of her malignancy. This work up was ultimately negative and she improved.  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 There was also concern for espophageal stenosis and thus GI consulted.  She underwent EGD 11/4 which revealed ulcerated and inflamed mucosa of the esophagus with multiple shallow ulcers that is consistent with viral esophagitis.  Biopsies taken. She was seen by Dr. Sagastume who plans for repeat MRI of the brain to help differentiate if her clinical decline is CNS involvement of her malignancy versus chemo induced. Lenvatinib has been stopped.  He has ordered TPN.  I have added IV Diflucan in addition to oral nystatin.  She is requiring supplemental oxygen and thus CXR ordered- no acute processes. Supplemental oxygen discontinued. 11/6 MRI of the brain performed and negative for acute process.        Baseline:  Long  discussion with patient and her . It sounds like she was ambulatory and able to go out and shop approximately 10/19.  By 10/21, she was showing signs of weakness and needing to be assisted to get off the toilet or out of the bath tub.  For the last few days prior to admission, she was sleeping a good deal and not getting off the couch and her  was having to provide a lot of physical support to hep her ambulate.    Scheduled Meds:   amLODIPine  5 mg Oral Daily    ascorbic acid (vitamin C)  1,000 mg Oral Daily    chlorhexidine  15 mL Mouth/Throat BID    cholecalciferol (vitamin D3)  5,000 Units Oral Daily    dexAMETHasone  4 mg Oral Q8H    fluconazole (DIFLUCAN) IV (PEDS and ADULTS)  200 mg Intravenous Q24H    levothyroxine  88 mcg Oral Daily    multivitamin  1 tablet Oral Daily    nystatin  4 mL Oral QID    pantoprazole  40 mg Intravenous Q24H    tuberculin  5 Units Intradermal Once     Continuous Infusions:      PRN Meds:acetaminophen, ALPRAZolam, calcium gluconate IVPB, calcium gluconate IVPB, calcium gluconate IVPB, dextrose 10%, dextrose 10%, glucagon (human recombinant), glucose, glucose, insulin aspart U-100, magnesium oxide, magnesium oxide, magnesium sulfate IVPB, magnesium sulfate IVPB, naloxone, ondansetron, potassium bicarbonate, potassium bicarbonate, potassium bicarbonate, potassium chloride **AND** potassium chloride **AND** potassium chloride, potassium, sodium phosphates, potassium, sodium phosphates, potassium, sodium phosphates, sodium chloride 0.9%, sodium phosphate IVPB, sodium phosphate IVPB, sodium phosphate IVPB    Review of patient's allergies indicates:   Allergen Reactions    Pcn [penicillins] Swelling    Codeine Nausea And Vomiting       Review of Systems: As per interval history    OBJECTIVE:     Vital Signs (Most Recent)  Temp: 97.8 °F (36.6 °C) (11/06/22 0701)  Pulse: 73 (11/06/22 0701)  Resp: 16 (11/06/22 0701)  BP: 100/66 (11/06/22 0701)  SpO2: (!) 94 % (11/06/22  0701)    Vital Signs Range (Last 24H):  Temp:  [97.7 °F (36.5 °C)-98.1 °F (36.7 °C)]   Pulse:  [73-83]   Resp:  [14-16]   BP: (100-116)/(66-80)   SpO2:  [85 %-96 %]     I & O (Last 24H):  Intake/Output Summary (Last 24 hours) at 11/6/2022 1916  Last data filed at 11/5/2022 2103  Gross per 24 hour   Intake 60 ml   Output --   Net 60 ml         Physical Exam:    Vitals and nursing note reviewed.     Constitutional:       General: Not in acute distress. Frail appearing.      Appearance: Well-developed.   HENT:      Head: Normocephalic and atraumatic.   Eyes:      Pupils: Pupils are equal, round, and reactive to light.   Cardiovascular:      Rate and Rhythm: Regular rhythm.   Pulmonary:      Effort: Pulmonary effort is normal.      Breath sounds: Normal breath sounds. No wheezing.   Abdominal:      General: There is no distension.      Palpations: Abdomen is soft.      Tenderness: There is no abdominal tenderness. There is no guarding or rebound.   Musculoskeletal:         General: Normal range of motion.      Cervical back: Normal range of motion.   Skin:     Findings: No rash.   Neurological:      Mental Status: Alert and oriented to person, place, and time.      Cranial Nerves: No cranial nerve deficit.      Sensory: No sensory deficit.     Laboratory:  I have reviewed all pertinent lab results within the past 24 hours.  CBC:   Recent Labs   Lab 11/06/22  0455   WBC 5.77   RBC 3.66*   HGB 11.1*   HCT 32.2*   PLT 50*   MCV 88   MCH 30.3   MCHC 34.5       CMP:   Recent Labs   Lab 11/06/22  0455   *   CALCIUM 7.8*   ALBUMIN 2.2*   PROT 5.4*      K 4.0   CO2 29      BUN 25*   CREATININE 0.4*   ALKPHOS 51*   ALT 75*   AST 38   BILITOT 0.6         Diagnostic Results:      ASSESSMENT/PLAN:         Active Hospital Problems    Diagnosis  POA    *Dehydration with hyponatremia [E86.0, E87.1]  Yes    Adult failure to thrive [R62.7]  Unknown    Physical debility [R53.81]  Yes    Mild protein-calorie  malnutrition [E44.1]  Yes    Transaminitis [R74.01]  Yes    Hypothyroidism [E03.9]  Yes    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 Margin positive  [id number]          Resolved Hospital Problems   No resolved problems to display.         Plan:     -Nystatin and IV Diflucan for thrush and concern for fungal esophagitis  -s/p EGD with ulcerated esophagus. Biopsies taken with results pending.  -CXR given need for supplemental oxygen.  CXR with no acute process. Supplemental oxygen discontinued. Monitoring needs.  -Trending Na and stable  -Oral intake has improved. Stopping TPN.    -MRI brain as recommended by Oncology-negative for metastatic disease or other acute process.  -She has been on daily dexamethasone since last admission in September.  I have restarted and will discuss with heme onc if needs to wean down and off.  -PT/OT.  Out of bed as much as possible to work on strengthening and conditioning.   -CM consulted for SNF    VTE Risk Mitigation (From admission, onward)           Ordered     IP VTE HIGH RISK PATIENT  Once         11/03/22 2020     Place sequential compression device  Until discontinued         11/03/22 2020                      Department Hospital Medicine  [facility]  Amairani Kaplan MD  Date of service: 11/06/2022

## 2022-11-07 NOTE — ASSESSMENT & PLAN NOTE
Patient seemed to making some improvement but is not not eating again and does not speak and is not assisting in her daily care, PT etc.  She has been diagnosed with herpetic esophagitis and recommended for IV Acyclovir and this was discussed today.      Her MRI of the brain did not show new disease/cancer growth so the cause of this mood/activity change is not definitively a cancer issue.  This being said,  Her nutritional status remains poor and she is certainly very weak due to this but she seems to have lost the will to continue to get better.      Will give her a day to see how she does, discuss again with  tomorrow as to a plan of action moving forward.  For now, SNF, IV acyclovir is the plan.

## 2022-11-07 NOTE — SUBJECTIVE & OBJECTIVE
Interval History:     Oncology Treatment Plan:   [No matching plan found]    Medications:  Continuous Infusions:  Scheduled Meds:   acyclovir  5 mg/kg Intravenous Q8H    amLODIPine  5 mg Oral Daily    cholecalciferol (vitamin D3)  5,000 Units Oral Daily    dexAMETHasone  2 mg Oral Q8H    dronabinoL  2.5 mg Oral BID    levothyroxine  88 mcg Oral Daily    nystatin  4 mL Oral QID    pantoprazole  40 mg Intravenous Q24H     PRN Meds:acetaminophen, ALPRAZolam, calcium gluconate IVPB, calcium gluconate IVPB, calcium gluconate IVPB, dextrose 10%, dextrose 10%, glucagon (human recombinant), glucose, glucose, insulin aspart U-100, magnesium oxide, magnesium oxide, magnesium sulfate IVPB, magnesium sulfate IVPB, naloxone, ondansetron, potassium bicarbonate, potassium bicarbonate, potassium bicarbonate, potassium chloride **AND** potassium chloride **AND** potassium chloride, potassium, sodium phosphates, potassium, sodium phosphates, potassium, sodium phosphates, sodium chloride 0.9%, sodium phosphate IVPB, sodium phosphate IVPB, sodium phosphate IVPB     Review of Systems   Unable to perform ROS: Other   Objective:     Vital Signs (Most Recent):  Temp: 97.9 °F (36.6 °C) (11/07/22 1227)  Pulse: 82 (11/07/22 1227)  Resp: 15 (11/07/22 1227)  BP: 127/79 (11/07/22 1227)  SpO2: 95 % (11/07/22 1227) Vital Signs (24h Range):  Temp:  [97.8 °F (36.6 °C)-98.9 °F (37.2 °C)] 97.9 °F (36.6 °C)  Pulse:  [73-82] 82  Resp:  [14-16] 15  SpO2:  [86 %-96 %] 95 %  BP: (114-131)/(76-82) 127/79     Weight: 48.5 kg (107 lb)  Body mass index is 18.37 kg/m².  Body surface area is 1.48 meters squared.      Intake/Output Summary (Last 24 hours) at 11/7/2022 1710  Last data filed at 11/6/2022 1949  Gross per 24 hour   Intake 120 ml   Output --   Net 120 ml       Physical Exam  Constitutional:       Appearance: She is well-developed.   HENT:      Head: Normocephalic and atraumatic.      Right Ear: External ear normal.      Left Ear: External ear normal.    Eyes:      Conjunctiva/sclera: Conjunctivae normal.      Pupils: Pupils are equal, round, and reactive to light.   Neck:      Thyroid: No thyromegaly.      Trachea: No tracheal deviation.   Cardiovascular:      Rate and Rhythm: Normal rate and regular rhythm.      Heart sounds: Normal heart sounds.   Pulmonary:      Effort: Pulmonary effort is normal.      Breath sounds: Normal breath sounds.   Abdominal:      General: Abdomen is flat.   Skin:     Findings: No rash.   Neurological:      Comments: Neuro intact througout       Significant Labs:   CBC:   Recent Labs   Lab 11/06/22 0455 11/07/22 0457   WBC 5.77 6.40   HGB 11.1* 10.9*   HCT 32.2* 31.9*   PLT 50* 46*    and CMP:   Recent Labs   Lab 11/06/22 0455 11/07/22 0457    139   K 4.0 4.1    106   CO2 29 26   * 207*   BUN 25* 24*   CREATININE 0.4* 0.4*   CALCIUM 7.8* 7.9*   PROT 5.4* 5.6*   ALBUMIN 2.2* 2.3*   BILITOT 0.6 0.8   ALKPHOS 51* 76   AST 38 81*   ALT 75* 157*   ANIONGAP 7* 7*       Diagnostic Results:  None

## 2022-11-07 NOTE — CONSULTS
Consult Note  Infectious Disease    Reason for Consult:  herpes esophagitis    HPI: Antonieta Clay is a 62 y.o. female  with advanced parathyroid carcinoma was admitted 11/3 after being sent by home health for dehydration concerns.  She has been having poor po intake thought to be related to thrush.  In addition, reports of dysphagia and odynophagia. It is estimated that she has last 20lbs over the last 6 weeks.  She is on oral chemo Lenvatinib.  She had recent hospitalization for confusion.  There was concern regarding CNS involvement of her malignancy. This work up was ultimately negative and she was given steroids an dshe improved.   . There was also concern for espophageal stenosis and thus GI consulted.  She underwent EGD 11/4 which revealed ulcerated and inflamed mucosa of the esophagus with multiple shallow ulcers that is consistent with viral esophagitis. The path today demonstrates lesions c/w herpes esophagitis, with immuno stains pending. Acyclovir was started today. She is eating poorly and is lethargic. The decadron that she was taking pre admission is being weaned slowly now.     Review of patient's allergies indicates:   Allergen Reactions    Pcn [penicillins] Swelling    Codeine Nausea And Vomiting     Past Medical History:   Diagnosis Date    Cancer     neck    HA (headache)     Hypothyroidism      Past Surgical History:   Procedure Laterality Date    INSERTION OF TUNNELED CENTRAL VENOUS CATHETER (CVC) WITH SUBCUTANEOUS PORT Right 04/04/2022    Procedure: [id number];  Surgeon: Charles Servin III, MD;  Location: [facility];  Service: General;  Laterality: Right;    left temporal bone resection  2020    MODIFIED RADICAL NECK DISSECTION Left 2020    parotidectomy Left 2020    UPPER GASTROINTESTINAL ENDOSCOPY  11/04/2022    with biopsies and viral studies     Social History     Socioeconomic History    Marital status:    Tobacco Use    Smoking status: Never    Smokeless tobacco:  Never   Substance and Sexual Activity    Alcohol use: Never    Drug use: Never    Sexual activity: Not Currently     Partners: Male     Birth control/protection: None     Social Determinants of Health     Financial Resource Strain: Low Risk     Difficulty of Paying Living Expenses: Not hard at all   Food Insecurity: No Food Insecurity    Worried About Running Out of Food in the Last Year: Never true    Ran Out of Food in the Last Year: Never true   Transportation Needs: No Transportation Needs    Lack of Transportation (Medical): No    Lack of Transportation (Non-Medical): No   Physical Activity: Inactive    Days of Exercise per Week: 0 days    Minutes of Exercise per Session: 0 min   Stress: Stress Concern Present    Feeling of Stress : To some extent   Social Connections: Moderately Isolated    Frequency of Communication with Friends and Family: More than three times a week    Frequency of Social Gatherings with Friends and Family: Once a week    Attends Christianity Services: Never    Active Member of Clubs or Organizations: No    Attends Club or Organization Meetings: Never    Marital Status:    Housing Stability: Low Risk     Unable to Pay for Housing in the Last Year: No    Number of Places Lived in the Last Year: 1    Unstable Housing in the Last Year: No     Family History   Problem Relation Age of Onset    Breast cancer Mother     Breast cancer Maternal Aunt          Review of Systems:   No chills, fever, sweats,  but has profound weakness weight loss  No change in vision, loss of vision or diplopia  No sinus congestion, purulent nasal discharge,   No pain in mouth  . No problems with teeth, gums.  No chest pain, palpitations, syncope  No cough, sputum production, shortness of breath,    No nausea, vomiting, diarrhea, or focal abd pain, but she has no interest in food or appetite. Has  odynophagia, from lower part of pharynx to lower esophagus  No dysuria, hematuria,    No swelling of joints, redness of  joints, injuries, or new focal pain  No unusual headaches,    No anxiety, depression, substance abuse, sleep disturbance  No diabetes,    No bleeding, lymphadenopathy, anemia, malignancy, unusual bruising  No new rashes, lesions, or wounds     Implants: portacath  Antibiotic History:     EXAM & DIAGNOSTICS REVIEWED:   Vitals:     Temp:  [97.8 °F (36.6 °C)-98.9 °F (37.2 °C)]   Temp: 97.9 °F (36.6 °C) (11/07/22 1227)  Pulse: 82 (11/07/22 1227)  Resp: 15 (11/07/22 1227)  BP: 127/79 (11/07/22 1227)  SpO2: 95 % (11/07/22 1227)    Intake/Output Summary (Last 24 hours) at 11/7/2022 1554  Last data filed at 11/6/2022 1949  Gross per 24 hour   Intake 120 ml   Output --   Net 120 ml       General:  In NAD. Alert and attentive, cooperative, comfortable. Profoundly weak  Eyes:  Anicteric, PERRL, EOMI  ENT:  No ulcers, exudates, thrush, nares patent, dentition is good  Neck:  supple, no masses or adenopathy appreciated  Lungs: Clear, no consolidation, rales, wheezes, rub  Heart:  RRR, no gallop/murmur/rub noted  Abd:  Soft, NT, ND, normal BS, no masses or organomegaly appreciated.  :  Voids  no flank tenderness  Musc:  Joints without effusion, swelling, erythema, synovitis, muscle wasting.   Skin:  No rashes. No palmar or plantar lesions. No subungual petechiae  Neuro:             Alert, attentive, speech fluent, face symmetric, moves all extremities, no focal weakness. Barely Ambulatory  Psych:  Calm, cooperative  Lymphatic:     No cervical, supraclavicular,   nodes  Extrem: No edema, erythema, phlebitis, cellulitis, warm and well perfused  VAD:   portacath    Isolation:  none  Wound:       Lines/Tubes/Drains:    General Labs reviewed:  Recent Labs   Lab 11/05/22  0440 11/06/22  0455 11/07/22  0457   WBC 3.98 5.77 6.40   HGB 12.4 11.1* 10.9*   HCT 34.9* 32.2* 31.9*   PLT 52* 50* 46*       Recent Labs   Lab 11/05/22  0440 11/06/22  0455 11/07/22  0457   * 136 139   K 3.2* 4.0 4.1   CL 93* 100 106   CO2 29 29 26   BUN 20  25* 24*   CREATININE 0.3* 0.4* 0.4*   CALCIUM 7.8* 7.8* 7.9*   PROT 5.7* 5.4* 5.6*   BILITOT 0.9 0.6 0.8   ALKPHOS 52* 51* 76   ALT 80* 75* 157*   AST 35 38 81*     No results for input(s): CRP in the last 168 hours.      Micro:  Microbiology Results (last 7 days)       Procedure Component Value Units Date/Time    Blood culture [[id number]]     Order Status: No result Specimen: Blood             Imaging Reviewed:   CXRs and KUB(formed stool in vault)   MRI brain negative now    Cardiology:    IMPRESSION & PLAN   1. 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   Odynophagia, poor oral intake, failure to thrive     2. 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 Long term use of systemic corticosteroids(since September)      Recommendations:  Continue acyclovir 5 mg/kg IV q8(if CMV, we will change to ganciclovir)  Reduce non essential meds(vitamins) and d/c chlorhexidine oral rinse, which would likely burn  Low dose marinol  Consider resumption of TPN  Discussed with patient and  the need for nourishment, and even mentioned PEG, but hopefully she can avoid this.    D/w Dr. Kaplan , patient and     Medical Decision Making during this encounter was  [_] Low Complexity  [_] Moderate Complexity  [ xxx ] High Complexity

## 2022-11-07 NOTE — PROGRESS NOTES
[facility]  Adult Nutrition   Progress Note (Follow-Up)    SUMMARY      Recommendations:   1. Continue current diet.  2. Continue Ensure plus with meals.     Goals:   Goals: 1. Transition from TPN to PO diet only. 2. Patient to meet at least 75% of estimated energy and protein needs via PO intake. 3. Blood glucose and electrolytes to trend towards normal limits/ target ranges.    Dietitian Rounds Brief  Patient with history of Malignant neoplasm of major salivary gland, Hypothyroidism, Chemotherapy induced neutropenia was on TPN until yesterday. Last BM 11/3. Montior for bowel function and intake.    Diet order: Dysphagia Soft (IDDSI Level 6)    Oral Supplement: Ensure HP    % Intake of Estimated Energy Needs: 25 - 50 %  % Meal Intake: 25 - 50 %    Estimated/Assessed Needs  Weight Used For Calorie Calculations: 48.5 kg (106 lb 14.8 oz)  Energy Calorie Requirements (kcal): 1028-5088 (30-35)  Energy Need Method: Kcal/kg  Protein Requirements:  (1.5-2.5 gm/kg)  Weight Used For Protein Calculations: 48.5 kg (106 lb 14.8 oz)  Fluid Requirements (mL): 1455 (30 ml/kg)     RDA Method (mL): 1455     Weight History:  Wt Readings from Last 5 Encounters:   11/05/22 48.5 kg (107 lb)   10/26/22 48.6 kg (107 lb 1.6 oz)   10/12/22 50.8 kg (112 lb)   09/28/22 52.2 kg (115 lb)   09/27/22 51.9 kg (114 lb 6.4 oz)      Reason for Assessment  Reason For Assessment: RD follow-up  Diagnosis: other (see comments) (Dehydration with hyponatremia)  Relevant Medical History: Malignant neoplasm of major salivary gland, Hypothyroidism, Chemotherapy induced neutropenia    Medications:Pertinent Medications Reviewed  Scheduled Meds:   acyclovir  5 mg/kg Intravenous Q8H    amLODIPine  5 mg Oral Daily    ascorbic acid (vitamin C)  1,000 mg Oral Daily    chlorhexidine  15 mL Mouth/Throat BID    cholecalciferol (vitamin D3)  5,000 Units Oral Daily    dexAMETHasone  4 mg Oral Q8H    levothyroxine  88 mcg Oral Daily    multivitamin  1  tablet Oral Daily    nystatin  4 mL Oral QID    pantoprazole  40 mg Intravenous Q24H     Continuous Infusions:  PRN Meds:.acetaminophen, ALPRAZolam, calcium gluconate IVPB, calcium gluconate IVPB, calcium gluconate IVPB, dextrose 10%, dextrose 10%, glucagon (human recombinant), glucose, glucose, insulin aspart U-100, magnesium oxide, magnesium oxide, magnesium sulfate IVPB, magnesium sulfate IVPB, naloxone, ondansetron, potassium bicarbonate, potassium bicarbonate, potassium bicarbonate, potassium chloride **AND** potassium chloride **AND** potassium chloride, potassium, sodium phosphates, potassium, sodium phosphates, potassium, sodium phosphates, sodium chloride 0.9%, sodium phosphate IVPB, sodium phosphate IVPB, sodium phosphate IVPB    Labs: Pertinent Labs Reviewed  Clinical Chemistry:  Recent Labs   Lab 11/05/22 0440 11/06/22 0455 11/07/22 0457   * 136 139   K 3.2* 4.0 4.1   CL 93* 100 106   CO2 29 29 26   * 197* 207*   BUN 20 25* 24*   CREATININE 0.3* 0.4* 0.4*   CALCIUM 7.8* 7.8* 7.9*   PROT 5.7* 5.4* 5.6*   ALBUMIN 2.6* 2.2* 2.3*   BILITOT 0.9 0.6 0.8   ALKPHOS 52* 51* 76   AST 35 38 81*   ALT 80* 75* 157*   ANIONGAP 9 7* 7*   MG 2.3 2.6 2.4   PHOS 2.0* 1.9* 2.9     CBC:   Recent Labs   Lab 11/07/22 0457   WBC 6.40   RBC 3.57*   HGB 10.9*   HCT 31.9*   PLT 46*   MCV 89   MCH 30.5   MCHC 34.2     Lipid Panel:  Recent Labs   Lab 11/05/22 0440   TRIG 266*     Monitor and Evaluation  Food and Nutrient Intake: energy intake, food and beverage intake  Food and Nutrient Adminstration: diet order  Knowledge/Beliefs/Attitudes: food and nutrition knowledge/skill, beliefs and attitudes  Physical Activity and Function: nutrition-related ADLs and IADLs, factors affecting access to physical activity  Anthropometric Measurements: weight, weight change, body mass index  Biochemical Data, Medical Tests and Procedures: electrolyte and renal panel, glucose/endocrine profile, lipid profile, inflammatory  profile, gastrointestinal profile  Nutrition-Focused Physical Findings: overall appearance     Nutrition Risk  Level of Risk/Frequency of Follow-up: high     Nutrition Follow-Up  RD Follow-up?: Yes    Joyce Galvan RD 11/07/2022 11:05 AM

## 2022-11-07 NOTE — PT/OT/SLP PROGRESS
Speech Language Pathology      Antonieta Clay  MRN: [id number]    Patient not seen today secondary to Other (Comment) (Pt currently iin meeting with family and MD). Will follow-up next scheduled visit day.

## 2022-11-08 NOTE — PT/OT/SLP PROGRESS
"Speech Language Pathology Treatment    Patient Name:  Antonieta Clay   MRN:  [id number]  Admitting Diagnosis: Dehydration    Recommendations:                 General Recommendations:  Dysphagia therapy  Diet recommendations:  Soft & Bite Sized Diet - IDDSI Level 6, Liquid Diet Level: Thin   Aspiration Precautions: 1 bite/sip at a time, HOB to 90 degrees, Small bites/sips, and Wear oxygen during intake   General Precautions: Standard, aspiration  Communication strategies:  none    Subjective     Pt seen at b/s with spouse and family friend present.  Patient goals: Unable to elicit     Pain/Comfort:  Pain Rating 1: 0/10    Respiratory Status: Nasal cannula, flow 2 L/min    Objective:     Has the patient been evaluated by SLP for swallowing?   Yes  Keep patient NPO? No   Current Respiratory Status:        Nsg staff reported no difficulty with pill med intake with liquid.  Both Nsg and spouse reported minimal po acceptance with breakfast, even though pt stated, "I'm hungry.".  Pt appears to be selectively choosing soft consistency items to eat from meal tray (e.g., pasta, scrambled eggs, grits, etc.) vs' regular-textured food items.  CG reported prior to hospital admission pt ate mainly soft foods and Ensure with ice- cream, 2 to pt preference and limited dentures.  Note:  Pt.'s upper dentures no longer securely fit 2 to pt.'s rapid weight loss.     Attempted po trials.  Pt only accepted 2 sips of thin liquids-H2O.  Pt independently taking mini-isolated sips via cup.  No overt s/s of aspiration.  Pt declined anymore po trials at this time.  Pt / CG were both educated on POC and recommendations for diet level modification to softer foods, to encourage consistent and adequate po consumption.    Assessment:     Antonieta Clay is a 62 y.o. female who is making gradual, yet inconsistent, improvement with po consumption.  No overt s/s of aspiration, and/or reported swallowing difficulty by Nsg.  Pt would benefit from " diet change to IDDSI-6 (Soft consistencies) / Thin liquids with previously listed swallowing precautions, in order to promote consistent and adequate po consumption.       Goals:   Multidisciplinary Problems       SLP Goals          Problem: SLP    Goal Priority Disciplines Outcome   SLP Goal     SLP Ongoing, Progressing   Description: 1.  Pt will demonstrate no overt s/s of aspiration, >95% of the time, with least restrictive diet level.  2.  Pt will demonstrate no overt s/s of aspiration, >95% of the time, with IDDSI-5 MINCED and MOIST / THIN liquid meal intake.  3.  Pt will utilize trained swallowing precautions and compensatory strategies with MOD IND.                       Plan:     Patient to be seen:  3 x/week   Plan of Care expires:  11/25/22  Plan of Care reviewed with:  patient, family   SLP Follow-Up:  Yes       Discharge recommendations:  home health speech therapy   Barriers to Discharge:  None    Time Tracking:     SLP Treatment Date:   11/08/22  Speech Start Time:  1200  Speech Stop Time:  1215     Speech Total Time (min):  15 min    Billable Minutes: Treatment Swallowing Dysfunction 15mins    11/08/2022

## 2022-11-08 NOTE — PT/OT/SLP PROGRESS
Occupational Therapy      Patient Name:  Antonieta Clay   MRN:  [id number]    Patient not seen today secondary to Patient unwilling to participate. Will follow-up tomorrow.    11/8/2022

## 2022-11-08 NOTE — PLAN OF CARE
Problem: Oral Intake Inadequate  Goal: Improved Oral Intake  Outcome: Ongoing, Not Progressing  Intervention: Promote and Optimize Oral Intake  Flowsheets (Taken 11/8/2022 1252)  Oral Nutrition Promotion:   calorie-dense liquids provided   appetite stimulant added

## 2022-11-08 NOTE — SUBJECTIVE & OBJECTIVE
Interval History:     Oncology Treatment Plan:   [No matching plan found]    Medications:  Continuous Infusions:   lactated ringers 75 mL/hr at 11/08/22 1101     Scheduled Meds:   acyclovir  5 mg/kg Intravenous Q8H    amLODIPine  5 mg Oral Daily    cholecalciferol (vitamin D3)  5,000 Units Oral Daily    DAPTOmycin (CUBICIN) IV (PEDS and ADULTS)  6 mg/kg Intravenous Once    dexAMETHasone  2 mg Oral Q8H    levothyroxine  88 mcg Oral Daily    mirtazapine  7.5 mg Oral QHS    nystatin  4 mL Oral QID    pantoprazole  40 mg Intravenous Q24H     PRN Meds:acetaminophen, ALPRAZolam, calcium gluconate IVPB, calcium gluconate IVPB, calcium gluconate IVPB, dextrose 10%, dextrose 10%, glucagon (human recombinant), glucose, glucose, insulin aspart U-100, magnesium oxide, magnesium oxide, magnesium sulfate IVPB, magnesium sulfate IVPB, naloxone, ondansetron, potassium bicarbonate, potassium bicarbonate, potassium bicarbonate, potassium chloride **AND** potassium chloride **AND** potassium chloride, potassium, sodium phosphates, potassium, sodium phosphates, potassium, sodium phosphates, sodium chloride 0.9%, sodium phosphate IVPB, sodium phosphate IVPB, sodium phosphate IVPB     Review of Systems   Unable to perform ROS: Other   Objective:     Vital Signs (Most Recent):  Temp: 98.4 °F (36.9 °C) (11/08/22 1200)  Pulse: 88 (11/08/22 1200)  Resp: 18 (11/08/22 1200)  BP: 114/74 (11/08/22 1200)  SpO2: (!) 92 % (11/08/22 1200) Vital Signs (24h Range):  Temp:  [97.7 °F (36.5 °C)-98.4 °F (36.9 °C)] 98.4 °F (36.9 °C)  Pulse:  [84-96] 88  Resp:  [16-18] 18  SpO2:  [92 %-99 %] 92 %  BP: ()/(74-82) 114/74     Weight: 48.5 kg (107 lb)  Body mass index is 18.37 kg/m².  Body surface area is 1.48 meters squared.      Intake/Output Summary (Last 24 hours) at 11/8/2022 1613  Last data filed at 11/8/2022 1307  Gross per 24 hour   Intake 125 ml   Output 450 ml   Net -325 ml         Physical Exam  Constitutional:       Appearance: She is  well-developed.   HENT:      Head: Normocephalic and atraumatic.      Right Ear: External ear normal.      Left Ear: External ear normal.   Eyes:      Conjunctiva/sclera: Conjunctivae normal.      Pupils: Pupils are equal, round, and reactive to light.   Neck:      Thyroid: No thyromegaly.      Trachea: No tracheal deviation.   Cardiovascular:      Rate and Rhythm: Normal rate and regular rhythm.      Heart sounds: Normal heart sounds.   Pulmonary:      Effort: Pulmonary effort is normal.      Breath sounds: Normal breath sounds.   Abdominal:      General: Abdomen is flat.   Skin:     Findings: No rash.   Neurological:      Comments: Neuro intact througout       Significant Labs:   CBC:   Recent Labs   Lab 11/07/22 0457 11/07/22 1657 11/08/22 0458   WBC 6.40 6.08 5.67   HGB 10.9* 11.2* 9.9*   HCT 31.9* 32.9* 28.8*   PLT 46* 38* 45*      and CMP:   Recent Labs   Lab 11/07/22 0457 11/07/22 1657 11/08/22 0458    138 136   K 4.1 4.1 4.2    105 103   CO2 26 24 24   * 215* 149*   BUN 24* 25* 16   CREATININE 0.4* 0.4* 0.3*   CALCIUM 7.9* 8.4* 8.2*   PROT 5.6* 5.6* 5.3*   ALBUMIN 2.3* 2.2* 2.1*   BILITOT 0.8 0.8 0.6   ALKPHOS 76 83 70   AST 81* 78* 56*   * 191* 159*   ANIONGAP 7* 9 9         Diagnostic Results:  None

## 2022-11-08 NOTE — PROGRESS NOTES
[facility]  Hematology/Oncology  Progress Note    Patient Name: Antonieta Clay  Admission Date: 11/3/2022  Hospital Length of Stay: 4 days  Code Status: Full Code     Subjective:     HPI:  Ms. Clay looks a bit better today.  Sitting up and eating on her own.  No new complaints.       Interval History:     Oncology Treatment Plan:   [No matching plan found]    Medications:  Continuous Infusions:   lactated ringers 75 mL/hr at 11/08/22 1101     Scheduled Meds:   acyclovir  5 mg/kg Intravenous Q8H    amLODIPine  5 mg Oral Daily    cholecalciferol (vitamin D3)  5,000 Units Oral Daily    DAPTOmycin (CUBICIN) IV (PEDS and ADULTS)  6 mg/kg Intravenous Once    dexAMETHasone  2 mg Oral Q8H    levothyroxine  88 mcg Oral Daily    mirtazapine  7.5 mg Oral QHS    nystatin  4 mL Oral QID    pantoprazole  40 mg Intravenous Q24H     PRN Meds:acetaminophen, ALPRAZolam, calcium gluconate IVPB, calcium gluconate IVPB, calcium gluconate IVPB, dextrose 10%, dextrose 10%, glucagon (human recombinant), glucose, glucose, insulin aspart U-100, magnesium oxide, magnesium oxide, magnesium sulfate IVPB, magnesium sulfate IVPB, naloxone, ondansetron, potassium bicarbonate, potassium bicarbonate, potassium bicarbonate, potassium chloride **AND** potassium chloride **AND** potassium chloride, potassium, sodium phosphates, potassium, sodium phosphates, potassium, sodium phosphates, sodium chloride 0.9%, sodium phosphate IVPB, sodium phosphate IVPB, sodium phosphate IVPB     Review of Systems   Unable to perform ROS: Other   Objective:     Vital Signs (Most Recent):  Temp: 98.4 °F (36.9 °C) (11/08/22 1200)  Pulse: 88 (11/08/22 1200)  Resp: 18 (11/08/22 1200)  BP: 114/74 (11/08/22 1200)  SpO2: (!) 92 % (11/08/22 1200) Vital Signs (24h Range):  Temp:  [97.7 °F (36.5 °C)-98.4 °F (36.9 °C)] 98.4 °F (36.9 °C)  Pulse:  [84-96] 88  Resp:  [16-18] 18  SpO2:  [92 %-99 %] 92 %  BP: ()/(74-82) 114/74     Weight: 48.5 kg  (107 lb)  Body mass index is 18.37 kg/m².  Body surface area is 1.48 meters squared.      Intake/Output Summary (Last 24 hours) at 11/8/2022 1613  Last data filed at 11/8/2022 1307  Gross per 24 hour   Intake 125 ml   Output 450 ml   Net -325 ml         Physical Exam  Constitutional:       Appearance: She is well-developed.   HENT:      Head: Normocephalic and atraumatic.      Right Ear: External ear normal.      Left Ear: External ear normal.   Eyes:      Conjunctiva/sclera: Conjunctivae normal.      Pupils: Pupils are equal, round, and reactive to light.   Neck:      Thyroid: No thyromegaly.      Trachea: No tracheal deviation.   Cardiovascular:      Rate and Rhythm: Normal rate and regular rhythm.      Heart sounds: Normal heart sounds.   Pulmonary:      Effort: Pulmonary effort is normal.      Breath sounds: Normal breath sounds.   Abdominal:      General: Abdomen is flat.   Skin:     Findings: No rash.   Neurological:      Comments: Neuro intact througout       Significant Labs:   CBC:   Recent Labs   Lab 11/07/22 0457 11/07/22 1657 11/08/22 0458   WBC 6.40 6.08 5.67   HGB 10.9* 11.2* 9.9*   HCT 31.9* 32.9* 28.8*   PLT 46* 38* 45*      and CMP:   Recent Labs   Lab 11/07/22 0457 11/07/22 1657 11/08/22 0458    138 136   K 4.1 4.1 4.2    105 103   CO2 26 24 24   * 215* 149*   BUN 24* 25* 16   CREATININE 0.4* 0.4* 0.3*   CALCIUM 7.9* 8.4* 8.2*   PROT 5.6* 5.6* 5.3*   ALBUMIN 2.3* 2.2* 2.1*   BILITOT 0.8 0.8 0.6   ALKPHOS 76 83 70   AST 81* 78* 56*   * 191* 159*   ANIONGAP 7* 9 9         Diagnostic Results:  None    Assessment/Plan:     Adult failure to thrive  Ms. Clay is doing a bit better today.  Discussed with Dr. Kaplan,  Will continue current approach for now as there is no definable cancer progression that would push me to de-escalating care.  Agree with trying to find SNF/Rehab option for her and this is ongoing.  No new issues otherwise today.          Thank you for your  consult. I will follow-up with patient. Please contact us if you have any additional questions.     Gorge Coombs MD  Hematology/Oncology  [facility]

## 2022-11-08 NOTE — PROGRESS NOTES
"Consult Note  Infectious Disease    Reason for Consult:  herpes esophagitis    HPI: Antonieta Clay is a 62 y.o. female  with advanced parathyroid carcinoma was admitted 11/3 after being sent by home health for dehydration concerns.  She has been having poor po intake thought to be related to thrush.  In addition, reports of dysphagia and odynophagia. It is estimated that she has last 20lbs over the last 6 weeks.  She is on oral chemo Lenvatinib.  She had recent hospitalization for confusion.  There was concern regarding CNS involvement of her malignancy. This work up was ultimately negative and she was given steroids an dshe improved.   . There was also concern for espophageal stenosis and thus GI consulted.  She underwent EGD 11/4 which revealed ulcerated and inflamed mucosa of the esophagus with multiple shallow ulcers that is consistent with viral esophagitis. The path today demonstrates lesions c/w herpes esophagitis, with immuno stains pending. Acyclovir was started today. She is eating poorly and is lethargic. The decadron that she was taking pre admission is being weaned slowly now.     11/8: interim reviewed. Afebrile. Seen at bedside with , who reports that she got up this am, but was progressively agitated. She ate about 10-20% of lunch, but did not cooperate with therapy very much. Currently she is curled up in a ball with covers to her face. She has been repeatedly asking for "my clothes". She did respond to a friend's visit. She denies pain, but resists exam. Long private talk with  about what is reversible and what might not be, ie the infection in her esophagus is reversible, but her failure to thrive and lack of will to nourish herself may not be. Psych consult noted.     EXAM & DIAGNOSTICS REVIEWED:   Vitals:     Temp:  [97.7 °F (36.5 °C)-98.4 °F (36.9 °C)]   Temp: 98.4 °F (36.9 °C) (11/08/22 1200)  Pulse: 88 (11/08/22 1200)  Resp: 18 (11/08/22 1200)  BP: 114/74 (11/08/22 " 1200)  SpO2: (!) 92 % (11/08/22 1200)    Intake/Output Summary (Last 24 hours) at 11/8/2022 1509  Last data filed at 11/8/2022 1307  Gross per 24 hour   Intake 125 ml   Output 450 ml   Net -325 ml       General:  In NAD.curled up in a ball and covering her face. Resists exam and  is Profoundly weak  Eyes:  Anicteric, EOMI  ENT:  Unable to see her mouth today  Neck:  supple,    Lungs: Clear, no consolidation, rales, wheezes, rub  Heart:  RRR, no gallop/murmur/rub noted  Abd:  Soft, NT, ND, normal BS, no masses or organomegaly appreciated.  :  Voids     Musc:  Joints without effusion, swelling, erythema, synovitis, with generalized muscle wasting.   Skin:  No rashes.    Neuro:            Arousable, did speak to me but resists. No focal deficit. Per 's report, the content of her speech is suggestive of confusion  Psych:  Calm, not really cooperative  Lymphatic:     Extrem: No edema, erythema, phlebitis, cellulitis, warm and well perfused  VAD:   portacath    Isolation:  none  Wound:            General Labs reviewed:  Recent Labs   Lab 11/07/22 0457 11/07/22 1657 11/08/22 0458   WBC 6.40 6.08 5.67   HGB 10.9* 11.2* 9.9*   HCT 31.9* 32.9* 28.8*   PLT 46* 38* 45*       Recent Labs   Lab 11/07/22 0457 11/07/22 1657 11/08/22 0458    138 136   K 4.1 4.1 4.2    105 103   CO2 26 24 24   BUN 24* 25* 16   CREATININE 0.4* 0.4* 0.3*   CALCIUM 7.9* 8.4* 8.2*   PROT 5.6* 5.6* 5.3*   BILITOT 0.8 0.8 0.6   ALKPHOS 76 83 70   * 191* 159*   AST 81* 78* 56*     Recent Labs   Lab 11/07/22 1657   CRP 9.84*         Micro:  Microbiology Results (last 7 days)       Procedure Component Value Units Date/Time    Blood culture [[id number]]     Order Status: No result Specimen: Blood     Blood culture [[id number]] Collected: 11/07/22 1711    Order Status: Completed Specimen: Blood from Antecubital, Right Arm Updated: 11/08/22 1505     Blood Culture, Routine Gram stain nicholas bottle: Gram positive cocci      Results  called to and read back by:Ananya Jacobs RN-[facility];  11/08/2022      15:05 CJD            Imaging Reviewed:   CXRs and KUB(formed stool in vault)   MRI brain negative now    Cardiology:    IMPRESSION & PLAN   1. 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   Odynophagia, poor oral intake, failure to thrive     2. 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 Long term use of systemic corticosteroids(since September)  4. 1 bottle drawn peripherally, pos for GPC      Recommendations:  Continue acyclovir 5 mg/kg IV q8(if CMV, we will change to ganciclovir)  Stop  marinol  rec resumption of TPN and LTAC instead of SNF  Repeat blood culture, from port, one dose of daptomycin but suspect contaminant     Wean steroids as able, defer schedule to oncology  D/w  patient and  and Dr. John    Medical Decision Making during this encounter was  [_] Low Complexity  [_] Moderate Complexity  [ xxx ] High Complexity

## 2022-11-08 NOTE — PLAN OF CARE
Problem: Adult Inpatient Plan of Care  Goal: Plan of Care Review  Outcome: Ongoing, Progressing    Problem: Skin Injury Risk Increased  Goal: Skin Health and Integrity  Outcome: Ongoing, Progressing     Problem: Oral Intake Inadequate  Goal: Improved Oral Intake  Outcome: Ongoing, Not Progressing   Goal: Absence of Hospital-Acquired Illness or Injury  Outcome: Ongoing, Progressing  Goal: Optimal Comfort and Wellbeing  Outcome: Ongoing, Progressing  Goal: Readiness for Transition of Care  Outcome: Ongoing, Progressing

## 2022-11-08 NOTE — PLAN OF CARE
Problem: Adult Inpatient Plan of Care  Goal: Plan of Care Review  Outcome: Ongoing, Progressing  Goal: Patient-Specific Goal (Individualized)  Outcome: Ongoing, Progressing  Goal: Absence of Hospital-Acquired Illness or Injury  Outcome: Ongoing, Progressing  Goal: Optimal Comfort and Wellbeing  Outcome: Ongoing, Progressing  Goal: Readiness for Transition of Care  Outcome: Ongoing, Progressing     Problem: Fall Injury Risk  Goal: Absence of Fall and Fall-Related Injury  Outcome: Ongoing, Progressing     Problem: Parenteral Nutrition  Goal: Effective Intravenous Nutrition Therapy Delivery  Outcome: Ongoing, Progressing     Problem: Skin Injury Risk Increased  Goal: Skin Health and Integrity  Outcome: Ongoing, Progressing     Problem: Infection  Goal: Absence of Infection Signs and Symptoms  Outcome: Ongoing, Progressing     Problem: Oral Intake Inadequate  Goal: Improved Oral Intake  Outcome: Ongoing, Progressing

## 2022-11-08 NOTE — PT/OT/SLP PROGRESS
Physical Therapy Treatment    Patient Name:  Antonieta Clay   MRN:  [id number]    Recommendations:     Discharge Recommendations:  nursing facility, skilled   Discharge Equipment Recommendations: none   Barriers to discharge:  increased assist with mobility    Assessment:     Antonieta Clay is a 62 y.o. female admitted with a medical diagnosis of Dehydration.  She presents with the following impairments/functional limitations:  weakness, impaired endurance, impaired self care skills, impaired functional mobility, gait instability, impaired balance, impaired cognition, decreased lower extremity function, decreased safety awareness, impaired coordination, impaired cardiopulmonary response to activity.  Pt with HOB elevated and family present. Spouse wanting pt up in chair. Pt with flat affect and limited responses. Pt ambulated 20' with HHA and min assist, slow conner and decreased step length. Pillows placed in chair to keep pt positioned upright.     Rehab Prognosis: Fair; patient would benefit from acute skilled PT services to address these deficits and reach maximum level of function.    Recent Surgery: Procedure(s) (LRB):  EGD (ESOPHAGOGASTRODUODENOSCOPY) (N/A) 4 Days Post-Op    Plan:     During this hospitalization, patient to be seen 6 x/week to address the identified rehab impairments via gait training, therapeutic activities, therapeutic exercises and progress toward the following goals:    Plan of Care Expires:       Subjective     Chief Complaint: none verbalized  Patient/Family Comments/goals: spouse wants pt sitting up in chair  Pain/Comfort:  Pain Rating 1: 0/10      Objective:     Communicated with RN prior to session.  Patient found HOB elevated with telemetry, peripheral IV, oxygen upon PT entry to room.     General Precautions: Standard, fall   Orthopedic Precautions:    Braces:    Respiratory Status: Nasal cannula, flow 2 L/min     Functional Mobility:  Bed Mobility:     Supine to Sit:  moderate assistance  Transfers:     Sit to Stand:  moderate assistance with hand-held assist  Gait: x 20' min HHA with slow conner and decreased step length      AM-PAC 6 CLICK MOBILITY          Treatment & Education:  Pt educated on importance of time OOB, importance of intermittent mobility, safe techniques for transfers/ambulation, discharge recommendations/options, and use of call light for assistance and fall prevention.      Patient left up in chair with all lines intact, call button in reach, chair alarm on, RN notified, and spouse present..    GOALS:   Multidisciplinary Problems       Physical Therapy Goals          Problem: Physical Therapy    Goal Priority Disciplines Outcome Goal Variances Interventions   Physical Therapy Goal     PT, PT/OT      Description: All physical therapy goals to be met by discharge:    1. Supine to sit with Stand-by Assistance  2. Sit to stand transfer with Stand-by Assistance  3.. Bed to chair transfer with Supervision using Rolling Walker  4. Gait  x 50 feet with Minimal Assistance using Rolling Walker.                          Time Tracking:     PT Received On: 11/08/22  PT Start Time: 1215     PT Stop Time: 1228  PT Total Time (min): 13 min     Billable Minutes: Gait Training 13    Treatment Type: Treatment  PT/PTA: PTA     PTA Visit Number: 1     11/08/2022

## 2022-11-08 NOTE — CONSULTS
"[facility]  Psychiatry  Telepsychiatry Consult Note    Please see previous notes:    Patient agreeable to consultation via telepsychiatry.    Tele-Consultation from Psychiatry started: 11/7/2022 at 6:55 PM  The chief complaint leading to psychiatric consultation is: "failure to thrive seemingly giving up"  This consultation was requested by Dr Kaplan, the medicine attending physician.  The location of the consulting psychiatrist is [place].  The patient location is  [address]*   The patient arrived at the ED at: 11/3/22    Also present with the patient at the time of the consultation: initially interviewed alone, pt's  joined w permission    Patient Identification:   Antonieta Clay is a 62 y.o. female.    Patient information was obtained from patient, spouse/SO, past medical records, and primary team.    Inpatient consult to Telemedicine - Psych  Consult performed by: Yumi Lubin MD  Consult ordered by: Amairani Kaplna MD      Teleconsult Time Documentation  Subjective:     History of Present Illness:  Per medicine progress note 11/6/22: "Principal problem: Dehydration with hyponatremia     Interval History:      Patient does not recall getting out of bed or seeing PT/OT at the time of my exam.  Per my discussion with RN, she has done significantly better and had been out of bed all morning. She had gotten up with PT/OT as well.  Pt/OT is recommending SNF.  CM consulted.  Patient ate 75% of breakfast.  TPN will be discontinued after this present bag is complete.  She denies oral pain or odynophagia.      Hospital Course:     Patient admitted after being sent by home health for dehydration concerns.  She has been having poor po intake thought to be related to thrush.  In addition, reports of dysphagia and odynophagia. It is estimated that she has last 20lbs over the last 6 weeks.  She is on oral chemo Lenvatinib.  She had recent hospitalization for confusion.  There was " "concern regarding CNS involvement of her malignancy. This work up was ultimately negative and she improved.  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 There was also concern for espophageal stenosis and thus GI consulted.  She underwent EGD 11/4 which revealed ulcerated and inflamed mucosa of the esophagus with multiple shallow ulcers that is consistent with viral esophagitis.  Biopsies taken. She was seen by Dr. Sagastume who plans for repeat MRI of the brain to help differentiate if her clinical decline is CNS involvement of her malignancy versus chemo induced. Lenvatinib has been stopped.  He has ordered TPN.  I have added IV Diflucan in addition to oral nystatin.  She is requiring supplemental oxygen and thus CXR ordered- no acute processes. Supplemental oxygen discontinued. 11/6 MRI of the brain performed and negative for acute process.          Baseline:  Long discussion with patient and her . It sounds like she was ambulatory and able to go out and shop approximately 10/19.  By 10/21, she was showing signs of weakness and needing to be assisted to get off the toilet or out of the bath tub.  For the last few days prior to admission, she was sleeping a good deal and not getting off the couch and her  was having to provide a lot of physical support to hep her ambulate."     Per heme onc progress note 11/7/22: "Ms. Clay was doing better yesterday, eating more and TPN d/c'd.  She is now refusing to eat and not cooperative.  Increased confusion as well...Patient seemed to making some improvement but is not not eating again and does not speak and is not assisting in her daily care, PT etc.  She has been diagnosed with herpetic esophagitis and recommended for IV Acyclovir and this was discussed today.       Her MRI of the brain did not show new disease/cancer growth so the cause of this mood/activity change is not definitively a cancer issue.  " "This being said,  Her nutritional status remains poor and she is certainly very weak due to this but she seems to have lost the will to continue to get better.       Will give her a day to see how she does, discuss again with  tomorrow as to a plan of action moving forward.  For now, SNF, IV acyclovir is the plan.  "    Pt is a 63 y/o female with PMH as above and no prior psychiatric hx admitted to medicine as above. Chart reviewed, noted incr CRP, decr albumin, compliant with scheduled meds, last Xanax PRN 2023 11/6;  h/o AMS with hallucinations during admission September 2022, s/o LP, MRI, started on steroids. On interview, pt feels optimistic she will get better, denied pain, sleep is "ok." Feels "a little bit" depressed and "a little bit" anxious, denied h/o depression or psychiatric care previously. Pt and  feels PRN Xanax has been helpful. Reports she has no appetite, can only taste food "a little bit." Used to enjoy shopping, has one child, lives w . Discussed importance of nutrition for physical and mental well being, pt expressed understanding. No SI/HI/AVH.     LuisitoFred payton A Spouse [phone]   At bedside, no questions or safety concerns; adds that he has noticed "confusion, foggy memory" recently.     Psychiatric History:   Previous Psychiatric Hospitalizations: No   Previous Medication Trials: No   Previous Suicide Attempts: no   History of Violence: no  History of Depression: no  History of Evie: no  History of Auditory/Visual Hallucination no  History of Delusions: no  Outpatient psychiatrist (current & past): No    Substance Abuse History:  Tobacco:No  Alcohol: No  Illicit Substances:No  Detox/Rehab: No    Legal History: Past charges/incarcerations: No     Family Psychiatric History: none known      Social History:  Developmental/Childhood:Achieved all developmental milestones timely  *Education:High School Diploma  Employment Status/Finances:Retired " "  Relationship Status/Sexual Orientation:    Children: 1  Housing Status: Home    history:  NO  Access to gun: NO  Anabaptism:Actively participates in organized Christianity  Recreational activities:shopping    Psychiatric Mental Status Exam:  Arousal: alert  Sensorium/Orientation: oriented to person, place, situation, month of year, year  Behavior/Cooperation: normal, cooperative, psychomotor retardation   Speech: slowed, dysarthia, increased latency of response, soft, non-spontaneous  Language: grossly intact  Mood: " ok "   Affect: constricted  Thought Process: normal and logical  Thought Content:   Auditory hallucinations: NO  Visual hallucinations: NO  Paranoia: NO  Delusions:  NO  Suicidal ideation: NO  Homicidal ideation: NO  Attention/Concentration:  completed serial 7s adequately  Memory:    Recent:  Intact   Remote: Intact  Fund of Knowledge: Vocabulary appropriate    Abstract reasoning: similarities were abstract  Insight: intact  Judgment: behavior is adequate to circumstances      Past Medical History:   Past Medical History:   Diagnosis Date    Cancer     neck    HA (headache)     Hypothyroidism       Laboratory Data:   Labs Reviewed   CBC W/ AUTO DIFFERENTIAL - Abnormal; Notable for the following components:       Result Value    RDW 15.7 (*)     Platelets 71 (*)     Immature Granulocytes 1.6 (*)     Immature Grans (Abs) 0.10 (*)     All other components within normal limits   COMPREHENSIVE METABOLIC PANEL - Abnormal; Notable for the following components:    Sodium 133 (*)     CO2 22 (*)     Glucose 148 (*)     BUN 29 (*)     Albumin 3.3 (*)     Total Bilirubin 1.2 (*)     AST 51 (*)      (*)     All other components within normal limits   MAGNESIUM   PHOSPHORUS   POCT GLUCOSE, HAND-HELD DEVICE       Neurological History:  Seizures: No  Head trauma: No    Allergies:   Review of patient's allergies indicates:   Allergen Reactions    Pcn [penicillins] Swelling    Codeine Nausea And " Vomiting       Medications in ER:   Medications   cholecalciferol (vitamin D3) 125 mcg (5,000 unit) tablet 5,000 Units (5,000 Units Oral Given 11/7/22 1024)   levothyroxine tablet 88 mcg (88 mcg Oral Given 11/7/22 0500)   nystatin 100,000 unit/mL suspension 400,000 Units (400,000 Units Oral Given 11/7/22 1734)   sodium chloride 0.9% flush 10 mL (has no administration in time range)   naloxone 0.4 mg/mL injection 0.02 mg (has no administration in time range)   glucose chewable tablet 16 g (has no administration in time range)   glucose chewable tablet 24 g (has no administration in time range)   glucagon (human recombinant) injection 1 mg (has no administration in time range)   dextrose 10% bolus 125 mL (has no administration in time range)   dextrose 10% bolus 250 mL (has no administration in time range)   potassium bicarbonate disintegrating tablet 50 mEq (has no administration in time range)   potassium bicarbonate disintegrating tablet 35 mEq (has no administration in time range)   potassium bicarbonate disintegrating tablet 60 mEq (has no administration in time range)   magnesium oxide tablet 800 mg (has no administration in time range)   magnesium oxide tablet 800 mg (has no administration in time range)   potassium, sodium phosphates 280-160-250 mg packet 2 packet (has no administration in time range)   potassium, sodium phosphates 280-160-250 mg packet 2 packet (has no administration in time range)   potassium, sodium phosphates 280-160-250 mg packet 2 packet (has no administration in time range)   acetaminophen tablet 650 mg (650 mg Oral Given 11/5/22 2217)   ondansetron injection 4 mg (has no administration in time range)   pantoprazole injection 40 mg (40 mg Intravenous Given 11/6/22 2023)   insulin aspart U-100 pen 0-5 Units (3 Units Subcutaneous Given 11/7/22 1733)   amLODIPine tablet 5 mg (5 mg Oral Given 11/7/22 1024)   TPN ADULT CENTRAL LINE CUSTOM (3 in 1) ( Intravenous Stopped 11/5/22 1707)    ALPRAZolam tablet 0.25 mg (0.25 mg Oral Given 11/6/22 2023)   TPN ADULT CENTRAL LINE CUSTOM (3 in 1) ( Intravenous Stopped 11/6/22 1700)   potassium chloride 10 mEq in 100 mL IVPB (40 mEq Intravenous New Bag 11/5/22 1539)     And   potassium chloride 10 mEq in 100 mL IVPB (has no administration in time range)     And   potassium chloride 10 mEq in 100 mL IVPB (has no administration in time range)   magnesium sulfate 2g in water 50mL IVPB (premix) (has no administration in time range)   magnesium sulfate 2g in water 50mL IVPB (premix) (has no administration in time range)   sodium phosphate 15 mmol in dextrose 5 % 250 mL IVPB (has no administration in time range)   sodium phosphate 20.01 mmol in dextrose 5 % 250 mL IVPB (has no administration in time range)   sodium phosphate 30 mmol in dextrose 5 % 250 mL IVPB (has no administration in time range)   calcium gluconate 1 g in NS IVPB (premixed) (has no administration in time range)   calcium gluconate 1 g in NS IVPB (premixed) (has no administration in time range)   calcium gluconate 1 g in NS IVPB (premixed) (has no administration in time range)   acyclovir (ZOVIRAX) 240 mg in dextrose 5 % 100 mL IVPB (0 mg Intravenous Stopped 11/7/22 1447)   dexAMETHasone tablet 2 mg (2 mg Oral Given 11/7/22 1347)   dronabinoL capsule 2.5 mg (has no administration in time range)   sodium chloride 0.9% bolus 1,000 mL (0 mLs Intravenous Stopped 11/3/22 1737)   ondansetron injection 4 mg (4 mg Intravenous Given 11/3/22 1635)   0.9%  NaCl infusion (1,000 mLs Intravenous New Bag 11/3/22 1636)   gadobutroL (GADAVIST) injection 4 mL (4 mLs Intravenous Given 11/5/22 1014)   tuberculin injection 5 Units (5 Units Intradermal Given 11/6/22 2036)       Medications at home:   Current Discharge Medication List        CONTINUE these medications which have NOT CHANGED    Details   ascorbic acid, vitamin C, (VITAMIN C) 1000 MG tablet Take 1,000 mg by mouth once daily.      cholecalciferol, vitamin  D3, 125 mcg (5,000 unit) Tab Take 5,000 Units by mouth once daily.      fluticasone propionate (FLONASE) 50 mcg/actuation nasal spray 2 sprays (100 mcg total) by Each Nostril route once daily.  Qty: 18.2 mL, Refills: 1    Associated Diagnoses: Eustachian tube dysfunction, left      lenvatinib 4 mg Cap Take 16 mg by mouth once daily at 6am.  Qty: 120 capsule, Refills: 3    Associated Diagnoses: Malignant neoplasm of major salivary gland      levothyroxine (SYNTHROID) 88 MCG tablet Take 1 tablet (88 mcg total) by mouth once daily.  Qty: 90 tablet, Refills: 1    Associated Diagnoses: Acquired hypothyroidism      megestroL (MEGACE) 400 mg/10 mL (40 mg/mL) Susp Take 10 mLs (400 mg total) by mouth 2 (two) times daily.  Qty: 600 mL, Refills: 11    Associated Diagnoses: Anorexia      multivit-iron-min-folic acid 18-0.4 mg Tab Take 1 tablet by mouth once daily.       omega-3 fatty acids/fish oil (FISH OIL-OMEGA-3 FATTY ACIDS) 300-1,000 mg capsule Take 1 capsule by mouth once daily.      ZINC ACETATE ORAL Take 1 capsule by mouth once daily.       amLODIPine (NORVASC) 5 MG tablet Take 1 tablet (5 mg total) by mouth once daily.  Qty: 30 tablet, Refills: 3    Comments: .      oxyCODONE-acetaminophen (PERCOCET)  mg per tablet Take 1 tablet by mouth every 6 (six) hours as needed for Pain.  Qty: 28 tablet, Refills: 0    Comments: Quantity prescribed more than 7 day supply? Yes, quantity medically necessary  Associated Diagnoses: Malignant neoplasm of major salivary gland; Cancer associated pain           STOP taking these medications       nystatin (MYCOSTATIN) 100,000 unit/mL suspension Comments:   Reason for Stopping:               Assessment - Diagnosis - Goals:     Diagnosis/Impression:   Acute encephalopathy; alert and oriented at time of interview, reportedly waxing and waning MS  Adjustment d/o w depressed mood and anxiety  R/o unspecified neurocogntive d/o    Rec:   - start remeron 7.5 mg qHS for anxiety, depression,  "poor appetite  - MRI brain 11/5: "1.  Mild changes of periventricular and deep white matter chronic small vessel ischemic disease.  2.  No intracranial enhancing abnormality observed."  - continue Xanax 0.25 mg qHS PRN anxiety, ok to use w greater frequency if needed--pt and spouse feel has been very helpful but may worsen encephalopathy  - Agree w UA, would also check TSH, B12, folate, RPR  - consider palliative care consult  - consider spiritual care consult, pt would appreciate prayer  - no indication for PEC    Time with patient, chart, collateral: 40      More than 50% of the time was spent counseling/coordinating care    Consulting clinician was informed of the encounter and consult note.    Consultation ended: 11/7/2022 at 7:35 PM      Yumi Lubin MD   Psychiatry  [facility]    "

## 2022-11-08 NOTE — ASSESSMENT & PLAN NOTE
Ms. Clay is doing a bit better today.  Discussed with Dr. Kaplan,  Will continue current approach for now as there is no definable cancer progression that would push me to de-escalating care.  Agree with trying to find SNF/Rehab option for her and this is ongoing.  No new issues otherwise today.

## 2022-11-08 NOTE — PROGRESS NOTES
[facility] Medicine  Progress Note    Patient name: Antonieta Clay  MRN: [id number]  Admit Date: 11/3/2022   LOS: 3 days     SUBJECTIVE:     Principal problem: Dehydration    Interval History:     11/7 Unfortunately, Mrs. Mendez has taken a complete 180 turn.  Family and RN supplements history. She ate 75% of breakfast and lunch yesterday but then declined to eat dinner. She did not eat much breakfast this AM.  She was getting up with minimal assist and getting to the toilet with stand by assist yesterday with RN.  Today, with therapy, she initially ambulated with the walker into the [place] with therapy but then midway,  seemed to lose the will to keep walking and pushing the walker and therapy had to move her forward.  She is sitting up in the chair at the time of my exam but really just wants to get back in bed. She cannot identify anything that has changed from yesterday to today. She denies odynophagia.  RN notes she is swallowing her pills without issue and without pain.  I do note she is confused but I don't find too much more than yesterday. She had some confusion overnight per her . Esophageal biopsies came back as herpes simplex and thus IV acyclovir started and ID consulted. I stopped IV Diflucan and continued nystatin. Abdomen is more distended today per family.  No significant TTP on exam and good bowel sounds.  It has been a few days since her last BM.  She does not know if she is passing gas or not.       11/6 Patient does not recall getting out of bed or seeing PT/OT at the time of my exam.  Per my discussion with RN, she has done significantly better and had been out of bed all morning. She had gotten up with PT/OT as well.  Pt/OT is recommending SNF.  CM consulted.  Patient ate 75% of breakfast.  TPN will be discontinued after this present bag is complete.  She denies oral pain or odynophagia.     Hospital Course:    Patient admitted after being sent by home health for  dehydration concerns.  She has been having poor po intake thought to be related to thrush.  In addition, reports of dysphagia and odynophagia. It is estimated that she has last 20lbs over the last 6 weeks.  She is on oral chemo Lenvatinib.  She had recent hospitalization for confusion.  There was concern regarding CNS involvement of her malignancy. This work up was ultimately negative and she improved.  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 There was also concern for espophageal stenosis and thus GI consulted.  She underwent EGD 11/4 which revealed ulcerated and inflamed mucosa of the esophagus with multiple shallow ulcers that is consistent with viral esophagitis.  Biopsies taken. She was seen by Dr. Sagastume who plans for repeat MRI of the brain to help differentiate if her clinical decline is CNS involvement of her malignancy versus chemo induced. Lenvatinib has been stopped.  He has ordered TPN.  I have added IV Diflucan in addition to oral nystatin.  She is requiring supplemental oxygen and thus CXR ordered- no acute processes. Supplemental oxygen discontinued. 11/6 MRI of the brain performed and negative for acute process.        Baseline:  Long discussion with patient and her . It sounds like she was ambulatory and able to go out and shop approximately 10/19.  By 10/21, she was showing signs of weakness and needing to be assisted to get off the toilet or out of the bath tub.  For the last few days prior to admission, she was sleeping a good deal and not getting off the couch and her  was having to provide a lot of physical support to hep her ambulate.    Scheduled Meds:   acyclovir  5 mg/kg Intravenous Q8H    amLODIPine  5 mg Oral Daily    cholecalciferol (vitamin D3)  5,000 Units Oral Daily    dexAMETHasone  2 mg Oral Q8H    dronabinoL  2.5 mg Oral BID    levothyroxine  88 mcg Oral Daily    nystatin  4 mL Oral QID    pantoprazole  40  mg Intravenous Q24H     Continuous Infusions:      PRN Meds:acetaminophen, ALPRAZolam, calcium gluconate IVPB, calcium gluconate IVPB, calcium gluconate IVPB, dextrose 10%, dextrose 10%, glucagon (human recombinant), glucose, glucose, insulin aspart U-100, magnesium oxide, magnesium oxide, magnesium sulfate IVPB, magnesium sulfate IVPB, naloxone, ondansetron, potassium bicarbonate, potassium bicarbonate, potassium bicarbonate, potassium chloride **AND** potassium chloride **AND** potassium chloride, potassium, sodium phosphates, potassium, sodium phosphates, potassium, sodium phosphates, sodium chloride 0.9%, sodium phosphate IVPB, sodium phosphate IVPB, sodium phosphate IVPB    Review of patient's allergies indicates:   Allergen Reactions    Pcn [penicillins] Swelling    Codeine Nausea And Vomiting       Review of Systems: As per interval history    OBJECTIVE:     Vital Signs (Most Recent)  Temp: 97.7 °F (36.5 °C) (11/07/22 1909)  Pulse: 85 (11/07/22 1909)  Resp: 18 (11/07/22 1909)  BP: 119/80 (11/07/22 1909)  SpO2: (!) 92 % (11/07/22 1909)    Vital Signs Range (Last 24H):  Temp:  [97.7 °F (36.5 °C)-98.9 °F (37.2 °C)]   Pulse:  [79-85]   Resp:  [14-18]   BP: (118-131)/(79-82)   SpO2:  [92 %-96 %]     I & O (Last 24H):No intake or output data in the 24 hours ending 11/07/22 2024      Physical Exam:    Vitals and nursing note reviewed.     Constitutional:       General: Not in acute distress. Frail appearing.      Appearance: Well-developed.   HENT:      Head: Normocephalic and atraumatic.   Eyes:      Pupils: Pupils are equal, round, and reactive to light.   Cardiovascular:      Rate and Rhythm: Regular rhythm.   Pulmonary:      Effort: Pulmonary effort is normal.      Breath sounds: Normal breath sounds. No wheezing.   Abdominal:      General: There is no distension.      Palpations: Abdomen is soft.      Tenderness: There is no abdominal tenderness. There is no guarding or rebound.   Musculoskeletal:          General: Normal range of motion.      Cervical back: Normal range of motion.   Skin:     Findings: No rash.   Neurological:      Mental Status: Alert but confused.  Voice is soft spoken and raspy.  No focal deficits.     Cranial Nerves: No cranial nerve deficit.      Sensory: No sensory deficit.     Laboratory:  I have reviewed all pertinent lab results within the past 24 hours.  CBC:   Recent Labs   Lab 11/07/22  1657   WBC 6.08   RBC 3.68*   HGB 11.2*   HCT 32.9*   PLT 38*   MCV 89   MCH 30.4   MCHC 34.0       CMP:   Recent Labs   Lab 11/07/22  1657   *   CALCIUM 8.4*   ALBUMIN 2.2*   PROT 5.6*      K 4.1   CO2 24      BUN 25*   CREATININE 0.4*   ALKPHOS 83   *   AST 78*   BILITOT 0.8         Diagnostic Results:      ASSESSMENT/PLAN:         Active Hospital Problems    Diagnosis  POA    *Dehydration [E86.0]  Yes    Dysphagia [R13.10]  Yes    Herpes simplex esophagitis [B00.89, K20.80]  Yes    Adult failure to thrive [R62.7]  Unknown    Physical debility [R53.81]  Yes    Mild protein-calorie malnutrition [E44.1]  Yes    Transaminitis [R74.01]  Yes    Hypothyroidism [E03.9]  Yes    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 Margin positive  [id number]          Resolved Hospital Problems   No resolved problems to display.         Plan:     -I'm not sure what has caused the significant worsening from yesterday.  I am approaching this in two ways:  further medical workup for an organic cause as well as psych consult for my concern that she is giving up. Labs repeated.  Procal ordered. BCx to be repeated. I will see if consultants have other thoughts.  -KUB for abdominal distension.  Clinically, not obstructed as soft with good bowel sounds.  Possible further imaging pending clinical course.   -Nystatin  for thrush  -s/p EGD with ulcerated esophagus. Biopsies taken and  revealed Herpes simplex. IV acyclovir started.  ID consulted.   -CXR given need for supplemental oxygen.  CXR with no acute process. Supplemental oxygen discontinued. Monitoring needs.  I repeated CXR 11/7 as she is back on supplemental oxygen.  I think she is not taking deep breaths.   Incentive spirometer ordered.   -Trending Na and stable  -Oral intake initially improved and TPN stopped.  Today, not eating.  I discussed lets giving her another day before considering TPN to be restarated.    -MRI brain as recommended by Oncology-negative for metastatic disease or other acute process.  -She has been on daily dexamethasone since last admission in September.  I discussed with Dr. Sagastume and halved to 2mg tid.   -PT/OT.  Out of bed as much as possible to work on strengthening and conditioning.   -CM consulted for SNF    VTE Risk Mitigation (From admission, onward)           Ordered     IP VTE HIGH RISK PATIENT  Once         11/03/22 2020     Place sequential compression device  Until discontinued         11/03/22 2020                      Department Hospital Medicine  [facility]  Amairani Kaplan MD  Date of service: 11/07/2022

## 2022-11-09 NOTE — CARE UPDATE
11/09/22 0827   PRE-TX-O2   O2 Device (Oxygen Therapy) nasal cannula   $ Is the patient on Low Flow Oxygen? Yes   Flow (L/min) 2   Pulse Oximetry Type Intermittent   $ Pulse Oximetry - Multiple Charge Pulse Oximetry - Multiple   Pulse 85   Resp 17   Respiratory Evaluation   $ Care Plan Tech Time 15 min

## 2022-11-09 NOTE — PLAN OF CARE
Per [facility], updated therapy and progress notes requested for pending authorization. Sent via [employer]. Awaiting authorization decision from insurance.

## 2022-11-09 NOTE — PROGRESS NOTES
"Consult Note  Infectious Disease    Reason for Consult:  herpes esophagitis    HPI: Antonieta Clay is a 62 y.o. female  with advanced parathyroid carcinoma was admitted 11/3 after being sent by home health for dehydration concerns.  She has been having poor po intake thought to be related to thrush.  In addition, reports of dysphagia and odynophagia. It is estimated that she has last 20lbs over the last 6 weeks.  She is on oral chemo Lenvatinib.  She had recent hospitalization for confusion.  There was concern regarding CNS involvement of her malignancy. This work up was ultimately negative and she was given steroids an dshe improved.   . There was also concern for espophageal stenosis and thus GI consulted.  She underwent EGD 11/4 which revealed ulcerated and inflamed mucosa of the esophagus with multiple shallow ulcers that is consistent with viral esophagitis. The path today demonstrates lesions c/w herpes esophagitis, with immuno stains pending. Acyclovir was started today. She is eating poorly and is lethargic. The decadron that she was taking pre admission is being weaned slowly now.     11/8: interim reviewed. Afebrile. Seen at bedside with , who reports that she got up this am, but was progressively agitated. She ate about 10-20% of lunch, but did not cooperate with therapy very much. Currently she is curled up in a ball with covers to her face. She has been repeatedly asking for "my clothes". She did respond to a friend's visit. She denies pain, but resists exam. Long private talk with  about what is reversible and what might not be, ie the infection in her esophagus is reversible, but her failure to thrive and lack of will to nourish herself may not be. Psych consult noted.   11/9: interim reviewed. Still minimally eating, not participating with therapy, confused statements. Family discussing goals of care and are undecided, concerned about what is reversible and what is not. Discussed " that AMS last admission was attributed to brain mass. Discussed that current AMS may be medication related and marinol was stopped and remeron will be. I reviewed the MRI images from now and 9/22 and there are still abnormalities of the left mastoid area.     EXAM & DIAGNOSTICS REVIEWED:   Vitals:     Temp:  [97.6 °F (36.4 °C)-98.8 °F (37.1 °C)]   Temp: 98.8 °F (37.1 °C) (11/09/22 1100)  Pulse: 97 (11/09/22 1100)  Resp: 18 (11/09/22 1100)  BP: 118/77 (11/09/22 1100)  SpO2: (!) 92 % (11/09/22 1100)    Intake/Output Summary (Last 24 hours) at 11/9/2022 1318  Last data filed at 11/9/2022 0623  Gross per 24 hour   Intake 300 ml   Output --   Net 300 ml       General:  In NAD. Arousable. Recognizes and names some but not all of present family members.   Eyes:  Anicteric, EOMI  ENT:  Mouth is dry  Neck:  supple,    Lungs: Clear, no consolidation, rales, wheezes, rub  Heart:  RRR, no gallop/murmur/rub noted  Abd:  Soft, NT, ND, normal BS, no masses or organomegaly appreciated.  :  Voids     Musc:  Joints without effusion, swelling, erythema, synovitis, with generalized muscle wasting.   Skin:  No rashes.    Neuro:            Arousable, did speak to me , speech is clear, but content is not appropriate for circumstance. Face is symmetric, tongue midline, CN intact. Moves all limbs.  No focal deficit.   Psych:  Calm, somewhat cooperative  Lymphatic:     Extrem: No edema, erythema, phlebitis, cellulitis, warm and well perfused  VAD:   portacath    Isolation:  none  Wound:            General Labs reviewed:  Recent Labs   Lab 11/07/22 1657 11/08/22  0458 11/09/22  0520   WBC 6.08 5.67 4.79   HGB 11.2* 9.9* 9.1*   HCT 32.9* 28.8* 26.4*   PLT 38* 45* 38*       Recent Labs   Lab 11/07/22 1657 11/08/22  0458 11/09/22  0520    136 140   K 4.1 4.2 3.4*    103 103   CO2 24 24 28   BUN 25* 16 12   CREATININE 0.4* 0.3* 0.3*   CALCIUM 8.4* 8.2* 8.2*   PROT 5.6* 5.3* 4.7*   BILITOT 0.8 0.6 0.6   ALKPHOS 83 70 63   *  159* 121*   AST 78* 56* 34     Recent Labs   Lab 11/07/22  1657   CRP 9.84*         Micro:  Microbiology Results (last 7 days)       Procedure Component Value Units Date/Time    Blood culture [[id number]]  (Abnormal) Collected: 11/07/22 1711    Order Status: Completed Specimen: Blood from Antecubital, Right Arm Updated: 11/09/22 0729     Blood Culture, Routine Gram stain nicholas bottle: Gram positive cocci      Results called to and read back by:Ananya Jacobs RN-[facility];  11/08/2022      15:05 CJD      COAGULASE-NEGATIVE STAPHYLOCOCCUS SPECIES  Organism is a probable contaminant      Blood culture [[id number]] Collected: 11/08/22 1533    Order Status: Completed Specimen: Blood from Line, Port A Cath Updated: 11/08/22 2317     Blood Culture, Routine No Growth to date    Narrative:      draw from the port            Imaging Reviewed:   CXRs and KUB(formed stool in vault)   MRI brain negative now    Cardiology:    IMPRESSION & PLAN   1. 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   Odynophagia, poor oral intake, failure to thrive   Encephalopathy, seems most likely to be med related.     2. 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 Long term use of systemic corticosteroids(since September)  4. 1 bottle drawn peripherally, pos for coag neg staph=contaminant      Recommendations:  Continue acyclovir 5 mg/kg IV q8(if CMV, we will change to ganciclovir)  Stop remeron  rec resumption of TPN and LTAC instead of SNF   If she returns to her baseline cognition, goals of care should be discussed with her.   CT head  Wean steroids as able, defer schedule to oncology  D/w  patient and  and Dr. John    Medical Decision Making during this encounter was  [_] Low Complexity  [_] Moderate Complexity  [ xxx ] High Complexity

## 2022-11-09 NOTE — PT/OT/SLP PROGRESS
Physical Therapy      Patient Name:  Antonieta Clay   MRN:  [id number]    Patient not seen today secondary to Patient unwilling to participate. Will follow-up 11/10/22.

## 2022-11-09 NOTE — PT/OT/SLP PROGRESS
Occupational Therapy      Patient Name:  Antonieta Clay   MRN:  [id number]    Patient not seen today secondary to Patient unwilling to participate (She was holding and talking to her bird in the hospital bed. It was not real.). Will follow-up tomorrow.    11/9/2022

## 2022-11-09 NOTE — PROGRESS NOTES
[facility]  Progress Note    Patient name: Antonieta Clay  MRN: [id number]  Admit Date: 11/3/2022   LOS: 5 days     SUBJECTIVE:     Principal problem: Dehydration with hyponatremia    Interval History:     11/9- patient is more alert today, attempting to talk but im not able to understand what she is saying. Had a long discussion with  while CM was present in room.  Discussed all options, including LTAC, SNF, home with hospice.  Her  would like to discuss further when her daughters are present. RN reports patient was able to eat more today.      11/8- patient arouses and nods to questions, not verbalizing today.  More sleepy per , also states she isnt eating and refused PT/OT today.  [facility]  No acute events  overnight.     11/7 Unfortunately, Mrs. Mendez has taken a complete 180 turn.  Family and RN supplements history. She ate 75% of breakfast and lunch yesterday but then declined to eat dinner. She did not eat much breakfast this AM.  She was getting up with minimal assist and getting to the toilet with stand by assist yesterday with RN.  Today, with therapy, she initially ambulated with the walker into the [place] with therapy but then midway,  seemed to lose the will to keep walking and pushing the walker and therapy had to move her forward.  She is sitting up in the chair at the time of my exam but really just wants to get back in bed. She cannot identify anything that has changed from yesterday to today. She denies odynophagia.  RN notes she is swallowing her pills without issue and without pain.  I do note she is confused but I don't find too much more than yesterday. She had some confusion overnight per her . Esophageal biopsies came back as herpes simplex and thus IV acyclovir started and ID consulted. I stopped IV Diflucan and continued nystatin. Abdomen is more distended today per family.  No  significant TTP on exam and good bowel sounds.  It has been a few days since her last BM.  She does not know if she is passing gas or not.       11/6 Patient does not recall getting out of bed or seeing PT/OT at the time of my exam.  Per my discussion with RN, she has done significantly better and had been out of bed all morning. She had gotten up with PT/OT as well.  Pt/OT is recommending SNF.  CM consulted.  Patient ate 75% of breakfast.  TPN will be discontinued after this present bag is complete.  She denies oral pain or odynophagia.     Hospital Course:    Patient admitted after being sent by home health for dehydration concerns.  She has been having poor po intake thought to be related to thrush.  In addition, reports of dysphagia and odynophagia. It is estimated that she has last 20lbs over the last 6 weeks.  She is on oral chemo Lenvatinib.  She had recent hospitalization for confusion.  There was concern regarding CNS involvement of her malignancy. This work up was ultimately negative and she improved.  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 There was also concern for espophageal stenosis and thus GI consulted.  She underwent EGD 11/4 which revealed ulcerated and inflamed mucosa of the esophagus with multiple shallow ulcers that is consistent with viral esophagitis.  Biopsies taken. She was seen by Dr. Sagastume who plans for repeat MRI of the brain to help differentiate if her clinical decline is CNS involvement of her malignancy versus chemo induced. Lenvatinib has been stopped.  He has ordered TPN.  I have added IV Diflucan in addition to oral nystatin.  She is requiring supplemental oxygen and thus CXR ordered- no acute processes. Supplemental oxygen discontinued. 11/6 MRI of the brain performed and negative for acute process.        Baseline:  Long discussion with patient and her . It sounds like she was ambulatory and able to go out and  shop approximately 10/19.  By 10/21, she was showing signs of weakness and needing to be assisted to get off the toilet or out of the bath tub.  For the last few days prior to admission, she was sleeping a good deal and not getting off the couch and her  was having to provide a lot of physical support to hep her ambulate.    Scheduled Meds:   acyclovir  5 mg/kg Intravenous Q8H    amLODIPine  5 mg Oral Daily    cholecalciferol (vitamin D3)  5,000 Units Oral Daily    dexAMETHasone  2 mg Oral Q8H    levothyroxine  88 mcg Oral Daily    nystatin  4 mL Oral QID    pantoprazole  40 mg Intravenous Q24H     Continuous Infusions:        PRN Meds:acetaminophen, ALPRAZolam, calcium gluconate IVPB, calcium gluconate IVPB, calcium gluconate IVPB, dextrose 10%, dextrose 10%, glucagon (human recombinant), glucose, glucose, insulin aspart U-100, LIDOcaine HCl 2%, magnesium oxide, magnesium oxide, magnesium sulfate IVPB, magnesium sulfate IVPB, naloxone, ondansetron, potassium bicarbonate, potassium bicarbonate, potassium bicarbonate, potassium chloride **AND** potassium chloride **AND** potassium chloride, potassium, sodium phosphates, potassium, sodium phosphates, potassium, sodium phosphates, sodium chloride 0.9%, sodium phosphate IVPB, sodium phosphate IVPB, sodium phosphate IVPB    Review of patient's allergies indicates:   Allergen Reactions    Pcn [penicillins] Swelling    Codeine Nausea And Vomiting       Review of Systems: As per interval history    OBJECTIVE:     Vital Signs (Most Recent)  Temp: 98.8 °F (37.1 °C) (11/09/22 1100)  Pulse: 97 (11/09/22 1100)  Resp: 18 (11/09/22 1100)  BP: 118/77 (11/09/22 1100)  SpO2: (!) 92 % (11/09/22 1100)    Vital Signs Range (Last 24H):  Temp:  [97.6 °F (36.4 °C)-98.8 °F (37.1 °C)]   Pulse:  [77-97]   Resp:  [14-18]   BP: (111-122)/(70-77)   SpO2:  [92 %-95 %]     I & O (Last 24H):  Intake/Output Summary (Last 24 hours) at 11/9/2022 7642  Last data filed at 11/9/2022 1504  Gross  per 24 hour   Intake 3520 ml   Output --   Net 3520 ml         Physical Exam:    Vitals and nursing note reviewed.     Constitutional:       General: Not in acute distress. Frail appearing.      Appearance: Well-developed.   HENT:      Head: Normocephalic and atraumatic.   Eyes:      Pupils: Pupils are equal, round, and reactive to light.   Cardiovascular:      Rate and Rhythm: Regular rhythm.   Pulmonary:      Effort: Pulmonary effort is normal.      Breath sounds: Normal breath sounds. No wheezing.   Abdominal:      General: There is no distension.      Palpations: Abdomen is soft.      Tenderness: There is no abdominal tenderness. There is no guarding or rebound.   Musculoskeletal:         General: Normal range of motion.      Cervical back: Normal range of motion.   Skin:     Findings: No rash.   Neurological:      Mental Status: Alert but confused.  No focal deficits.     Cranial Nerves: No cranial nerve deficit.      Sensory: No sensory deficit.     Laboratory:  I have reviewed all pertinent lab results within the past 24 hours.  CBC:   Recent Labs   Lab 11/09/22  0520   WBC 4.79   RBC 2.93*   HGB 9.1*   HCT 26.4*   PLT 38*   MCV 90   MCH 31.1*   MCHC 34.5       CMP:   Recent Labs   Lab 11/09/22  0520   *   CALCIUM 8.2*   ALBUMIN 1.8*   PROT 4.7*      K 3.4*   CO2 28      BUN 12   CREATININE 0.3*   ALKPHOS 63   *   AST 34   BILITOT 0.6         Diagnostic Results:      ASSESSMENT/PLAN:         Active Hospital Problems    Diagnosis  POA    *Dehydration with hyponatremia [E86.0, E87.1]  Yes    Dysphagia [R13.10]  Yes    Herpes simplex esophagitis [B00.89, K20.80]  Yes    Adult failure to thrive [R62.7]  Yes    Physical debility [R53.81]  Yes    Mild protein-calorie malnutrition [E44.1]  Yes    Transaminitis [R74.01]  Yes    Hypothyroidism [E03.9]  Yes    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 Margin positive  [id number]          Resolved Hospital Problems   No resolved problems to display.         Plan:       -continue work up and fu psych recs  -Nystatin  for thrush  -s/p EGD with ulcerated esophagus. Biopsies taken and revealed Herpes simplex. IV acyclovir started.  ID consulted and following, appreciate recs  -CXR given need for supplemental oxygen.  CXR with no acute process. Supplemental oxygen discontinued. Monitoring needs.  I repeated CXR 11/7 as she is back on supplemental oxygen.  I think she is not taking deep breaths.   Incentive spirometer ordered.   -Trending Na and stable  -Oral intake initially improved and TPN stopped.  Again, not eating.     -MRI brain as recommended by Oncology-negative for metastatic disease or other acute process.  -She has been on daily dexamethasone since last admission in September.  Discussed with Dr. Sagastume and halved to 2mg tid.   -PT/OT.  Out of bed as much as possible to work on strengthening and conditioning.   -CM previously for SNF.  May need to consider LTAC plcmt given she will likely require TPN again.    -nutrition consult to restart TPN  -remeron per psych recs  -continue GOC discussion with  and patient    VTE Risk Mitigation (From admission, onward)           Ordered     IP VTE HIGH RISK PATIENT  Once         11/03/22 2020     Place sequential compression device  Until discontinued         11/03/22 2020                      Department Hospital Medicine  [facility]  Niranjan John MD  Date of service: 11/09/2022

## 2022-11-09 NOTE — PROGRESS NOTES
[facility] Medicine  Progress Note    Patient name: Antonieta Clay  MRN: [id number]  Admit Date: 11/3/2022   LOS: 4 days     SUBJECTIVE:     Principal problem: Dehydration with hyponatremia    Interval History:     11/8- patient arouses and nods to questions, not verbalizing today.  More sleepy per , also states she isnt eating and refused PT/OT today.  [facility]  No acute events  overnight.     11/7 Unfortunately, Mrs. Mendez has taken a complete 180 turn.  Family and RN supplements history. She ate 75% of breakfast and lunch yesterday but then declined to eat dinner. She did not eat much breakfast this AM.  She was getting up with minimal assist and getting to the toilet with stand by assist yesterday with RN.  Today, with therapy, she initially ambulated with the walker into the hylton with therapy but then midway,  seemed to lose the will to keep walking and pushing the walker and therapy had to move her forward.  She is sitting up in the chair at the time of my exam but really just wants to get back in bed. She cannot identify anything that has changed from yesterday to today. She denies odynophagia.  RN notes she is swallowing her pills without issue and without pain.  I do note she is confused but I don't find too much more than yesterday. She had some confusion overnight per her . Esophageal biopsies came back as herpes simplex and thus IV acyclovir started and ID consulted. I stopped IV Diflucan and continued nystatin. Abdomen is more distended today per family.  No significant TTP on exam and good bowel sounds.  It has been a few days since her last BM.  She does not know if she is passing gas or not.       11/6 Patient does not recall getting out of bed or seeing PT/OT at the time of my exam.  Per my discussion with RN, she has done significantly better and had been out of bed all morning. She had gotten up with PT/OT as  well.  Pt/OT is recommending SNF.  CM consulted.  Patient ate 75% of breakfast.  TPN will be discontinued after this present bag is complete.  She denies oral pain or odynophagia.     Hospital Course:    Patient admitted after being sent by home health for dehydration concerns.  She has been having poor po intake thought to be related to thrush.  In addition, reports of dysphagia and odynophagia. It is estimated that she has last 20lbs over the last 6 weeks.  She is on oral chemo Lenvatinib.  She had recent hospitalization for confusion.  There was concern regarding CNS involvement of her malignancy. This work up was ultimately negative and she improved.  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 There was also concern for espophageal stenosis and thus GI consulted.  She underwent EGD 11/4 which revealed ulcerated and inflamed mucosa of the esophagus with multiple shallow ulcers that is consistent with viral esophagitis.  Biopsies taken. She was seen by Dr. Sagastume who plans for repeat MRI of the brain to help differentiate if her clinical decline is CNS involvement of her malignancy versus chemo induced. Lenvatinib has been stopped.  He has ordered TPN.  I have added IV Diflucan in addition to oral nystatin.  She is requiring supplemental oxygen and thus CXR ordered- no acute processes. Supplemental oxygen discontinued. 11/6 MRI of the brain performed and negative for acute process.        Baseline:  Long discussion with patient and her . It sounds like she was ambulatory and able to go out and shop approximately 10/19.  By 10/21, she was showing signs of weakness and needing to be assisted to get off the toilet or out of the bath tub.  For the last few days prior to admission, she was sleeping a good deal and not getting off the couch and her  was having to provide a lot of physical support to hep her ambulate.    Scheduled Meds:   acyclovir   5 mg/kg Intravenous Q8H    amLODIPine  5 mg Oral Daily    cholecalciferol (vitamin D3)  5,000 Units Oral Daily    dexAMETHasone  2 mg Oral Q8H    levothyroxine  88 mcg Oral Daily    mirtazapine  7.5 mg Oral QHS    nystatin  4 mL Oral QID    pantoprazole  40 mg Intravenous Q24H     Continuous Infusions:   lactated ringers 75 mL/hr at 11/08/22 1101       PRN Meds:acetaminophen, ALPRAZolam, calcium gluconate IVPB, calcium gluconate IVPB, calcium gluconate IVPB, dextrose 10%, dextrose 10%, glucagon (human recombinant), glucose, glucose, insulin aspart U-100, magnesium oxide, magnesium oxide, magnesium sulfate IVPB, magnesium sulfate IVPB, naloxone, ondansetron, potassium bicarbonate, potassium bicarbonate, potassium bicarbonate, potassium chloride **AND** potassium chloride **AND** potassium chloride, potassium, sodium phosphates, potassium, sodium phosphates, potassium, sodium phosphates, sodium chloride 0.9%, sodium phosphate IVPB, sodium phosphate IVPB, sodium phosphate IVPB    Review of patient's allergies indicates:   Allergen Reactions    Pcn [penicillins] Swelling    Codeine Nausea And Vomiting       Review of Systems: As per interval history    OBJECTIVE:     Vital Signs (Most Recent)  Temp: 97.6 °F (36.4 °C) (11/08/22 1615)  Pulse: 81 (11/08/22 1615)  Resp: 16 (11/08/22 1615)  BP: 122/75 (11/08/22 1615)  SpO2: (!) 93 % (11/08/22 1615)    Vital Signs Range (Last 24H):  Temp:  [97.6 °F (36.4 °C)-98.4 °F (36.9 °C)]   Pulse:  [81-96]   Resp:  [16-18]   BP: ()/(74-82)   SpO2:  [92 %-99 %]     I & O (Last 24H):  Intake/Output Summary (Last 24 hours) at 11/8/2022 1835  Last data filed at 11/8/2022 1639  Gross per 24 hour   Intake 365 ml   Output 450 ml   Net -85 ml         Physical Exam:    Vitals and nursing note reviewed.     Constitutional:       General: Not in acute distress. Frail appearing.      Appearance: Well-developed.   HENT:      Head: Normocephalic and atraumatic.   Eyes:      Pupils: Pupils are  equal, round, and reactive to light.   Cardiovascular:      Rate and Rhythm: Regular rhythm.   Pulmonary:      Effort: Pulmonary effort is normal.      Breath sounds: Normal breath sounds. No wheezing.   Abdominal:      General: There is no distension.      Palpations: Abdomen is soft.      Tenderness: There is no abdominal tenderness. There is no guarding or rebound.   Musculoskeletal:         General: Normal range of motion.      Cervical back: Normal range of motion.   Skin:     Findings: No rash.   Neurological:      Mental Status: Alert but confused.  Voice is soft spoken and raspy.  No focal deficits.     Cranial Nerves: No cranial nerve deficit.      Sensory: No sensory deficit.     Laboratory:  I have reviewed all pertinent lab results within the past 24 hours.  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 Margin positive  T4aN3b          Resolved Hospital Problems   No resolved problems to display.         Plan:       -continue work up and fu psych recs  -KUB for abdominal distension.  Clinically, not obstructed as soft with good bowel  sounds.  Possible further imaging pending clinical course.   -Nystatin  for thrush  -s/p EGD with ulcerated esophagus. Biopsies taken and revealed Herpes simplex. IV acyclovir started.  ID consulted and following, appreciate recs  -CXR given need for supplemental oxygen.  CXR with no acute process. Supplemental oxygen discontinued. Monitoring needs.  I repeated CXR 11/7 as she is back on supplemental oxygen.  I think she is not taking deep breaths.   Incentive spirometer ordered.   -Trending Na and stable  -Oral intake initially improved and TPN stopped.  Again, not eating.     -MRI brain as recommended by Oncology-negative for metastatic disease or other acute process.  -She has been on daily dexamethasone since last admission in September.  Discussed with Dr. Sagastume and halved to 2mg tid.   -PT/OT.  Out of bed as much as possible to work on strengthening and conditioning.   -CM previously for SNF.  May need to consider LTAC plcmt given she will likely require TPN again.    -start remeron per psych recs  -continue GOC discussion with  and patient    VTE Risk Mitigation (From admission, onward)           Ordered     IP VTE HIGH RISK PATIENT  Once         11/03/22 2020     Place sequential compression device  Until discontinued         11/03/22 2020                      Department Hospital Medicine  [facility]  Niranjan John MD  Date of service: 11/08/2022

## 2022-11-10 PROBLEM — Z51.5 END OF LIFE CARE: Status: ACTIVE | Noted: 2022-01-01

## 2022-11-10 NOTE — PT/OT/SLP PROGRESS
"Physical Therapy Treatment    Patient Name:  Antonieta Clay   MRN:  [id number]    Recommendations:     Discharge Recommendations:  nursing facility, skilled (family considering Hospice)   Discharge Equipment Recommendations: none   Barriers to discharge: None    Assessment:     Antonieta Clay is a 62 y.o. female admitted with a medical diagnosis of Dehydration.  She presents with the following impairments/functional limitations:  weakness, impaired functional mobility, impaired cognition, decreased safety awareness, impaired endurance, gait instability, pain, decreased lower extremity function, impaired self care skills, impaired balance Pt found supine in bed. Complaining of mouth pain "What's in here?".Pt required max encouragement from family and staff to participate. Pt assisted to EOB mod-Max A. Sit to stand resisted by patient, mod A x 2. Pt began trying to push self onto bed and return to supine. Transferred max A to BS chair. Poor cooperation/participation. Pt's mother encouraging patient to relax and sit up in chair for awhile.    Rehab Prognosis: Fair and Poor; depends on willingness to participate. patient would benefit from acute skilled PT services to address these deficits and reach maximum level of function.    Recent Surgery: Procedure(s) (LRB):  EGD (ESOPHAGOGASTRODUODENOSCOPY) (N/A) 6 Days Post-Op    Plan:     During this hospitalization, patient to be seen 6 x/week to address the identified rehab impairments via gait training, therapeutic activities, therapeutic exercises and progress toward the following goals:    Plan of Care Expires:  12/04/22    Subjective     Chief Complaint: mouth pain  Patient/Family Comments/goals: stay in bed  Pain/Comfort:  Pain Rating 1:  (does not rate)  Location 1: mouth  Pain Addressed 1: Nurse notified  Pain Rating Post-Intervention 1:  (does not rate)      Objective:     Communicated with nurseDamion prior to session. States patient must get up today. " Patient found HOB elevated with peripheral IV, telemetry, oxygen upon PT entry to room.     General Precautions: Standard, aspiration, fall   Orthopedic Precautions:N/A   Braces: N/A  Respiratory Status: Nasal cannula, flow 2 L/min     Functional Mobility:  Bed Mobility:     Supine to Sit: moderate assistance  Transfers:     Sit to Stand:  maximal assistance with hand-held assist      AM-PAC 6 CLICK MOBILITY  Turning over in bed (including adjusting bedclothes, sheets and blankets)?: 3  Sitting down on and standing up from a chair with arms (e.g., wheelchair, bedside commode, etc.): 3  Moving from lying on back to sitting on the side of the bed?: 3  Moving to and from a bed to a chair (including a wheelchair)?: 2  Need to walk in hospital room?: 1 (unwilling??)  Climbing 3-5 steps with a railing?: 1  Basic Mobility Total Score: 13       Treatment & Education:  Pt and family educated in benefits of mobility, use of call bell and chair check, fall prevention    Patient left up in chair with all lines intact, call button in reach, chair alarm on, nurse notified, and family present..    GOALS:   Multidisciplinary Problems       Physical Therapy Goals          Problem: Physical Therapy    Goal Priority Disciplines Outcome Goal Variances Interventions   Physical Therapy Goal     PT, PT/OT Ongoing, Progressing     Description: All physical therapy goals to be met by discharge:    1. Supine to sit with Stand-by Assistance  2. Sit to stand transfer with Stand-by Assistance  3.. Bed to chair transfer with Supervision using Rolling Walker  4. Gait  x 50 feet with Minimal Assistance using Rolling Walker.                          Time Tracking:     PT Received On: 11/10/22  PT Start Time: 0935     PT Stop Time: 0948  PT Total Time (min): 13 min     Billable Minutes: Therapeutic Activity 13    Treatment Type: Treatment  PT/PTA: PT     PTA Visit Number: 1     11/10/2022

## 2022-11-10 NOTE — PROGRESS NOTES
[facility] Medicine  Progress Note    Patient name: Antonieta Clay  MRN: [id number]  Admit Date: 11/3/2022   LOS: 6 days     SUBJECTIVE:     Principal problem: Dehydration    Interval History:     11/10-  patient has slept most of the day, spoke with sister who was in the room.  The family and patient are now considering a more comfort based approach.  Plan for home with hospice.  Likely tomorrow.     11/9- patient is more alert today, attempting to talk but im not able to understand what she is saying. Had a long discussion with  while CM was present in room.  Discussed all options, including LTAC, SNF, home with hospice.  Her  would like to discuss further when her daughters are present. RN reports patient was able to eat more today.      11/8- patient arouses and nods to questions, not verbalizing today.  More sleepy per , also states she isnt eating and refused PT/OT today.  Seen by tele psych yesterday and recommend [facility].  No acute events  overnight.     11/7 Unfortunately, Mrs. Mendez has taken a complete 180 turn.  Family and RN supplements history. She ate 75% of breakfast and lunch yesterday but then declined to eat dinner. She did not eat much breakfast this AM.  She was getting up with minimal assist and getting to the toilet with stand by assist yesterday with RN.  Today, with therapy, she initially ambulated with the walker into the [place] with therapy but then midway,  seemed to lose the will to keep walking and pushing the walker and therapy had to move her forward.  She is sitting up in the chair at the time of my exam but really just wants to get back in bed. She cannot identify anything that has changed from yesterday to today. She denies odynophagia.  RN notes she is swallowing her pills without issue and without pain.  I do note she is confused but I don't find too much more than yesterday. She had some confusion overnight per her .  Esophageal biopsies came back as herpes simplex and thus IV acyclovir started and ID consulted. I stopped IV Diflucan and continued nystatin. Abdomen is more distended today per family.  No significant TTP on exam and good bowel sounds.  It has been a few days since her last BM.  She does not know if she is passing gas or not.       11/6 Patient does not recall getting out of bed or seeing PT/OT at the time of my exam.  Per my discussion with RN, she has done significantly better and had been out of bed all morning. She had gotten up with PT/OT as well.  Pt/OT is recommending SNF.  CM consulted.  Patient ate 75% of breakfast.  TPN will be discontinued after this present bag is complete.  She denies oral pain or odynophagia.     Hospital Course:    Patient admitted after being sent by home health for dehydration concerns.  She has been having poor po intake thought to be related to thrush.  In addition, reports of dysphagia and odynophagia. It is estimated that she has last 20lbs over the last 6 weeks.  She is on oral chemo Lenvatinib.  She had recent hospitalization for confusion.  There was concern regarding CNS involvement of her malignancy. This work up was ultimately negative and she improved.  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 There was also concern for espophageal stenosis and thus GI consulted.  She underwent EGD 11/4 which revealed ulcerated and inflamed mucosa of the esophagus with multiple shallow ulcers that is consistent with viral esophagitis.  Biopsies taken. She was seen by Dr. Sagastume who plans for repeat MRI of the brain to help differentiate if her clinical decline is CNS involvement of her malignancy versus chemo induced. Lenvatinib has been stopped.  He has ordered TPN.  I have added IV Diflucan in addition to oral nystatin.  She is requiring supplemental oxygen and thus CXR ordered- no acute processes. Supplemental oxygen  discontinued. 11/6 MRI of the brain performed and negative for acute process.        Baseline:  Long discussion with patient and her . It sounds like she was ambulatory and able to go out and shop approximately 10/19.  By 10/21, she was showing signs of weakness and needing to be assisted to get off the toilet or out of the bath tub.  For the last few days prior to admission, she was sleeping a good deal and not getting off the couch and her  was having to provide a lot of physical support to hep her ambulate.    Scheduled Meds:   acyclovir  5 mg/kg Intravenous Q8H    amLODIPine  5 mg Oral Daily    cholecalciferol (vitamin D3)  5,000 Units Oral Daily    dexAMETHasone  2 mg Oral Q8H    levothyroxine  88 mcg Oral Daily    nystatin  4 mL Oral QID    pantoprazole  40 mg Intravenous Q24H     Continuous Infusions:   amino acids 4.25%-[id number] 50 mL/hr at 11/10/22 1251    TPN ADULT CENTRAL LINE CUSTOM (3 in 1) Stopped (11/10/22 1700)         PRN Meds:acetaminophen, ALPRAZolam, bisacodyL, calcium gluconate IVPB, calcium gluconate IVPB, calcium gluconate IVPB, dextrose 10%, dextrose 10%, glucagon (human recombinant), glucose, glucose, insulin aspart U-100, LIDOcaine HCl 2%, lorazepam, magnesium oxide, magnesium oxide, magnesium sulfate IVPB, magnesium sulfate IVPB, naloxone, ondansetron, polyethylene glycol, potassium bicarbonate, potassium bicarbonate, potassium bicarbonate, potassium chloride **AND** potassium chloride **AND** potassium chloride, potassium, sodium phosphates, potassium, sodium phosphates, potassium, sodium phosphates, sodium chloride 0.9%, sodium phosphate IVPB, sodium phosphate IVPB, sodium phosphate IVPB    Review of patient's allergies indicates:   Allergen Reactions    Pcn [penicillins] Swelling    Codeine Nausea And Vomiting       Review of Systems: As per interval history    OBJECTIVE:     Vital Signs (Most Recent)  Temp: 97.9 °F (36.6 °C) (11/10/22 1500)  Pulse: 106 (11/10/22  1500)  Resp: 16 (11/10/22 1500)  BP: 109/79 (11/10/22 1500)  SpO2: (!) 92 % (11/10/22 1500)    Vital Signs Range (Last 24H):  Temp:  [97.6 °F (36.4 °C)-98.5 °F (36.9 °C)]   Pulse:  []   Resp:  [16-20]   BP: (102-121)/(68-81)   SpO2:  [89 %-95 %]     I & O (Last 24H):  Intake/Output Summary (Last 24 hours) at 11/10/2022 1714  Last data filed at 11/10/2022 0617  Gross per 24 hour   Intake 200 ml   Output 650 ml   Net -450 ml         Physical Exam:    Vitals and nursing note reviewed.     Constitutional:       General: Not in acute distress. Frail appearing.      Appearance: Well-developed.   HENT:      Head: Normocephalic and atraumatic.   Eyes:      Pupils: Pupils are equal, round, and reactive to light.   Cardiovascular:      Rate and Rhythm: Regular rhythm.   Pulmonary:      Effort: Pulmonary effort is normal.      Breath sounds: Normal breath sounds. No wheezing.   Abdominal:      General: There is no distension.      Palpations: Abdomen is soft.      Tenderness: There is no abdominal tenderness. There is no guarding or rebound.   Musculoskeletal:         General: Normal range of motion.      Cervical back: Normal range of motion.   Skin:     Findings: No rash.   Neurological:      Mental Status: Alert but confused.  No focal deficits.     Cranial Nerves: No cranial nerve deficit.      Sensory: No sensory deficit.     Laboratory:  I have reviewed all pertinent lab results within the past 24 hours.  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 Margin positive  T4aN3b          Resolved Hospital Problems   No resolved problems to display.         Plan:       -continue work up and fu psych recs  -Nystatin  for thrush  -s/p EGD with ulcerated esophagus. Biopsies taken and revealed Herpes simplex. IV acyclovir started.  ID consulted and following, appreciate recs  -CXR given need for supplemental oxygen.  CXR with no acute process. Supplemental oxygen discontinued. Monitoring needs.  I repeated CXR 11/7 as she is back on supplemental oxygen.  I think she is not taking deep breaths.   Incentive spirometer ordered.   -Trending Na and stable  -Oral intake initially improved and TPN stopped.  Again, not eating.     -MRI brain as recommended by Oncology-negative for metastatic disease or other acute process.  -She has been on daily dexamethasone since last admission in September.  Discussed with Dr. Sagastume and halved to 2mg tid.   -PT/OT.  Out of bed as much as possible to work on strengthening and conditioning.   -CM previously for SNF.  Now considering home with hospice      VTE Risk Mitigation (From admission, onward)           Ordered     IP VTE HIGH RISK PATIENT  Once         11/03/22 2020     Place sequential compression device  Until discontinued         11/03/22 2020                      Department Hospital Medicine  [facility]  Niarnjan John MD  Date of service: 11/10/2022

## 2022-11-10 NOTE — PROGRESS NOTES
"Consult Note  Infectious Disease    Reason for Consult:  herpes esophagitis    HPI: Antonieta Clay is a 62 y.o. female  with advanced parathyroid carcinoma was admitted 11/3 after being sent by home health for dehydration concerns.  She has been having poor po intake thought to be related to thrush.  In addition, reports of dysphagia and odynophagia. It is estimated that she has last 20lbs over the last 6 weeks.  She is on oral chemo Lenvatinib.  She had recent hospitalization for confusion.  There was concern regarding CNS involvement of her malignancy. This work up was ultimately negative and she was given steroids an dshe improved.   . There was also concern for espophageal stenosis and thus GI consulted.  She underwent EGD 11/4 which revealed ulcerated and inflamed mucosa of the esophagus with multiple shallow ulcers that is consistent with viral esophagitis. The path today demonstrates lesions c/w herpes esophagitis, with immuno stains pending. Acyclovir was started today. She is eating poorly and is lethargic. The decadron that she was taking pre admission is being weaned slowly now.     11/8: interim reviewed. Afebrile. Seen at bedside with , who reports that she got up this am, but was progressively agitated. She ate about 10-20% of lunch, but did not cooperate with therapy very much. Currently she is curled up in a ball with covers to her face. She has been repeatedly asking for "my clothes". She did respond to a friend's visit. She denies pain, but resists exam. Long private talk with  about what is reversible and what might not be, ie the infection in her esophagus is reversible, but her failure to thrive and lack of will to nourish herself may not be. Psych consult noted.   11/9: interim reviewed. Still minimally eating, not participating with therapy, confused statements. Family discussing goals of care and are undecided, concerned about what is reversible and what is not. Discussed " that AMS last admission was attributed to brain mass. Discussed that current AMS may be medication related and marinol was stopped and remeron will be. I reviewed the MRI images from now and 9/22 and there are still abnormalities of the left mastoid area.   11/10: plans for discharge with hospice noted.    EXAM & DIAGNOSTICS REVIEWED:   Vitals:     Temp:  [97.6 °F (36.4 °C)-98.5 °F (36.9 °C)]   Temp: 97.6 °F (36.4 °C) (11/10/22 1200)  Pulse: 101 (11/10/22 1200)  Resp: 16 (11/10/22 1200)  BP: 102/68 (11/10/22 1200)  SpO2: (!) 89 % (11/10/22 1200)    Intake/Output Summary (Last 24 hours) at 11/10/2022 1325  Last data filed at 11/10/2022 0617  Gross per 24 hour   Intake 3420 ml   Output 650 ml   Net 2770 ml               General Labs reviewed:  Recent Labs   Lab 11/08/22  0458 11/09/22  0520 11/10/22  0500   WBC 5.67 4.79 4.98   HGB 9.9* 9.1* 9.6*   HCT 28.8* 26.4* 27.6*   PLT 45* 38* 44*       Recent Labs   Lab 11/08/22  0458 11/09/22  0520 11/10/22  0500    140 135*   K 4.2 3.4* 3.4*    103 101   CO2 24 28 25   BUN 16 12 16   CREATININE 0.3* 0.3* 0.4*   CALCIUM 8.2* 8.2* 8.1*   PROT 5.3* 4.7* 5.1*   BILITOT 0.6 0.6 0.7   ALKPHOS 70 63 66   * 121* 113*   AST 56* 34 34     Recent Labs   Lab 11/07/22  1657   CRP 9.84*         Micro:  Microbiology Results (last 7 days)       Procedure Component Value Units Date/Time    Blood culture [[id number]] Collected: 11/08/22 1533    Order Status: Completed Specimen: Blood from Line, Port A Cath Updated: 11/09/22 1632     Blood Culture, Routine No Growth to date      No Growth to date    Narrative:      draw from the port    Blood culture [[id number]]  (Abnormal) Collected: 11/07/22 1711    Order Status: Completed Specimen: Blood from Antecubital, Right Arm Updated: 11/09/22 0729     Blood Culture, Routine Gram stain nicholas bottle: Gram positive cocci      Results called to and read back by:Ananya Jacobs RN-[facility];  11/08/2022      15:05 CJD      COAGULASE-NEGATIVE  STAPHYLOCOCCUS SPECIES  Organism is a probable contaminant              Imaging Reviewed:   CXRs and KUB(formed stool in vault)   MRI brain negative now    Cardiology:    IMPRESSION & PLAN   1. 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   Odynophagia, poor oral intake, failure to thrive   Encephalopathy, seems most likely to be med related.     2. 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 Long term use of systemic corticosteroids(since September)  4. 1 bottle drawn peripherally, pos for coag neg staph=contaminant      Recommendations:  Continue acyclovir IV while in house, and follow with liquid oral acyclovir 800 mg 4-5 times per day, or valtrex 1000 mg TID(harder to swallow)  Continue decadron  Will sign off    Medical Decision Making during this encounter was  [_] Low Complexity  [_] Moderate Complexity  [ xxx ] High Complexity

## 2022-11-10 NOTE — SUBJECTIVE & OBJECTIVE
Interval History:     Oncology Treatment Plan:   [No matching plan found]    Medications:  Continuous Infusions:   amino acids 4.25%-[id number] 50 mL/hr at 11/10/22 1251    TPN ADULT CENTRAL LINE CUSTOM (3 in 1) Stopped (11/10/22 1700)     Scheduled Meds:   acyclovir  5 mg/kg Intravenous Q8H    amLODIPine  5 mg Oral Daily    cholecalciferol (vitamin D3)  5,000 Units Oral Daily    dexAMETHasone  2 mg Oral Q8H    levothyroxine  88 mcg Oral Daily    nystatin  4 mL Oral QID    pantoprazole  40 mg Intravenous Q24H     PRN Meds:acetaminophen, ALPRAZolam, calcium gluconate IVPB, calcium gluconate IVPB, calcium gluconate IVPB, dextrose 10%, dextrose 10%, glucagon (human recombinant), glucose, glucose, insulin aspart U-100, LIDOcaine HCl 2%, magnesium oxide, magnesium oxide, magnesium sulfate IVPB, magnesium sulfate IVPB, naloxone, ondansetron, potassium bicarbonate, potassium bicarbonate, potassium bicarbonate, potassium chloride **AND** potassium chloride **AND** potassium chloride, potassium, sodium phosphates, potassium, sodium phosphates, potassium, sodium phosphates, sodium chloride 0.9%, sodium phosphate IVPB, sodium phosphate IVPB, sodium phosphate IVPB     Review of Systems   Constitutional:  Negative for fever.   Respiratory:  Negative for shortness of breath.    Cardiovascular:  Negative for chest pain and leg swelling.   Gastrointestinal:  Negative for abdominal pain and blood in stool.   Genitourinary:  Negative for hematuria.   Skin:  Negative for rash.   Objective:     Vital Signs (Most Recent):  Temp: 97.9 °F (36.6 °C) (11/10/22 1500)  Pulse: 106 (11/10/22 1500)  Resp: 16 (11/10/22 1500)  BP: 109/79 (11/10/22 1500)  SpO2: (!) 92 % (11/10/22 1500)   Vital Signs (24h Range):  Temp:  [97.6 °F (36.4 °C)-98.5 °F (36.9 °C)] 97.9 °F (36.6 °C)  Pulse:  [] 106  Resp:  [16-20] 16  SpO2:  [89 %-95 %] 92 %  BP: (102-121)/(68-81) 109/79     Weight: 48.5 kg (107 lb)  Body mass index is 18.37 kg/m².  Body surface  area is 1.48 meters squared.      Intake/Output Summary (Last 24 hours) at 11/10/2022 1645  Last data filed at 11/10/2022 0617  Gross per 24 hour   Intake 200 ml   Output 650 ml   Net -450 ml       Physical Exam  Constitutional:       Appearance: She is well-developed. She is ill-appearing.   HENT:      Head: Normocephalic and atraumatic.      Right Ear: External ear normal.      Left Ear: External ear normal.   Eyes:      Conjunctiva/sclera: Conjunctivae normal.      Pupils: Pupils are equal, round, and reactive to light.   Neck:      Thyroid: No thyromegaly.      Trachea: No tracheal deviation.   Cardiovascular:      Rate and Rhythm: Normal rate and regular rhythm.      Heart sounds: Normal heart sounds.   Pulmonary:      Effort: Pulmonary effort is normal.      Breath sounds: Normal breath sounds.   Abdominal:      General: Bowel sounds are normal. There is no distension.      Palpations: Abdomen is soft. There is no mass.      Tenderness: There is no abdominal tenderness.   Skin:     Findings: No rash.   Neurological:      Comments: Neuro intact througout       Significant Labs:   CBC:   Recent Labs   Lab 11/09/22  0520 11/10/22  0500   WBC 4.79 4.98   HGB 9.1* 9.6*   HCT 26.4* 27.6*   PLT 38* 44*    and CMP:   Recent Labs   Lab 11/09/22  0520 11/10/22  0500    135*   K 3.4* 3.4*    101   CO2 28 25   * 167*   BUN 12 16   CREATININE 0.3* 0.4*   CALCIUM 8.2* 8.1*   PROT 4.7* 5.1*   ALBUMIN 1.8* 2.1*   BILITOT 0.6 0.7   ALKPHOS 63 66   AST 34 34   * 113*   ANIONGAP 9 9       Diagnostic Results:  None

## 2022-11-10 NOTE — PROGRESS NOTES
[facility]  Hematology/Oncology  Progress Note    Patient Name: Antonieta Clay  Admission Date: 11/3/2022  Hospital Length of Stay: 6 days  Code Status: Full Code     Subjective:     HPI:  Ms. Clay is not making any progress.  Still eating little, mostly non-verbal      Interval History:     Oncology Treatment Plan:   [No matching plan found]    Medications:  Continuous Infusions:   amino acids 4.25%-[id number] 50 mL/hr at 11/10/22 1251    TPN ADULT CENTRAL LINE CUSTOM (3 in 1) Stopped (11/10/22 1700)     Scheduled Meds:   acyclovir  5 mg/kg Intravenous Q8H    amLODIPine  5 mg Oral Daily    cholecalciferol (vitamin D3)  5,000 Units Oral Daily    dexAMETHasone  2 mg Oral Q8H    levothyroxine  88 mcg Oral Daily    nystatin  4 mL Oral QID    pantoprazole  40 mg Intravenous Q24H     PRN Meds:acetaminophen, ALPRAZolam, calcium gluconate IVPB, calcium gluconate IVPB, calcium gluconate IVPB, dextrose 10%, dextrose 10%, glucagon (human recombinant), glucose, glucose, insulin aspart U-100, LIDOcaine HCl 2%, magnesium oxide, magnesium oxide, magnesium sulfate IVPB, magnesium sulfate IVPB, naloxone, ondansetron, potassium bicarbonate, potassium bicarbonate, potassium bicarbonate, potassium chloride **AND** potassium chloride **AND** potassium chloride, potassium, sodium phosphates, potassium, sodium phosphates, potassium, sodium phosphates, sodium chloride 0.9%, sodium phosphate IVPB, sodium phosphate IVPB, sodium phosphate IVPB     Review of Systems   Constitutional:  Negative for fever.   Respiratory:  Negative for shortness of breath.    Cardiovascular:  Negative for chest pain and leg swelling.   Gastrointestinal:  Negative for abdominal pain and blood in stool.   Genitourinary:  Negative for hematuria.   Skin:  Negative for rash.   Objective:     Vital Signs (Most Recent):  Temp: 97.9 °F (36.6 °C) (11/10/22 1500)  Pulse: 106 (11/10/22 1500)  Resp: 16 (11/10/22 1500)  BP: 109/79 (11/10/22  1500)  SpO2: (!) 92 % (11/10/22 1500)   Vital Signs (24h Range):  Temp:  [97.6 °F (36.4 °C)-98.5 °F (36.9 °C)] 97.9 °F (36.6 °C)  Pulse:  [] 106  Resp:  [16-20] 16  SpO2:  [89 %-95 %] 92 %  BP: (102-121)/(68-81) 109/79     Weight: 48.5 kg (107 lb)  Body mass index is 18.37 kg/m².  Body surface area is 1.48 meters squared.      Intake/Output Summary (Last 24 hours) at 11/10/2022 1645  Last data filed at 11/10/2022 0617  Gross per 24 hour   Intake 200 ml   Output 650 ml   Net -450 ml       Physical Exam  Constitutional:       Appearance: She is well-developed. She is ill-appearing.   HENT:      Head: Normocephalic and atraumatic.      Right Ear: External ear normal.      Left Ear: External ear normal.   Eyes:      Conjunctiva/sclera: Conjunctivae normal.      Pupils: Pupils are equal, round, and reactive to light.   Neck:      Thyroid: No thyromegaly.      Trachea: No tracheal deviation.   Cardiovascular:      Rate and Rhythm: Normal rate and regular rhythm.      Heart sounds: Normal heart sounds.   Pulmonary:      Effort: Pulmonary effort is normal.      Breath sounds: Normal breath sounds.   Abdominal:      General: Bowel sounds are normal. There is no distension.      Palpations: Abdomen is soft. There is no mass.      Tenderness: There is no abdominal tenderness.   Skin:     Findings: No rash.   Neurological:      Comments: Neuro intact througout       Significant Labs:   CBC:   Recent Labs   Lab 11/09/22  0520 11/10/22  0500   WBC 4.79 4.98   HGB 9.1* 9.6*   HCT 26.4* 27.6*   PLT 38* 44*    and CMP:   Recent Labs   Lab 11/09/22  0520 11/10/22  0500    135*   K 3.4* 3.4*    101   CO2 28 25   * 167*   BUN 12 16   CREATININE 0.3* 0.4*   CALCIUM 8.2* 8.1*   PROT 4.7* 5.1*   ALBUMIN 1.8* 2.1*   BILITOT 0.6 0.7   ALKPHOS 63 66   AST 34 34   * 113*   ANIONGAP 9 9       Diagnostic Results:  None    Assessment/Plan:     Adult failure to thrive  I had a long discussion her  and family  today.  After review of her recent stay, discussion with consultants, I feel there is little further that can be done for her.  I discussed that cancer is certainly underlying this and is the likely culprit of her loss of appetite, malnutrition and subsequent decompensation.   She has no viable treatment options and at this point further treatment would likely only make her less comfortable and offer no or very little benefit.  I feel the best course of action is comfort care and hospice.  Her family does agree and will ask  to begin this process.  Very sad situation.  Can discharge when arranged.          Thank you for your consult. I will follow-up with patient. Please contact us if you have any additional questions.     Gorge Coombs MD  Hematology/Oncology  [facility]

## 2022-11-10 NOTE — CONSULTS
[facility]  Adult Nutrition   Consult Note (Nutrition Support Management)    SUMMARY     Recommendations  Recommendation/Intervention:   1) TPN ordered to start at 1700. Run @ 60 mL/hr.   2) Hypokalemia (K=3.4), recommend continued monitoring and management of BG and electrolytes as needed.   3) Continue Ensure Plus HP + Ice Cream Milkshake TID to better meet needs (provides 590 kcal and 34 g protein per milkshake).   4) Continue regular diet and encourage PO intake of meals and supplements.    Goals:   1) Pt to tolerate TPN.   2) Nutrition provisions to be met.   3) Blood glucose and electrolytes to trend towards normal limits/target ranges.  Nutrition Goal Status: new, progressing towards goal    Dietitian Rounds Brief  Consult to re-start TPN. Pt still minimally eating. Marinol d/c d/t AMS. New TPN ordered. RD will continue to monitor and manage nutrition support.    PARENTERAL NUTRITION PROGRESS NOTE:    Parenteral Nutrition Day # 1  Diagnosis/Indication for PN:   IV Access: PICC  Diet/Tube Feeding: Dysphagia soft      Admixture Type: 3-in-1  Infusion Rate and Frequency: 60 mL/hr  Patient Acuity: Acute     PN Composition:   110 grams Amino Acid, 116 grams Dextrose, and 50 grams Lipid.    Today's TPN provides  kcal.  *Dextrose is started at less than full dextrose goal to reduce risk for Refeeding Syndrome. Dextrose content will be advanced as appropriate over the next few days.     Please see order for electrolytes and additives content and adjustments.   *The Nutrition Support Team will continue to monitor electrolytes daily and adjust parenteral nutrition as warranted.     Malnutrition Assessment:  Severe malnutrition related to decreased intake in pt w/ increased needs d/t salivary gland cancer as evidenced by wt loss of 16 lbs (13% body wt loss) within 2 months, and < 75% of estimated energy requirements for > 1 month.     Diet order:   Current Diet Order: Dysphagia soft   Oral Nutrition  "Supplement: Ensure Plus HP Milkshake TID  Current Nutrition Support Formula Ordered: Other (Comment) (custom 3-in-1 TPN)  Current Nutrition Support Rate Ordered: 60 (ml)  Current Nutrition Support Frequency Ordered: ml/hr    Evaluation of Received Nutrient/Fluid Intake  Parenteral Calories (kcal): 1335  Parenteral Protein (gm): 110  Parenteral Fluid (mL): 1440  Energy Calories Required: not meeting needs  Protein Required: not meeting needs  Fluid Required: not meeting needs  Tolerance: tolerating     % Intake of Estimated Energy Needs: 25 - 50 %  % Meal Intake: 25 - 50 %      Intake/Output Summary (Last 24 hours) at 11/10/2022 1153  Last data filed at 11/10/2022 0617  Gross per 24 hour   Intake 3420 ml   Output 650 ml   Net 2770 ml        Anthropometrics  Temp: 97.7 °F (36.5 °C)  Height Method: Stated  Height: 5' 4" (162.6 cm)  Height (inches): 64 in  Weight Method: Bed Scale  Weight: 48.5 kg (107 lb)  Weight (lb): 107 lb  Ideal Body Weight (IBW), Female: 120 lb  % Ideal Body Weight, Female (lb): 89.17 %  BMI (Calculated): 18.4  BMI Grade: 18.5-24.9 - normal       Estimated/Assessed Needs  Weight Used For Calorie Calculations: 48.5 kg (106 lb 14.8 oz)  Energy Calorie Requirements (kcal): 5141-3058 (30-35)  Energy Need Method: Kcal/kg  Protein Requirements:  (1.5-2.5 gm/kg)  Weight Used For Protein Calculations: 48.5 kg (106 lb 14.8 oz)  Fluid Requirements (mL): 1455 (30 ml/kg)     RDA Method (mL): 1455       Reason for Assessment  Reason For Assessment: consult  Diagnosis: other (see comments) (Dehydration with hyponatremia)  Relevant Medical History: Malignant neoplasm of major salivary gland, Hypothyroidism, Chemotherapy induced neutropenia    Nutrition/Diet History  Spiritual, Cultural Beliefs, Cheondoism Practices, Values that Affect Care: no  Food Allergies: NKFA  Factors Affecting Nutritional Intake: decreased appetite, difficulty/impaired swallowing    Nutrition Risk Screen  Nutrition Risk Screen: no " indicators present     MST Score: 2  Have you recently lost weight without trying?: Yes: 2-13 lbs  Weight loss score: 1  Have you been eating poorly because of a decreased appetite?: Yes  Appetite score: 1       Weight History:  Wt Readings from Last 5 Encounters:   11/05/22 48.5 kg (107 lb)   10/26/22 48.6 kg (107 lb 1.6 oz)   10/12/22 50.8 kg (112 lb)   09/28/22 52.2 kg (115 lb)   09/27/22 51.9 kg (114 lb 6.4 oz)        Medications:Pertinent Medications Reviewed  Scheduled Meds:   acyclovir  5 mg/kg Intravenous Q8H    amLODIPine  5 mg Oral Daily    cholecalciferol (vitamin D3)  5,000 Units Oral Daily    dexAMETHasone  2 mg Oral Q8H    levothyroxine  88 mcg Oral Daily    nystatin  4 mL Oral QID    pantoprazole  40 mg Intravenous Q24H     Continuous Infusions:   TPN ADULT CENTRAL LINE CUSTOM (3 in 1)       PRN Meds:.acetaminophen, ALPRAZolam, calcium gluconate IVPB, calcium gluconate IVPB, calcium gluconate IVPB, dextrose 10%, dextrose 10%, glucagon (human recombinant), glucose, glucose, insulin aspart U-100, LIDOcaine HCl 2%, magnesium oxide, magnesium oxide, magnesium sulfate IVPB, magnesium sulfate IVPB, naloxone, ondansetron, potassium bicarbonate, potassium bicarbonate, potassium bicarbonate, potassium chloride **AND** potassium chloride **AND** potassium chloride, potassium, sodium phosphates, potassium, sodium phosphates, potassium, sodium phosphates, sodium chloride 0.9%, sodium phosphate IVPB, sodium phosphate IVPB, sodium phosphate IVPB    Labs: Pertinent Labs Reviewed  Clinical Chemistry:  Recent Labs   Lab 11/08/22  0458 11/09/22  0520 11/10/22  0500    140 135*   K 4.2 3.4* 3.4*    103 101   CO2 24 28 25   * 189* 167*   BUN 16 12 16   CREATININE 0.3* 0.3* 0.4*   CALCIUM 8.2* 8.2* 8.1*   PROT 5.3* 4.7* 5.1*   ALBUMIN 2.1* 1.8* 2.1*   BILITOT 0.6 0.6 0.7   ALKPHOS 70 63 66   AST 56* 34 34   * 121* 113*   ANIONGAP 9 9 9   MG 2.2 2.0 2.1   PHOS 3.4 4.1 4.1     CBC:   Recent Labs    Lab 11/10/22  0500   WBC 4.98   RBC 3.09*   HGB 9.6*   HCT 27.6*   PLT 44*   MCV 89   MCH 31.1*   MCHC 34.8     Lipid Panel:  Recent Labs   Lab 11/05/22  0440   TRIG 266*     Inflammatory Labs:  Recent Labs   Lab 11/07/22  1657   CRP 9.84*     Monitor and Evaluation  Food and Nutrient Intake: energy intake, food and beverage intake  Food and Nutrient Adminstration: diet order  Knowledge/Beliefs/Attitudes: food and nutrition knowledge/skill, beliefs and attitudes  Physical Activity and Function: nutrition-related ADLs and IADLs, factors affecting access to physical activity  Anthropometric Measurements: weight, weight change, body mass index  Biochemical Data, Medical Tests and Procedures: electrolyte and renal panel, glucose/endocrine profile, lipid profile, inflammatory profile, gastrointestinal profile  Nutrition-Focused Physical Findings: overall appearance     Nutrition Risk  Level of Risk/Frequency of Follow-up: high     Nutrition Follow-Up  RD Follow-up?: Yes      Saira Wintres RD 11/10/2022 11:53 AM

## 2022-11-10 NOTE — PLAN OF CARE
Problem: Parenteral Nutrition  Goal: Effective Intravenous Nutrition Therapy Delivery  Outcome: Ongoing, Progressing  Intervention: Optimize Intravenous Nutrition Delivery  Flowsheets (Taken 11/10/2022 1203)  Nutrition Support Management: parenteral nutrition initiated

## 2022-11-10 NOTE — PT/OT/SLP PROGRESS
Speech Language Pathology      Antonieta Clay  MRN: [id number]    Patient not seen today secondary to Patient fatigue (Pt sleeping, curled up in bed.). Will follow-up next scheduled visit day.

## 2022-11-10 NOTE — ASSESSMENT & PLAN NOTE
I had a long discussion her  and family today.  After review of her recent stay, discussion with consultants, I feel there is little further that can be done for her.  I discussed that cancer is certainly underlying this and is the likely culprit of her loss of appetite, malnutrition and subsequent decompensation.   She has no viable treatment options and at this point further treatment would likely only make her less comfortable and offer no or very little benefit.  I feel the best course of action is comfort care and hospice.  Her family does agree and will ask  to begin this process.  Very sad situation.  Can discharge when arranged.

## 2022-11-10 NOTE — CARE UPDATE
11/10/22 0757   PRE-TX-O2   O2 Device (Oxygen Therapy) nasal cannula   $ Is the patient on Low Flow Oxygen? Yes   Flow (L/min) 2   SpO2 95 %   Pulse Oximetry Type Intermittent   $ Pulse Oximetry - Multiple Charge Pulse Oximetry - Multiple   Pulse 90   Resp 17   Respiratory Evaluation   $ Care Plan Tech Time 15 min

## 2022-11-11 NOTE — PT/OT/SLP PROGRESS
Occupational Therapy      Patient Name:  Antonieta Clay   MRN:  [id number]    Patient not seen today secondary to  (Patient to d/c home with hospice services.).    11/11/2022

## 2022-11-11 NOTE — PROGRESS NOTES
[facility]  Hematology/Oncology  Progress Note    Patient Name: Antonieta Clay  Admission Date: 11/3/2022  Hospital Length of Stay: 7 days  Code Status: Full Code     Subjective:     HPI:  Patient resting comfortably today.        Interval History:     Oncology Treatment Plan:   [No matching plan found]    Medications:  Continuous Infusions:   amino acids 4.25%-ifana-Cj-R1V 50 mL/hr at 11/10/22 1251    TPN ADULT CENTRAL LINE CUSTOM (3 in 1) Stopped (11/10/22 1700)     Scheduled Meds:   acyclovir  5 mg/kg Intravenous Q8H    amLODIPine  5 mg Oral Daily    cholecalciferol (vitamin D3)  5,000 Units Oral Daily    dexAMETHasone  2 mg Oral Q8H    levothyroxine  88 mcg Oral Daily    nystatin  4 mL Oral QID    pantoprazole  40 mg Intravenous Q24H     PRN Meds:acetaminophen, ALPRAZolam, bisacodyL, calcium gluconate IVPB, calcium gluconate IVPB, calcium gluconate IVPB, dextrose 10%, dextrose 10%, glucagon (human recombinant), glucose, glucose, insulin aspart U-100, LIDOcaine HCl 2%, lorazepam, magnesium oxide, magnesium oxide, magnesium sulfate IVPB, magnesium sulfate IVPB, naloxone, ondansetron, polyethylene glycol, potassium bicarbonate, potassium bicarbonate, potassium bicarbonate, potassium chloride **AND** potassium chloride **AND** potassium chloride, potassium, sodium phosphates, potassium, sodium phosphates, potassium, sodium phosphates, sodium chloride 0.9%, sodium phosphate IVPB, sodium phosphate IVPB, sodium phosphate IVPB     Review of Systems   Constitutional:  Negative for fever.   Respiratory:  Negative for shortness of breath.    Cardiovascular:  Negative for chest pain and leg swelling.   Gastrointestinal:  Negative for abdominal pain and blood in stool.   Genitourinary:  Negative for hematuria.   Skin:  Negative for rash.   Objective:     Vital Signs (Most Recent):  Temp: 97.6 °F (36.4 °C) (11/11/22 0800)  Pulse: 93 (11/11/22 0800)  Resp: 16 (11/11/22 0800)  BP: 106/76 (11/11/22  0800)  SpO2: (!) 92 % (11/11/22 0800)   Vital Signs (24h Range):  Temp:  [97.6 °F (36.4 °C)-98.8 °F (37.1 °C)] 97.6 °F (36.4 °C)  Pulse:  [] 93  Resp:  [16-20] 16  SpO2:  [89 %-98 %] 92 %  BP: (102-118)/(68-90) 106/76     Weight: 48.5 kg (107 lb)  Body mass index is 18.37 kg/m².  Body surface area is 1.48 meters squared.    No intake or output data in the 24 hours ending 11/11/22 0857      Physical Exam  Constitutional:       Appearance: She is well-developed. She is ill-appearing.   HENT:      Head: Normocephalic and atraumatic.      Right Ear: External ear normal.      Left Ear: External ear normal.   Eyes:      Conjunctiva/sclera: Conjunctivae normal.      Pupils: Pupils are equal, round, and reactive to light.   Neck:      Thyroid: No thyromegaly.      Trachea: No tracheal deviation.   Cardiovascular:      Rate and Rhythm: Normal rate and regular rhythm.      Heart sounds: Normal heart sounds.   Pulmonary:      Effort: Pulmonary effort is normal.      Breath sounds: Normal breath sounds.   Abdominal:      General: Bowel sounds are normal. There is no distension.      Palpations: Abdomen is soft. There is no mass.      Tenderness: There is no abdominal tenderness.   Skin:     Findings: No rash.   Neurological:      Comments: Neuro intact througout       Significant Labs:   CBC:   Recent Labs   Lab 11/10/22  0500 11/11/22  0520   WBC 4.98 5.25   HGB 9.6* 11.3*   HCT 27.6* 32.3*   PLT 44* 56*      and CMP:   Recent Labs   Lab 11/10/22  0500 11/11/22  0520   * 136   K 3.4* 3.9    101   CO2 25 29   * 140*   BUN 16 21   CREATININE 0.4* 0.5   CALCIUM 8.1* 8.5*   PROT 5.1* 5.5*   ALBUMIN 2.1* 2.2*   BILITOT 0.7 0.9   ALKPHOS 66 73   AST 34 52*   * 145*   ANIONGAP 9 6*         Diagnostic Results:  None    Assessment/Plan:     Herpes simplex esophagitis  Continue acyclovir IV while in the hospital.  Seems to be doing ok with this.  Will put on oral liquid at discharge.     Adult failure to  thrive  Ms. Carranza is comfortable today but  notes she was agitated last night.  Will continue to move towards hospice care and continue comfort measures.   Discussed that she can likely be discharged when this arranged,  Possibly today.  Continue pain control, anxiolytics as needed.         Thank you for your consult. I will follow-up with patient. Please contact us if you have any additional questions.     Gorge Coombs MD  Hematology/Oncology  [facility]

## 2022-11-11 NOTE — SUBJECTIVE & OBJECTIVE
Interval History:     Oncology Treatment Plan:   [No matching plan found]    Medications:  Continuous Infusions:   amino acids 4.25%-uvxge-Fi-A4E 50 mL/hr at 11/10/22 1251    TPN ADULT CENTRAL LINE CUSTOM (3 in 1) Stopped (11/10/22 1700)     Scheduled Meds:   acyclovir  5 mg/kg Intravenous Q8H    amLODIPine  5 mg Oral Daily    cholecalciferol (vitamin D3)  5,000 Units Oral Daily    dexAMETHasone  2 mg Oral Q8H    levothyroxine  88 mcg Oral Daily    nystatin  4 mL Oral QID    pantoprazole  40 mg Intravenous Q24H     PRN Meds:acetaminophen, ALPRAZolam, bisacodyL, calcium gluconate IVPB, calcium gluconate IVPB, calcium gluconate IVPB, dextrose 10%, dextrose 10%, glucagon (human recombinant), glucose, glucose, insulin aspart U-100, LIDOcaine HCl 2%, lorazepam, magnesium oxide, magnesium oxide, magnesium sulfate IVPB, magnesium sulfate IVPB, naloxone, ondansetron, polyethylene glycol, potassium bicarbonate, potassium bicarbonate, potassium bicarbonate, potassium chloride **AND** potassium chloride **AND** potassium chloride, potassium, sodium phosphates, potassium, sodium phosphates, potassium, sodium phosphates, sodium chloride 0.9%, sodium phosphate IVPB, sodium phosphate IVPB, sodium phosphate IVPB     Review of Systems   Constitutional:  Negative for fever.   Respiratory:  Negative for shortness of breath.    Cardiovascular:  Negative for chest pain and leg swelling.   Gastrointestinal:  Negative for abdominal pain and blood in stool.   Genitourinary:  Negative for hematuria.   Skin:  Negative for rash.   Objective:     Vital Signs (Most Recent):  Temp: 97.6 °F (36.4 °C) (11/11/22 0800)  Pulse: 93 (11/11/22 0800)  Resp: 16 (11/11/22 0800)  BP: 106/76 (11/11/22 0800)  SpO2: (!) 92 % (11/11/22 0800)   Vital Signs (24h Range):  Temp:  [97.6 °F (36.4 °C)-98.8 °F (37.1 °C)] 97.6 °F (36.4 °C)  Pulse:  [] 93  Resp:  [16-20] 16  SpO2:  [89 %-98 %] 92 %  BP: (102-118)/(68-90) 106/76     Weight: 48.5 kg (107 lb)  Body  mass index is 18.37 kg/m².  Body surface area is 1.48 meters squared.    No intake or output data in the 24 hours ending 11/11/22 0857      Physical Exam  Constitutional:       Appearance: She is well-developed. She is ill-appearing.   HENT:      Head: Normocephalic and atraumatic.      Right Ear: External ear normal.      Left Ear: External ear normal.   Eyes:      Conjunctiva/sclera: Conjunctivae normal.      Pupils: Pupils are equal, round, and reactive to light.   Neck:      Thyroid: No thyromegaly.      Trachea: No tracheal deviation.   Cardiovascular:      Rate and Rhythm: Normal rate and regular rhythm.      Heart sounds: Normal heart sounds.   Pulmonary:      Effort: Pulmonary effort is normal.      Breath sounds: Normal breath sounds.   Abdominal:      General: Bowel sounds are normal. There is no distension.      Palpations: Abdomen is soft. There is no mass.      Tenderness: There is no abdominal tenderness.   Skin:     Findings: No rash.   Neurological:      Comments: Neuro intact througout       Significant Labs:   CBC:   Recent Labs   Lab 11/10/22  0500 11/11/22  0520   WBC 4.98 5.25   HGB 9.6* 11.3*   HCT 27.6* 32.3*   PLT 44* 56*      and CMP:   Recent Labs   Lab 11/10/22  0500 11/11/22  0520   * 136   K 3.4* 3.9    101   CO2 25 29   * 140*   BUN 16 21   CREATININE 0.4* 0.5   CALCIUM 8.1* 8.5*   PROT 5.1* 5.5*   ALBUMIN 2.1* 2.2*   BILITOT 0.7 0.9   ALKPHOS 66 73   AST 34 52*   * 145*   ANIONGAP 9 6*         Diagnostic Results:  None

## 2022-11-11 NOTE — PT/OT/SLP PROGRESS
Occupational Therapy      Patient Name:  Antonieta Clay   MRN:  [id number]    Patient not seen today secondary to Other (Comment) (1st attempt @ 1233 MD rounding multiple family members present. 2nd attempt @ 1340 and pt is B2B and  easily agitated as described by RN; family is choosing hospice.). Will follow-up next service date.    11/10/2022

## 2022-11-11 NOTE — PLAN OF CARE
Family elects home hospice with [facility]. They have a meeting to sign paperwork with a representative at 2pm       11/11/22 1121   Discharge Reassessment   Assessment Type Discharge Planning Reassessment   Did the patient's condition or plan change since previous assessment? Yes   Discharge Plan discussed with: Patient;Sibling;Spouse/sig other   Discharge Plan A Hospice/home   Discharge Plan B Hospice/home   Post-Acute Status   Post-Acute Authorization Hospice   Post-Acute Placement Status Discharge Plan Changed   Hospice Status Set-up Complete/Auth obtained

## 2022-11-11 NOTE — PLAN OF CARE
Family met with [facility] and signed consents to admit patient to hospice at home. Equipment will be delivered the morning of 11.12.2022.

## 2022-11-11 NOTE — ASSESSMENT & PLAN NOTE
Ms. Carranza is comfortable today but  notes she was agitated last night.  Will continue to move towards hospice care and continue comfort measures.   Discussed that she can likely be discharged when this arranged,  Possibly today.  Continue pain control, anxiolytics as needed.

## 2022-11-11 NOTE — PT/OT/SLP PROGRESS
Physical Therapy Treatment    Patient Name:  Antonieta Clay   MRN:  [id number]    Recommendations:     Discharge Recommendations:  other (see comments) (home with hospice)   Discharge Equipment Recommendations: none   Barriers to discharge: None    Assessment:     Antonieta Clay is a 62 y.o. female admitted with a medical diagnosis of Dehydration.  She presents with the following impairments/functional limitations:  weakness, impaired endurance, impaired self care skills, impaired functional mobility, gait instability, impaired balance, impaired cognition, decreased lower extremity function, decreased safety awareness, impaired coordination, impaired cardiopulmonary response to activity.    Pt's family at bedside requesting family training for functional mobility and transfers when pt at home.  Instructed and performed bed mobility, pt req maxA.  Pt unable to transfer today due to severe dizziness upon sitting, family given visual and verbal instruction with cueing and return demonstration given.  All questions answered, all equipment needs met.  Will discharge PT as pt POC now to transition to hospice care at home.    Rehab Prognosis: Fair; patient would benefit from acute skilled PT services to address these deficits and reach maximum level of function.    Recent Surgery: Procedure(s) (LRB):  EGD (ESOPHAGOGASTRODUODENOSCOPY) (N/A) 7 Days Post-Op    Plan:     During this hospitalization, patient to be seen 5 x/week to address the identified rehab impairments via gait training, therapeutic activities, therapeutic exercises and progress toward the following goals:    Plan of Care Expires:  12/04/22    Subjective     Chief Complaint: dizzy  Patient/Family Comments/goals: provide care for pt at home safely  Pain/Comfort:  Pain Rating 1: 0/10      Objective:     Communicated with RN prior to session.  Patient found right sidelying with peripheral IV, telemetry, oxygen upon PT entry to room.     General Precautions:  Standard, fall, aspiration   Orthopedic Precautions:N/A   Braces:    Respiratory Status: Room air     Functional Mobility:  Bed Mobility:     Rolling Left:  minimum assistance  Rolling Right: minimum assistance  Scooting: maximal assistance  Supine to Sit: maximal assistance      AM-PAC 6 CLICK MOBILITY  Turning over in bed (including adjusting bedclothes, sheets and blankets)?: 3  Sitting down on and standing up from a chair with arms (e.g., wheelchair, bedside commode, etc.): 2  Moving from lying on back to sitting on the side of the bed?: 2  Moving to and from a bed to a chair (including a wheelchair)?: 2  Need to walk in hospital room?: 2  Climbing 3-5 steps with a railing?: 1  Basic Mobility Total Score: 12       Treatment & Education:  Pt was educated on the following: call light use, importance of OOB activity and functional mobility to negate the negative effects of prolonged bed rest during this hospitalization, safe transfers/ambulation and discharge planning recommendations/options.     Patient left HOB elevated with all lines intact, call button in reach, bed alarm on, RN notified, and [facility] friends and family members present..    GOALS:   Multidisciplinary Problems       Physical Therapy Goals          Problem: Physical Therapy    Goal Priority Disciplines Outcome Goal Variances Interventions   Physical Therapy Goal     PT, PT/OT Ongoing, Progressing     Description: All physical therapy goals to be met by discharge:    1. Supine to sit with Stand-by Assistance  2. Sit to stand transfer with Stand-by Assistance  3.. Bed to chair transfer with Supervision using Rolling Walker  4. Gait  x 50 feet with Minimal Assistance using Rolling Walker.                          Time Tracking:     PT Received On: 11/11/22  PT Start Time: 1428     PT Stop Time: 1439  PT Total Time (min): 11 min     Billable Minutes: Therapeutic Activity 11    Treatment Type: Treatment  PT/PTA: PT     PTA Visit Number: 1      11/11/2022

## 2022-11-11 NOTE — PROGRESS NOTES
[facility] Medicine  Progress Note    Patient name: Antonieta Clay  MRN: [id number]  Admit Date: 11/3/2022   LOS: 7 days     SUBJECTIVE:     Principal problem: Dehydration    Interval History:     11/11- plan is home with hospice.  No change clinically    11/10-  patient has slept most of the day, spoke with sister who was in the room.  The family and patient are now considering a more comfort based approach.  Plan for home with hospice.  Likely tomorrow.     11/9- patient is more alert today, attempting to talk but im not able to understand what she is saying. Had a long discussion with  while CM was present in room.  Discussed all options, including LTAC, SNF, home with hospice.  Her  would like to discuss further when her daughters are present. RN reports patient was able to eat more today.      11/8- patient arouses and nods to questions, not verbalizing today.  More sleepy per , also states she isnt eating and refused PT/OT today.  Seen by tele psych yesterday and recommend [facility].  No acute events  overnight.     11/7 Unfortunately, Mrs. Mendez has taken a complete 180 turn.  Family and RN supplements history. She ate 75% of breakfast and lunch yesterday but then declined to eat dinner. She did not eat much breakfast this AM.  She was getting up with minimal assist and getting to the toilet with stand by assist yesterday with RN.  Today, with therapy, she initially ambulated with the walker into the [place] with therapy but then midway,  seemed to lose the will to keep walking and pushing the walker and therapy had to move her forward.  She is sitting up in the chair at the time of my exam but really just wants to get back in bed. She cannot identify anything that has changed from yesterday to today. She denies odynophagia.  RN notes she is swallowing her pills without issue and without pain.  I do note she is confused but I don't find too much more than  yesterday. She had some confusion overnight per her . Esophageal biopsies came back as herpes simplex and thus IV acyclovir started and ID consulted. I stopped IV Diflucan and continued nystatin. Abdomen is more distended today per family.  No significant TTP on exam and good bowel sounds.  It has been a few days since her last BM.  She does not know if she is passing gas or not.       11/6 Patient does not recall getting out of bed or seeing PT/OT at the time of my exam.  Per my discussion with RN, she has done significantly better and had been out of bed all morning. She had gotten up with PT/OT as well.  Pt/OT is recommending SNF.  CM consulted.  Patient ate 75% of breakfast.  TPN will be discontinued after this present bag is complete.  She denies oral pain or odynophagia.     Hospital Course:    Patient admitted after being sent by home health for dehydration concerns.  She has been having poor po intake thought to be related to thrush.  In addition, reports of dysphagia and odynophagia. It is estimated that she has last 20lbs over the last 6 weeks.  She is on oral chemo Lenvatinib.  She had recent hospitalization for confusion.  There was concern regarding CNS involvement of her malignancy. This work up was ultimately negative and she improved.  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 There was also concern for espophageal stenosis and thus GI consulted.  She underwent EGD 11/4 which revealed ulcerated and inflamed mucosa of the esophagus with multiple shallow ulcers that is consistent with viral esophagitis.  Biopsies taken. She was seen by Dr. Sagastume who plans for repeat MRI of the brain to help differentiate if her clinical decline is CNS involvement of her malignancy versus chemo induced. Lenvatinib has been stopped.  He has ordered TPN.  I have added IV Diflucan in addition to oral nystatin.  She is requiring supplemental oxygen and thus  CXR ordered- no acute processes. Supplemental oxygen discontinued. 11/6 MRI of the brain performed and negative for acute process.        Baseline:  Long discussion with patient and her . It sounds like she was ambulatory and able to go out and shop approximately 10/19.  By 10/21, she was showing signs of weakness and needing to be assisted to get off the toilet or out of the bath tub.  For the last few days prior to admission, she was sleeping a good deal and not getting off the couch and her  was having to provide a lot of physical support to hep her ambulate.    Scheduled Meds:   acyclovir  5 mg/kg Intravenous Q8H    amLODIPine  5 mg Oral Daily    cholecalciferol (vitamin D3)  5,000 Units Oral Daily    dexAMETHasone  2 mg Oral Q8H    levothyroxine  88 mcg Oral Daily    nystatin  4 mL Oral QID    pantoprazole  40 mg Intravenous Q24H     Continuous Infusions:   amino acids 4.25%-wcpkt-Sf-A9U 50 mL/hr at 11/11/22 1326    TPN ADULT CENTRAL LINE CUSTOM (3 in 1) Stopped (11/10/22 1700)         PRN Meds:acetaminophen, ALPRAZolam, bisacodyL, calcium gluconate IVPB, calcium gluconate IVPB, calcium gluconate IVPB, dextrose 10%, dextrose 10%, glucagon (human recombinant), glucose, glucose, insulin aspart U-100, LIDOcaine HCl 2%, lorazepam, magnesium oxide, magnesium oxide, magnesium sulfate IVPB, magnesium sulfate IVPB, naloxone, ondansetron, polyethylene glycol, potassium bicarbonate, potassium bicarbonate, potassium bicarbonate, potassium chloride **AND** potassium chloride **AND** potassium chloride, potassium, sodium phosphates, potassium, sodium phosphates, potassium, sodium phosphates, sodium chloride 0.9%, sodium phosphate IVPB, sodium phosphate IVPB, sodium phosphate IVPB    Review of patient's allergies indicates:   Allergen Reactions    Pcn [penicillins] Swelling    Codeine Nausea And Vomiting       Review of Systems: As per interval history    OBJECTIVE:     Vital Signs (Most Recent)  Temp: 97.5 °F  (36.4 °C) (11/11/22 1146)  Pulse: 97 (11/11/22 1146)  Resp: 14 (11/11/22 1146)  BP: 102/70 (11/11/22 1146)  SpO2: (!) 94 % (11/11/22 1146)    Vital Signs Range (Last 24H):  Temp:  [97.5 °F (36.4 °C)-98.8 °F (37.1 °C)]   Pulse:  []   Resp:  [14-20]   BP: (102-118)/(70-90)   SpO2:  [92 %-98 %]     I & O (Last 24H):No intake or output data in the 24 hours ending 11/11/22 1650      Physical Exam:    Vitals and nursing note reviewed.     Constitutional:       General: Not in acute distress. Frail appearing.      Appearance: Well-developed.   HENT:      Head: Normocephalic and atraumatic.   Eyes:      Pupils: Pupils are equal, round, and reactive to light.   Cardiovascular:      Rate and Rhythm: Regular rhythm.   Pulmonary:      Effort: Pulmonary effort is normal.      Breath sounds: Normal breath sounds. No wheezing.   Abdominal:      General: There is no distension.      Palpations: Abdomen is soft.      Tenderness: There is no abdominal tenderness. There is no guarding or rebound.   Musculoskeletal:         General: Normal range of motion.      Cervical back: Normal range of motion.   Skin:     Findings: No rash.   Neurological:      Mental Status: Alert but confused.  No focal deficits.     Cranial Nerves: No cranial nerve deficit.      Sensory: No sensory deficit.     Laboratory:  I have reviewed all pertinent lab results within the past 24 hours.  CBC:   Recent Labs   Lab 11/11/22  0520   WBC 5.25   RBC 3.69*   HGB 11.3*   HCT 32.3*   PLT 56*   MCV 88   MCH 30.6   MCHC 35.0       CMP:   Recent Labs   Lab 11/11/22  0520   *   CALCIUM 8.5*   ALBUMIN 2.2*   PROT 5.5*      K 3.9   CO2 29      BUN 21   CREATININE 0.5   ALKPHOS 73   *   AST 52*   BILITOT 0.9         Diagnostic Results:      ASSESSMENT/PLAN:         Active Hospital Problems    Diagnosis  POA    *Dehydration [E86.0]  Yes    End of life care [Z51.5]  Not Applicable    Dysphagia [R13.10]  Yes    Herpes simplex esophagitis  [B00.89, K20.80]  Yes    Adult failure to thrive [R62.7]  Yes    Physical debility [R53.81]  Yes    Mild protein-calorie malnutrition [E44.1]  Yes    Transaminitis [R74.01]  Yes    Hypothyroidism [E03.9]  Yes    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 Margin positive  [id number]          Resolved Hospital Problems   No resolved problems to display.         Plan:       -s/p EGD with ulcerated esophagus. Biopsies taken and revealed Herpes simplex. IV acyclovir started.  ID consulted and following, appreciate recs  -Oral intake initially improved and TPN stopped.  Again, not eating.     -MRI brain as recommended by Oncology-negative for metastatic disease or other acute process.  -She has been on daily dexamethasone since last admission in September.  Discussed with Dr. Sagastume and halved to 2mg tid.   -PT/OT.  Out of bed as much as possible to work on strengthening and conditioning.   -patient was not interested in participating with PT/OT  -CM previously for SNF.  Now considering home with hospice  -plan for home hospice in the morning      VTE Risk Mitigation (From admission, onward)           Ordered     IP VTE HIGH RISK PATIENT  Once         11/03/22 2020     Place sequential compression device  Until discontinued         11/03/22 2020                      Department [facility]  [facility]  Niranjan John MD  Date of service: 11/11/2022

## 2022-11-11 NOTE — PT/OT/SLP DISCHARGE
Occupational Therapy Discharge Summary    Antonieta Clay  MRN: [id number]   Principal Problem: Dehydration      Patient Discharged from acute Occupational Therapy on 11/11/2022.  Please refer to prior OT note dated 11/11/2022 for functional status.    Assessment:      Patient is no longer making progress.    Objective:     GOALS:   Multidisciplinary Problems       Occupational Therapy Goals       Not on file              Multidisciplinary Problems (Resolved)          Problem: Occupational Therapy    Goal Priority Disciplines Outcome Interventions   Occupational Therapy Goal   (Resolved)     OT, PT/OT Met    Description: Goals to be met by: 12/6/2022     Patient will increase functional independence with ADLs by performing:    UE Dressing with Stand-by Assistance.  LE Dressing with Stand-by Assistance.  Grooming while standing with Stand-by Assistance.  Toileting from toilet with Stand-by Assistance for hygiene and clothing management.   Bathing from  shower chair/bench with Stand-by Assistance.  Toilet transfer to toilet with Stand-by Assistance.                         Reasons for Discontinuation of Therapy Services  Patient to d/c home.       Plan:     Patient Discharged to:  home with hospice services    11/11/2022

## 2022-11-11 NOTE — PT/OT/SLP PROGRESS
Speech Language Pathology      Antonieta Clay  MRN: [id number]    Patient not seen today secondary to Patient fatigue. Will follow-up next scheduled visit day.

## 2022-11-11 NOTE — ASSESSMENT & PLAN NOTE
Continue acyclovir IV while in the hospital.  Seems to be doing ok with this.  Will put on oral liquid at discharge.

## 2022-11-11 NOTE — PT/OT/SLP DISCHARGE
Physical Therapy Discharge Summary    Name: Antonieta Clay  MRN: [id number]   Principal Problem: Dehydration     Patient Discharged from acute Physical Therapy on 11/11/22.  Please refer to prior PT noted date on 11/11/22 for functional status.     Assessment:     Pt POC changed to hospice    Objective:     GOALS:   Multidisciplinary Problems       Physical Therapy Goals          Problem: Physical Therapy    Goal Priority Disciplines Outcome Goal Variances Interventions   Physical Therapy Goal     PT, PT/OT Ongoing, Progressing     Description: All physical therapy goals to be met by discharge:    1. Supine to sit with Stand-by Assistance  2. Sit to stand transfer with Stand-by Assistance  3.. Bed to chair transfer with Supervision using Rolling Walker  4. Gait  x 50 feet with Minimal Assistance using Rolling Walker.                          Reasons for Discontinuation of Therapy Services  Pt's POC changed to hospice       Plan:     Patient Discharged to: Palliative Care/Hospice.      11/11/2022

## 2022-11-12 NOTE — NURSING
"Entered room and pt was noted grabbing at anal/ rectum area. Stool was noted on pt hand and fingers, bedding and buttocks. Pt bathed and linens changed. Pt cursing "Leave me the f*ck alone." And "Get her the f*ck out of my house." Repositioned pt and reset bed alarm. Pt resting comfortably in NAD.  "

## 2022-11-12 NOTE — PROGRESS NOTES
Counts include 234 beds at the [facility] Medicine  Progress Note    Patient name: Antonieta Clay  MRN: [id number]  Admit Date: 11/3/2022   LOS: 8 days     SUBJECTIVE:     Principal problem: Malignant neoplasm of major salivary gland    Interval History:     11/12- plan is no longer home with hospice. No change clinically    11/11- plan is home with hospice.  No change clinically    11/10-  patient has slept most of the day, spoke with sister who was in the room.  The family and patient are now considering a more comfort based approach.  Plan for home with hospice.  Likely tomorrow.     11/9- patient is more alert today, attempting to talk but im not able to understand what she is saying. Had a long discussion with  while CM was present in room.  Discussed all options, including LTAC, SNF, home with hospice.  Her  would like to discuss further when her daughters are present. RN reports patient was able to eat more today.      11/8- patient arouses and nods to questions, not verbalizing today.  More sleepy per , also states she isnt eating and refused PT/OT today.  [facility]  No acute events  overnight.     11/7 Unfortunately, Mrs. Mendez has taken a complete 180 turn.  Family and RN supplements history. She ate 75% of breakfast and lunch yesterday but then declined to eat dinner. She did not eat much breakfast this AM.  She was getting up with minimal assist and getting to the toilet with stand by assist yesterday with RN.  Today, with therapy, she initially ambulated with the walker into the [place] with therapy but then midway,  seemed to lose the will to keep walking and pushing the walker and therapy had to move her forward.  She is sitting up in the chair at the time of my exam but really just wants to get back in bed. She cannot identify anything that has changed from yesterday to today. She denies odynophagia.  RN notes she is swallowing her pills  without issue and without pain.  I do note she is confused but I don't find too much more than yesterday. She had some confusion overnight per her . Esophageal biopsies came back as herpes simplex and thus IV acyclovir started and ID consulted. I stopped IV Diflucan and continued nystatin. Abdomen is more distended today per family.  No significant TTP on exam and good bowel sounds.  It has been a few days since her last BM.  She does not know if she is passing gas or not.       11/6 Patient does not recall getting out of bed or seeing PT/OT at the time of my exam.  Per my discussion with RN, she has done significantly better and had been out of bed all morning. She had gotten up with PT/OT as well.  Pt/OT is recommending SNF.  CM consulted.  Patient ate 75% of breakfast.  TPN will be discontinued after this present bag is complete.  She denies oral pain or odynophagia.     Hospital Course:    Patient admitted after being sent by home health for dehydration concerns.  She has been having poor po intake thought to be related to thrush.  In addition, reports of dysphagia and odynophagia. It is estimated that she has last 20lbs over the last 6 weeks.  She is on oral chemo Lenvatinib.  She had recent hospitalization for confusion.  There was concern regarding CNS involvement of her malignancy. This work up was ultimately negative and she improved.  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 There was also concern for espophageal stenosis and thus GI consulted.  She underwent EGD 11/4 which revealed ulcerated and inflamed mucosa of the esophagus with multiple shallow ulcers that is consistent with viral esophagitis.  Biopsies taken. She was seen by Dr. Sagastume who plans for repeat MRI of the brain to help differentiate if her clinical decline is CNS involvement of her malignancy versus chemo induced. Lenvatinib has been stopped.  He has ordered TPN.  I have  added IV Diflucan in addition to oral nystatin.  She is requiring supplemental oxygen and thus CXR ordered- no acute processes. Supplemental oxygen discontinued. 11/6 MRI of the brain performed and negative for acute process.        Baseline:  Long discussion with patient and her . It sounds like she was ambulatory and able to go out and shop approximately 10/19.  By 10/21, she was showing signs of weakness and needing to be assisted to get off the toilet or out of the bath tub.  For the last few days prior to admission, she was sleeping a good deal and not getting off the couch and her  was having to provide a lot of physical support to hep her ambulate.    Scheduled Meds:   acyclovir  5 mg/kg Intravenous Q8H    amLODIPine  5 mg Oral Daily    cholecalciferol (vitamin D3)  5,000 Units Oral Daily    dexAMETHasone  2 mg Oral Q8H    levothyroxine  88 mcg Oral Daily    nystatin  4 mL Oral QID    pantoprazole  40 mg Intravenous Q24H     Continuous Infusions:   amino acids 4.25%-dapjo-Bw-R4R 50 mL/hr at 11/12/22 1512         PRN Meds:acetaminophen, ALPRAZolam, bisacodyL, calcium gluconate IVPB, calcium gluconate IVPB, calcium gluconate IVPB, dextrose 10%, dextrose 10%, glucagon (human recombinant), glucose, glucose, insulin aspart U-100, LIDOcaine HCl 2%, lorazepam, magnesium oxide, magnesium oxide, magnesium sulfate IVPB, magnesium sulfate IVPB, naloxone, ondansetron, polyethylene glycol, potassium bicarbonate, potassium bicarbonate, potassium bicarbonate, potassium chloride **AND** potassium chloride **AND** potassium chloride, potassium, sodium phosphates, potassium, sodium phosphates, potassium, sodium phosphates, sodium chloride 0.9%, sodium phosphate IVPB, sodium phosphate IVPB, sodium phosphate IVPB    Review of patient's allergies indicates:   Allergen Reactions    Pcn [penicillins] Swelling    Codeine Nausea And Vomiting       Review of Systems: As per interval history    OBJECTIVE:     Vital Signs  (Most Recent)  Temp: 97.6 °F (36.4 °C) (11/12/22 1608)  Pulse: 98 (11/12/22 1608)  Resp: 18 (11/12/22 1608)  BP: 109/74 (11/12/22 1608)  SpO2: 95 % (11/12/22 1608)    Vital Signs Range (Last 24H):  Temp:  [97.3 °F (36.3 °C)-98.2 °F (36.8 °C)]   Pulse:  []   Resp:  [16-18]   BP: ()/(67-84)   SpO2:  [93 %-99 %]     I & O (Last 24H):  Intake/Output Summary (Last 24 hours) at 11/12/2022 1622  Last data filed at 11/11/2022 1751  Gross per 24 hour   Intake 1950 ml   Output --   Net 1950 ml         Physical Exam:    Vitals and nursing note reviewed.     Constitutional:       General: Not in acute distress. Frail appearing.      Appearance: Well-developed.   HENT:      Head: Normocephalic and atraumatic.   Eyes:      Pupils: Pupils are equal, round, and reactive to light.   Cardiovascular:      Rate and Rhythm: Regular rhythm.   Pulmonary:      Effort: Pulmonary effort is normal.      Breath sounds: Normal breath sounds. No wheezing.   Abdominal:      General: There is no distension.      Palpations: Abdomen is soft.      Tenderness: There is no abdominal tenderness. There is no guarding or rebound.   Musculoskeletal:         General: Normal range of motion.      Cervical back: Normal range of motion.   Skin:     Findings: No rash.   Neurological:      Mental Status: Alert but confused.  No focal deficits.     Cranial Nerves: No cranial nerve deficit.      Sensory: No sensory deficit.     Laboratory:  I have reviewed all pertinent lab results within the past 24 hours.  CBC:   Recent Labs   Lab 11/12/22  0524   WBC 4.97   RBC 3.38*   HGB 10.2*   HCT 29.9*   PLT 50*   MCV 89   MCH 30.2   MCHC 34.1       CMP:   Recent Labs   Lab 11/12/22  0524   *   CALCIUM 8.4*   ALBUMIN 2.0*   PROT 5.4*   *   K 4.0   CO2 28      BUN 18   CREATININE 0.4*   ALKPHOS 62   *   AST 35   BILITOT 0.8             ASSESSMENT/PLAN:         Active Hospital Problems    Diagnosis  POA    *Malignant neoplasm of major  salivary gland [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         Plan:       -s/p EGD with ulcerated esophagus. Biopsies taken and revealed Herpes simplex. IV acyclovir started.  ID consulted and following, appreciate recs  -Oral intake initially improved and TPN stopped.  Again, not eating.     -MRI brain as recommended by Oncology-negative for metastatic disease or other acute process.  -She has been on daily dexamethasone since last admission in September.  Discussed with Dr. Sagastume and halved to 2mg tid.   -PT/OT.  Out of bed as much as possible to work on strengthening and conditioning.   -patient was not interested in participating with PT/OT  -unclear dispo at this time        VTE Risk Mitigation (From admission, onward)           Ordered     IP VTE HIGH RISK PATIENT  Once         11/03/22 2020     Place sequential compression device  Until discontinued         11/03/22 2020                      Department Hospital Medicine  [facility]  Niranjan John MD  Date of service: 11/12/2022

## 2022-11-12 NOTE — PLAN OF CARE
Pt's previous plan was to discharge home with Hospice.  SW notified today that Pt's family now wanting for Pt to have nutrition and transfer to [facility].  They have declined delivery of equipment from Hospice.    SRUTHI facilitated communication with family and Dr. Sagastume, and was informed that Pt has been accepted by Dr. Khang Go.      SRUTHI spoke to Mirlande with [facility] [place] to initiate transfer.  Later informed that Pt would be accepted at 8:00 AM tomorrow.  Faxing clinicals to Karin at [facility] ([phone]) as requested.  [facility] to follow up in the morning.

## 2022-11-13 NOTE — PLAN OF CARE
11/13/22 1041   Final Note   Assessment Type Final Discharge Note   Anticipated Discharge Disposition Short Term   What phone number can be called within the next 1-3 days to see how you are doing after discharge? [phone]   Post-Acute Status   Hospice Status Discharge Plan Changed   Discharge Delays (!) Ambulance Transport/Facility Transport     Pt being transferred to [facility].  RN informed to call report [phone].  Family aware that they will have to pay for transport (lateral transfer).  Guero called and provided contact information for patient's spouse.

## 2022-11-13 NOTE — DISCHARGE SUMMARY
[facility]  Discharge Summary  Patient Name: Antonieta Clay MRN: [id number]   Patient Class: IP- Inpatient  Length of Stay: 9   Admission Date: 11/3/2022 12:38 PM Attending Physician: Tim John MD   Primary Care Provider: Casandra Yu NP Face-to-Face encounter date: 11/13/2022   Chief Complaint: Weakness, Dehydration, and Mouth Lesions (Pt sent in by home health for dehydration. Pt's  states she has not been eating or drinking and has thrush.)    Date of Discharge: 11/13/2022  Discharge Disposition:facility transfer  Condition: Stable       Reason for Hospitalization     Active Hospital Problems    Diagnosis    *Malignant neoplasm of major salivary gland     8/31/2020:  Left Parotidectomy and left neck dissection  2.1cm adenoid cystic carcinoma, 6/32 LNs positive. Margin positive  T4aN3b        End of life care    Dysphagia    Herpes simplex esophagitis    Adult failure to thrive    Physical debility    Mild protein-calorie malnutrition    Transaminitis    Hypothyroidism    Dehydration         Brief History of Present Illness     Patient admitted after being sent by home health for dehydration concerns.  She has been having poor po intake thought to be related to thrush.  In addition, reports of dysphagia and odynophagia. It is estimated that she has lost 20lbs over the last 6 weeks.  She is on oral chemo Lenvatinib.  She had recent hospitalization for confusion.  There was concern regarding CNS involvement of her malignancy. This work up was ultimately negative and she improved.  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 There was also concern for espophageal stenosis and thus GI consulted.  She underwent EGD 11/4 which revealed ulcerated and inflamed mucosa of the esophagus with multiple shallow ulcers that is consistent with viral esophagitis.  Biopsies taken. She was seen by Dr. Sagastume who plans for repeat MRI of the  brain to help differentiate if her clinical decline is CNS involvement of her malignancy versus chemo induced. Lenvatinib has been stopped.  He has ordered TPN.  I have added IV Diflucan in addition to oral nystatin.  She is requiring supplemental oxygen and thus CXR ordered- no acute processes. Supplemental oxygen discontinued. 11/6 MRI of the brain performed and negative for acute process.          Baseline:  Long discussion with patient and her . It sounds like she was ambulatory and able to go out and shop approximately 10/19.  By 10/21, she was showing signs of weakness and needing to be assisted to get off the toilet or out of the bath tub.  For the last few days prior to admission, she was sleeping a good deal and not getting off the couch and her  was having to provide a lot of physical support to hep her ambulate.    For the full HPI please refer to the History & Physical from this admission.    Hospital Course By Problem with Pertinent Findings       Failure to thrive; weakness; confusion  Herpes esophagitis   -s/p EGD with ulcerated esophagus. Biopsies taken and revealed Herpes simplex. IV acyclovir started.  ID consulted and following, appreciate recs  -Continue acyclovir IV while in house, and follow with liquid oral acyclovir 800 mg 4-5 times per day, or -valtrex 1000 mg TID(harder to swallow)  -Continue decadron  -Oral intake initially improved and TPN stopped.    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  Discussed with Dr. Sagastume and halved to 2mg tid.   -PT/OT.  Out of bed as much as possible to work on strengthening and conditioning.   -patient was not interested in participating with  "PT/OT  -Eventually decision for home hospice was made and arranged  -decision to continue care was then made and plan to transfer to [facility] under the care of Dr. Khang Go, who is a friend of the family, was made       Patient was seen and examined on the date of discharge and determined to be suitable for discharge.    Physical Exam  /76 (BP Location: Right arm, Patient Position: Lying)   Pulse 105   Temp 98.3 °F (36.8 °C) (Axillary)   Resp 16   Ht 5' 4" (1.626 m)   Wt 48.5 kg (107 lb)   SpO2 96%   BMI 18.37 kg/m²   Vitals reviewed.    Constitutional:       General: Not in acute distress. Frail appearing. Minimally interactive  HENT:      Head: Normocephalic and atraumatic. Scar/skin changes noted to left neck r/t treatment  Eyes:      Pupils: Pupils are equal, round, and reactive to light.   Cardiovascular:      Rate and Rhythm: Regular rhythm.   Pulmonary:      Effort: Pulmonary effort is normal.      Breath sounds: Normal breath sounds. No wheezing.   Abdominal:      General: There is no distension.      Palpations: Abdomen is soft.      Tenderness: There is no abdominal tenderness. There is no guarding or rebound.   Musculoskeletal:         General: Normal range of motion.      Cervical back: Normal range of motion.   Neurological:      Mental Status: drowsy, confused.  No focal deficits.     Cranial Nerves: No cranial nerve deficit.      Sensory: No sensory deficit.      Following labs were Reviewed   Recent Labs   Lab 11/13/22  0512   WBC 4.90   HGB 9.8*   HCT 29.0*   PLT 51*   CALCIUM 8.0*   ALBUMIN 1.8*   PROT 4.9*   *   K 4.0   CO2 22*   CL 98   BUN 24*   CREATININE 0.6   ALKPHOS 61   ALT 89*   AST 36   BILITOT 0.8     No results found for: POCTGLUCOSE     All labs within the past 24 hours have been reviewed    Microbiology Results (last 7 days)       Procedure Component Value Units Date/Time    Blood culture [[id number]] Collected: 11/08/22 2489    Order Status: " Completed Specimen: Blood from Line, Port A Cath Updated: 11/12/22 1632     Blood Culture, Routine No Growth to date      No Growth to date      No Growth to date      No Growth to date      No Growth to date    Narrative:      draw from the port    Blood culture [[id number]]  (Abnormal) Collected: 11/07/22 1711    Order Status: Completed Specimen: Blood from Antecubital, Right Arm Updated: 11/09/22 0729     Blood Culture, Routine Gram stain nicholas bottle: Gram positive cocci      Results called to and read back by:Ananya Jacobs RN-[facility];  11/08/2022      15:05 CJD      COAGULASE-NEGATIVE STAPHYLOCOCCUS SPECIES  Organism is a probable contaminant            CT Head Without Contrast   Final Result      X-Ray Abdomen AP 1 View   Final Result      X-Ray Chest AP Portable   Final Result      MRI Brain W WO Contrast   Final Result      X-Ray Chest AP Portable   Final Result      CT Abdomen Pelvis  Without Contrast   Final Result          No results found for this or any previous visit.      Consultants and Procedures   Consultants:  Consults (From admission, onward)          Status Ordering Provider     Inpatient consult to Registered Dietitian/Nutritionist  Once        Provider:  (Not yet assigned)    Completed DULCE CHRISTIANSON     Inpatient consult to Social Work/Case Management  Once        Provider:  (Not yet assigned)    Acknowledged BRYCE BALTAZAR     Inpatient consult to Telemedicine - Psych  Once        Provider:  Yumi Lubin MD    Completed TAMELA SOUZA     Inpatient consult to Infectious Diseases  Once        Provider:  Bryce Baltazar MD    Acknowledged TAMELA SOUZA     Inpatient consult to Registered Dietitian/Nutritionist  Once        Provider:  (Not yet assigned)    Completed YAKOV ESPARZA     IP consult to case management  Once        Provider:  (Not yet assigned)    Acknowledged MOE MORA     Inpatient consult to Gastroenterology  Once        Provider:  Colby  RAYO Donis MD    Completed JJ RIOS     Inpatient consult to Hematology/Oncology  Once        Provider:  Gorge Sagastume MD    Acknowledged JJ RIOS     Inpatient consult to Registered Dietitian/Nutritionist  Once        Provider:  (Not yet assigned)    Completed JJ RIOS     Inpatient consult to Hospitalist  Once        Provider:  Kat Serrano MD    Acknowledged JJ RIOS              Discharge Information:   Diet:  Resume regular diet, supplement with ensure as able.  Transferring to another facility where TPN will be continued    Physical Activity:  Activity as tolerated           Contact information for after-discharge care       Home Medical Care       [facility] .    Service: Home Health Services  Contact information:  [address] [place] [id number]  [phone]                                     Pending laboratory work/Tests to be performed/followed by the Primary care Physician:    Transferring to [facility] [place]      Discharge Medications:     Medication List        CONTINUE taking these medications      fluticasone propionate 50 mcg/actuation nasal spray  Commonly known as: FLONASE  2 sprays (100 mcg total) by Each Nostril route once daily.     lenvatinib 4 mg Cap  Take 16 mg by mouth once daily at 6am.     levothyroxine 88 MCG tablet  Commonly known as: SYNTHROID  Take 1 tablet (88 mcg total) by mouth once daily.     nystatin 100,000 unit/mL suspension  Commonly known as: MYCOSTATIN  Take 4 mLs (400,000 Units total) by mouth 4 (four) times daily. for 10 days     oxyCODONE-acetaminophen  mg per tablet  Commonly known as: PERCOCET  Take 1 tablet by mouth every 6 (six) hours as needed for Pain.            STOP taking these medications      amLODIPine 5 MG tablet  Commonly known as: NORVASC     ascorbic acid (vitamin C) 1000 MG tablet  Commonly known as: VITAMIN C     cholecalciferol (vitamin D3) 125  mcg (5,000 unit) Tab     fish oil-omega-3 fatty acids 300-1,000 mg capsule     megestroL 400 mg/10 mL (40 mg/mL) Susp  Commonly known as: MEGACE     multivit-iron-min-folic acid 18-0.4 mg Tab     ZINC ACETATE ORAL               Where to Get Your Medications        These medications were sent to [id number] - [place], [place] - [address] [facility] [address]  [address] [place] [address] [address]      Phone: [phone]   nystatin 100,000 unit/mL suspension           I spent 45 minutes preparing the discharge including reviewing records from previous encounters, preparation of discharge summary, assessing and final examination of the patient, discharge medicine reconciliation, discussing plan of care, follow up and education and prescriptions.       Niranjan John  [facility] Hospitalist  11/13/2022

## 2022-11-13 NOTE — PROGRESS NOTES
[facility]  Hematology/Oncology  Progress Note    Patient Name: Antonieta Clay  Admission Date: 11/3/2022  Hospital Length of Stay: 9 days  Code Status: Full Code     Subjective:     HPI:  Had a family conference on the phone last night.  Patient has no new complaints this am.  She denies pain or head ache.        Interval History:     Oncology Treatment Plan:   [No matching plan found]    Medications:  Continuous Infusions:   amino acids 4.25%-levns-Ls-F3C 50 mL/hr at 11/12/22 1512     Scheduled Meds:   acyclovir  5 mg/kg Intravenous Q8H    amLODIPine  5 mg Oral Daily    cholecalciferol (vitamin D3)  5,000 Units Oral Daily    dexAMETHasone  2 mg Oral Q8H    levothyroxine  88 mcg Oral Daily    nystatin  4 mL Oral QID    pantoprazole  40 mg Intravenous Q24H     PRN Meds:acetaminophen, ALPRAZolam, bisacodyL, calcium gluconate IVPB, calcium gluconate IVPB, calcium gluconate IVPB, dextrose 10%, dextrose 10%, glucagon (human recombinant), glucose, glucose, insulin aspart U-100, LIDOcaine HCl 2%, lorazepam, magnesium oxide, magnesium oxide, magnesium sulfate IVPB, magnesium sulfate IVPB, naloxone, ondansetron, polyethylene glycol, potassium bicarbonate, potassium bicarbonate, potassium bicarbonate, potassium chloride **AND** potassium chloride **AND** potassium chloride, potassium, sodium phosphates, potassium, sodium phosphates, potassium, sodium phosphates, sodium chloride 0.9%, sodium phosphate IVPB, sodium phosphate IVPB, sodium phosphate IVPB     Review of Systems   Constitutional:  Negative for fever.   Respiratory:  Negative for shortness of breath.    Cardiovascular:  Negative for chest pain and leg swelling.   Gastrointestinal:  Negative for abdominal pain and blood in stool.   Genitourinary:  Negative for hematuria.   Skin:  Negative for rash.   Objective:     Vital Signs (Most Recent):  Temp: 98.3 °F (36.8 °C) (11/13/22 0726)  Pulse: 105 (11/13/22 0726)  Resp: 16 (11/13/22 0726)  BP:  112/76 (11/13/22 0726)  SpO2: 96 % (11/13/22 0726)   Vital Signs (24h Range):  Temp:  [97.4 °F (36.3 °C)-98.5 °F (36.9 °C)] 98.3 °F (36.8 °C)  Pulse:  [] 105  Resp:  [16-18] 16  SpO2:  [93 %-99 %] 96 %  BP: ()/(65-84) 112/76     Weight: 48.5 kg (107 lb)  Body mass index is 18.37 kg/m².  Body surface area is 1.48 meters squared.      Intake/Output Summary (Last 24 hours) at 11/13/2022 0751  Last data filed at 11/12/2022 2018  Gross per 24 hour   Intake 100 ml   Output --   Net 100 ml         Physical Exam  Constitutional:       Appearance: She is well-developed. She is ill-appearing.   HENT:      Head: Normocephalic and atraumatic.      Right Ear: External ear normal.      Left Ear: External ear normal.   Eyes:      Conjunctiva/sclera: Conjunctivae normal.      Pupils: Pupils are equal, round, and reactive to light.   Neck:      Thyroid: No thyromegaly.      Trachea: No tracheal deviation.   Cardiovascular:      Rate and Rhythm: Normal rate and regular rhythm.      Heart sounds: Normal heart sounds.   Pulmonary:      Effort: Pulmonary effort is normal.      Breath sounds: Normal breath sounds.   Abdominal:      General: Bowel sounds are normal. There is no distension.      Palpations: Abdomen is soft. There is no mass.      Tenderness: There is no abdominal tenderness.   Skin:     Findings: No rash.   Neurological:      Comments: Neuro intact througout       Significant Labs:   CBC:   Recent Labs   Lab 11/12/22 0524 11/13/22 0512   WBC 4.97 4.90   HGB 10.2* 9.8*   HCT 29.9* 29.0*   PLT 50* 51*      and CMP:   Recent Labs   Lab 11/12/22 0524 11/13/22 0512   * 129*   K 4.0 4.0    98   CO2 28 22*   * 149*   BUN 18 24*   CREATININE 0.4* 0.6   CALCIUM 8.4* 8.0*   PROT 5.4* 4.9*   ALBUMIN 2.0* 1.8*   BILITOT 0.8 0.8   ALKPHOS 62 61   AST 35 36   * 89*   ANIONGAP 5* 9         Diagnostic Results:  None    Assessment/Plan:     Herpes simplex esophagitis  Continue acyclovir IV while in  the hospital.  Seems to be doing ok with this.  Will put on oral liquid at discharge.     Adult failure to thrive  I had a long difficult discussion with Ms. Clay's family last night and they have decided to rescind the hospice care approach and have requested transfer to another facility for nutritional support.  They are hoping she will improve and have some longevity.  I discussed that I feel this could be hazardous in regard to her long term suffering from her cancer.  The TPN will certainly prolong her life but her cancer treatment does not work and she will again have cancer growth and likely suffering from this.  They are insistent in this approach.  To that end,  I discussed this with Dr. Go at [facility] and he is willing to accept her as a patient.  Will transfer when available.  Very long and difficult process and my heart goes out to this very pleasant and caring family.  Spent > one hour in total care in this process.          Thank you for your consult. I will follow-up with patient. Please contact us if you have any additional questions.     Gorge Coombs MD  Hematology/Oncology  Pending sale to [facility]

## 2022-11-13 NOTE — ASSESSMENT & PLAN NOTE
I had a long difficult discussion with Ms. Clay's family last night and they have decided to rescind the hospice care approach and have requested transfer to another facility for nutritional support.  They are hoping she will improve and have some longevity.  I discussed that I feel this could be hazardous in regard to her long term suffering from her cancer.  The TPN will certainly prolong her life but her cancer treatment does not work and she will again have cancer growth and likely suffering from this.  They are insistent in this approach.  To that end,  I discussed this with Dr. Go at [facility] and he is willing to accept her as a patient.  Will transfer when available.  Very long and difficult process and my heart goes out to this very pleasant and caring family.  Spent > one hour in total care in this process.

## 2022-11-13 NOTE — NURSING
Discharged off unit in stable condition with [facility] to [facility]. Discharge paperwork sent with patient. Sister at bedside and aware of transfer.

## 2022-11-13 NOTE — SUBJECTIVE & OBJECTIVE
Interval History:     Oncology Treatment Plan:   [No matching plan found]    Medications:  Continuous Infusions:   amino acids 4.25%-scpus-Gz-K4C 50 mL/hr at 11/12/22 1512     Scheduled Meds:   acyclovir  5 mg/kg Intravenous Q8H    amLODIPine  5 mg Oral Daily    cholecalciferol (vitamin D3)  5,000 Units Oral Daily    dexAMETHasone  2 mg Oral Q8H    levothyroxine  88 mcg Oral Daily    nystatin  4 mL Oral QID    pantoprazole  40 mg Intravenous Q24H     PRN Meds:acetaminophen, ALPRAZolam, bisacodyL, calcium gluconate IVPB, calcium gluconate IVPB, calcium gluconate IVPB, dextrose 10%, dextrose 10%, glucagon (human recombinant), glucose, glucose, insulin aspart U-100, LIDOcaine HCl 2%, lorazepam, magnesium oxide, magnesium oxide, magnesium sulfate IVPB, magnesium sulfate IVPB, naloxone, ondansetron, polyethylene glycol, potassium bicarbonate, potassium bicarbonate, potassium bicarbonate, potassium chloride **AND** potassium chloride **AND** potassium chloride, potassium, sodium phosphates, potassium, sodium phosphates, potassium, sodium phosphates, sodium chloride 0.9%, sodium phosphate IVPB, sodium phosphate IVPB, sodium phosphate IVPB     Review of Systems   Constitutional:  Negative for fever.   Respiratory:  Negative for shortness of breath.    Cardiovascular:  Negative for chest pain and leg swelling.   Gastrointestinal:  Negative for abdominal pain and blood in stool.   Genitourinary:  Negative for hematuria.   Skin:  Negative for rash.   Objective:     Vital Signs (Most Recent):  Temp: 98.3 °F (36.8 °C) (11/13/22 0726)  Pulse: 105 (11/13/22 0726)  Resp: 16 (11/13/22 0726)  BP: 112/76 (11/13/22 0726)  SpO2: 96 % (11/13/22 0726)   Vital Signs (24h Range):  Temp:  [97.4 °F (36.3 °C)-98.5 °F (36.9 °C)] 98.3 °F (36.8 °C)  Pulse:  [] 105  Resp:  [16-18] 16  SpO2:  [93 %-99 %] 96 %  BP: ()/(65-84) 112/76     Weight: 48.5 kg (107 lb)  Body mass index is 18.37 kg/m².  Body surface area is 1.48 meters  squared.      Intake/Output Summary (Last 24 hours) at 11/13/2022 0751  Last data filed at 11/12/2022 2018  Gross per 24 hour   Intake 100 ml   Output --   Net 100 ml         Physical Exam  Constitutional:       Appearance: She is well-developed. She is ill-appearing.   HENT:      Head: Normocephalic and atraumatic.      Right Ear: External ear normal.      Left Ear: External ear normal.   Eyes:      Conjunctiva/sclera: Conjunctivae normal.      Pupils: Pupils are equal, round, and reactive to light.   Neck:      Thyroid: No thyromegaly.      Trachea: No tracheal deviation.   Cardiovascular:      Rate and Rhythm: Normal rate and regular rhythm.      Heart sounds: Normal heart sounds.   Pulmonary:      Effort: Pulmonary effort is normal.      Breath sounds: Normal breath sounds.   Abdominal:      General: Bowel sounds are normal. There is no distension.      Palpations: Abdomen is soft. There is no mass.      Tenderness: There is no abdominal tenderness.   Skin:     Findings: No rash.   Neurological:      Comments: Neuro intact througout       Significant Labs:   CBC:   Recent Labs   Lab 11/12/22 0524 11/13/22 0512   WBC 4.97 4.90   HGB 10.2* 9.8*   HCT 29.9* 29.0*   PLT 50* 51*      and CMP:   Recent Labs   Lab 11/12/22 0524 11/13/22 0512   * 129*   K 4.0 4.0    98   CO2 28 22*   * 149*   BUN 18 24*   CREATININE 0.4* 0.6   CALCIUM 8.4* 8.0*   PROT 5.4* 4.9*   ALBUMIN 2.0* 1.8*   BILITOT 0.8 0.8   ALKPHOS 62 61   AST 35 36   * 89*   ANIONGAP 5* 9         Diagnostic Results:  None

## 2022-11-13 NOTE — PLAN OF CARE
Problem: Adult Inpatient Plan of Care  Goal: Plan of Care Review  Outcome: Ongoing, Progressing  Goal: Patient-Specific Goal (Individualized)  Outcome: Ongoing, Progressing  Goal: Absence of Hospital-Acquired Illness or Injury  Outcome: Ongoing, Progressing  Goal: Optimal Comfort and Wellbeing  Outcome: Ongoing, Progressing  Goal: Readiness for Transition of Care  Outcome: Ongoing, Progressing     Problem: Fall Injury Risk  Goal: Absence of Fall and Fall-Related Injury  Outcome: Ongoing, Progressing  Bed alarm on, nonskid socks in place, bed rails x3      Problem: Parenteral Nutrition  Goal: Effective Intravenous Nutrition Therapy Delivery  Outcome: Ongoing, Progressing  Clinimix infusing      Problem: Skin Injury Risk Increased  Goal: Skin Health and Integrity  Outcome: Ongoing, Progressing     Problem: Infection  Goal: Absence of Infection Signs and Symptoms  Outcome: Ongoing, Progressing   Acyclovir infusing as ordered

## 2022-11-25 NOTE — TELEPHONE ENCOUNTER
----- Message from Angie Holland sent at 5/11/2020  9:33 AM CDT -----  Pt has a earache and would like to come in office to be looked at. She went to urgent care a while ago but can not remember when.   [phone]   Notified by PSR that patient called saying Yusra was going to send 7 prescriptions to her pharmacy for her, but pharmacy only received 6 and stated they do not have the script for nicorette. Per our records, receipt confirmed by pharmacy 11/25/22 at 3:25pm. PSR advised patient of this but patient stated pharmacy said they have not received. Will re-send script. PSR will inform patient.    Nicorette script re-sent:  \"E-Prescribing Status: Receipt confirmed by pharmacy (11/25/2022  3:43 PM CST)\"

## (undated) DEVICE — TIP BOVIE TEFLON E1450X

## (undated) DEVICE — DRESSING POST OP MEPILEX AG 4X6  498300

## (undated) DEVICE — GLOVE BIOGEL MICRO SURGEON PINK SZ 7.5

## (undated) DEVICE — TRAY GENERAL SURGERY

## (undated) DEVICE — PAD BOVIE ADULT

## (undated) DEVICE — SUTURE VICRYL 3-0 18 SH VCP864D

## (undated) DEVICE — DERMABOND HVD MINI  DHVM12

## (undated) DEVICE — DRAPE C-ARM (FITS NEW C-ARM) 07-CA108

## (undated) DEVICE — UNDERGLOVE BIOGEL PI MICRO BLUE SZ 8

## (undated) DEVICE — SUTURE PROLENE 2-0 SH 36 8523H

## (undated) DEVICE — SUTURE MONOCRYL 4-0 PS-2 27 MCP426H

## (undated) DEVICE — PORT POWER MRI 8FR 1808000: Type: IMPLANTABLE DEVICE | Status: NON-FUNCTIONAL

## (undated) DEVICE — BAG DECANTER 10-102

## (undated) DEVICE — NEEDLE SAFETY ECLIPSE 25G 1-1/2IN 305767

## (undated) DEVICE — SYRINGE 10ML 302995

## (undated) DEVICE — DRAPE LAPAROTOMY TRANSVERSE 89281

## (undated) DEVICE — SOLUTION IRRI NS BOTTLE 1000ML R5200-01

## (undated) DEVICE — BLADE SCALPEL #11 371111